# Patient Record
Sex: FEMALE | Race: AMERICAN INDIAN OR ALASKA NATIVE | Employment: OTHER | URBAN - METROPOLITAN AREA
[De-identification: names, ages, dates, MRNs, and addresses within clinical notes are randomized per-mention and may not be internally consistent; named-entity substitution may affect disease eponyms.]

---

## 2019-03-13 ENCOUNTER — OFFICE VISIT (OUTPATIENT)
Dept: INTERNAL MEDICINE CLINIC | Age: 59
End: 2019-03-13

## 2019-03-13 VITALS
WEIGHT: 201.1 LBS | OXYGEN SATURATION: 97 % | BODY MASS INDEX: 29.7 KG/M2 | HEART RATE: 90 BPM | DIASTOLIC BLOOD PRESSURE: 85 MMHG | TEMPERATURE: 98 F | SYSTOLIC BLOOD PRESSURE: 120 MMHG

## 2019-03-13 DIAGNOSIS — M54.12 CERVICAL RADICULOPATHY: ICD-10-CM

## 2019-03-13 DIAGNOSIS — K56.1 INTUSSUSCEPTION INTESTINE (HCC): ICD-10-CM

## 2019-03-13 DIAGNOSIS — Z12.39 BREAST CANCER SCREENING: ICD-10-CM

## 2019-03-13 DIAGNOSIS — M54.2 NECK PAIN: Primary | ICD-10-CM

## 2019-03-13 DIAGNOSIS — Z12.4 CERVICAL CANCER SCREENING: ICD-10-CM

## 2019-03-13 DIAGNOSIS — M54.50 ACUTE BILATERAL LOW BACK PAIN WITHOUT SCIATICA: ICD-10-CM

## 2019-03-13 RX ORDER — LOSARTAN POTASSIUM 25 MG/1
TABLET ORAL DAILY
COMMUNITY
End: 2019-03-27 | Stop reason: SDUPTHER

## 2019-03-13 RX ORDER — ALBUTEROL SULFATE 90 UG/1
1-2 AEROSOL, METERED RESPIRATORY (INHALATION) AS NEEDED
COMMUNITY
End: 2021-07-02

## 2019-03-13 RX ORDER — PREDNISONE 20 MG/1
20 TABLET ORAL 3 TIMES DAILY
Qty: 21 TAB | Refills: 0 | Status: SHIPPED | OUTPATIENT
Start: 2019-03-13 | End: 2019-03-20

## 2019-03-13 RX ORDER — GABAPENTIN 300 MG/1
300 CAPSULE ORAL 3 TIMES DAILY
COMMUNITY
End: 2019-04-24

## 2019-03-13 RX ORDER — MELOXICAM 15 MG/1
15 TABLET ORAL DAILY
Qty: 30 TAB | Refills: 3 | Status: SHIPPED | OUTPATIENT
Start: 2019-03-13 | End: 2019-07-23 | Stop reason: SDUPTHER

## 2019-03-13 RX ORDER — HYOSCYAMINE SULFATE 0.12 MG/1
0.12 TABLET SUBLINGUAL
Qty: 90 TAB | Refills: 0 | Status: SHIPPED | OUTPATIENT
Start: 2019-03-13 | End: 2019-04-24

## 2019-03-13 NOTE — PROGRESS NOTES
Chief Complaint   Patient presents with    Generalized Body Aches     she is a 66y.o. year old female who presents for evaluation of Body aches  Pain Assessment Encounter      Magdiel Dose  0/56/2790  Onset of Symptoms: neck pain, back pain   ________________________________________________________________________  Description: States that she has had lower back pain for many years. She is been through physical therapy 6 sessions with the [facility] back in November 2018. States that she is had an MRI done in 2004 which showed disc herniation and has had an epidural steroid injection of her lumbar spine. Regards to her neck she has had 5-day history of bilateral neck pain with radiation at times into her arms. States that she has not had any recent imaging but does have a chiropractor who she sees regularly. Frequency: more than 5 times a day  Pain Scale:(1-10): 7  Trauma Hx: fall, on on back,   Hx of similar symptoms: Yes  Radiation: YES, foot and leg  Duration:  continuous      Progression: has worsened  What makes it better?: heat and ice  What makes it worse?:exercise, sitting, strecthing and walking  Medications tried: acetaminophen, ibuprofen, aspirin    Reviewed and agree with Nurse Note and duplicated in this note. Reviewed PmHx, RxHx, FmHx, SocHx, AllgHx and updated and dated in the chart. No family history on file. No past medical history on file.    Social History     Socioeconomic History    Marital status: LEGALLY      Spouse name: Not on file    Number of children: Not on file    Years of education: Not on file    Highest education level: Not on file   Tobacco Use    Smoking status: Former Smoker   Substance and Sexual Activity    Alcohol use: Yes     Comment: occassionally    Drug use: No        Review of Systems - negative except as listed above      Objective:     Vitals:    03/13/19 1148   BP: 120/85   Pulse: 90   Temp: 98 °F (36.7 °C)   SpO2: 97%   Weight: 201 lb 1.6 oz (91.2 kg)       Physical Examination: General appearance - alert, well appearing, and in no distress  Back exam -positive straight-leg raise bilaterally, pain with palpation of bilateral low back, deep tendon reflexes intact  Neurological - alert, oriented, normal speech, no focal findings or movement disorder noted  Musculoskeletal -neck-negative Spurling's bilaterally with pain reproduced and to upper traps, pain with palpation of upper traps and spasms. Extremities - peripheral pulses normal, no pedal edema, no clubbing or cyanosis  Skin - normal coloration and turgor, no rashes, no suspicious skin lesions noted    Assessment/ Plan:   Diagnoses and all orders for this visit:    1. Neck pain  -     XR SPINE CERV 4 OR 5 V; Future  -     REFERRAL TO PHYSICAL THERAPY    2. Cervical radiculopathy  -     XR SPINE CERV 4 OR 5 V; Future  -     REFERRAL TO PHYSICAL THERAPY    3. Acute bilateral low back pain without sciatica  -     REFERRAL TO PHYSICAL THERAPY  -     MRI LUMB SPINE WO CONT; Future    4. Intussusception intestine (HCC)  -     hyoscyamine SL (LEVSIN/SL) 0.125 mg SL tablet; 1 Tab by SubLINGual route every four (4) hours as needed for Cramping.  -     REFERRAL TO GASTROENTEROLOGY    5. Cervical cancer screening  -     REFERRAL TO OBSTETRICS AND GYNECOLOGY    6. Breast cancer screening  -     [facility] MAMMO BI SCREENING INCL CAD; Future    Other orders  -     meloxicam (MOBIC) 15 mg tablet; Take 1 Tab by mouth daily. -     predniSONE (DELTASONE) 20 mg tablet; Take 20 mg by mouth three (3) times daily for 7 days.          Pathophysiology, recovery and rehabilitation process discussed and questions answered   Counseling for 30 Minutes of the total visit duration   Pictures and figures used as necessary   Provided reassurance   Monitor response to Physical Therapy   Recommend activity modification   Recommend  lower impact activities-walking, Eliptical, Nordic Track, cycling or swimming   Follow up in 4 week(s) 1) Remember to stay active and/or exercise regularly (I suggest 30-45 minutes daily)   2) For reliable dietary information, go to www. EATRIGHT.org. You may wish to consider seeing the nutritionist at [facility] [phone], also consider the [address] I have discussed the diagnosis with the patient and the intended plan as seen in the above orders. The patient has received an after-visit summary and questions were answered concerning future plans. Medication Side Effects and Warnings were discussed with patient,  Patient Labs were reviewed and or requested, and  Patient Past Records were reviewed and or requested  yes      Pt agrees to call or return to clinic and/or go to closest ER with any worsening of symptoms. This may include, but not limited to increased fever (>100.4) with NSAIDS or Tylenol, increased edema, confusion, rash, worsening of presenting symptoms.

## 2019-03-19 ENCOUNTER — TELEPHONE (OUTPATIENT)
Dept: INTERNAL MEDICINE CLINIC | Age: 59
End: 2019-03-19

## 2019-03-20 ENCOUNTER — OFFICE VISIT (OUTPATIENT)
Dept: INTERNAL MEDICINE CLINIC | Age: 59
End: 2019-03-20

## 2019-03-20 VITALS
SYSTOLIC BLOOD PRESSURE: 144 MMHG | RESPIRATION RATE: 16 BRPM | OXYGEN SATURATION: 96 % | BODY MASS INDEX: 30.36 KG/M2 | HEIGHT: 69 IN | WEIGHT: 205 LBS | HEART RATE: 79 BPM | DIASTOLIC BLOOD PRESSURE: 89 MMHG | TEMPERATURE: 97.7 F

## 2019-03-20 DIAGNOSIS — T78.40XA ALLERGIC REACTION, INITIAL ENCOUNTER: Primary | ICD-10-CM

## 2019-03-20 DIAGNOSIS — H53.8 BLURRY VISION, LEFT EYE: ICD-10-CM

## 2019-03-20 NOTE — PROGRESS NOTES
Chief Complaint Patient presents with  Allergic Reaction  
 
she is a 66y.o. year old female who presents for follow-up of possibly having an allergic reaction. Patient states she has a foggy memory, not feeling herself, and elevated bp after taking prednisone and meloxicam. Patient stopped taking meds yesterday. Reviewed and agree with Nurse Note and duplicated in this note. Reviewed PmHx, RxHx, FmHx, SocHx, AllgHx and updated and dated in the chart. History reviewed. No pertinent family history. History reviewed. No pertinent past medical history. Social History Socioeconomic History  Marital status: LEGALLY  Spouse name: Not on file  Number of children: Not on file  Years of education: Not on file  Highest education level: Not on file Tobacco Use  Smoking status: Former Smoker  Smokeless tobacco: Never Used Substance and Sexual Activity  Alcohol use: Yes Comment: occassionally  Drug use: No  
 Sexual activity: Yes Review of Systems - negative except as listed above Objective:  
 
Vitals:  
 03/20/19 1324 BP: 144/89 Pulse: 79 Resp: 16 Temp: 97.7 °F (36.5 °C) TempSrc: Oral  
SpO2: 96% Weight: 205 lb (93 kg) Height: 5' 9\" (1.753 m) Physical Examination: General appearance - alert, well appearing, and in no distress Eyes - pupils equal and reactive, extraocular eye movements intact Ears - bilateral TM's and external ear canals normal 
Nose - normal and patent, no erythema, discharge or polyps Mouth - mucous membranes moist, pharynx normal without lesions Neck - supple, no significant adenopathy Chest - clear to auscultation, no wheezes, rales or rhonchi, symmetric air entry Heart - normal rate, regular rhythm, normal S1, S2, no murmurs, rubs, clicks or gallops Abdomen - soft, nontender, nondistended, no masses or organomegaly Extremities - peripheral pulses normal, no pedal edema, no clubbing or cyanosis Skin - normal coloration and turgor, no rashes, no suspicious skin lesions noted Assessment/ Plan:  
Diagnoses and all orders for this visit: 1. Allergic reaction, initial encounter 2. Blurry vision, left eye 
-     REFERRAL TO OPHTHALMOLOGY Patient will continue to monitor symptoms over the next day and if any worsening symptoms we will proceed to ER. Patient will hold off on both meloxicam and prednisone. This likely reaction to the prednisone and she may restart meloxicam after she feels the symptoms have resolved. Follow-up Disposition: Not on File I have discussed the diagnosis with the patient and the intended plan as seen in the above orders. The patient has received an after-visit summary and questions were answered concerning future plans. Medication Side Effects and Warnings were discussed with patient, Patient Labs were reviewed and or requested, and 
Patient Past Records were reviewed and or requested  yes Pt agrees to call or return to clinic and/or go to closest ER with any worsening of symptoms. This may include, but not limited to increased fever (>100.4) with NSAIDS or Tylenol, increased edema, confusion, rash, worsening of presenting symptoms.

## 2019-03-25 ENCOUNTER — HOSPITAL ENCOUNTER (OUTPATIENT)
Dept: PHYSICAL THERAPY | Age: 59
Discharge: HOME | End: 2019-03-25
Payer: MEDICAID

## 2019-03-25 DIAGNOSIS — M54.50 ACUTE BILATERAL LOW BACK PAIN WITHOUT SCIATICA: ICD-10-CM

## 2019-03-25 DIAGNOSIS — M54.12 CERVICAL RADICULOPATHY: ICD-10-CM

## 2019-03-25 DIAGNOSIS — M54.2 NECK PAIN: ICD-10-CM

## 2019-03-25 PROCEDURE — 97110 THERAPEUTIC EXERCISES: CPT

## 2019-03-25 PROCEDURE — 97162 PT EVAL MOD COMPLEX 30 MIN: CPT

## 2019-03-26 ENCOUNTER — OFFICE VISIT (OUTPATIENT)
Dept: INTERNAL MEDICINE CLINIC | Age: 59
End: 2019-03-26

## 2019-03-26 VITALS
OXYGEN SATURATION: 97 % | WEIGHT: 202.8 LBS | HEART RATE: 103 BPM | SYSTOLIC BLOOD PRESSURE: 128 MMHG | BODY MASS INDEX: 30.04 KG/M2 | DIASTOLIC BLOOD PRESSURE: 85 MMHG | HEIGHT: 69 IN

## 2019-03-26 DIAGNOSIS — Z00.00 WELL WOMAN EXAM (NO GYNECOLOGICAL EXAM): Primary | ICD-10-CM

## 2019-03-26 NOTE — PROGRESS NOTES
Chief Complaint Patient presents with  Well Woman  
 
she is a 66y.o. year old female who presents for CPE Complete Physical Exam Questions: LMP -  1/2019 Last Pap (q 3 years, or q5 with HPV) - over 10 years ago Last Mammogram (50-74 biennially)- over 10 years ago Hx of abnl Pap - No 
 
1. Do you follow a low fat diet?  no 
2. Are you up to date on your Tdap (<10 years)? No 
3. Have you ever had a Pneumovax vaccine (>65)? Unknown [id number] Not applicable [id number] Not applicable 4. Have you had Zoster vaccine (>60)? Not applicable 5. Have you had the HPV - Gardasil (13- 26)? Not applicable 6. Do you follow an exercise program?  no 
7. Do you smoke?  no 
8. Do you consider yourself overweight?  yes 9. Is there a family history of CAD< age 48? Yes, sister heart valve defect. 10.  Is there a family history of Cancer?  yes 11. Do you know your Cancer risks? Yes 12. Have you had a colonoscopy? No, pt states it is scheduled for 4/25/2019. 13. Have you been tested for HIV or other STI's? No HIV testing today(18-66 y/o)? No 
14. Have had a bone density scan or DEXA done(>65)? Not applicable 15. Have you had an EKG performed in the last five years (>50)? No 
 
Other complaints: 
 
Reviewed and agree with Nurse Note and duplicated in this note. Reviewed PmHx, RxHx, FmHx, SocHx, AllgHx and updated and dated in the chart. No family history on file. No past medical history on file. Social History Socioeconomic History  Marital status: LEGALLY  Spouse name: Not on file  Number of children: Not on file  Years of education: Not on file  Highest education level: Not on file Tobacco Use  Smoking status: Former Smoker  Smokeless tobacco: Never Used Substance and Sexual Activity  Alcohol use: Yes Comment: occassionally  Drug use: No  
 Sexual activity: Yes Review of Systems - negative except as listed above Objective:  
 
Vitals: 03/26/19 1050 BP: 128/85 Pulse: (!) 103 SpO2: 97% Weight: 202 lb 12.8 oz (92 kg) Height: 5' 9\" (1.753 m) Physical Examination: General appearance - alert, well appearing, and in no distress Eyes - pupils equal and reactive, extraocular eye movements intact Ears - bilateral TM's and external ear canals normal 
Nose - normal and patent, no erythema, discharge or polyps Mouth - mucous membranes moist, pharynx normal without lesions Neck - supple, no significant adenopathy Chest - clear to auscultation, no wheezes, rales or rhonchi, symmetric air entry Heart - normal rate, regular rhythm, normal S1, S2, no murmurs, rubs, clicks or gallops Abdomen - soft, nontender, nondistended, no masses or organomegaly Musculoskeletal - no joint tenderness, deformity or swelling Extremities - peripheral pulses normal, no pedal edema, no clubbing or cyanosis Skin - normal coloration and turgor, no rashes, no suspicious skin lesions noted Assessment/ Plan:  
Diagnoses and all orders for this visit: 
 
1. Well woman exam (no gynecological exam) -     CBC W/O DIFF 
-     LIPID PANEL 
-     METABOLIC PANEL, COMPREHENSIVE Labs to be drawn: CBC, CMP, Lipid I have discussed the diagnosis with the patient and the intended plan as seen in the above orders. The patient has received an after-visit summary and questions were answered concerning future plans. Medication Side Effects and Warnings were discussed with patient, Patient Labs were reviewed and or requested, and 
Patient Past Records were reviewed and or requested  yes Pt agrees to call or return to clinic and/or go to closest ER with any worsening of symptoms. This may include, but not limited to increased fever (>100.4) with NSAIDS or Tylenol, increased edema, confusion, rash, worsening of presenting symptoms.

## 2019-03-26 NOTE — PROGRESS NOTES
[facility]  [id number] [address] [place], [address] [place] [address]  Phone: [phone]  Fax: [phone]    Plan of Care/Statement of Necessity for Physical Therapy Services  2-15    Patient name: Selena Barker  : 1960  Provider#: [id number]  Referral source: Geno Mcdonald MD      Medical/Treatment Diagnosis: Neck pain [M54.2]  Cervical radiculopathy [M54.12]  Acute bilateral low back pain without sciatica [M54.5]     Prior Hospitalization: see medical history     Comorbidities: Allergies, Anxiety or Panic Disorders, Arthritis, Asthma, Back pain, BMI>30, Gastrointestinal Disease, Headaches, High blood pressure, Prior surgery  Prior Level of Function: Patient completed 20 minutes of exercise at least 3 times a week. Medications: Verified on Patient Summary List    Start of Care: 3/25/19      Onset Date: Chronic       The Plan of Care and following information is based on the information from the initial evaluation. Assessment/ key information: Pt is a 66year old female who was referred to skilled PT for chronic back and neck pain with cervical radiculopathy. Based on examination, patient presents with an extension directional preference and various impairments associated with her chronic pain including decreased cervical/lumbar ROM, poor postural strength and awareness, decreased hip and core strength, impaired gait mechanics, and decreased functional activity tolerance. Patient will benefit from skilled PT services to modify and progress therapeutic interventions, address functional mobility deficits, address ROM deficits, address strength deficits, analyze and address soft tissue restrictions, analyze and cue movement patterns, analyze and modify body mechanics/ergonomics and assess and modify postural abnormalities to attain pt/PT goals.       Evaluation Complexity History MEDIUM  Complexity : 1-2 comorbidities / personal factors will impact the outcome/ POC ; Examination MEDIUM Complexity : 3 Standardized tests and measures addressing body structure, function, activity limitation and / or participation in recreation  ;Presentation MEDIUM Complexity : Evolving with changing characteristics  ; Clinical Decision Making MEDIUM Complexity : FOTO score of 26-74  Overall Complexity Rating: MEDIUM    Problem List: pain affecting function, decrease ROM, decrease strength, edema affecting function, impaired gait/ balance, decrease ADL/ functional abilitiies, decrease activity tolerance and decrease flexibility/ joint mobility   Treatment Plan may include any combination of the following: Therapeutic exercise, Therapeutic activities, Neuromuscular re-education, Physical agent/modality, Gait/balance training, Manual therapy, Patient education, Functional mobility training and Stair training  Patient / Family readiness to learn indicated by: asking questions, trying to perform skills and interest  Persons(s) to be included in education: patient (P)  Barriers to Learning/Limitations: None  Patient Goal (s): Relief  Patient Self Reported Health Status: fair  Rehabilitation Potential: fair    Short Term Goals: To be accomplished in 6 treatments:   1. Pt will be independent and compliant with HEP. 2. Pt will report improved sitting and standing postural awareness. 3. Pt will improve cervical rotation AROM from 40 to >/= 50 degrees bilaterally. Long Term Goals: To be accomplished in 12 treatments:   1. Pt will be independent and compliant with HEP. 2. Pt will improve FOTO score by the MCID demonstrating improved overall function with decreased pain or discomfort. 3. Pt will improve cervical rotation AROM to >/= 60 degrees bilaterally. 4. Pt will be able to stand >/= 2 hours without complaints of low back pain. 5. Pt will be able to tend to her garden without pain > 2/10. 6. Pt will be able to carry groceries without complaints of neck or back pain.   Frequency / Duration: Patient to be seen 2 times per week for 12 treatments. Patient/ Caregiver education and instruction: self care, activity modification and exercises    [x]  Plan of care has been reviewed with STACEY Romero 3/26/2019     ________________________________________________________________________    I certify that the above Therapy Services are being furnished while the patient is under my care. I agree with the treatment plan and certify that this therapy is necessary.     [de-identified] Signature:____________________  Date:____________Time: _________

## 2019-03-27 ENCOUNTER — OFFICE VISIT (OUTPATIENT)
Dept: OBGYN CLINIC | Age: 59
End: 2019-03-27

## 2019-03-27 VITALS
DIASTOLIC BLOOD PRESSURE: 90 MMHG | BODY MASS INDEX: 29.92 KG/M2 | WEIGHT: 202 LBS | HEIGHT: 69 IN | SYSTOLIC BLOOD PRESSURE: 160 MMHG

## 2019-03-27 DIAGNOSIS — Z01.419 ENCOUNTER FOR GYNECOLOGICAL EXAMINATION WITHOUT ABNORMAL FINDING: Primary | ICD-10-CM

## 2019-03-27 DIAGNOSIS — R73.9 ELEVATED BLOOD SUGAR: Primary | ICD-10-CM

## 2019-03-27 LAB
ALBUMIN SERPL-MCNC: 4.2 G/DL (ref 3.5–5.5)
ALBUMIN/GLOB SERPL: 1.2 {RATIO} (ref 1.2–2.2)
ALP SERPL-CCNC: 106 IU/L (ref 39–117)
ALT SERPL-CCNC: 24 IU/L (ref 0–32)
AST SERPL-CCNC: 15 IU/L (ref 0–40)
BILIRUB SERPL-MCNC: 0.6 MG/DL (ref 0–1.2)
BUN SERPL-MCNC: 10 MG/DL (ref 6–24)
BUN/CREAT SERPL: 14 (ref 9–23)
CALCIUM SERPL-MCNC: 8.9 MG/DL (ref 8.7–10.2)
CHLORIDE SERPL-SCNC: 103 MMOL/L (ref 96–106)
CHOLEST SERPL-MCNC: 221 MG/DL (ref 100–199)
CO2 SERPL-SCNC: 23 MMOL/L (ref 20–29)
CREAT SERPL-MCNC: 0.71 MG/DL (ref 0.57–1)
ERYTHROCYTE [DISTWIDTH] IN BLOOD BY AUTOMATED COUNT: 15 % (ref 12.3–15.4)
GLOBULIN SER CALC-MCNC: 3.4 G/DL (ref 1.5–4.5)
GLUCOSE SERPL-MCNC: 111 MG/DL (ref 65–99)
HCT VFR BLD AUTO: 35.1 % (ref 34–46.6)
HDLC SERPL-MCNC: 61 MG/DL
HGB BLD-MCNC: 11.6 G/DL (ref 11.1–15.9)
LDLC SERPL CALC-MCNC: 139 MG/DL (ref 0–99)
MCH RBC QN AUTO: 29.1 PG (ref 26.6–33)
MCHC RBC AUTO-ENTMCNC: 33 G/DL (ref 31.5–35.7)
MCV RBC AUTO: 88 FL (ref 79–97)
PLATELET # BLD AUTO: 330 X10E3/UL (ref 150–379)
POTASSIUM SERPL-SCNC: 4.3 MMOL/L (ref 3.5–5.2)
PROT SERPL-MCNC: 7.6 G/DL (ref 6–8.5)
RBC # BLD AUTO: 3.98 X10E6/UL (ref 3.77–5.28)
SODIUM SERPL-SCNC: 140 MMOL/L (ref 134–144)
TRIGL SERPL-MCNC: 107 MG/DL (ref 0–149)
VLDLC SERPL CALC-MCNC: 21 MG/DL (ref 5–40)
WBC # BLD AUTO: 8.5 X10E3/UL (ref 3.4–10.8)

## 2019-03-27 RX ORDER — LOSARTAN POTASSIUM 25 MG/1
25 TABLET ORAL DAILY
Qty: 30 TAB | Refills: 5 | Status: SHIPPED | OUTPATIENT
Start: 2019-03-27 | End: 2019-10-10 | Stop reason: SDUPTHER

## 2019-03-27 RX ORDER — FLUTICASONE PROPIONATE 50 MCG
2 SPRAY, SUSPENSION (ML) NASAL DAILY
COMMUNITY
End: 2019-04-24

## 2019-03-27 NOTE — PATIENT INSTRUCTIONS

## 2019-03-27 NOTE — PROGRESS NOTES
Your sugars are slightly elevated, please return to clinic at your convenience to take a look at your 3-month average sugar levels. This is called the hemoglobin A1c and is a lab draw Your \"Bad\" cholesterol (LDL and/or triglycerides) are elevated. Please eat a healthier diet as described below. In particular avoid fried, fatty and junk foods, while increasing fiber (fruits and vegetables). If you cannot increase fiber through diet, you can supplement with metamucil as directed on bottle daily. Also, make sure you are taking 1 to 2 grams of over the counter fish oil. Increase exercise to 5 times per week of cardio lasting at least 30 min's each (biking, walking, elliptical, swimming). Lets recheck the fasting (atleast eight hours) in 6 months. Mediterranean diet: Choose this heart-healthy diet option The Mediterranean diet is a heart-healthy eating plan combining elements of Mediterranean-style cooking. Here's how to adopt the Mediterranean diet. If you're looking for a heart-healthy eating plan, the Mediterranean diet might be right for you. The Mediterranean diet incorporates the basics of healthy eating  plus a splash of flavorful olive oil and perhaps a glass of red wine  among other components characterizing the traditional cooking style of countries bordering the [facility]. Most healthy diets include fruits, vegetables, fish and whole grains, and limit unhealthy fats. While these parts of a healthy diet remain tried-and-true, subtle variations or differences in proportions of certain foods may make a difference in your risk of heart disease. Benefits of the [address] [facility] Research has shown that the traditional Mediterranean diet reduces the risk of heart disease.  In fact, a recent analysis of more than 1.5 million healthy adults demonstrated that following a Mediterranean diet was associated with a reduced risk of overall and cardiovascular mortality, a reduced incidence of cancer and cancer mortality, and a reduced incidence of Parkinson's and Alzheimer's diseases. For this reason, most if not all major scientific organizations encourage healthy adults to adapt a style of eating like that of the [address] [facility] for prevention of major chronic diseases. Key components of the Mediterranean diet The Mediterranean diet emphasizes:  
Getting plenty of exercise Eating primarily plant-based foods, such as fruits and vegetables, whole grains, legumes and nuts Replacing butter with healthy fats such as olive oil and canola oil Using herbs and spices instead of salt to flavor foods Limiting red meat to no more than a few times a month Eating fish and poultry at least twice a week Drinking red wine in moderation (optional) The diet also recognizes the importance of enjoying meals with family and friends. Fruits, vegetables, nuts and grains The Mediterranean diet traditionally includes fruits, vegetables, pasta and rice. For example, residents of [facility] eat very little red meat and average nine servings a day of antioxidant-rich fruits and vegetables. The Mediterranean diet has been associated with a lower level of oxidized low-density lipoprotein (LDL) cholesterol  the \"bad\" cholesterol that's more likely to build up deposits in your arteries. Nuts are another part of a healthy Mediterranean diet. Nuts are high in fat (approximately 80 percent of their calories come from fat), but most of the fat is not saturated. Because nuts are high in calories, they should not be eaten in large amounts  generally no more than a handful a day. For the best nutrition, avoid candied or honey-roasted and heavily salted nuts. Grains in the [address] [place] [address] region are typically whole grain and usually contain very few unhealthy trans fats, and bread is an important part of the diet there.  However, throughout the [address] [place] [address] region, bread is eaten plain or dipped in olive oil  not eaten with butter or margarines, which contain saturated or trans fats.

## 2019-03-27 NOTE — PROGRESS NOTES
Annual exam    Gaetano Rodriguez is a 66 y.o. presenting for annual exam.   Her main concerns today include none    Ob/Gyn Hx:    No LMP recorded (lmp unknown). Menarche- age  Menses- regular monthly cycles? no, last 2 days, passage of clots? no, intermenstrual spotting? yes, Number of pads/tampons per day? 3  Contraception- none  STI- denies  SA- yes, female only    Health maintenance:  Pap- unknown  Mammo- scheduled in 2019  Colonoscopy- scheduled in 2019  Gardasil- none per patient    Past Medical History:   Diagnosis Date    Degenerative spinal arthritis     Hypertension     IBS (irritable bowel syndrome)        Past Surgical History:   Procedure Laterality Date    HX  SECTION      HX CHOLECYSTECTOMY         History reviewed. No pertinent family history.     Social History     Socioeconomic History    Marital status: LEGALLY      Spouse name: Not on file    Number of children: Not on file    Years of education: Not on file    Highest education level: Not on file   Occupational History    Not on file   Social Needs    Financial resource strain: Not on file    Food insecurity:     Worry: Not on file     Inability: Not on file    Transportation needs:     Medical: Not on file     Non-medical: Not on file   Tobacco Use    Smoking status: Former Smoker    Smokeless tobacco: Never Used   Substance and Sexual Activity    Alcohol use: Yes     Comment: occassionally    Drug use: No    Sexual activity: Yes     Partners: Female   Lifestyle    Physical activity:     Days per week: Not on file     Minutes per session: Not on file    Stress: Not on file   Relationships    Social connections:     Talks on phone: Not on file     Gets together: Not on file     Attends Orthodox service: Not on file     Active member of club or organization: Not on file     Attends meetings of clubs or organizations: Not on file     Relationship status: Not on file    Intimate partner violence:     Fear of current or ex partner: Not on file     Emotionally abused: Not on file     Physically abused: Not on file     Forced sexual activity: Not on file   Other Topics Concern    Not on file   Social History Narrative    Not on file       Current Outpatient Medications   Medication Sig Dispense Refill    fluticasone propionate (FLONASE ALLERGY RELIEF) 50 mcg/actuation nasal spray 2 Sprays by Both Nostrils route daily.  gabapentin (NEURONTIN) 300 mg capsule Take 300 mg by mouth three (3) times daily.  losartan (COZAAR) 25 mg tablet Take  by mouth daily.  albuterol (PROAIR HFA) 90 mcg/actuation inhaler Take  by inhalation.  hyoscyamine SL (LEVSIN/SL) 0.125 mg SL tablet 1 Tab by SubLINGual route every four (4) hours as needed for Cramping. 90 Tab 0    meloxicam (MOBIC) 15 mg tablet Take 1 Tab by mouth daily. 30 Tab 3    B INFANTIS/B ANI/B IGOR/B BIFID (PROBIOTIC DIGESTIVE CARE PO) Take  by mouth.  RANITIDINE HCL (ZANTAC PO) Take  by mouth.  INULIN (FIBER GUMMIES PO) Take  by mouth.  BUSPIRONE HCL (BUSPAR PO) Take  by mouth.          Allergies   Allergen Reactions    Egg Yolk Swelling    Lactaid [Lactase] Other (comments)     Bloating/gas    Prednisone Swelling       Review of Systems - History obtained from the patient  Constitutional: negative for weight loss, fever, night sweats  HEENT: negative for hearing loss, earache, congestion, snoring, sorethroat  CV: negative for chest pain, palpitations, edema  Resp: negative for cough, shortness of breath, wheezing  GI: negative for change in bowel habits, abdominal pain, black or bloody stools  : negative for frequency, dysuria, hematuria, vaginal discharge  MSK: negative for back pain, joint pain, muscle pain  Breast: negative for breast lumps, nipple discharge, galactorrhea  Skin :negative for itching, rash, hives  Neuro: negative for dizziness, headache, confusion, weakness  Psych: negative for anxiety, depression, change in mood  Heme/lymph: negative for bleeding, bruising, pallor    Physical Exam    Visit Vitals  /90 (BP 1 Location: Left arm, BP Patient Position: Sitting)   Ht 5' 9\" (1.753 m)   Wt 202 lb (91.6 kg)   LMP  (LMP Unknown)   BMI 29.83 kg/m²       Constitutional  · Appearance: well-nourished, well developed, alert, in no acute distress    HENT  · Head and Face: appears normal    Neck  · Inspection/Palpation: normal appearance, no masses or tenderness  · Lymph Nodes: no lymphadenopathy present  · Thyroid: gland size normal, nontender, no nodules or masses present on palpation    Chest  · Respiratory Effort: non-labored breathing  · Auscultation: CTAB, normal breath sounds    Cardiovascular  · Heart:  · Auscultation: regular rate and rhythm without murmur  · Extremities: no peripheral edema    Breasts  · Inspection of Breasts: breasts symmetrical, no skin changes, no discharge present, nipple appearance normal, no skin retraction present  · Palpation of Breasts and Axillae: no masses present on palpation, no breast tenderness  · Axillary Lymph Nodes: no lymphadenopathy present    Gastrointestinal  · Abdominal Examination: abdomen non-tender to palpation, normal bowel sounds, no masses present  · Liver and spleen: no hepatomegaly present, spleen not palpable  · Hernias: no hernias identified    Genitourinary  · External Genitalia: normal appearance for age, no discharge present, no tenderness present, no inflammatory lesions present, no masses present, no atrophy present  · Vagina: normal vaginal vault without central or paravaginal defects, no discharge present, no inflammatory lesions present, no masses present  · Bladder: non-tender to palpation  · Urethra: appears normal  · Cervix: normal   · Uterus: normal size, shape and consistency  · Adnexa: no adnexal tenderness present, no adnexal masses present-limited by habitus  · Perineum: perineum within normal limits, no evidence of trauma, no rashes or skin lesions present    Skin  · General Inspection: no rash, no lesions identified    Neurologic/Psychiatric  · Mental Status:  · Orientation: grossly oriented to person, place and time  · Mood and Affect: mood normal, affect appropriate      Assessment/Plan:  66 y.o. overall doing well.      Health Maintenance:  -counseled re: diet, exercise, healthy lifestyle  -pap/HPV  sent  -STI testing SENT      -refer for mammo and colonoscopy scheduled    RTC: 1 year for annual wellness assessment, or sooner prn for problems or concerns  -handouts and instructions provided    Deanne Castellon  3/27/2019  2:17 PM     Signed By: Governor MD Mariaa     March 27, 2019

## 2019-03-29 ENCOUNTER — DOCUMENTATION ONLY (OUTPATIENT)
Dept: INTERNAL MEDICINE CLINIC | Age: 59
End: 2019-03-29

## 2019-03-29 ENCOUNTER — HOSPITAL ENCOUNTER (OUTPATIENT)
Dept: MAMMOGRAPHY | Age: 59
Discharge: HOME | End: 2019-03-29
Attending: FAMILY MEDICINE
Payer: MEDICAID

## 2019-03-29 ENCOUNTER — APPOINTMENT (OUTPATIENT)
Dept: MRI IMAGING | Age: 59
End: 2019-03-29
Attending: FAMILY MEDICINE
Payer: MEDICAID

## 2019-03-29 DIAGNOSIS — Z12.39 BREAST CANCER SCREENING: ICD-10-CM

## 2019-03-29 PROCEDURE — 77067 SCR MAMMO BI INCL CAD: CPT

## 2019-04-01 LAB
C TRACH RRNA CVX QL NAA+PROBE: NEGATIVE
CYTOLOGIST CVX/VAG CYTO: NORMAL
CYTOLOGY CVX/VAG DOC THIN PREP: NORMAL
DX ICD CODE: NORMAL
HPV I/H RISK 4 DNA CVX QL PROBE+SIG AMP: NEGATIVE
Lab: NORMAL
Lab: NORMAL
N GONORRHOEA RRNA CVX QL NAA+PROBE: NEGATIVE
OTHER STN SPEC: NORMAL
PATH REPORT.FINAL DX SPEC: NORMAL
STAT OF ADQ CVX/VAG CYTO-IMP: NORMAL
T VAGINALIS RRNA SPEC QL NAA+PROBE: NEGATIVE

## 2019-04-03 ENCOUNTER — HOSPITAL ENCOUNTER (OUTPATIENT)
Dept: PHYSICAL THERAPY | Age: 59
Discharge: HOME | End: 2019-04-03
Payer: MEDICAID

## 2019-04-03 PROCEDURE — 97110 THERAPEUTIC EXERCISES: CPT

## 2019-04-03 NOTE — PROGRESS NOTES
PT DAILY TREATMENT NOTE 2-15    Patient Name: Dee Costa  [id number]  : 1960  [x]  Patient  Verified  Payor: [employer] / Plan: [facility]UN[facility] PLAN [place] CCCP / Product Type: Managed Care Medicaid /    In time:10:02 am  Out time:11:15 am  Total Treatment Time (min): 73 minutes  Visit #: 2    Treatment Area: Neck pain [M54.2]  Low back pain [M54.5]    SUBJECTIVE  Pain Level (0-10 scale): 3.5/10  Any medication changes, allergies to medications, adverse drug reactions, diagnosis change, or new procedure performed?: [x] No    [] Yes (see summary sheet for update)  Subjective functional status/changes:   [] No changes reported  Pt reports she is doing well today. She woke up with significant pain in R low back to anterior right hip on Friday which lasted all day; she thinks that it was from bending over while picking up a bag. Symptoms eased over the weekend. The neck has been great. OBJECTIVE    Modality rationale: decrease pain and increase tissue extensibility to improve the patients ability to perform ADLs with decreased pain or discomfort.    Min Type Additional Details       [] Estim: []Att   []Unatt    []TENS instruct                  []IFC  []Premod   []NMES                     []Other:  []w/US   []w/ice   []w/heat  Position:  Location:       []  Traction: [] Cervical       []Lumbar                       [] Prone          []Supine                       []Intermittent   []Continuous Lbs:  [] before manual  [] after manual  []w/heat    []  Ultrasound: []Continuous   [] Pulsed                       at: []1MHz   []3MHz Location:  W/cm2:    [] Paraffin         Location:   []w/heat   10 []  Ice     [x]  Heat  []  Ice massage Position: Supine  Location: Lumbar    []  Laser  []  Other: Position:  Location:      []  Vasopneumatic Device Pressure:       [] lo [] med [] hi   Temperature:      [x] Skin assessment post-treatment:  [x]intact []redness- no adverse reaction    []redness - adverse reaction:       63 min Therapeutic Exercise:  [] See flow sheet :   Rationale: increase ROM and increase strength to improve the patients ability to perform ADLs with decreased pain or discomfort. With   [] TE   [] TA   [] neuro   [] other: Patient Education: [x] Review HEP    [] Progressed/Changed HEP based on:   [] positioning   [] body mechanics   [] transfers   [] heat/ice application    [] other:      Other Objective/Functional Measures:     TTP: L>R PSIS; lumbar spinous processes     Pain Level (0-10 scale) post treatment: 1/10    ASSESSMENT/Changes in Function:   Pt was noting radiating symptoms into left foot beginning in the session. SRIKANTH increased pain in low back and did not improve symptoms in left foot, per Pt. However, she noted decreased symptoms in LE and back throughout session; continue to monitor effect of repeated extension going forward due to extension preference noted in evaluation. Pt noted significant challenge with L-sided clam shells, though was able to perform without pain. Continue progressing therex as tolerated. Patient will continue to benefit from skilled PT services to modify and progress therapeutic interventions, address functional mobility deficits, address ROM deficits, address strength deficits, analyze and address soft tissue restrictions, analyze and cue movement patterns, analyze and modify body mechanics/ergonomics and assess and modify postural abnormalities to attain remaining goals. [x]  See Plan of Care  []  See progress note/recertification  []  See Discharge Summary         Progress towards goals / Updated goals:   Short Term Goals: To be accomplished in 6 treatments:               1. Pt will be independent and compliant with HEP. 2. Pt will report improved sitting and standing postural awareness. 3. Pt will improve cervical rotation AROM from 40 to >/= 50 degrees bilaterally. Long Term Goals:  To be accomplished in 12 treatments:               1. Pt will be independent and compliant with HEP. 2. Pt will improve FOTO score by the MCID demonstrating improved overall function with decreased pain or discomfort. 3. Pt will improve cervical rotation AROM to >/= 60 degrees bilaterally. 4. Pt will be able to stand >/= 2 hours without complaints of low back pain. 5. Pt will be able to tend to her garden without pain > 2/10. 6. Pt will be able to carry groceries without complaints of neck or back pain.         PLAN  [x]  Upgrade activities as tolerated     [x]  Continue plan of care  []  Update interventions per flow sheet       []  Discharge due to:_  []  Other:_      Nikolai Encinas 4/3/2019

## 2019-04-08 ENCOUNTER — HOSPITAL ENCOUNTER (OUTPATIENT)
Dept: PHYSICAL THERAPY | Age: 59
Discharge: HOME | End: 2019-04-08
Payer: MEDICAID

## 2019-04-08 PROCEDURE — 97110 THERAPEUTIC EXERCISES: CPT

## 2019-04-08 NOTE — PROGRESS NOTES
PT DAILY TREATMENT NOTE 2-15    Patient Name: Maria G Auguste  IBWO:[id number]  : 1960  [x]  Patient  Verified  Payor: Cindi Carmona / Plan: [facility] COMMUNITY PLAN [facility] CCCP / Product Type: Managed Care Medicaid /    In time:10:02 am  Out time:10:58 am  Total Treatment Time (min): 56 minutes  Visit #: 3    Treatment Area: Neck pain [M54.2]  Low back pain [M54.5]     SUBJECTIVE  Pain Level (0-10 scale): 3.510  Any medication changes, allergies to medications, adverse drug reactions, diagnosis change, or new procedure performed?: [x] No    [] Yes (see summary sheet for update)  Subjective functional status/changes:   [] No changes reported  She reports she was on her feet for a prolonged time on Saturday and she noted stiffness in low back. She has been pretty diligent with her exercises and is more aware of her posture. OBJECTIVE    56 min Therapeutic Exercise:  [] See flow sheet :   Rationale: increase ROM and increase strength to improve the patients ability to perform ADLs with decreased pain or discomfort. With   [] TE   [] TA   [] neuro   [] other: Patient Education: [x] Review HEP    [] Progressed/Changed HEP based on:   [] positioning   [] body mechanics   [] transfers   [] heat/ice application    [] other:      Other Objective/Functional Measures:     TTP: L>R PSIS; lumbar spinous processes     Pain Level (0-10 scale) post treatment: 1/10    ASSESSMENT/Changes in Function:   Added split stance rows and shoulder extensions to promote both core stability as well as periscapular/postural strength cuing for decreased shoulder shrug and proper posture; Pt noted mild discomfort in right knee, but was able to perform without pain in low back or neck. Progressed pelvic tilts to include marches in order challenge core stabilization with functional movement; Pt able to perform properly with cuing.   Patient will continue to benefit from skilled PT services to modify and progress therapeutic interventions, address functional mobility deficits, address ROM deficits, address strength deficits, analyze and address soft tissue restrictions, analyze and cue movement patterns, analyze and modify body mechanics/ergonomics and assess and modify postural abnormalities to attain remaining goals. [x]  See Plan of Care  []  See progress note/recertification  []  See Discharge Summary         Progress towards goals / Updated goals:   Short Term Goals: To be accomplished in 6 treatments:               1. Pt will be independent and compliant with HEP. 2. Pt will report improved sitting and standing postural awareness. 3. Pt will improve cervical rotation AROM from 40 to >/= 50 degrees bilaterally. Long Term Goals: To be accomplished in 12 treatments:               1. Pt will be independent and compliant with HEP. 2. Pt will improve FOTO score by the MCID demonstrating improved overall function with decreased pain or discomfort. 3. Pt will improve cervical rotation AROM to >/= 60 degrees bilaterally. 4. Pt will be able to stand >/= 2 hours without complaints of low back pain. 5. Pt will be able to tend to her garden without pain > 2/10. 6. Pt will be able to carry groceries without complaints of neck or back pain.         PLAN  [x]  Upgrade activities as tolerated     [x]  Continue plan of care  []  Update interventions per flow sheet       []  Discharge due to:_  []  Other:_      Len Adkins 4/8/2019

## 2019-04-10 ENCOUNTER — HOSPITAL ENCOUNTER (OUTPATIENT)
Dept: PHYSICAL THERAPY | Age: 59
Discharge: HOME | End: 2019-04-10
Payer: MEDICAID

## 2019-04-10 PROCEDURE — 97110 THERAPEUTIC EXERCISES: CPT

## 2019-04-10 PROCEDURE — 97140 MANUAL THERAPY 1/> REGIONS: CPT

## 2019-04-10 NOTE — PROGRESS NOTES
PT DAILY TREATMENT NOTE 2-15    Patient Name: Nelson Burns  DNJE:[id number]  : 1960  [x]  Patient  Verified  Payor: [facility] MEDICAID / Plan: [facility] COMMUNITY PL[facility] [place] CCCP / Product Type: Managed Care Medicaid /    In time:10:04 am  Out time: 11:03 am  Total Treatment Time (min): 59 minutes  Visit #: 4    Treatment Area: Neck pain [M54.2]  Low back pain [M54.5]     SUBJECTIVE  Pain Level (0-10 scale): 4/10  Any medication changes, allergies to medications, adverse drug reactions, diagnosis change, or new procedure performed?: [x] No    [] Yes (see summary sheet for update)  Subjective functional status/changes:   [] No changes reported  Pt carried groceries yesterday with less discomfort and more stability. She has been doing lots of walking without discomfort. She is also noticing less fatigue with descending stairs. OBJECTIVE    49 min Therapeutic Exercise:  [] See flow sheet :   Rationale: increase ROM and increase strength to improve the patients ability to perform ADLs with decreased pain or discomfort. 10 min Manual Therapy: MFR and TPR of upper trap, levator scap, and cervical paraspinals bilaterally   Rationale: decrease pain, increase ROM, increase tissue extensibility and decrease trigger points to improve the patients ability to perform ADLs with decreased pain or discomfort. With   [x] TE   [] TA   [] neuro   [] other: Patient Education: [x] Review HEP    [] Progressed/Changed HEP based on:   [] positioning   [] body mechanics   [] transfers   [] heat/ice application    [] other:      Other Objective/Functional Measures:     TTP: L>R PSIS; lumbar spinous processes     Pain Level (0-10 scale) post treatment: 1/10    ASSESSMENT/Changes in Function:   Pt noted decreased tightness/soreness in neck bilaterally post-manual treatment. Pt noted dizziness and headache with introduction of upper trap stretches today; cued to limit motion to stret.   Added quadruped multifidus exercise today for increased lumbar stabilization; Pt demonstrated QL compensation and hip rotation which improved with cues to keep feet on table and just lift knee to isolate multifidus. Patient will continue to benefit from skilled PT services to modify and progress therapeutic interventions, address functional mobility deficits, address ROM deficits, address strength deficits, analyze and address soft tissue restrictions, analyze and cue movement patterns, analyze and modify body mechanics/ergonomics and assess and modify postural abnormalities to attain remaining goals. [x]  See Plan of Care  []  See progress note/recertification  []  See Discharge Summary         Progress towards goals / Updated goals:   Short Term Goals: To be accomplished in 6 treatments:               1. Pt will be independent and compliant with HEP. 2. Pt will report improved sitting and standing postural awareness. 3. Pt will improve cervical rotation AROM from 40 to >/= 50 degrees bilaterally. Long Term Goals: To be accomplished in 12 treatments:               1. Pt will be independent and compliant with HEP. 2. Pt will improve FOTO score by the MCID demonstrating improved overall function with decreased pain or discomfort. 3. Pt will improve cervical rotation AROM to >/= 60 degrees bilaterally. 4. Pt will be able to stand >/= 2 hours without complaints of low back pain. 5. Pt will be able to tend to her garden without pain > 2/10. 6. Pt will be able to carry groceries without complaints of neck or back pain.         PLAN  [x]  Upgrade activities as tolerated     [x]  Continue plan of care  []  Update interventions per flow sheet       []  Discharge due to:_  []  Other:_      Pam Thomas 4/10/2019

## 2019-04-15 ENCOUNTER — HOSPITAL ENCOUNTER (OUTPATIENT)
Dept: PHYSICAL THERAPY | Age: 59
End: 2019-04-15
Payer: MEDICAID

## 2019-04-24 RX ORDER — BACLOFEN 20 MG
500 TABLET ORAL DAILY
COMMUNITY
End: 2019-11-04

## 2019-04-24 RX ORDER — MAGNESIUM HYDROXIDE 400 MG/5ML
595 SUSPENSION, ORAL (FINAL DOSE FORM) ORAL DAILY
COMMUNITY
End: 2019-11-04

## 2019-04-24 RX ORDER — LORATADINE 10 MG/1
10 TABLET ORAL
COMMUNITY
End: 2020-11-10 | Stop reason: ALTCHOICE

## 2019-04-24 RX ORDER — BUSPIRONE HYDROCHLORIDE 5 MG/1
5 TABLET ORAL
COMMUNITY
End: 2019-07-24 | Stop reason: SDUPTHER

## 2019-04-24 RX ORDER — RANITIDINE 150 MG/1
150 TABLET, FILM COATED ORAL
COMMUNITY
End: 2020-11-10

## 2019-04-25 ENCOUNTER — HOSPITAL ENCOUNTER (OUTPATIENT)
Age: 59
Setting detail: OUTPATIENT SURGERY
Discharge: HOME | End: 2019-04-25
Attending: SPECIALIST | Admitting: SPECIALIST
Payer: MEDICAID

## 2019-04-25 ENCOUNTER — ANESTHESIA (OUTPATIENT)
Dept: ENDOSCOPY | Age: 59
End: 2019-04-25
Payer: MEDICAID

## 2019-04-25 ENCOUNTER — ANESTHESIA EVENT (OUTPATIENT)
Dept: ENDOSCOPY | Age: 59
End: 2019-04-25
Payer: MEDICAID

## 2019-04-25 VITALS
DIASTOLIC BLOOD PRESSURE: 86 MMHG | SYSTOLIC BLOOD PRESSURE: 142 MMHG | BODY MASS INDEX: 30.14 KG/M2 | HEART RATE: 84 BPM | OXYGEN SATURATION: 91 % | HEIGHT: 68 IN | TEMPERATURE: 97.8 F | WEIGHT: 198.9 LBS | RESPIRATION RATE: 33 BRPM

## 2019-04-25 LAB
H PYLORI FROM TISSUE: NEGATIVE
KIT LOT NO., HCLOLOT: NORMAL
NEGATIVE CONTROL: NEGATIVE
POSITIVE CONTROL: POSITIVE

## 2019-04-25 PROCEDURE — 74011250636 HC RX REV CODE- 250/636

## 2019-04-25 PROCEDURE — 88305 TISSUE EXAM BY PATHOLOGIST: CPT

## 2019-04-25 PROCEDURE — 77030027957 HC TBNG IRR ENDOGTR BUSS -B: Performed by: SPECIALIST

## 2019-04-25 PROCEDURE — 77030009426 HC FCPS BIOP ENDOSC BSC -B: Performed by: SPECIALIST

## 2019-04-25 PROCEDURE — 87077 CULTURE AEROBIC IDENTIFY: CPT | Performed by: SPECIALIST

## 2019-04-25 PROCEDURE — 76040000007: Performed by: SPECIALIST

## 2019-04-25 PROCEDURE — 88342 IMHCHEM/IMCYTCHM 1ST ANTB: CPT

## 2019-04-25 PROCEDURE — 76060000032 HC ANESTHESIA 0.5 TO 1 HR: Performed by: SPECIALIST

## 2019-04-25 RX ORDER — DEXTROMETHORPHAN/PSEUDOEPHED 2.5-7.5/.8
1.2 DROPS ORAL
Status: DISCONTINUED | OUTPATIENT
Start: 2019-04-25 | End: 2019-04-25 | Stop reason: HOSPADM

## 2019-04-25 RX ORDER — ATROPINE SULFATE 0.1 MG/ML
0.5 INJECTION INTRAVENOUS
Status: DISCONTINUED | OUTPATIENT
Start: 2019-04-25 | End: 2019-04-25 | Stop reason: HOSPADM

## 2019-04-25 RX ORDER — FENTANYL CITRATE 50 UG/ML
200 INJECTION, SOLUTION INTRAMUSCULAR; INTRAVENOUS
Status: DISCONTINUED | OUTPATIENT
Start: 2019-04-25 | End: 2019-04-25 | Stop reason: HOSPADM

## 2019-04-25 RX ORDER — SODIUM CHLORIDE 9 MG/ML
50 INJECTION, SOLUTION INTRAVENOUS CONTINUOUS
Status: DISCONTINUED | OUTPATIENT
Start: 2019-04-25 | End: 2019-04-25 | Stop reason: HOSPADM

## 2019-04-25 RX ORDER — LIDOCAINE HYDROCHLORIDE 20 MG/ML
INJECTION, SOLUTION EPIDURAL; INFILTRATION; INTRACAUDAL; PERINEURAL AS NEEDED
Status: DISCONTINUED | OUTPATIENT
Start: 2019-04-25 | End: 2019-04-25 | Stop reason: HOSPADM

## 2019-04-25 RX ORDER — NALOXONE HYDROCHLORIDE 0.4 MG/ML
0.4 INJECTION, SOLUTION INTRAMUSCULAR; INTRAVENOUS; SUBCUTANEOUS
Status: DISCONTINUED | OUTPATIENT
Start: 2019-04-25 | End: 2019-04-25 | Stop reason: HOSPADM

## 2019-04-25 RX ORDER — MIDAZOLAM HYDROCHLORIDE 1 MG/ML
.25-1 INJECTION, SOLUTION INTRAMUSCULAR; INTRAVENOUS
Status: DISCONTINUED | OUTPATIENT
Start: 2019-04-25 | End: 2019-04-25 | Stop reason: HOSPADM

## 2019-04-25 RX ORDER — PROPOFOL 10 MG/ML
INJECTION, EMULSION INTRAVENOUS AS NEEDED
Status: DISCONTINUED | OUTPATIENT
Start: 2019-04-25 | End: 2019-04-25 | Stop reason: HOSPADM

## 2019-04-25 RX ORDER — SODIUM CHLORIDE 0.9 % (FLUSH) 0.9 %
5-40 SYRINGE (ML) INJECTION AS NEEDED
Status: DISCONTINUED | OUTPATIENT
Start: 2019-04-25 | End: 2019-04-25 | Stop reason: HOSPADM

## 2019-04-25 RX ORDER — EPINEPHRINE 0.1 MG/ML
1 INJECTION INTRACARDIAC; INTRAVENOUS
Status: DISCONTINUED | OUTPATIENT
Start: 2019-04-25 | End: 2019-04-25 | Stop reason: HOSPADM

## 2019-04-25 RX ORDER — FLUMAZENIL 0.1 MG/ML
0.2 INJECTION INTRAVENOUS
Status: DISCONTINUED | OUTPATIENT
Start: 2019-04-25 | End: 2019-04-25 | Stop reason: HOSPADM

## 2019-04-25 RX ORDER — SODIUM CHLORIDE 0.9 % (FLUSH) 0.9 %
5-40 SYRINGE (ML) INJECTION EVERY 8 HOURS
Status: DISCONTINUED | OUTPATIENT
Start: 2019-04-25 | End: 2019-04-25 | Stop reason: HOSPADM

## 2019-04-25 RX ORDER — SODIUM CHLORIDE 9 MG/ML
INJECTION, SOLUTION INTRAVENOUS
Status: DISCONTINUED | OUTPATIENT
Start: 2019-04-25 | End: 2019-04-25 | Stop reason: HOSPADM

## 2019-04-25 RX ADMIN — PROPOFOL 40 MG: 10 INJECTION, EMULSION INTRAVENOUS at 16:30

## 2019-04-25 RX ADMIN — PROPOFOL 60 MG: 10 INJECTION, EMULSION INTRAVENOUS at 16:28

## 2019-04-25 RX ADMIN — SODIUM CHLORIDE: 9 INJECTION, SOLUTION INTRAVENOUS at 16:27

## 2019-04-25 RX ADMIN — PROPOFOL 100 MG: 10 INJECTION, EMULSION INTRAVENOUS at 16:27

## 2019-04-25 RX ADMIN — PROPOFOL 50 MG: 10 INJECTION, EMULSION INTRAVENOUS at 16:34

## 2019-04-25 RX ADMIN — PROPOFOL 50 MG: 10 INJECTION, EMULSION INTRAVENOUS at 16:47

## 2019-04-25 RX ADMIN — PROPOFOL 50 MG: 10 INJECTION, EMULSION INTRAVENOUS at 16:42

## 2019-04-25 RX ADMIN — LIDOCAINE HYDROCHLORIDE 40 MG: 20 INJECTION, SOLUTION EPIDURAL; INFILTRATION; INTRACAUDAL; PERINEURAL at 16:27

## 2019-04-25 RX ADMIN — PROPOFOL 50 MG: 10 INJECTION, EMULSION INTRAVENOUS at 16:39

## 2019-04-25 RX ADMIN — PROPOFOL 50 MG: 10 INJECTION, EMULSION INTRAVENOUS at 16:45

## 2019-04-25 RX ADMIN — PROPOFOL 50 MG: 10 INJECTION, EMULSION INTRAVENOUS at 16:37

## 2019-04-25 NOTE — ANESTHESIA PREPROCEDURE EVALUATION
Relevant Problems No relevant active problems Anesthetic History No history of anesthetic complications Review of Systems / Medical History Patient summary reviewed, nursing notes reviewed and pertinent labs reviewed Pulmonary Asthma Comments: Former Smoker Neuro/Psych Comments: Chronic pain Cardiovascular Hypertension Exercise tolerance: >4 METS 
  
GI/Hepatic/Renal 
  
GERD: well controlled Comments: IBS Endo/Other Arthritis Other Findings Physical Exam 
 
Airway Mallampati: II 
TM Distance: 4 - 6 cm Neck ROM: normal range of motion Mouth opening: Normal 
 
 Cardiovascular Regular rate and rhythm,  S1 and S2 normal,  no murmur, click, rub, or gallop Rhythm: regular Rate: normal 
 
 
 
 Dental 
 
Dentition: Upper partial plate Pulmonary Breath sounds clear to auscultation Abdominal 
GI exam deferred Other Findings Anesthetic Plan ASA: 2 Anesthesia type: MAC Induction: Intravenous Anesthetic plan and risks discussed with: Patient

## 2019-04-25 NOTE — H&P
Colonoscopy History and Physical      The patient was seen and examined. Date of last colonoscopy: 2008, Polyps  No      The airway was assessed and documented. The problem list, past medical history, and medications were reviewed. There is no problem list on file for this patient.     Social History     Socioeconomic History    Marital status: LEGALLY      Spouse name: Not on file    Number of children: Not on file    Years of education: Not on file    Highest education level: Not on file   Occupational History    Not on file   Social Needs    Financial resource strain: Not on file    Food insecurity:     Worry: Not on file     Inability: Not on file    Transportation needs:     Medical: Not on file     Non-medical: Not on file   Tobacco Use    Smoking status: Former Smoker    Smokeless tobacco: Never Used    Tobacco comment: quit 2012   Substance and Sexual Activity    Alcohol use: Yes     Comment: 3-4 times a yr    Drug use: No    Sexual activity: Yes     Partners: Female   Lifestyle    Physical activity:     Days per week: Not on file     Minutes per session: Not on file    Stress: Not on file   Relationships    Social connections:     Talks on phone: Not on file     Gets together: Not on file     Attends Jew service: Not on file     Active member of club or organization: Not on file     Attends meetings of clubs or organizations: Not on file     Relationship status: Not on file    Intimate partner violence:     Fear of current or ex partner: Not on file     Emotionally abused: Not on file     Physically abused: Not on file     Forced sexual activity: Not on file   Other Topics Concern    Not on file   Social History Narrative    Not on file     Past Medical History:   Diagnosis Date    Adverse effect of anesthesia     \"so sleepy and tired for the rest of the day\"    Asthma     Chronic pain     back - L5 is \"almost gone\"/left foot neuropathy -pinched nerve L4-5 - spine shifted/degenerative spinal disease/stiff and sore neck and shoulder - in PT for back and neck    Degenerative spinal arthritis     Hypertension     IBS (irritable bowel syndrome)     Ill-defined condition     pre-diabetes    Nausea & vomiting     ? d/t fentanyl         Prior to Admission Medications   Prescriptions Last Dose Informant Patient Reported? Taking? Bacillus coagulans (DIGESTIVE ADVANTAGE PO) 4/23/2019  Yes Yes   Sig: Take  by mouth. Takes one po twice daily. FIBER-CAPS, PSYLLIUM HUSK, PO 4/23/2019  Yes Yes   Sig: Take 2 Caps by mouth daily. MULTIVITAMIN PO 4/23/2019  Yes Yes   Sig: Take  by mouth. With iron and D3. Takes one po once daily. albuterol (PROAIR HFA) 90 mcg/actuation inhaler 4/18/2019 at Unknown time  Yes Yes   Sig: Take 1-2 Puffs by inhalation as needed. busPIRone (BUSPAR) 5 mg tablet Unknown at Unknown time  Yes No   Sig: Take 5 mg by mouth daily as needed. loratadine (CLARITIN) 10 mg tablet 4/23/2019  Yes Yes   Sig: Take 10 mg by mouth daily. losartan (COZAAR) 25 mg tablet 4/23/2019  No Yes   Sig: Take 1 Tab by mouth daily. magnesium oxide 500 mg tab 4/23/2019  Yes Yes   Sig: Take 500 mg by mouth daily. meloxicam (MOBIC) 15 mg tablet 4/23/2019 at Unknown time  No Yes   Sig: Take 1 Tab by mouth daily. omega-3/dha/epa/fish oil (OMEGA-3 FISH OIL PO) 4/23/2019  Yes Yes   Sig: Take 1 Cap by mouth daily. potassium gluconate 595 mg (99 mg) tablet 4/23/2019  Yes Yes   Sig: Take 595 mg by mouth daily. raNITIdine (ZANTAC) 150 mg tablet Unknown at Unknown time  Yes No   Sig: Take 150 mg by mouth daily as needed for Indigestion. turmeric (CURCUMIN) 4/23/2019  Yes Yes   Sig: Take  by mouth. [place] triple turmeric. 766 mg of turmeric/100 mg curcumin per 2 caps. Takes one po once daily. Facility-Administered Medications: None       The patient was seen and examined in the endoscopy suite. The airway was assessed and docuemented.  The problem list and medications were reviewed. Chief complaint, history of present illness, and review of systems and Past medical History are positive for: abdominal pain, constipation and colon cancer screening    The heart, lungs and mental status were satisfactory for the administration of sedation and for the procedure. I discussed with the patient the objectives, risks, consequences and alternatives to the procedure.      Plan: Endoscopic procedure with sedation     Tara Fletcher MD   4/25/2019  4:23 PM

## 2019-04-25 NOTE — ANESTHESIA POSTPROCEDURE EVALUATION
Procedure(s): 
COLONOSCOPY 
ESOPHAGOGASTRODUODENOSCOPY (EGD) ESOPHAGOGASTRODUODENAL (EGD) BIOPSY. MAC Anesthesia Post Evaluation Patient location during evaluation: PACU Patient participation: complete - patient participated Level of consciousness: awake and alert Pain management: adequate Airway patency: patent Anesthetic complications: no 
Cardiovascular status: acceptable Respiratory status: acceptable Hydration status: acceptable Comments: Seen and ready for discharge Post anesthesia nausea and vomiting:  none Vitals Value Taken Time /86 4/25/2019  5:07 PM  
Temp 36.6 °C (97.8 °F) 4/25/2019  5:01 PM  
Pulse 86 4/25/2019  5:13 PM  
Resp 0 4/25/2019  5:31 PM  
SpO2 0 % 4/25/2019  5:26 PM  
Vitals shown include unvalidated device data.

## 2019-04-25 NOTE — DISCHARGE INSTRUCTIONS
[facility]  [id number]  1960    COLON DISCHARGE INSTRUCTIONS  Discomfort:  Redness at IV site- apply warm compress to area; if redness or soreness persist- contact your physician  There may be a slight amount of blood passed from the rectum  Gaseous discomfort- walking, belching will help relieve any discomfort  You may not operate a vehicle for 12 hours  You may not engage in an occupation involving machinery or appliances for rest of today  You may not drink alcoholic beverages for at least 12 hours  Avoid making any critical decisions for at least 24 hour  DIET:   High fiber diet. - however -  remember your colon is empty and a heavy meal will produce gas. Avoid these foods:  vegetables, fried / greasy foods, carbonated drinks for today. MEDICATIONS:      Regarding Aspirin or Nonsteroidal medications, please see below. ACTIVITY:  You may resume your normal daily activities it is recommended that you spend the remainder of the day resting -  avoid any strenuous activity. CALL M.D. ANY SIGN OF:  Increasing pain, nausea, vomiting  Abdominal distension (swelling)  New increased bleeding (oral or rectal)  Fever (chills)  Pain in chest area  Bloody discharge from nose or mouth  Shortness of breath    You may not  take any Advil, Aspirin, Ibuprofen, Motrin, Aleve, or Goodys  ONLY  Tylenol as needed for pain. Post procedure diagnosis: 1. - Gastric Ulcer  2. - Diverticulosis      Follow-up Instructions:   Call Dr. Ashly Yi  Results of procedure / biopsy in 10 days. Take Omeprazole as prescribed.   Telephone #  [phone]      DISCHARGE SUMMARY from Nurse    The following personal items collected during your admission are returned to you:   Dental Appliance: Dental Appliances: Partials, Uppers, With patient  Vision: Visual Aid: Glasses, At bedside, With patient  Hearing Aid:    Jewelry:    Clothing:    Other Valuables:    Valuables sent to safe:

## 2019-04-25 NOTE — PROCEDURES
[address]  [address]                 NAME:  Virgilio Reed   :   1960   MRN:   [id number]     Date/Time:  2019 4:52 PM    Esophagogastroduodenoscopy (EGD) Procedure Note    :  David Ledesma MD    Referring Provider:  Jude Martinez MD    Anethesia/Sedation:  MAC anesthesia Propofol    Preoperative diagnosis: NOT GIVEN    Postoperative diagnosis: 1. - Gastric Ulcer  2. - Diverticulosis    Procedure Details     After infom consent was obtained for the procedure, with all risks and benefits of procedure explained the patient was taken to the endoscopy suite and placed in the left lateral decubitus position. Following sequential administration of sedation as per above, the [id number] gastroscope was inserted into the mouth and advanced under direct vision to second portion of the duodenum. A careful inspection was made as the gastroscope was withdrawn, including a retroflexed view of the proximal stomach; findings and interventions are described below. Findings:  Esophagus:normal  Stomach:4 mm superficial non bleeding ulcer seen in antrum surrounding by gastritis, LATRELL and biopsies done  Duodenum/jejunum:normal      Therapies:  none    Specimens: LATRELL, antral biopsy           EBL: None    Complications:   None; patient tolerated the procedure well. Impression:    See Postoperative diagnosis above    Recommendations:  -Acid suppression with a proton pump inhibitor. , -Await pathology. , -Await LATRELL test result and treat for Helicobacter pylori if positive. , -No NSAIDS    Discharge disposition:  Home in the company of  when able to ambulate    David Ledesma MD

## 2019-04-25 NOTE — PROCEDURES
[address]  [address]                 Colonoscopy Procedure Note    Indications:   See Preoperative Diagnosis above  Referring Physician: Elliot Cuevas MD  Anesthesia/Sedation: MAC anesthesia Propofol  Endoscopist:  Dr. Elis Mason  Assistant:  Endoscopy Technician-1: Hawk Chandra  Endoscopy RN-1: Kecia Schulz RN  Preoperative diagnosis: NOT GIVEN  Postoperative diagnosis: 1. - Gastric Ulcer  2. - Diverticulosis    Procedure in Detail:  Informed consent was obtained for the procedure, including sedation. Risks of perforation, hemorrhage, adverse drug reaction, and aspiration were discussed. The patient was placed in the left lateral decubitus position. Based on the pre-procedure assessment, including review of the patient's medical history, medications, allergies, and review of systems, she had been deemed to be an appropriate candidate for moderate sedation; she was therefore sedated with the medications listed above. The patient was monitored continuously with ECG tracing, pulse oximetry, blood pressure monitoring, and direct observations. A rectal examination was performed. The [id number] was inserted into the rectum and advanced under direct vision to the cecum, which was identified by the ileocecal valve and appendiceal orifice. The quality of the colonic preparation was good. A careful inspection was made as the colonoscope was withdrawn, including a retroflexed view of the rectum; findings and interventions are described below. Appropriate photodocumentation was obtained. Findings:  Rectum: normal  Sigmoid: moderate diverticulosis; Descending Colon: moderate diverticulosis;  Transverse Colon: mild diverticulosis; Ascending Colon: normal  Cecum: normal      Specimens:    none    EBL: None    Complications: None; patient tolerated the procedure well.     Recommendations:     - For colon cancer screening in this average-risk patient, colonoscopy may be repeated in 10 years. - High fiber diet.         Signed By: Campbell Abraham MD                        April 25, 2019

## 2019-04-25 NOTE — PROGRESS NOTES

## 2019-04-25 NOTE — PROGRESS NOTES
Bedside and Verbal shift change report given to Selena Hoskins (oncoming nurse) by Rosendo Ruvalcaba (offgoing nurse). Report included the following information SBAR, Kardex and MAR.

## 2019-04-29 ENCOUNTER — HOSPITAL ENCOUNTER (OUTPATIENT)
Dept: PHYSICAL THERAPY | Age: 59
Discharge: HOME | End: 2019-04-29
Payer: MEDICAID

## 2019-04-29 PROCEDURE — 97140 MANUAL THERAPY 1/> REGIONS: CPT

## 2019-04-29 PROCEDURE — 97110 THERAPEUTIC EXERCISES: CPT

## 2019-04-29 PROCEDURE — 97014 ELECTRIC STIMULATION THERAPY: CPT

## 2019-04-29 NOTE — PROGRESS NOTES
PT DAILY TREATMENT NOTE 2-15    Patient Name: Lynette Montes De Oca  SRIP:  : 1960  [x]  Patient  Verified  Payor: Siva Lazo / Plan: [facility] [employer]AN [employer] / Product Type: Managed Care Medicaid /    In time:10:03 am  Out time: 11:16 am  Total Treatment Time (min): 73 minutes  Visit #: 5    Treatment Area: Neck pain [M54.2]  Low back pain [M54.5]     SUBJECTIVE  Pain Level (0-10 scale): 9/10  Any medication changes, allergies to medications, adverse drug reactions, diagnosis change, or new procedure performed?: [x] No    [] Yes (see summary sheet for update)  Subjective functional status/changes:   [] No changes reported  Pt reports that she has had significant pain in past 2 days. She was doing great all last week, though pain just started randomly when she woke up yesterday. She notes that pain/swelling in central low back to R side. OBJECTIVE    Modality rationale: decrease inflammation, decrease pain and increase tissue extensibility to improve the patients ability to perform functional activities with decreased pain or discomfort.    Min Type Additional Details      15 [x] Estim: []Att   []Unatt    []TENS instruct                  [x]IFC  []Premod   []NMES                     []Other:  []w/US   []w/ice   [x]w/heat  Position:   Location:       []  Traction: [] Cervical       []Lumbar                       [] Prone          []Supine                       []Intermittent   []Continuous Lbs:  [] before manual  [] after manual  []w/heat    []  Ultrasound: []Continuous   [] Pulsed                       at: []1MHz   []3MHz Location:  W/cm2:    [] Paraffin         Location:   []w/heat    []  Ice     []  Heat  []  Ice massage Position:  Location:    []  Laser  []  Other: Position:  Location:      []  Vasopneumatic Device Pressure:       [] lo [] med [] hi   Temperature:      [x] Skin assessment post-treatment:  [x]intact []redness- no adverse reaction    []redness - adverse reaction: 48 min Therapeutic Exercise:  [] See flow sheet :   Rationale: increase ROM and increase strength to improve the patients ability to perform ADLs with decreased pain or discomfort. 10 min Manual Therapy: STM of R QL; contract-relax of R QL   Rationale: decrease pain, increase ROM, increase tissue extensibility and decrease trigger points to improve the patients ability to perform ADLs with decreased pain or discomfort. With   [x] TE   [] TA   [] neuro   [] other: Patient Education: [x] Review HEP    [] Progressed/Changed HEP based on:   [] positioning   [] body mechanics   [] transfers   [] heat/ice application    [] other:      Other Objective/Functional Measures:     TTP: L>R PSIS; lumbar spinous processes     Pain Level (0-10 scale) post treatment: 1/10    ASSESSMENT/Changes in Function:   Manual treatment helped ease discomfort in right side emphasizing decreased tightness of QL musculature. Juan Antonio repeated extension further improved symptoms, utilizing ambulation as an asterisk sign. Pt responded well to e-stim on R lumbar musculature with heat at end of session. Patient will continue to benefit from skilled PT services to modify and progress therapeutic interventions, address functional mobility deficits, address ROM deficits, address strength deficits, analyze and address soft tissue restrictions, analyze and cue movement patterns, analyze and modify body mechanics/ergonomics and assess and modify postural abnormalities to attain remaining goals. [x]  See Plan of Care  []  See progress note/recertification  []  See Discharge Summary         Progress towards goals / Updated goals:   Short Term Goals: To be accomplished in 6 treatments:               1. Pt will be independent and compliant with HEP. 2. Pt will report improved sitting and standing postural awareness. 3. Pt will improve cervical rotation AROM from 40 to >/= 50 degrees bilaterally.   Long Term Goals: To be accomplished in 12 treatments:               1. Pt will be independent and compliant with HEP. 2. Pt will improve FOTO score by the MCID demonstrating improved overall function with decreased pain or discomfort. 3. Pt will improve cervical rotation AROM to >/= 60 degrees bilaterally. 4. Pt will be able to stand >/= 2 hours without complaints of low back pain. 5. Pt will be able to tend to her garden without pain > 2/10. 6. Pt will be able to carry groceries without complaints of neck or back pain.         PLAN  [x]  Upgrade activities as tolerated     [x]  Continue plan of care  []  Update interventions per flow sheet       []  Discharge due to:_  []  Other:_      Tia Martínez 4/29/2019

## 2019-05-10 ENCOUNTER — HOSPITAL ENCOUNTER (OUTPATIENT)
Dept: PHYSICAL THERAPY | Age: 59
Discharge: HOME | End: 2019-05-10
Payer: MEDICAID

## 2019-05-10 PROCEDURE — 97110 THERAPEUTIC EXERCISES: CPT

## 2019-05-10 NOTE — PROGRESS NOTES
[facility]   [address], [address] [place] [address]  Phone: [phone] Fax: [phone]    Progress Note    Name: Last Amaya   : 1960   MD: Aram Briscoe MD       Treatment Diagnosis: Neck pain [M54.2]  Low back pain [M54.5]  Start of Care: 3/26/19    Visits from Start of Care: 6  Missed Visits: 2    Summary of Care: Ms. Michelle England has been seen for 6 skilled physical therapy visits secondary to chronic neck and low back pain. Pt has demonstrated some progress with her therapy program, noting she has increased her activity level significantly, walking 3-5 miles several days per week. She reports continued intermittent significant low back pain though improved neck pain and demonstrates significantly improved cervical and lumbar AROM. Pt would benefit from additional PT to further develop a comprehensive HEP to perform independently. Thank you for this referral!    Assessment / Recommendations: 4 additional sessions     Short Term Goals: To be accomplished in 6 treatments:               1. Pt will be independent and compliant with HEP.              2. Pt will report improved sitting and standing postural awareness. - MET               3. Pt will improve cervical rotation AROM from 40 to >/= 50 degrees bilaterally. - Progressing (MET L)  Long Term Goals: To be accomplished in 12 treatments:               1. Pt will be independent and compliant with HEP.              2. Pt will improve FOTO score by the MCID demonstrating improved overall function with decreased pain or discomfort. - Progressing               3. Pt will improve cervical rotation AROM to >/= 60 degrees bilaterally. - Progressing               4. Pt will be able to stand >/= 2 hours without complaints of low back pain. - Progressing (able to stand for 2 hours, though with some pain)               5.  Pt will be able to tend to her garden without pain > 2/10. - MET (returned this week for several hours)               6. Pt will be able to carry groceries without complaints of neck or back pain. - Progressing (some pain in low back, though much improved)         Margarete Duane 5/10/2019     ________________________________________________________________________  NOTE TO PHYSICIAN:  Please complete the following and fax to: [facility]: Fax: [phone] . Carolin Shane Retain this original for your records. If you are unable to process this request in 24 hours, please contact our office.        ____ I have read the above report and request that my patient continue therapy with the following changes/special instructions:  ____ I have read the above report and request that my patient be discharged from therapy    Physician's Signature:_________________ Date:___________Time:__________

## 2019-05-10 NOTE — PROGRESS NOTES
PT DAILY TREATMENT NOTE 2-15    Patient Name: Gabriele Esposito  SFLM:3/44/2014  : 1960  [x]  Patient  Verified  Payor: April Keegan / Plan: [facility] COMMUN[employer]AN [employer] / Product Type: Managed Care Medicaid /    In time:10:05 am  Out time: 11:10 am  Total Treatment Time (min): 55 minutes  Visit #: 6    Treatment Area: Neck pain [M54.2]  Low back pain [M54.5]     SUBJECTIVE  Pain Level (0-10 scale): 10  Any medication changes, allergies to medications, adverse drug reactions, diagnosis change, or new procedure performed?: [x] No    [] Yes (see summary sheet for update)  Subjective functional status/changes:   [] No changes reported  Pt reports she has walked 3-5 miles several days this week. She is still having continued pain in her right low back. She is also having continued stiffness/pain in neck L>R, though pain has improved. OBJECTIVE    55 min Therapeutic Exercise:  [] See flow sheet :   Rationale: increase ROM and increase strength to improve the patients ability to perform ADLs with decreased pain or discomfort. Other Objective/Functional Measures:   0-100: 50% improved - ability to move in all areas has improved, more control while doing activities, more aware of posture and mechanics; continued pain, numbness in feet (constant in L) with prolonged standing/bending; same severity p!  In low back; decreased p! in neck  FOTO Score: 52/100 (47 on eval)                                       Cervical AROM:                                                                       R                      L                        Flexion                                     35 pulling        35                                       Extension                                25                     40                         Side Bending                           15 pulling   20      15 pulling       15                Rotation                                   40      45              40      52                                                    Lumbar AROM:                                                                                               R                      L                        Flexion                                     50% p! Low back (had to sit down) - leaning back helps         100%                                     Extension                                50% eases p! And numbness                  75%   Side Bending                           75% central low back  p!   75% p!      50% increased numbness      75% p! Rotation                                   75% no change 75                      75% no change        50% restriction             Pain Level (0-10 scale) post treatment: 1/10    ASSESSMENT/Changes in Function:     []  See Plan of Care  [x]  See progress note/recertification  []  See Discharge Summary         Progress towards goals / Updated goals:   Short Term Goals: To be accomplished in 6 treatments:               1. Pt will be independent and compliant with HEP. 2. Pt will report improved sitting and standing postural awareness. - MET               3. Pt will improve cervical rotation AROM from 40 to >/= 50 degrees bilaterally. - Progressing (MET L)  Long Term Goals: To be accomplished in 12 treatments:               1. Pt will be independent and compliant with HEP. 2. Pt will improve FOTO score by the MCID demonstrating improved overall function with decreased pain or discomfort. - Progressing               3. Pt will improve cervical rotation AROM to >/= 60 degrees bilaterally. - Progressing               4. Pt will be able to stand >/= 2 hours without complaints of low back pain.  - Progressing (able to stand for 2 hours, though with some pain)               5. Pt will be able to tend to her garden without pain > 2/10. - MET (returned this week for several hours)               6. Pt will be able to carry groceries without complaints of neck or back pain.  - Progressing (some pain in low back, though much improved)      PLAN  [x]  Upgrade activities as tolerated     [x]  Continue plan of care  []  Update interventions per flow sheet       []  Discharge due to:_  []  Other:_      Russell Lawrence 5/10/2019

## 2019-05-15 ENCOUNTER — HOSPITAL ENCOUNTER (OUTPATIENT)
Dept: PHYSICAL THERAPY | Age: 59
End: 2019-05-15
Payer: MEDICAID

## 2019-06-04 NOTE — ANCILLARY DISCHARGE INSTRUCTIONS
[facility]   [address] [address], [address] [place] [address], [address] [place] [address]  Phone: [phone] Fax: [phone]      Discharge Summary 2-15      Patient name: Nelson Burns  : 1960  Provider#: [id number]  Referral source: Mynor Martel MD      Medical/Treatment Diagnosis: Neck pain [M54.2]  Low back pain [M54.5]     Prior Hospitalization: see medical history     Comorbidities: Allergies, Anxiety or Panic Disorders, Arthritis, Asthma, Back pain, BMI>30, Gastrointestinal Disease, Headaches, High blood pressure, Prior surgery  Prior Level of Function: Patient completed 20 minutes of exercise at least 3 times a week. Medications: Verified on Patient Summary List     Start of Care: 3/25/19                                                               Onset Date: Chronic        Visits from Start of Care: 6     Missed Visits: 3  Reporting Period : 3/25/19 to 5/10/19    Assessment/Summary of care: Ms. Louisa Fry was seen for a total of 6 skilled physical therapy visits secondary to chronic neck and low back pain. Pt did not show for her scheduled appointment after the most recent progress note and has stopped attending therapy. She is thus being discharged today, 19.       [address] be accomplished in 6 treatments:               1. Pt will be independent and compliant with HEP. - MET               2. Pt will report improved sitting and standing postural awareness. - MET               3. Pt will improve cervical rotation AROM from 40 to >/= 50 degrees bilaterally. - MET (Left)  Long Term Goals: To be accomplished in 12 treatments:               1.  Pt will be independent and compliant with HEP. - MET               2. Pt will improve FOTO score by the MCID from 47/100 to 54/100 demonstrating improved overall function with decreased pain or discomfort. - NOT MET (54/100)               3. Pt will improve cervical rotation AROM to >/= 60 degrees bilaterally. - NOT MET               4. Pt will be able to stand >/= 2 hours without complaints of low back pain. - NOT MET (able to stand for 2 hours, though with some pain)               5.  Pt will be able to tend to her garden without pain > 2/10. - MET                6. Pt will be able to carry groceries without complaints of neck or back pain. - NOT MET (some pain in low back, though much improved)    RECOMMENDATIONS:  [x]Discontinue therapy: []Patient has reached or is progressing toward set goals     [x]Patient is non-compliant or has abdicated     []Due to lack of appreciable progress towards set goals    Dannacharlee Goes 6/4/2019

## 2019-06-06 ENCOUNTER — OFFICE VISIT (OUTPATIENT)
Dept: INTERNAL MEDICINE CLINIC | Age: 59
End: 2019-06-06

## 2019-06-06 VITALS
HEIGHT: 68 IN | TEMPERATURE: 97.8 F | OXYGEN SATURATION: 96 % | BODY MASS INDEX: 30.68 KG/M2 | WEIGHT: 202.4 LBS | HEART RATE: 74 BPM | DIASTOLIC BLOOD PRESSURE: 79 MMHG | RESPIRATION RATE: 16 BRPM | SYSTOLIC BLOOD PRESSURE: 125 MMHG

## 2019-06-06 DIAGNOSIS — K21.00 GASTROESOPHAGEAL REFLUX DISEASE WITH ESOPHAGITIS: Primary | ICD-10-CM

## 2019-06-06 RX ORDER — HYOSCYAMINE SULFATE 0.12 MG/1
TABLET SUBLINGUAL
COMMUNITY
Start: 2019-03-13 | End: 2019-06-06 | Stop reason: SDUPTHER

## 2019-06-06 RX ORDER — DEXLANSOPRAZOLE 30 MG/1
30 CAPSULE, DELAYED RELEASE ORAL DAILY
Qty: 30 CAP | Refills: 1 | Status: SHIPPED | OUTPATIENT
Start: 2019-06-06 | End: 2019-11-04

## 2019-06-06 RX ORDER — HYOSCYAMINE SULFATE 0.12 MG/1
0.12 TABLET SUBLINGUAL
Qty: 30 TAB | Refills: 0 | Status: SHIPPED | OUTPATIENT
Start: 2019-06-06 | End: 2019-10-10 | Stop reason: SDUPTHER

## 2019-06-06 NOTE — PROGRESS NOTES
No chief complaint on file. she is a 64y.o. year old female who presents for follow-up of having an ulcer. Patient not doing well on omeprazole and would like a change in med and GI doctor. Patient states that she had endoscopy done and had 1 week of bloating and nausea. Denies any current pain or nausea vomiting or diarrhea. Reviewed and agree with Nurse Note and duplicated in this note  Reviewed PmHx, RxHx, FmHx, SocHx, AllgHx and updated and dated in the chart.     Family History   Problem Relation Age of Onset    Delayed Awakening Mother         with anesthesia    Arthritis Mother     Other Mother         IBS/degenerative spinal disease    COPD Father     Glaucoma Father     Heart Disease Sister         heart valve problem    Heart Disease Brother         heart valve problem    Glaucoma Brother     Other Other        Past Medical History:   Diagnosis Date    Adverse effect of anesthesia     \"so sleepy and tired for the rest of the day\"    Asthma     Chronic pain     back - L5 is \"almost gone\"/left foot neuropathy -pinched nerve L4-5 - spine shifted/degenerative spinal disease/stiff and sore neck and shoulder - in PT for back and neck    Degenerative spinal arthritis     Hypertension     IBS (irritable bowel syndrome)     Ill-defined condition     pre-diabetes    Nausea & vomiting     ? d/t fentanyl      Social History     Socioeconomic History    Marital status: LEGALLY      Spouse name: Not on file    Number of children: Not on file    Years of education: Not on file    Highest education level: Not on file   Tobacco Use    Smoking status: Former Smoker    Smokeless tobacco: Never Used    Tobacco comment: quit 2012   Substance and Sexual Activity    Alcohol use: Yes     Comment: 3-4 times a yr    Drug use: No    Sexual activity: Yes     Partners: Female        Review of Systems - negative except as listed above      Objective:     Vitals:    06/06/19 0914   BP: 125/79 Pulse: 74   Resp: 16   Temp: 97.8 °F (36.6 °C)   TempSrc: Oral   SpO2: 96%   Weight: 202 lb 6.4 oz (91.8 kg)   Height: 5' 8\" (1.727 m)       Physical Examination: General appearance - alert, well appearing, and in no distress  Eyes - pupils equal and reactive, extraocular eye movements intact  Ears - bilateral TM's and external ear canals normal  Nose - normal and patent, no erythema, discharge or polyps  Mouth - mucous membranes moist, pharynx normal without lesions  Neck - supple, no significant adenopathy  Chest - clear to auscultation, no wheezes, rales or rhonchi, symmetric air entry  Heart - normal rate, regular rhythm, normal S1, S2, no murmurs, rubs, clicks or gallops  Abdomen - soft, nontender, nondistended, no masses or organomegaly  Skin - normal coloration and turgor, no rashes, no suspicious skin lesions noted    Assessment/ Plan:   Diagnoses and all orders for this visit:    1. Gastroesophageal reflux disease with esophagitis  -     REFERRAL TO GASTROENTEROLOGY    Other orders  -     dexlansoprazole (DEXILANT) 30 mg capsule; Take 1 Cap by mouth daily. -     hyoscyamine SL (LEVSIN/SL) 0.125 mg SL tablet; Take 1 Tab by mouth every six (6) hours as needed for Cramping. I have discussed the diagnosis with the patient and the intended plan as seen in the above orders. The patient has received an after-visit summary and questions were answered concerning future plans. Medication Side Effects and Warnings were discussed with patient,  Patient Labs were reviewed and or requested, and  Patient Past Records were reviewed and or requested  yes       Pt agrees to call or return to clinic and/or go to closest ER with any worsening of symptoms. This may include, but not limited to increased fever (>100.4) with NSAIDS or Tylenol, increased edema, confusion, rash, worsening of presenting symptoms.

## 2019-07-02 DIAGNOSIS — M54.50 ACUTE BILATERAL LOW BACK PAIN WITHOUT SCIATICA: Primary | ICD-10-CM

## 2019-07-19 ENCOUNTER — HOSPITAL ENCOUNTER (OUTPATIENT)
Dept: MRI IMAGING | Age: 59
Discharge: HOME | End: 2019-07-19
Attending: FAMILY MEDICINE
Payer: MEDICAID

## 2019-07-19 DIAGNOSIS — M51.36 DDD (DEGENERATIVE DISC DISEASE), LUMBAR: Primary | ICD-10-CM

## 2019-07-19 DIAGNOSIS — M54.50 ACUTE BILATERAL LOW BACK PAIN WITHOUT SCIATICA: ICD-10-CM

## 2019-07-19 PROCEDURE — 72148 MRI LUMBAR SPINE W/O DYE: CPT

## 2019-07-19 NOTE — PROGRESS NOTES
Multilevel degenerative disease with disc bulge at L5. We will follow-up with orthospine.   Please call and inform patient

## 2019-07-24 RX ORDER — MELOXICAM 15 MG/1
15 TABLET ORAL DAILY
Qty: 30 TAB | Refills: 3 | Status: SHIPPED | OUTPATIENT
Start: 2019-07-24 | End: 2019-11-04

## 2019-07-24 RX ORDER — BUSPIRONE HYDROCHLORIDE 5 MG/1
5 TABLET ORAL
Qty: 30 TAB | Refills: 2 | Status: SHIPPED | OUTPATIENT
Start: 2019-07-24 | End: 2019-10-18 | Stop reason: SDUPTHER

## 2019-10-11 RX ORDER — LOSARTAN POTASSIUM 25 MG/1
25 TABLET ORAL DAILY
Qty: 30 TAB | Refills: 5 | Status: SHIPPED | OUTPATIENT
Start: 2019-10-11 | End: 2020-04-19 | Stop reason: SDUPTHER

## 2019-10-11 RX ORDER — HYOSCYAMINE SULFATE 0.12 MG/1
0.12 TABLET SUBLINGUAL
Qty: 30 TAB | Refills: 0 | Status: SHIPPED | OUTPATIENT
Start: 2019-10-11 | End: 2019-11-15

## 2019-10-14 ENCOUNTER — OFFICE VISIT (OUTPATIENT)
Dept: INTERNAL MEDICINE CLINIC | Age: 59
End: 2019-10-14

## 2019-10-14 VITALS
OXYGEN SATURATION: 97 % | SYSTOLIC BLOOD PRESSURE: 107 MMHG | WEIGHT: 208.1 LBS | DIASTOLIC BLOOD PRESSURE: 69 MMHG | RESPIRATION RATE: 16 BRPM | BODY MASS INDEX: 31.54 KG/M2 | HEIGHT: 68 IN | TEMPERATURE: 97.6 F | HEART RATE: 74 BPM

## 2019-10-14 DIAGNOSIS — R60.0 BILATERAL LEG EDEMA: ICD-10-CM

## 2019-10-14 DIAGNOSIS — Z01.811 PRE-OP CHEST EXAM: ICD-10-CM

## 2019-10-14 DIAGNOSIS — Z23 ENCOUNTER FOR IMMUNIZATION: ICD-10-CM

## 2019-10-14 DIAGNOSIS — M51.36 DDD (DEGENERATIVE DISC DISEASE), LUMBAR: Primary | ICD-10-CM

## 2019-10-14 DIAGNOSIS — R00.2 PALPITATIONS: ICD-10-CM

## 2019-10-14 NOTE — PROGRESS NOTES
Chief Complaint   Patient presents with    Pre-op Exam     she is a 64y.o. year old female who presents for evaluation of preop exam and complaints of sporadic chest palpitations and new onset bilateral leg edema. Patient states that she normally does not have lower foot swelling but notices that her feet have been more swollen bilaterally over the last couple months. Patient states that there is no new changes in her diet and is been salt restricted in general.  She has not had a history of swelling redness. Denies any shortness of breath or ROGERS. Preoperative Evaluation    Date of Exam: 01/83/0928    Carlyn Junior is a 64 y.o. female who presents for preoperative evaluation. Procedure/Surgery: spinal fusion  Date of Procedure/Surgery: 11/11/19  Surgeon: Dr. Hinojosa Neighbours: Kate Méndez  Primary Physician: Mitcheal Keep  Latex Allergy: no    Problem List:   There are no active problems to display for this patient. Medical History:     Past Medical History:   Diagnosis Date    Adverse effect of anesthesia     \"so sleepy and tired for the rest of the day\"    Asthma     Chronic pain     back - L5 is \"almost gone\"/left foot neuropathy -pinched nerve L4-5 - spine shifted/degenerative spinal disease/stiff and sore neck and shoulder - in PT for back and neck    Degenerative spinal arthritis     Hypertension     IBS (irritable bowel syndrome)     Ill-defined condition     pre-diabetes    Nausea & vomiting     ? d/t fentanyl     Allergies: Allergies   Allergen Reactions    Demerol [Meperidine] Nausea and Vomiting     Dizziness. Went to ER.  Egg Yolk Swelling    Fentanyl Nausea and Vomiting     Dizziness.  Lactaid [Lactase] Other (comments)     Bloating/gas    Lactose Other (comments)     Bloating/gas    Prednisone Swelling      Medications:     Current Outpatient Medications   Medication Sig    losartan (COZAAR) 25 mg tablet Take 1 Tab by mouth daily.     hyoscyamine SL (LEVSIN/SL) 0.125 mg SL tablet Take 1 Tab by mouth every six (6) hours as needed for Cramping.  meloxicam (MOBIC) 15 mg tablet Take 1 Tab by mouth daily.  busPIRone (BUSPAR) 5 mg tablet Take 1 Tab by mouth daily as needed (Anxiety).  magnesium oxide 500 mg tab Take 500 mg by mouth daily.  potassium gluconate 595 mg (99 mg) tablet Take 595 mg by mouth daily.  FIBER-CAPS, PSYLLIUM HUSK, PO Take 2 Caps by mouth daily.  turmeric (CURCUMIN) Take  by mouth. [facility] triple turmeric. 766 mg of turmeric/100 mg curcumin per 2 caps. Takes one po once daily.  omega-3/dha/epa/fish oil (OMEGA-3 FISH OIL PO) Take 1 Cap by mouth daily.  MULTIVITAMIN PO Take  by mouth. With iron and D3. Takes one po once daily.  loratadine (CLARITIN) 10 mg tablet Take 10 mg by mouth daily.  Bacillus coagulans (DIGESTIVE ADVANTAGE PO) Take  by mouth. Takes one po twice daily.  raNITIdine (ZANTAC) 150 mg tablet Take 150 mg by mouth daily as needed for Indigestion.  albuterol (PROAIR HFA) 90 mcg/actuation inhaler Take 1-2 Puffs by inhalation as needed.  dexlansoprazole (DEXILANT) 30 mg capsule Take 1 Cap by mouth daily. No current facility-administered medications for this visit.       Surgical History:     Past Surgical History:   Procedure Laterality Date    COLONOSCOPY N/A 4/25/2019    COLONOSCOPY performed by Ines Moreno MD at [facility] ENDOSCOPY    HX [place]    HX CHOLECYSTECTOMY       Social History:     Social History     Socioeconomic History    Marital status: LEGALLY      Spouse name: Not on file    Number of children: Not on file    Years of education: Not on file    Highest education level: Not on file   Tobacco Use    Smoking status: Former Smoker    Smokeless tobacco: Never Used    Tobacco comment: quit 2012   Substance and Sexual Activity    Alcohol use: Yes     Comment: 3-4 times a yr    Drug use: No    Sexual activity: Yes     Partners: Female       Recent use of: No recent use of aspirin (ASA), NSAIDS or steroids    Tetanus up to date: Td vaccination indicated and given today      Anesthesia Complications: None  History of abnormal bleeding : None  History of Blood Transfusions: no  Health Care Directive or Living Will: no      Past Surgical History:   Procedure Laterality Date    COLONOSCOPY N/A 4/25/2019    COLONOSCOPY performed by Jacques Joseph MD at [facility] ENDOSCOPY    HX [address] HX CHOLECYSTECTOMY         Family History   Problem Relation Age of Onset    Delayed Awakening Mother         with anesthesia    Arthritis Mother     Other Mother         IBS/degenerative spinal disease    COPD Father     Glaucoma Father     Heart Disease Sister         heart valve problem    Heart Disease Brother         heart valve problem    Glaucoma Brother     Other Other        Past Medical History:   Diagnosis Date    Adverse effect of anesthesia     \"so sleepy and tired for the rest of the day\"    Asthma     Chronic pain     back - L5 is \"almost gone\"/left foot neuropathy -pinched nerve L4-5 - spine shifted/degenerative spinal disease/stiff and sore neck and shoulder - in PT for back and neck    Degenerative spinal arthritis     Hypertension     IBS (irritable bowel syndrome)     Ill-defined condition     pre-diabetes    Nausea & vomiting     ? d/t fentanyl      Social History     Socioeconomic History    Marital status: LEGALLY      Spouse name: Not on file    Number of children: Not on file    Years of education: Not on file    Highest education level: Not on file   Tobacco Use    Smoking status: Former Smoker    Smokeless tobacco: Never Used    Tobacco comment: quit 2012   Substance and Sexual Activity    Alcohol use: Yes     Comment: 3-4 times a yr    Drug use: No    Sexual activity: Yes     Partners: Female      Reviewed and agree with Nurse Note and duplicated in this note.   Reviewed PmHx, RxHx, FmHx, SocHx, AllgHx and updated and dated in the chart. Review of Systems - negative except as listed above      Objective:     Vitals:    10/14/19 1019   BP: 107/69   Pulse: 74   Resp: 16   Temp: 97.6 °F (36.4 °C)   TempSrc: Oral   SpO2: 97%   Weight: 208 lb 1.6 oz (94.4 kg)   Height: 5' 8\" (1.727 m)       Physical Examination: General appearance - alert, well appearing, and in no distress  Eyes - pupils equal and reactive, extraocular eye movements intact  Ears - bilateral TM's and external ear canals normal  Nose - normal and patent, no erythema, discharge or polyps  Mouth - mucous membranes moist, pharynx normal without lesions  Neck - supple, no significant adenopathy  Chest - clear to auscultation, no wheezes, rales or rhonchi, symmetric air entry  Heart - normal rate, regular rhythm, normal S1, S2, no murmurs, rubs, clicks or gallops  Abdomen - soft, nontender, nondistended, no masses or organomegaly  Extremities - peripheral pulses normal, 1+ pedal edema nonpitting, no clubbing or cyanosis  Skin - normal coloration and turgor, no rashes, no suspicious skin lesions noted    Assessment/ Plan:   Diagnoses and all orders for this visit:    1. DDD (degenerative disc disease), lumbar    2. Encounter for immunization  -     INFLUENZA VIRUS VAC QUAD,SPLIT,PRESV FREE SYRINGE IM  -     IL IMMUNIZ ADMIN,1 SINGLE/COMB VAC/TOXOID    3. Pre-op chest exam    4. Palpitations  -     EKG, 12 LEAD, INITIAL; Future  -     ECHO ADULT COMPLETE; Future    5. Bilateral leg edema  -     EKG, 12 LEAD, INITIAL; Future  -     ECHO ADULT COMPLETE; Future    Patient has no symptoms of palpitations or chest pain today, if any symptoms develop she will proceed to the ER. Patient will return to clinic after her surgery she is still having palpitations we will consider referral to cardiology              I have discussed the diagnosis with the patient and the intended plan as seen in the above orders.   The patient has received an after-visit summary and questions were answered concerning future plans. Medication Side Effects and Warnings were discussed with patient,  Patient Labs were reviewed and or requested, and  Patient Past Records were reviewed and or requested  yes         Pt agrees to call or return to clinic and/or go to closest ER with any worsening of symptoms. This may include, but not limited to increased fever (>100.4) with NSAIDS or Tylenol, increased edema, confusion, rash, worsening of presenting symptoms.

## 2019-10-21 RX ORDER — BUSPIRONE HYDROCHLORIDE 5 MG/1
5 TABLET ORAL
Qty: 30 TAB | Refills: 2 | Status: SHIPPED | OUTPATIENT
Start: 2019-10-21 | End: 2020-01-06 | Stop reason: SDUPTHER

## 2019-10-23 ENCOUNTER — HOSPITAL ENCOUNTER (OUTPATIENT)
Dept: NON INVASIVE DIAGNOSTICS | Age: 59
Discharge: HOME | End: 2019-10-23
Attending: FAMILY MEDICINE
Payer: MEDICAID

## 2019-10-23 VITALS
BODY MASS INDEX: 31.54 KG/M2 | WEIGHT: 208.11 LBS | HEIGHT: 68 IN | SYSTOLIC BLOOD PRESSURE: 108 MMHG | DIASTOLIC BLOOD PRESSURE: 70 MMHG

## 2019-10-23 DIAGNOSIS — R00.2 PALPITATIONS: ICD-10-CM

## 2019-10-23 DIAGNOSIS — R60.0 BILATERAL LEG EDEMA: ICD-10-CM

## 2019-10-23 LAB
AV VELOCITY RATIO: 0.63
ECHO AO ROOT DIAM: 2.66 CM
ECHO AV AREA PEAK VELOCITY: 2.8 CM2
ECHO AV PEAK GRADIENT: 6.1 MMHG
ECHO AV PEAK VELOCITY: 123.21 CM/S
ECHO EST RA PRESSURE: 10 MMHG
ECHO LA AREA 4C: 9.9 CM2
ECHO LA VOL 4C: 22.02 ML (ref 22–52)
ECHO LA VOLUME INDEX A4C: 10.59 ML/M2 (ref 16–28)
ECHO LV E' LATERAL VELOCITY: 8.3 CM/S
ECHO LV E' SEPTAL VELOCITY: 8.19 CM/S
ECHO LV INTERNAL DIMENSION DIASTOLIC MMODE: 5.16 CM
ECHO LV INTERNAL DIMENSION SYSTOLIC MMODE: 3.23 CM
ECHO LV IVSD MMODE: 0.86 CM
ECHO LV IVSS MMODE: 1.35 CM
ECHO LV POSTERIOR WALL DIASTOLIC MMODE: 1.01 CM
ECHO LV POSTERIOR WALL SYSTOLIC MMODE: 1.59 CM
ECHO LVOT DIAM: 2.37 CM
ECHO LVOT PEAK GRADIENT: 2.4 MMHG
ECHO LVOT PEAK VELOCITY: 78.03 CM/S
ECHO MV A VELOCITY: 82.23 CM/S
ECHO MV E DECELERATION TIME (DT): 249.6 MS
ECHO MV E VELOCITY: 77.76 CM/S
ECHO MV E/A RATIO: 0.95
ECHO MV E/E' LATERAL: 9.37
ECHO MV E/E' RATIO (AVERAGED): 9.43
ECHO MV E/E' SEPTAL: 9.49
ECHO MV REGURGITANT PEAK GRADIENT: 70.4 MMHG
ECHO MV REGURGITANT PEAK VELOCITY: 419.5 CM/S
ECHO PULMONARY ARTERY SYSTOLIC PRESSURE (PASP): 28.1 MMHG
ECHO RIGHT VENTRICULAR SYSTOLIC PRESSURE (RVSP): 28.1 MMHG
ECHO RV INTERNAL DIMENSION: 2.82 CM
ECHO RVOT PEAK VELOCITY: 57.52 CM/S
ECHO TV REGURGITANT MAX VELOCITY: 212.77 CM/S
ECHO TV REGURGITANT PEAK GRADIENT: 18.1 MMHG
LVFS: 37.43 %
MV DEC SLOPE: 3.12

## 2019-10-23 PROCEDURE — 93306 TTE W/DOPPLER COMPLETE: CPT

## 2019-10-23 NOTE — PROGRESS NOTES
Essentially normal echo with mild Mitral valve thickening and regurgitation, no cause of leg swelling noted

## 2019-10-23 NOTE — PROGRESS NOTES
Patient called with questions about class, PAT, and surgery. Directed patient to call Dr. Trinidad Campos office for more information and provided her with the number to PAT.

## 2019-11-04 ENCOUNTER — HOSPITAL ENCOUNTER (OUTPATIENT)
Dept: PREADMISSION TESTING | Age: 59
Discharge: HOME | End: 2019-11-04
Payer: MEDICAID

## 2019-11-04 VITALS
HEART RATE: 86 BPM | BODY MASS INDEX: 31.08 KG/M2 | HEIGHT: 67 IN | SYSTOLIC BLOOD PRESSURE: 125 MMHG | DIASTOLIC BLOOD PRESSURE: 83 MMHG | WEIGHT: 198 LBS | TEMPERATURE: 98.5 F

## 2019-11-04 DIAGNOSIS — R73.09 ELEVATED HEMOGLOBIN A1C: ICD-10-CM

## 2019-11-04 LAB
ABO + RH BLD: NORMAL
ANION GAP SERPL CALC-SCNC: 2 MMOL/L (ref 5–15)
APPEARANCE UR: CLEAR
BACTERIA URNS QL MICRO: NEGATIVE /HPF
BILIRUB UR QL: NEGATIVE
BLOOD GROUP ANTIBODIES SERPL: NORMAL
BUN SERPL-MCNC: 10 MG/DL (ref 6–20)
BUN/CREAT SERPL: 15 (ref 12–20)
CALCIUM SERPL-MCNC: 8.9 MG/DL (ref 8.5–10.1)
CHLORIDE SERPL-SCNC: 105 MMOL/L (ref 97–108)
CO2 SERPL-SCNC: 31 MMOL/L (ref 21–32)
COLOR UR: NORMAL
CREAT SERPL-MCNC: 0.67 MG/DL (ref 0.55–1.02)
EPITH CASTS URNS QL MICRO: NORMAL /LPF
ERYTHROCYTE [DISTWIDTH] IN BLOOD BY AUTOMATED COUNT: 13.6 % (ref 11.5–14.5)
EST. AVERAGE GLUCOSE BLD GHB EST-MCNC: 128 MG/DL
GLUCOSE SERPL-MCNC: 83 MG/DL (ref 65–100)
GLUCOSE UR STRIP.AUTO-MCNC: NEGATIVE MG/DL
HBA1C MFR BLD: 6.1 % (ref 4.2–6.3)
HCG SERPL QL: NEGATIVE
HCT VFR BLD AUTO: 36.6 % (ref 35–47)
HGB BLD-MCNC: 11.6 G/DL (ref 11.5–16)
HGB UR QL STRIP: NEGATIVE
HYALINE CASTS URNS QL MICRO: NORMAL /LPF (ref 0–5)
INR PPP: 1 (ref 0.9–1.1)
KETONES UR QL STRIP.AUTO: NEGATIVE MG/DL
LEUKOCYTE ESTERASE UR QL STRIP.AUTO: NEGATIVE
MCH RBC QN AUTO: 29.7 PG (ref 26–34)
MCHC RBC AUTO-ENTMCNC: 31.7 G/DL (ref 30–36.5)
MCV RBC AUTO: 93.6 FL (ref 80–99)
NITRITE UR QL STRIP.AUTO: NEGATIVE
NRBC # BLD: 0 K/UL (ref 0–0.01)
NRBC BLD-RTO: 0 PER 100 WBC
PH UR STRIP: 6 [PH] (ref 5–8)
PLATELET # BLD AUTO: 305 K/UL (ref 150–400)
PMV BLD AUTO: 9.3 FL (ref 8.9–12.9)
POTASSIUM SERPL-SCNC: 4.1 MMOL/L (ref 3.5–5.1)
PROT UR STRIP-MCNC: NEGATIVE MG/DL
PROTHROMBIN TIME: 10.2 SEC (ref 9–11.1)
RBC # BLD AUTO: 3.91 M/UL (ref 3.8–5.2)
RBC #/AREA URNS HPF: NORMAL /HPF (ref 0–5)
SODIUM SERPL-SCNC: 138 MMOL/L (ref 136–145)
SP GR UR REFRACTOMETRY: 1.01 (ref 1–1.03)
SPECIMEN EXP DATE BLD: NORMAL
UA: UC IF INDICATED,UAUC: NORMAL
UROBILINOGEN UR QL STRIP.AUTO: 0.2 EU/DL (ref 0.2–1)
WBC # BLD AUTO: 8 K/UL (ref 3.6–11)
WBC URNS QL MICRO: NORMAL /HPF (ref 0–4)

## 2019-11-04 PROCEDURE — 86900 BLOOD TYPING SEROLOGIC ABO: CPT

## 2019-11-04 PROCEDURE — 36415 COLL VENOUS BLD VENIPUNCTURE: CPT

## 2019-11-04 PROCEDURE — 81001 URINALYSIS AUTO W/SCOPE: CPT

## 2019-11-04 PROCEDURE — 83036 HEMOGLOBIN GLYCOSYLATED A1C: CPT

## 2019-11-04 PROCEDURE — 85610 PROTHROMBIN TIME: CPT

## 2019-11-04 PROCEDURE — 80048 BASIC METABOLIC PNL TOTAL CA: CPT

## 2019-11-04 PROCEDURE — 85027 COMPLETE CBC AUTOMATED: CPT

## 2019-11-04 PROCEDURE — 84703 CHORIONIC GONADOTROPIN ASSAY: CPT

## 2019-11-05 PROBLEM — R73.09 ELEVATED HEMOGLOBIN A1C: Status: ACTIVE | Noted: 2019-11-05

## 2019-11-05 LAB
BACTERIA SPEC CULT: NORMAL
BACTERIA SPEC CULT: NORMAL
SERVICE CMNT-IMP: NORMAL

## 2019-11-06 NOTE — H&P
Keely Costa  Location: - [facility]  Patient #: [id number]  : 1960  Unknown / Language: [place] / Race: Black or   Female      History of Present Illness   The patient is a 64year old female who presents with chronic lumbar back pain. This condition occurred following a specific injury. The injury involved the low back. This occurred at work. The injury resulted from a fall (slipped down brick steps 1989). Symptoms include pain and decreased range of motion. Symptoms are located in the low back. The patient describes the pain as aching and throbbing. The symptoms occur constantly. The patient describes symptoms as moderate in severity (moderate to severe) and worsening. Symptoms are exacerbated by weight bearing, lifting and bending (exercise and twisting). Symptoms are relieved by rest and heat (and elevation). Current treatment includes nonsteroidal anti-inflammatory drugs. Recently the disease has been worsening. Past evaluation has included x-ray of the lumbar spine and MRI of the lumbar spine. Past treatment has included epidural injection and physical therapy. Allergies   Pollens   Seasonal  PriLOSEC *ULCER DRUGS/ANTISPASMODICS/ANTICHOLINERGICS*      Family History  Alcohol Abuse   Father. Anemia   Mother. Arthritis   Brother, Mother, Sister, Son. Hypercholesterolemia   Brother. Hypertension   Brother, Father. Migraine Headache   Daughter. Respiratory Condition   Father. Thyroid problems   Mother. Social History  Alcohol use   1 time, Drinks hard liquor, Drinks wine, Never drinks more than 5 drinks per occasion, per month. Caffeine use   3-4 drinks per day, Carbonated beverages, Coffee, Tea. Current work status   Part-time. Exercise   Occasionally, Swimming, walking. Illicit drug use   Current. Marital status   Partnered. Seat Belt Use   Always uses seat belts. Sun Exposure   Occasionally. Tobacco / smoke exposure   None.   Tobacco use   Former smoker, Smokes < .5 pack of cigarettes per day. Medication History   Mobic  (Oral) Specific strength unknown - Active. busPIRone HCl  (Oral) Specific strength unknown - Active. Bentyl  (Oral) Specific strength unknown - Active. Zantac  (Oral) Specific strength unknown - Active. Claritin  (Oral) Specific strength unknown - Active. Medications Reconciled     Past Surgical History    Delivery   1 time  Gallbladder Surgery   laparoscopic    Diagnostic Studies History  MRI, Spine   Date: 2019, Results: Review of the MRI demonstrates she has a grade 1 anterolisthesis at L4-5. She has moderate stenosis due to facet hypertrophy as well as the anterolisthesis. Also spondylosis at L5-S1 with some moderate lateral recess and foraminal stenosis. Lumbar Spine X-ray   Date: 2019, Results: Flexion-extension views of the lumbar spine demonstrate mild narrowing at L5-S1 to space but no difficulties with flexion-extension. At L4-5 she has a spondylolisthesis of approximately 6.5 mm. It reduces to approximately 5 mm with extension. Other Problems   Anemia    Anxiety Disorder    Arthritis    Asthma    Gastrointestinal Disease    High blood pressure    Migraine Headache    Ulcer disease      Review of Systems  General Present- Seasonal Allergies. Not Present- Appetite Loss, Chills, Fatigue, Fever, HIV Exposure, Night Sweats, Persistent Infections, Weight Gain and Weight Loss. Skin Not Present- Itching, Nail Changes, Poor Wound Healing, Rash, Skin Color Changes, Suspicious Lesions and Yellowish Skin Color. HEENT Not Present- Decreased Hearing, Earache, Hoarseness, Loose Teeth, Nose Bleed, Ringing in the Ears and Sore Throat. Respiratory Present- Wheezing. Not Present- Bloody sputum, Chronic Cough, Difficulty Breathing, Snoring and Wakes up from Sleep Wheezing or Short of Breath.   Cardiovascular Not Present- Bluish Discoloration Of Lips Or Nails, Chest Pain, Difficulty Breathing Lying Down, Difficulty Breathing On Exertion, Leg Cramps With Exertion, Palpitations and Swelling of Extremities. Gastrointestinal Present- Abdominal Pain and Diarrhea. Not Present- Black, Tarry Stool, Change in Bowel Habits, Cirrhosis, Constipation, Difficulty Swallowing, Nausea and Vomiting. Female Genitourinary Not Present- Blood in Urine, Frequency, Painful Urination, Pelvic Pain, Trouble Starting Urinary Stream and Urgency. Musculoskeletal Present- Back Pain, Joint Pain, Joint Stiffness and Joint Swelling. Neurological Present- Headaches, Numbness and Unsteadiness. Not Present- Fainting, Memory Loss, Seizures, Tingling, Tremor and Weakness. Psychiatric Present- Anxiety. Not Present- Bipolar and Depression. Endocrine Not Present- Cold Intolerance, Excessive Hunger, Excessive Thirst, Excessive Urination and Heat Intolerance. Hematology Not Present- Abnormal Bruising , Enlarged Lymph Nodes, Excessive bleeding and Skin Discoloration. Vitals  Weight: 204 lb   Height: 68.5 in   Body Surface Area: 2.07 m²   Body Mass Index: 30.57 kg/m²      Physical Exam  Neurologic  Sensory  Light Touch - Intact - Globally. Overall Assessment of Muscle Strength and Tone reveals  Lower Extremities - Right Iliopsoas - 5/5. Left Iliopsoas - 5/5. Right Tibialis Anterior - 5/5. Left Tibialis Anterior - 5/5. Right Gastroc-Soleus - 5/5. Left Gastroc-Soleus - 5/5. Right EHL - 3+/5. Left EHL - 4-/5. General Assessment of Reflexes  Right Ankle - Clonus is not present. Left Ankle - Clonus is not present. Reflexes (Dermatomes)  2/2 Normal - Left Achilles (L5-S2), Left Knee (L2-4), Right Achilles (L5-S2) and Right Knee (L2-4). Musculoskeletal  Global Assessment  Examination of related systems reveals - well-developed, well-nourished, in no acute distress, alert and oriented x 3. Gait and Station - normal gait and station and normal posture.  Right Lower Extremity - normal strength and tone, normal range of motion without pain and no instability, subluxation or laxity. Left Lower Extremity - normal strength and tone, normal range of motion without pain and no instability, subluxation or laxity. Spine/Ribs/Pelvis  Cervical Spine - Examination of the cervical spine reveals - no tenderness to palpation, no pain, no swelling, edema or erythema, normal cervical spine movements and normal sensation. Thoracic (Dorsal) Spine - Examination of the thoracic spine reveals - no tenderness over thoracic vertebrae, no pain, normal sensation and normal thoracic spine movements. Lumbosacral Spine - Examination of the lumbosacral spine reveals - no known fractures or deformities. Inspection and Palpation - Tenderness - moderate. Assessment of pain reveals the following findings - The pain is characterized as - moderate. Location - pain refers to lower back bilaterally. ROJM - Trunk Extension - 15 degrees. Lumbar Spine Flexion - . Lumbosacral Spine - Functional Testing - Babinski Test negative, Prone Knee Bending Test negative, Slump Test negative, Straight Leg Raising Test negative. Assessment & Plan   Spondylolisthesis (Acquired) (738.4  M43.10)  Impression: She has had a chronic history of back pain and bilateral leg pain left greater than right. She has lumbar stenosis at L4-5 and L5-S1. She has a grade 1 anterolisthesis at L4-5 which is unstable. She is a candidate for a lumbar laminectomy at L4-5 and L5-S1 with a midline fusion at L4-5 due to the instability. Risk and benefits were discussed with her. The risks and benefits were discussed at length with the patient and the patient has elected to proceed. Indications for surgery include failed conservative treatment. Alternative treatments, risks and the perioperative course were discussed with the patient. All questions were answered. The risks and benefits of the procedure were explained. Benefits include definitive diagnosis, relief of pain, elimination of deformity and improved function.  Risks of surgery including bleeding, infection, weakness, numbness, CSF leak, failure to improve symptoms, exacerbation of medical co-morbidities and even death were discussed with the patient. Current Plans  Pt Education - How to access health information online: discussed with patient and provided information. Pt Education - Educational materials were provided.: discussed with patient and provided information. X-RAY EXAM OF LUMBAR SPINE, 2 OR 3 VIEWS (83129) (Flexion and Extension views were taken today using Digital Radiography.)  Lumbar stenosis with neurogenic claudication (724.03  M48.062)  Treatment options were discussed with the patient in full.(V65.49)  Current Plans  Presurgical planning was preformed with the patient today  Surgery to be scheduled  Procedure: Lumbar laminectomy L4-S1 with L4-5 midline fusion    Date of Surgery Update:  Gumaro Ortega was seen and examined. History and physical has been reviewed. The patient has been examined.  There have been no significant clinical changes since the completion of the originally dated History and Physical.    Signed By: Courtney Biswas MD     November 11, 2019 1:43 PM             Signed by Courtney Biswas MD

## 2019-11-09 ENCOUNTER — ANESTHESIA EVENT (OUTPATIENT)
Dept: SURGERY | Age: 59
DRG: 304 | End: 2019-11-09
Payer: MEDICAID

## 2019-11-11 ENCOUNTER — APPOINTMENT (OUTPATIENT)
Dept: CT IMAGING | Age: 59
DRG: 304 | End: 2019-11-11
Attending: ORTHOPAEDIC SURGERY
Payer: MEDICAID

## 2019-11-11 ENCOUNTER — APPOINTMENT (OUTPATIENT)
Dept: GENERAL RADIOLOGY | Age: 59
DRG: 304 | End: 2019-11-11
Attending: ORTHOPAEDIC SURGERY
Payer: MEDICAID

## 2019-11-11 ENCOUNTER — ANESTHESIA (OUTPATIENT)
Dept: SURGERY | Age: 59
DRG: 304 | End: 2019-11-11
Payer: MEDICAID

## 2019-11-11 ENCOUNTER — HOSPITAL ENCOUNTER (INPATIENT)
Age: 59
LOS: 4 days | Discharge: HOME HEALTH | DRG: 304 | End: 2019-11-15
Attending: ORTHOPAEDIC SURGERY | Admitting: ORTHOPAEDIC SURGERY
Payer: MEDICAID

## 2019-11-11 DIAGNOSIS — M43.10 SPONDYLISTHESIS: ICD-10-CM

## 2019-11-11 DIAGNOSIS — M48.061 SPINAL STENOSIS OF LUMBAR REGION, UNSPECIFIED WHETHER NEUROGENIC CLAUDICATION PRESENT: ICD-10-CM

## 2019-11-11 DIAGNOSIS — M48.061 SPINAL STENOSIS OF LUMBAR REGION WITHOUT NEUROGENIC CLAUDICATION: Primary | ICD-10-CM

## 2019-11-11 DIAGNOSIS — K58.2 IRRITABLE BOWEL SYNDROME WITH BOTH CONSTIPATION AND DIARRHEA: ICD-10-CM

## 2019-11-11 DIAGNOSIS — M48.062 SPINAL STENOSIS, LUMBAR REGION, WITH NEUROGENIC CLAUDICATION: ICD-10-CM

## 2019-11-11 LAB
GLUCOSE BLD STRIP.AUTO-MCNC: 102 MG/DL (ref 65–100)
SERVICE CMNT-IMP: ABNORMAL

## 2019-11-11 PROCEDURE — 76010000172 HC OR TIME 2.5 TO 3 HR INTENSV-TIER 1: Performed by: ORTHOPAEDIC SURGERY

## 2019-11-11 PROCEDURE — 77030040943 HC SPCR SPN ARTIC-L MEDT -I1: Performed by: ORTHOPAEDIC SURGERY

## 2019-11-11 PROCEDURE — 74011250636 HC RX REV CODE- 250/636

## 2019-11-11 PROCEDURE — 74011250636 HC RX REV CODE- 250/636: Performed by: NURSE ANESTHETIST, CERTIFIED REGISTERED

## 2019-11-11 PROCEDURE — 77030038552 HC DRN WND MDII -A: Performed by: ORTHOPAEDIC SURGERY

## 2019-11-11 PROCEDURE — 77030038600 HC TU BPLR IRR DISP STRY -B: Performed by: ORTHOPAEDIC SURGERY

## 2019-11-11 PROCEDURE — 74011250636 HC RX REV CODE- 250/636: Performed by: PHYSICIAN ASSISTANT

## 2019-11-11 PROCEDURE — C1713 ANCHOR/SCREW BN/BN,TIS/BN: HCPCS | Performed by: ORTHOPAEDIC SURGERY

## 2019-11-11 PROCEDURE — 72131 CT LUMBAR SPINE W/O DYE: CPT

## 2019-11-11 PROCEDURE — 74011000250 HC RX REV CODE- 250: Performed by: ORTHOPAEDIC SURGERY

## 2019-11-11 PROCEDURE — 74011000250 HC RX REV CODE- 250: Performed by: PHYSICIAN ASSISTANT

## 2019-11-11 PROCEDURE — 77030004391 HC BUR FLUT MEDT -C: Performed by: ORTHOPAEDIC SURGERY

## 2019-11-11 PROCEDURE — 77030040361 HC SLV COMPR DVT MDII -B: Performed by: ORTHOPAEDIC SURGERY

## 2019-11-11 PROCEDURE — 0SG00AJ FUSION OF LUMBAR VERTEBRAL JOINT WITH INTERBODY FUSION DEVICE, POSTERIOR APPROACH, ANTERIOR COLUMN, OPEN APPROACH: ICD-10-PCS | Performed by: ORTHOPAEDIC SURGERY

## 2019-11-11 PROCEDURE — 4A10X4G MONITORING OF CENTRAL NERVOUS ELECTRICAL ACTIVITY, INTRAOPERATIVE, EXTERNAL APPROACH: ICD-10-PCS | Performed by: ORTHOPAEDIC SURGERY

## 2019-11-11 PROCEDURE — 77030008684 HC TU ET CUF COVD -B: Performed by: ANESTHESIOLOGY

## 2019-11-11 PROCEDURE — 77030014647 HC SEAL FBRN TISSL BAXT -D: Performed by: ORTHOPAEDIC SURGERY

## 2019-11-11 PROCEDURE — 77030013079 HC BLNKT BAIR HGGR 3M -A: Performed by: ANESTHESIOLOGY

## 2019-11-11 PROCEDURE — 0ST20ZZ RESECTION OF LUMBAR VERTEBRAL DISC, OPEN APPROACH: ICD-10-PCS | Performed by: ORTHOPAEDIC SURGERY

## 2019-11-11 PROCEDURE — 0SG0071 FUSION OF LUMBAR VERTEBRAL JOINT WITH AUTOLOGOUS TISSUE SUBSTITUTE, POSTERIOR APPROACH, POSTERIOR COLUMN, OPEN APPROACH: ICD-10-PCS | Performed by: ORTHOPAEDIC SURGERY

## 2019-11-11 PROCEDURE — 8E0WXBG COMPUTER ASSISTED PROCEDURE OF TRUNK REGION, WITH COMPUTERIZED TOMOGRAPHY: ICD-10-PCS | Performed by: ORTHOPAEDIC SURGERY

## 2019-11-11 PROCEDURE — 77030035338 HC BED MT SPN RENSNCE GDNC KT MAZR -G1: Performed by: ORTHOPAEDIC SURGERY

## 2019-11-11 PROCEDURE — 74011000250 HC RX REV CODE- 250: Performed by: NURSE ANESTHETIST, CERTIFIED REGISTERED

## 2019-11-11 PROCEDURE — 77030018836 HC SOL IRR NACL ICUM -A: Performed by: ORTHOPAEDIC SURGERY

## 2019-11-11 PROCEDURE — 77030026438 HC STYL ET INTUB CARD -A: Performed by: ANESTHESIOLOGY

## 2019-11-11 PROCEDURE — 01NR0ZZ RELEASE SACRAL NERVE, OPEN APPROACH: ICD-10-PCS | Performed by: ORTHOPAEDIC SURGERY

## 2019-11-11 PROCEDURE — 74011250636 HC RX REV CODE- 250/636: Performed by: ANESTHESIOLOGY

## 2019-11-11 PROCEDURE — 74011250637 HC RX REV CODE- 250/637: Performed by: NURSE ANESTHETIST, CERTIFIED REGISTERED

## 2019-11-11 PROCEDURE — 72100 X-RAY EXAM L-S SPINE 2/3 VWS: CPT

## 2019-11-11 PROCEDURE — 82962 GLUCOSE BLOOD TEST: CPT

## 2019-11-11 PROCEDURE — 76210000000 HC OR PH I REC 2 TO 2.5 HR: Performed by: ORTHOPAEDIC SURGERY

## 2019-11-11 PROCEDURE — 77030031139 HC SUT VCRL2 J&J -A: Performed by: ORTHOPAEDIC SURGERY

## 2019-11-11 PROCEDURE — 77030029099 HC BN WAX SSPC -A: Performed by: ORTHOPAEDIC SURGERY

## 2019-11-11 PROCEDURE — 77030040922 HC BLNKT HYPOTHRM STRY -A

## 2019-11-11 PROCEDURE — 77030019908 HC STETH ESOPH SIMS -A: Performed by: ANESTHESIOLOGY

## 2019-11-11 PROCEDURE — 65270000032 HC RM SEMIPRIVATE

## 2019-11-11 PROCEDURE — 74011250637 HC RX REV CODE- 250/637: Performed by: PHYSICIAN ASSISTANT

## 2019-11-11 PROCEDURE — 77030034094 HC GRFT BN SUB ELITE TRNTY MUSC -I1: Performed by: ORTHOPAEDIC SURGERY

## 2019-11-11 PROCEDURE — 77030005513 HC CATH URETH FOL11 MDII -B: Performed by: ORTHOPAEDIC SURGERY

## 2019-11-11 PROCEDURE — 74011250636 HC RX REV CODE- 250/636: Performed by: ORTHOPAEDIC SURGERY

## 2019-11-11 PROCEDURE — 01NB0ZZ RELEASE LUMBAR NERVE, OPEN APPROACH: ICD-10-PCS | Performed by: ORTHOPAEDIC SURGERY

## 2019-11-11 PROCEDURE — 76060000037 HC ANESTHESIA 3 TO 3.5 HR: Performed by: ORTHOPAEDIC SURGERY

## 2019-11-11 DEVICE — SPACER 56251110 12W 25MM X 11MM 10 DG TI
Type: IMPLANTABLE DEVICE | Site: BACK | Status: FUNCTIONAL
Brand: ARTIC-L™ 3D TI SPINAL SYSTEM WITH TIONIC™ TECHNOLOGY

## 2019-11-11 DEVICE — GRAFT BNE SUB M CANC FRZN MORSELIZED W/ VIABLE CELL TRINITY: Type: IMPLANTABLE DEVICE | Site: BACK | Status: FUNCTIONAL

## 2019-11-11 DEVICE — ROD 1553201040 5.5 TI CP4 NS CURV 40MM
Type: IMPLANTABLE DEVICE | Site: BACK | Status: FUNCTIONAL
Brand: CD HORIZON® SPINAL SYSTEM

## 2019-11-11 DEVICE — SET SCREW 5540030 5.5 TI NS BRK OFF
Type: IMPLANTABLE DEVICE | Site: BACK | Status: FUNCTIONAL
Brand: CD HORIZON® SPINAL SYSTEM

## 2019-11-11 DEVICE — SCREW 55840005540 5.5/6 MAS 5.5X40 CC
Type: IMPLANTABLE DEVICE | Site: BACK | Status: FUNCTIONAL
Brand: CD HORIZON® SPINAL SYSTEM

## 2019-11-11 DEVICE — GRAFT BNE SUB 7.5GM PTTY SYN SIGNAFUSE: Type: IMPLANTABLE DEVICE | Site: BACK | Status: FUNCTIONAL

## 2019-11-11 DEVICE — GRAFT BNE SUB L CANC FRZN MORSELIZED W/ VIABLE CELL TRINITY: Type: IMPLANTABLE DEVICE | Site: BACK | Status: FUNCTIONAL

## 2019-11-11 RX ORDER — CEFAZOLIN SODIUM/WATER 2 G/20 ML
2 SYRINGE (ML) INTRAVENOUS EVERY 8 HOURS
Status: COMPLETED | OUTPATIENT
Start: 2019-11-11 | End: 2019-11-12

## 2019-11-11 RX ORDER — SODIUM CHLORIDE 0.9 % (FLUSH) 0.9 %
5-40 SYRINGE (ML) INJECTION EVERY 8 HOURS
Status: DISCONTINUED | OUTPATIENT
Start: 2019-11-11 | End: 2019-11-15 | Stop reason: HOSPADM

## 2019-11-11 RX ORDER — ALBUTEROL SULFATE 0.83 MG/ML
2.5 SOLUTION RESPIRATORY (INHALATION)
Status: DISCONTINUED | OUTPATIENT
Start: 2019-11-11 | End: 2019-11-15 | Stop reason: HOSPADM

## 2019-11-11 RX ORDER — MORPHINE SULFATE 2 MG/ML
2 INJECTION, SOLUTION INTRAMUSCULAR; INTRAVENOUS
Status: ACTIVE | OUTPATIENT
Start: 2019-11-11 | End: 2019-11-12

## 2019-11-11 RX ORDER — ONDANSETRON 2 MG/ML
INJECTION INTRAMUSCULAR; INTRAVENOUS AS NEEDED
Status: DISCONTINUED | OUTPATIENT
Start: 2019-11-11 | End: 2019-11-11 | Stop reason: HOSPADM

## 2019-11-11 RX ORDER — CYCLOBENZAPRINE HCL 10 MG
10 TABLET ORAL
Status: DISCONTINUED | OUTPATIENT
Start: 2019-11-11 | End: 2019-11-15 | Stop reason: HOSPADM

## 2019-11-11 RX ORDER — VANCOMYCIN HYDROCHLORIDE 1 G/20ML
INJECTION, POWDER, LYOPHILIZED, FOR SOLUTION INTRAVENOUS AS NEEDED
Status: DISCONTINUED | OUTPATIENT
Start: 2019-11-11 | End: 2019-11-11 | Stop reason: HOSPADM

## 2019-11-11 RX ORDER — HYDROMORPHONE HYDROCHLORIDE 1 MG/ML
0.2 INJECTION, SOLUTION INTRAMUSCULAR; INTRAVENOUS; SUBCUTANEOUS
Status: DISCONTINUED | OUTPATIENT
Start: 2019-11-11 | End: 2019-11-11 | Stop reason: HOSPADM

## 2019-11-11 RX ORDER — ACETAMINOPHEN 10 MG/ML
INJECTION, SOLUTION INTRAVENOUS AS NEEDED
Status: DISCONTINUED | OUTPATIENT
Start: 2019-11-11 | End: 2019-11-11 | Stop reason: HOSPADM

## 2019-11-11 RX ORDER — LORATADINE 10 MG/1
10 TABLET ORAL
Status: DISCONTINUED | OUTPATIENT
Start: 2019-11-12 | End: 2019-11-15 | Stop reason: HOSPADM

## 2019-11-11 RX ORDER — MIDAZOLAM HYDROCHLORIDE 1 MG/ML
1 INJECTION, SOLUTION INTRAMUSCULAR; INTRAVENOUS
Status: DISCONTINUED | OUTPATIENT
Start: 2019-11-11 | End: 2019-11-11 | Stop reason: HOSPADM

## 2019-11-11 RX ORDER — SODIUM CHLORIDE, SODIUM LACTATE, POTASSIUM CHLORIDE, CALCIUM CHLORIDE 600; 310; 30; 20 MG/100ML; MG/100ML; MG/100ML; MG/100ML
INJECTION, SOLUTION INTRAVENOUS
Status: DISCONTINUED | OUTPATIENT
Start: 2019-11-11 | End: 2019-11-11 | Stop reason: HOSPADM

## 2019-11-11 RX ORDER — ESMOLOL HYDROCHLORIDE 10 MG/ML
INJECTION INTRAVENOUS AS NEEDED
Status: DISCONTINUED | OUTPATIENT
Start: 2019-11-11 | End: 2019-11-11 | Stop reason: HOSPADM

## 2019-11-11 RX ORDER — SODIUM CHLORIDE 0.9 % (FLUSH) 0.9 %
5-40 SYRINGE (ML) INJECTION AS NEEDED
Status: DISCONTINUED | OUTPATIENT
Start: 2019-11-11 | End: 2019-11-11 | Stop reason: HOSPADM

## 2019-11-11 RX ORDER — DIPHENHYDRAMINE HYDROCHLORIDE 50 MG/ML
12.5 INJECTION, SOLUTION INTRAMUSCULAR; INTRAVENOUS ONCE
Status: COMPLETED | OUTPATIENT
Start: 2019-11-11 | End: 2019-11-11

## 2019-11-11 RX ORDER — PROPOFOL 10 MG/ML
INJECTION, EMULSION INTRAVENOUS AS NEEDED
Status: DISCONTINUED | OUTPATIENT
Start: 2019-11-11 | End: 2019-11-11 | Stop reason: HOSPADM

## 2019-11-11 RX ORDER — SODIUM CHLORIDE 0.9 % (FLUSH) 0.9 %
5-40 SYRINGE (ML) INJECTION AS NEEDED
Status: DISCONTINUED | OUTPATIENT
Start: 2019-11-11 | End: 2019-11-15 | Stop reason: HOSPADM

## 2019-11-11 RX ORDER — MIDAZOLAM HYDROCHLORIDE 1 MG/ML
INJECTION, SOLUTION INTRAMUSCULAR; INTRAVENOUS
Status: COMPLETED
Start: 2019-11-11 | End: 2019-11-11

## 2019-11-11 RX ORDER — FAMOTIDINE 20 MG/1
20 TABLET, FILM COATED ORAL
Status: DISCONTINUED | OUTPATIENT
Start: 2019-11-12 | End: 2019-11-15 | Stop reason: HOSPADM

## 2019-11-11 RX ORDER — AMOXICILLIN 250 MG
1 CAPSULE ORAL 2 TIMES DAILY
Status: DISCONTINUED | OUTPATIENT
Start: 2019-11-11 | End: 2019-11-15 | Stop reason: HOSPADM

## 2019-11-11 RX ORDER — ACETAMINOPHEN 500 MG
1000 TABLET ORAL EVERY 6 HOURS
Status: DISCONTINUED | OUTPATIENT
Start: 2019-11-12 | End: 2019-11-15 | Stop reason: HOSPADM

## 2019-11-11 RX ORDER — SCOLOPAMINE TRANSDERMAL SYSTEM 1 MG/1
PATCH, EXTENDED RELEASE TRANSDERMAL AS NEEDED
Status: DISCONTINUED | OUTPATIENT
Start: 2019-11-11 | End: 2019-11-11 | Stop reason: HOSPADM

## 2019-11-11 RX ORDER — SODIUM CHLORIDE 0.9 % (FLUSH) 0.9 %
5-40 SYRINGE (ML) INJECTION EVERY 8 HOURS
Status: DISCONTINUED | OUTPATIENT
Start: 2019-11-11 | End: 2019-11-11 | Stop reason: HOSPADM

## 2019-11-11 RX ORDER — NALOXONE HYDROCHLORIDE 0.4 MG/ML
0.4 INJECTION, SOLUTION INTRAMUSCULAR; INTRAVENOUS; SUBCUTANEOUS AS NEEDED
Status: DISCONTINUED | OUTPATIENT
Start: 2019-11-11 | End: 2019-11-15 | Stop reason: HOSPADM

## 2019-11-11 RX ORDER — MIDAZOLAM HYDROCHLORIDE 1 MG/ML
1 INJECTION, SOLUTION INTRAMUSCULAR; INTRAVENOUS AS NEEDED
Status: DISCONTINUED | OUTPATIENT
Start: 2019-11-11 | End: 2019-11-11 | Stop reason: HOSPADM

## 2019-11-11 RX ORDER — DIPHENHYDRAMINE HYDROCHLORIDE 50 MG/ML
12.5 INJECTION, SOLUTION INTRAMUSCULAR; INTRAVENOUS
Status: ACTIVE | OUTPATIENT
Start: 2019-11-11 | End: 2019-11-12

## 2019-11-11 RX ORDER — KETAMINE HYDROCHLORIDE 10 MG/ML
INJECTION, SOLUTION INTRAMUSCULAR; INTRAVENOUS AS NEEDED
Status: DISCONTINUED | OUTPATIENT
Start: 2019-11-11 | End: 2019-11-11 | Stop reason: HOSPADM

## 2019-11-11 RX ORDER — HYDROMORPHONE HYDROCHLORIDE 2 MG/ML
INJECTION, SOLUTION INTRAMUSCULAR; INTRAVENOUS; SUBCUTANEOUS AS NEEDED
Status: DISCONTINUED | OUTPATIENT
Start: 2019-11-11 | End: 2019-11-11 | Stop reason: HOSPADM

## 2019-11-11 RX ORDER — ALBUTEROL SULFATE 90 UG/1
1-2 AEROSOL, METERED RESPIRATORY (INHALATION) AS NEEDED
Status: DISCONTINUED | OUTPATIENT
Start: 2019-11-11 | End: 2019-11-11 | Stop reason: CLARIF

## 2019-11-11 RX ORDER — MIDAZOLAM HYDROCHLORIDE 1 MG/ML
INJECTION, SOLUTION INTRAMUSCULAR; INTRAVENOUS AS NEEDED
Status: DISCONTINUED | OUTPATIENT
Start: 2019-11-11 | End: 2019-11-11 | Stop reason: HOSPADM

## 2019-11-11 RX ORDER — LIDOCAINE HYDROCHLORIDE 20 MG/ML
INJECTION, SOLUTION EPIDURAL; INFILTRATION; INTRACAUDAL; PERINEURAL AS NEEDED
Status: DISCONTINUED | OUTPATIENT
Start: 2019-11-11 | End: 2019-11-11 | Stop reason: HOSPADM

## 2019-11-11 RX ORDER — FENTANYL CITRATE 50 UG/ML
25 INJECTION, SOLUTION INTRAMUSCULAR; INTRAVENOUS
Status: DISCONTINUED | OUTPATIENT
Start: 2019-11-11 | End: 2019-11-11 | Stop reason: HOSPADM

## 2019-11-11 RX ORDER — CALCIUM POLYCARBOPHIL 625 MG
2 TABLET ORAL
Status: DISCONTINUED | OUTPATIENT
Start: 2019-11-12 | End: 2019-11-15 | Stop reason: HOSPADM

## 2019-11-11 RX ORDER — ONDANSETRON 2 MG/ML
4 INJECTION INTRAMUSCULAR; INTRAVENOUS
Status: ACTIVE | OUTPATIENT
Start: 2019-11-11 | End: 2019-11-12

## 2019-11-11 RX ORDER — POLYETHYLENE GLYCOL 3350 17 G/17G
17 POWDER, FOR SOLUTION ORAL DAILY
Status: DISCONTINUED | OUTPATIENT
Start: 2019-11-12 | End: 2019-11-15 | Stop reason: HOSPADM

## 2019-11-11 RX ORDER — SODIUM CHLORIDE, SODIUM LACTATE, POTASSIUM CHLORIDE, CALCIUM CHLORIDE 600; 310; 30; 20 MG/100ML; MG/100ML; MG/100ML; MG/100ML
50 INJECTION, SOLUTION INTRAVENOUS CONTINUOUS
Status: DISCONTINUED | OUTPATIENT
Start: 2019-11-11 | End: 2019-11-11 | Stop reason: HOSPADM

## 2019-11-11 RX ORDER — BUSPIRONE HYDROCHLORIDE 5 MG/1
5 TABLET ORAL
Status: DISCONTINUED | OUTPATIENT
Start: 2019-11-12 | End: 2019-11-15 | Stop reason: HOSPADM

## 2019-11-11 RX ORDER — LIDOCAINE HYDROCHLORIDE 10 MG/ML
0.1 INJECTION, SOLUTION EPIDURAL; INFILTRATION; INTRACAUDAL; PERINEURAL AS NEEDED
Status: DISCONTINUED | OUTPATIENT
Start: 2019-11-11 | End: 2019-11-11 | Stop reason: HOSPADM

## 2019-11-11 RX ORDER — CEFAZOLIN SODIUM/WATER 2 G/20 ML
2 SYRINGE (ML) INTRAVENOUS ONCE
Status: COMPLETED | OUTPATIENT
Start: 2019-11-11 | End: 2019-11-11

## 2019-11-11 RX ORDER — DEXAMETHASONE SODIUM PHOSPHATE 4 MG/ML
INJECTION, SOLUTION INTRA-ARTICULAR; INTRALESIONAL; INTRAMUSCULAR; INTRAVENOUS; SOFT TISSUE AS NEEDED
Status: DISCONTINUED | OUTPATIENT
Start: 2019-11-11 | End: 2019-11-11 | Stop reason: HOSPADM

## 2019-11-11 RX ORDER — SUCCINYLCHOLINE CHLORIDE 20 MG/ML
INJECTION INTRAMUSCULAR; INTRAVENOUS AS NEEDED
Status: DISCONTINUED | OUTPATIENT
Start: 2019-11-11 | End: 2019-11-11 | Stop reason: HOSPADM

## 2019-11-11 RX ORDER — LOSARTAN POTASSIUM 25 MG/1
25 TABLET ORAL
Status: DISCONTINUED | OUTPATIENT
Start: 2019-11-12 | End: 2019-11-15 | Stop reason: HOSPADM

## 2019-11-11 RX ORDER — HYOSCYAMINE SULFATE 0.12 MG/1
0.12 TABLET SUBLINGUAL
Status: DISCONTINUED | OUTPATIENT
Start: 2019-11-11 | End: 2019-11-14

## 2019-11-11 RX ORDER — KETOROLAC TROMETHAMINE 30 MG/ML
30 INJECTION, SOLUTION INTRAMUSCULAR; INTRAVENOUS EVERY 6 HOURS
Status: COMPLETED | OUTPATIENT
Start: 2019-11-11 | End: 2019-11-12

## 2019-11-11 RX ORDER — SODIUM CHLORIDE 9 MG/ML
125 INJECTION, SOLUTION INTRAVENOUS CONTINUOUS
Status: DISPENSED | OUTPATIENT
Start: 2019-11-11 | End: 2019-11-12

## 2019-11-11 RX ORDER — DEXMEDETOMIDINE HYDROCHLORIDE 100 UG/ML
INJECTION, SOLUTION INTRAVENOUS AS NEEDED
Status: DISCONTINUED | OUTPATIENT
Start: 2019-11-11 | End: 2019-11-11 | Stop reason: HOSPADM

## 2019-11-11 RX ORDER — ONDANSETRON 2 MG/ML
4 INJECTION INTRAMUSCULAR; INTRAVENOUS AS NEEDED
Status: DISCONTINUED | OUTPATIENT
Start: 2019-11-11 | End: 2019-11-11 | Stop reason: HOSPADM

## 2019-11-11 RX ORDER — MIDAZOLAM HYDROCHLORIDE 1 MG/ML
1 INJECTION, SOLUTION INTRAMUSCULAR; INTRAVENOUS
Status: DISCONTINUED | OUTPATIENT
Start: 2019-11-11 | End: 2019-11-14 | Stop reason: HOSPADM

## 2019-11-11 RX ORDER — PROPOFOL 10 MG/ML
INJECTION, EMULSION INTRAVENOUS
Status: DISCONTINUED | OUTPATIENT
Start: 2019-11-11 | End: 2019-11-11 | Stop reason: HOSPADM

## 2019-11-11 RX ORDER — MORPHINE SULFATE IN 0.9 % NACL 150MG/30ML
PATIENT CONTROLLED ANALGESIA SYRINGE INTRAVENOUS
Status: DISCONTINUED | OUTPATIENT
Start: 2019-11-11 | End: 2019-11-13

## 2019-11-11 RX ORDER — SODIUM CHLORIDE 9 MG/ML
INJECTION, SOLUTION INTRAVENOUS
Status: DISCONTINUED | OUTPATIENT
Start: 2019-11-11 | End: 2019-11-11 | Stop reason: HOSPADM

## 2019-11-11 RX ORDER — OXYCODONE HYDROCHLORIDE 5 MG/1
10 TABLET ORAL
Status: DISCONTINUED | OUTPATIENT
Start: 2019-11-11 | End: 2019-11-13

## 2019-11-11 RX ORDER — OXYCODONE HYDROCHLORIDE 5 MG/1
5 TABLET ORAL
Status: DISCONTINUED | OUTPATIENT
Start: 2019-11-11 | End: 2019-11-13

## 2019-11-11 RX ORDER — ROCURONIUM BROMIDE 10 MG/ML
INJECTION, SOLUTION INTRAVENOUS AS NEEDED
Status: DISCONTINUED | OUTPATIENT
Start: 2019-11-11 | End: 2019-11-11 | Stop reason: HOSPADM

## 2019-11-11 RX ORDER — THERA TABS 400 MCG
1 TAB ORAL DAILY
Status: DISCONTINUED | OUTPATIENT
Start: 2019-11-12 | End: 2019-11-15 | Stop reason: HOSPADM

## 2019-11-11 RX ORDER — FENTANYL CITRATE 50 UG/ML
50 INJECTION, SOLUTION INTRAMUSCULAR; INTRAVENOUS AS NEEDED
Status: DISCONTINUED | OUTPATIENT
Start: 2019-11-11 | End: 2019-11-11 | Stop reason: HOSPADM

## 2019-11-11 RX ORDER — FACIAL-BODY WIPES
10 EACH TOPICAL DAILY PRN
Status: DISCONTINUED | OUTPATIENT
Start: 2019-11-13 | End: 2019-11-15 | Stop reason: HOSPADM

## 2019-11-11 RX ADMIN — SODIUM CHLORIDE, POTASSIUM CHLORIDE, SODIUM LACTATE AND CALCIUM CHLORIDE: 600; 310; 30; 20 INJECTION, SOLUTION INTRAVENOUS at 15:10

## 2019-11-11 RX ADMIN — MIDAZOLAM 1 MG: 1 INJECTION INTRAMUSCULAR; INTRAVENOUS at 19:40

## 2019-11-11 RX ADMIN — SODIUM CHLORIDE: 900 INJECTION, SOLUTION INTRAVENOUS at 18:08

## 2019-11-11 RX ADMIN — ESMOLOL HYDROCHLORIDE 30 MG: 10 INJECTION, SOLUTION INTRAVENOUS at 17:48

## 2019-11-11 RX ADMIN — DEXMEDETOMIDINE HYDROCHLORIDE 4 MCG: 100 INJECTION, SOLUTION, CONCENTRATE INTRAVENOUS at 17:35

## 2019-11-11 RX ADMIN — KETAMINE HYDROCHLORIDE 10 MG: 10 INJECTION, SOLUTION INTRAMUSCULAR; INTRAVENOUS at 15:50

## 2019-11-11 RX ADMIN — SODIUM CHLORIDE 125 ML/HR: 900 INJECTION, SOLUTION INTRAVENOUS at 18:20

## 2019-11-11 RX ADMIN — Medication 10 ML: at 19:59

## 2019-11-11 RX ADMIN — Medication 10 ML: at 23:54

## 2019-11-11 RX ADMIN — HYDROMORPHONE HYDROCHLORIDE 0.2 MG: 1 INJECTION, SOLUTION INTRAMUSCULAR; INTRAVENOUS; SUBCUTANEOUS at 19:30

## 2019-11-11 RX ADMIN — CYCLOBENZAPRINE 10 MG: 10 TABLET, FILM COATED ORAL at 19:09

## 2019-11-11 RX ADMIN — SODIUM CHLORIDE, POTASSIUM CHLORIDE, SODIUM LACTATE AND CALCIUM CHLORIDE: 600; 310; 30; 20 INJECTION, SOLUTION INTRAVENOUS at 18:08

## 2019-11-11 RX ADMIN — KETAMINE HYDROCHLORIDE 10 MG: 10 INJECTION, SOLUTION INTRAMUSCULAR; INTRAVENOUS at 16:26

## 2019-11-11 RX ADMIN — HYDROMORPHONE HYDROCHLORIDE 0.2 MG: 1 INJECTION, SOLUTION INTRAMUSCULAR; INTRAVENOUS; SUBCUTANEOUS at 19:15

## 2019-11-11 RX ADMIN — DEXAMETHASONE SODIUM PHOSPHATE 8 MG: 4 INJECTION, SOLUTION INTRAMUSCULAR; INTRAVENOUS at 15:37

## 2019-11-11 RX ADMIN — ONDANSETRON 4 MG: 2 INJECTION INTRAMUSCULAR; INTRAVENOUS at 18:41

## 2019-11-11 RX ADMIN — SODIUM CHLORIDE, SODIUM LACTATE, POTASSIUM CHLORIDE, AND CALCIUM CHLORIDE 50 ML/HR: 600; 310; 30; 20 INJECTION, SOLUTION INTRAVENOUS at 13:00

## 2019-11-11 RX ADMIN — STANDARDIZED SENNA CONCENTRATE AND DOCUSATE SODIUM 1 TABLET: 8.6; 5 TABLET ORAL at 23:38

## 2019-11-11 RX ADMIN — ROCURONIUM BROMIDE 5 MG: 10 SOLUTION INTRAVENOUS at 15:17

## 2019-11-11 RX ADMIN — PROPOFOL 150 MCG/KG/MIN: 10 INJECTION, EMULSION INTRAVENOUS at 15:16

## 2019-11-11 RX ADMIN — KETAMINE HYDROCHLORIDE 20 MG: 10 INJECTION, SOLUTION INTRAMUSCULAR; INTRAVENOUS at 15:25

## 2019-11-11 RX ADMIN — HYDROMORPHONE HYDROCHLORIDE 0.2 MG: 1 INJECTION, SOLUTION INTRAMUSCULAR; INTRAVENOUS; SUBCUTANEOUS at 18:45

## 2019-11-11 RX ADMIN — DIPHENHYDRAMINE HYDROCHLORIDE 12.5 MG: 50 INJECTION, SOLUTION INTRAMUSCULAR; INTRAVENOUS at 19:41

## 2019-11-11 RX ADMIN — DEXMEDETOMIDINE HYDROCHLORIDE 8 MCG: 100 INJECTION, SOLUTION, CONCENTRATE INTRAVENOUS at 15:45

## 2019-11-11 RX ADMIN — ROCURONIUM BROMIDE 15 MG: 10 SOLUTION INTRAVENOUS at 15:26

## 2019-11-11 RX ADMIN — PROPOFOL 50 MG: 10 INJECTION, EMULSION INTRAVENOUS at 15:26

## 2019-11-11 RX ADMIN — Medication 2 G: at 23:40

## 2019-11-11 RX ADMIN — HYDROMORPHONE HYDROCHLORIDE 1 MG: 2 INJECTION, SOLUTION INTRAMUSCULAR; INTRAVENOUS; SUBCUTANEOUS at 15:21

## 2019-11-11 RX ADMIN — Medication 2 G: at 15:41

## 2019-11-11 RX ADMIN — MIDAZOLAM HYDROCHLORIDE 1 MG: 1 INJECTION, SOLUTION INTRAMUSCULAR; INTRAVENOUS at 19:40

## 2019-11-11 RX ADMIN — SODIUM CHLORIDE 125 ML/HR: 900 INJECTION, SOLUTION INTRAVENOUS at 23:58

## 2019-11-11 RX ADMIN — HYDROMORPHONE HYDROCHLORIDE 1 MG: 2 INJECTION, SOLUTION INTRAMUSCULAR; INTRAVENOUS; SUBCUTANEOUS at 15:48

## 2019-11-11 RX ADMIN — HYDROMORPHONE HYDROCHLORIDE 0.2 MG: 1 INJECTION, SOLUTION INTRAMUSCULAR; INTRAVENOUS; SUBCUTANEOUS at 18:30

## 2019-11-11 RX ADMIN — HYDROMORPHONE HYDROCHLORIDE 0.2 MG: 1 INJECTION, SOLUTION INTRAMUSCULAR; INTRAVENOUS; SUBCUTANEOUS at 19:00

## 2019-11-11 RX ADMIN — DEXMEDETOMIDINE HYDROCHLORIDE 4 MCG: 100 INJECTION, SOLUTION, CONCENTRATE INTRAVENOUS at 16:15

## 2019-11-11 RX ADMIN — DEXMEDETOMIDINE HYDROCHLORIDE 4 MCG: 100 INJECTION, SOLUTION, CONCENTRATE INTRAVENOUS at 15:50

## 2019-11-11 RX ADMIN — MIDAZOLAM 2 MG: 1 INJECTION INTRAMUSCULAR; INTRAVENOUS at 15:10

## 2019-11-11 RX ADMIN — KETOROLAC TROMETHAMINE 30 MG: 30 INJECTION, SOLUTION INTRAMUSCULAR at 23:50

## 2019-11-11 RX ADMIN — ONDANSETRON HYDROCHLORIDE 4 MG: 2 INJECTION, SOLUTION INTRAMUSCULAR; INTRAVENOUS at 17:45

## 2019-11-11 RX ADMIN — SUCCINYLCHOLINE CHLORIDE 140 MG: 20 INJECTION, SOLUTION INTRAMUSCULAR; INTRAVENOUS at 15:17

## 2019-11-11 RX ADMIN — DEXMEDETOMIDINE HYDROCHLORIDE 8 MCG: 100 INJECTION, SOLUTION, CONCENTRATE INTRAVENOUS at 15:25

## 2019-11-11 RX ADMIN — PROPOFOL 200 MG: 10 INJECTION, EMULSION INTRAVENOUS at 15:17

## 2019-11-11 RX ADMIN — Medication: at 18:35

## 2019-11-11 RX ADMIN — KETOROLAC TROMETHAMINE 30 MG: 30 INJECTION, SOLUTION INTRAMUSCULAR at 20:00

## 2019-11-11 RX ADMIN — LIDOCAINE HYDROCHLORIDE 100 MG: 20 INJECTION, SOLUTION EPIDURAL; INFILTRATION; INTRACAUDAL; PERINEURAL at 15:17

## 2019-11-11 RX ADMIN — ACETAMINOPHEN 1000 MG: 10 INJECTION, SOLUTION INTRAVENOUS at 17:50

## 2019-11-11 RX ADMIN — SCOPALAMINE 1 PATCH: 1 PATCH, EXTENDED RELEASE TRANSDERMAL at 15:09

## 2019-11-11 RX ADMIN — ACETAMINOPHEN 1000 MG: 500 TABLET ORAL at 23:38

## 2019-11-11 RX ADMIN — KETAMINE HYDROCHLORIDE 10 MG: 10 INJECTION, SOLUTION INTRAMUSCULAR; INTRAVENOUS at 17:17

## 2019-11-11 NOTE — ANESTHESIA POSTPROCEDURE EVALUATION
Post-Anesthesia Evaluation and Assessment    Patient: Jj De La O MRN: [id number]  SSN: [id number]    YOB: 1960  Age: 64 y.o. Sex: female      I have evaluated the patient and they are stable and ready for discharge from the PACU. Cardiovascular Function/Vital Signs  Visit Vitals  /61   Pulse (!) 115   Temp 37.5 °C (99.5 °F)   Resp 14   Ht 5' 7\" (1.702 m)   Wt 89.8 kg (198 lb)   SpO2 95%   BMI 31.01 kg/m²       Patient is status post General anesthesia for Procedure(s):  LUMBAR LAMINECTOMY L4-S1 WITH L4-5 MIDLINE FUSION - MAZOR. Nausea/Vomiting: None    Postoperative hydration reviewed and adequate. Pain:  Pain Scale 1: Numeric (0 - 10) (11/11/19 1825)  Pain Intensity 1: 6 (11/11/19 1825)   Managed    Neurological Status:   Neuro (WDL): Within Defined Limits (11/11/19 1257)   At baseline    Mental Status, Level of Consciousness: Alert and  oriented to person, place, and time    Pulmonary Status:   O2 Device: Nasal cannula (11/11/19 1817)   Adequate oxygenation and airway patent    Complications related to anesthesia: None    Post-anesthesia assessment completed.  No concerns    Signed By: Guzman Maxwell MD     November 11, 2019

## 2019-11-11 NOTE — BRIEF OP NOTE
BRIEF OPERATIVE NOTE    Date of Procedure: 11/11/2019   Preoperative Diagnosis: STENOSIS  SPONDYLOLISTHESIS  Postoperative Diagnosis: STENOSIS  SPONDYLOLISTHESIS    Procedure(s):  LUMBAR LAMINECTOMY L4-S1 WITH L4-5 MIDLINE FUSION - OLGA  Surgeon(s) and Role: Isiah Major MD - Primary         Surgical Assistant: Tara Mckeon PA-C    Surgical Staff:  Circ-1: Emiliana Dawson RN  Physician Assistant: PARESH Swanson  Scrub Tech-1: Venessa Cherry  Scrub RN-1: Maya Fox RN  Event Time In Time Out   Incision Start 1549    Incision Close 1755      Anesthesia: General   Estimated Blood Loss: 150cc  Specimens: * No specimens in log *   Findings: Lumbar stenosis, spondylolisthesis   Complications: None  Implants:   Implant Name Type Inv.  Item Serial No.  Lot No. LRB No. Used Action   [employer] --  - [id number]  [employer] --  [id number] MUSCULOSKELETAL TRANS N/A N/A 1 Implanted   [employer] [place] --  - [id number]  [employer] [place] --  [id number] MUSCULOSKELETAL TRANS N/A N/A 1 Implanted   SPACER   N/A [employer] [id number] N/A 1 Implanted   SIGNAFUSE   N/A [facility] [id number] N/A 1 Implanted

## 2019-11-11 NOTE — PERIOP NOTES
Called pt family to come and sit with pt while in holding; volunteer services called back and said pt family will be down in \"aboout 12 minutes\"    1:50 pt family at bedside

## 2019-11-11 NOTE — ANESTHESIA PREPROCEDURE EVALUATION
Anesthetic History     PONV          Review of Systems / Medical History  Patient summary reviewed, nursing notes reviewed and pertinent labs reviewed    Pulmonary            Asthma        Neuro/Psych   Within defined limits           Cardiovascular    Hypertension              Exercise tolerance: >4 METS     GI/Hepatic/Renal           PUD     Endo/Other    Diabetes: well controlled, type 2    Arthritis     Other Findings              Physical Exam    Airway  Mallampati: II  TM Distance: 4 - 6 cm  Neck ROM: normal range of motion   Mouth opening: Normal     Cardiovascular    Rhythm: regular  Rate: normal         Dental    Dentition: Upper partial plate     Pulmonary  Breath sounds clear to auscultation               Abdominal  Abdominal exam normal       Other Findings            Anesthetic Plan    ASA: 2  Anesthesia type: general          Induction: Intravenous  Anesthetic plan and risks discussed with: Patient

## 2019-11-12 LAB
ANION GAP SERPL CALC-SCNC: 7 MMOL/L (ref 5–15)
BUN SERPL-MCNC: 8 MG/DL (ref 6–20)
BUN/CREAT SERPL: 9 (ref 12–20)
CALCIUM SERPL-MCNC: 8.4 MG/DL (ref 8.5–10.1)
CHLORIDE SERPL-SCNC: 106 MMOL/L (ref 97–108)
CO2 SERPL-SCNC: 25 MMOL/L (ref 21–32)
CREAT SERPL-MCNC: 0.87 MG/DL (ref 0.55–1.02)
GLUCOSE SERPL-MCNC: 168 MG/DL (ref 65–100)
HGB BLD-MCNC: 9.7 G/DL (ref 11.5–16)
POTASSIUM SERPL-SCNC: 4.2 MMOL/L (ref 3.5–5.1)
SODIUM SERPL-SCNC: 138 MMOL/L (ref 136–145)

## 2019-11-12 PROCEDURE — 97535 SELF CARE MNGMENT TRAINING: CPT

## 2019-11-12 PROCEDURE — 97161 PT EVAL LOW COMPLEX 20 MIN: CPT

## 2019-11-12 PROCEDURE — 36415 COLL VENOUS BLD VENIPUNCTURE: CPT

## 2019-11-12 PROCEDURE — 65270000032 HC RM SEMIPRIVATE

## 2019-11-12 PROCEDURE — 74011250637 HC RX REV CODE- 250/637: Performed by: PHYSICIAN ASSISTANT

## 2019-11-12 PROCEDURE — 97165 OT EVAL LOW COMPLEX 30 MIN: CPT

## 2019-11-12 PROCEDURE — 97116 GAIT TRAINING THERAPY: CPT

## 2019-11-12 PROCEDURE — 74011250637 HC RX REV CODE- 250/637: Performed by: ORTHOPAEDIC SURGERY

## 2019-11-12 PROCEDURE — 80048 BASIC METABOLIC PNL TOTAL CA: CPT

## 2019-11-12 PROCEDURE — 85018 HEMOGLOBIN: CPT

## 2019-11-12 PROCEDURE — 74011250636 HC RX REV CODE- 250/636: Performed by: PHYSICIAN ASSISTANT

## 2019-11-12 RX ORDER — DIPHENHYDRAMINE HCL 25 MG
25 CAPSULE ORAL
Status: DISCONTINUED | OUTPATIENT
Start: 2019-11-12 | End: 2019-11-15 | Stop reason: HOSPADM

## 2019-11-12 RX ADMIN — OXYCODONE HYDROCHLORIDE 10 MG: 5 TABLET ORAL at 17:55

## 2019-11-12 RX ADMIN — DIPHENHYDRAMINE HYDROCHLORIDE 25 MG: 25 CAPSULE ORAL at 20:55

## 2019-11-12 RX ADMIN — OXYCODONE HYDROCHLORIDE 10 MG: 5 TABLET ORAL at 21:14

## 2019-11-12 RX ADMIN — Medication 10 ML: at 21:14

## 2019-11-12 RX ADMIN — KETOROLAC TROMETHAMINE 30 MG: 30 INJECTION, SOLUTION INTRAMUSCULAR at 05:32

## 2019-11-12 RX ADMIN — CALCIUM POLYCARBOPHIL 1250 MG: 625 TABLET, FILM COATED ORAL at 09:13

## 2019-11-12 RX ADMIN — THERA TABS 1 TABLET: TAB at 09:14

## 2019-11-12 RX ADMIN — STANDARDIZED SENNA CONCENTRATE AND DOCUSATE SODIUM 1 TABLET: 8.6; 5 TABLET ORAL at 17:50

## 2019-11-12 RX ADMIN — OXYCODONE HYDROCHLORIDE 10 MG: 5 TABLET ORAL at 09:13

## 2019-11-12 RX ADMIN — OXYCODONE HYDROCHLORIDE 5 MG: 5 TABLET ORAL at 14:43

## 2019-11-12 RX ADMIN — Medication 10 ML: at 05:41

## 2019-11-12 RX ADMIN — STANDARDIZED SENNA CONCENTRATE AND DOCUSATE SODIUM 1 TABLET: 8.6; 5 TABLET ORAL at 09:13

## 2019-11-12 RX ADMIN — ACETAMINOPHEN 1000 MG: 500 TABLET ORAL at 12:43

## 2019-11-12 RX ADMIN — ACETAMINOPHEN 1000 MG: 500 TABLET ORAL at 17:50

## 2019-11-12 RX ADMIN — Medication 1 CAPSULE: at 09:13

## 2019-11-12 RX ADMIN — OXYCODONE HYDROCHLORIDE 5 MG: 5 TABLET ORAL at 12:43

## 2019-11-12 RX ADMIN — ACETAMINOPHEN 1000 MG: 500 TABLET ORAL at 06:00

## 2019-11-12 RX ADMIN — Medication 2 G: at 05:42

## 2019-11-12 RX ADMIN — FAMOTIDINE 20 MG: 20 TABLET ORAL at 06:17

## 2019-11-12 RX ADMIN — Medication 10 ML: at 14:44

## 2019-11-12 RX ADMIN — BUSPIRONE HYDROCHLORIDE 5 MG: 5 TABLET ORAL at 09:14

## 2019-11-12 RX ADMIN — SODIUM CHLORIDE 125 ML/HR: 900 INJECTION, SOLUTION INTRAVENOUS at 09:21

## 2019-11-12 RX ADMIN — KETOROLAC TROMETHAMINE 30 MG: 30 INJECTION, SOLUTION INTRAMUSCULAR at 12:43

## 2019-11-12 RX ADMIN — LORATADINE 10 MG: 10 TABLET ORAL at 09:14

## 2019-11-12 RX ADMIN — POLYETHYLENE GLYCOL 3350 17 G: 17 POWDER, FOR SOLUTION ORAL at 09:14

## 2019-11-12 NOTE — PROGRESS NOTES
Problem: Mobility Impaired (Adult and Pediatric)  Goal: *Acute Goals and Plan of Care (Insert Text)  Description  FUNCTIONAL STATUS PRIOR TO ADMISSION: Patient was independent and active without use of DME.    HOME SUPPORT PRIOR TO ADMISSION: The patient lived with family but did not require assist.    Physical Therapy Goals  Initiated 11/12/2019    1. Patient will move from supine to sit and sit to supine , scoot up and down and roll side to side in bed with modified independence within 4 days. 2. Patient will perform sit to stand with modified independence within 4 days. 3. Patient will ambulate with modified independence for 300 feet with the least restrictive device within 4 days. 4. Patient will ascend/descend 4 stairs with 1 handrail(s) with supervision/set-up within 4 days. 5. Patient will verbalize and demonstrate understanding of spinal precautions (No bending, lifting greater than 5 lbs, or twisting; log-roll technique; frequent repositioning as instructed) within 4 days. 11/12/2019 1508 by Emeli Montes De Oca, PT  Outcome: Progressing Towards Goal  11/12/2019 1138 by Emeli Montes De Oca, PT  Outcome: Progressing Towards Goal    PHYSICAL THERAPY TREATMENT  Patient: Keely Costa (9 y.o. female)  Date: 11/12/2019  Diagnosis: Spinal stenosis, lumbar [M48.061] <principal problem not specified>  Procedure(s) (LRB):  LUMBAR LAMINECTOMY L4-S1 WITH L4-5 MIDLINE FUSION - MAZOR (N/A) 1 Day Post-Op  Precautions:   No bending, no lifting greater than 5 lbs, no twisting, log-roll technique, repositioning every 20-30 min except when sleeping, brace when OOB (if ordered)  Chart, physical therapy assessment, plan of care and goals were reviewed. ASSESSMENT  Patient continues with skilled PT services and is progressing towards goals. Pt up with OT upon arrival, OT ended session and pt participated in walking in hallway.  Pt noted to have significant bolus of fluid in wrist, found RN in hallway and fluids stopped. Pt participates in 300ft walking with RW and improved stability with Supervision. Discussed progress and mobility with pt and family. Pt is not cleared for DC home, expect pt will clear tomorrow AM.     Current Level of Function Impacting Discharge (mobility/balance): supervision         PLAN :  Patient continues to benefit from skilled intervention to address the above impairments. Continue treatment per established plan of care. to address goals. Recommendation for discharge: (in order for the patient to meet his/her long term goals)  Physical therapy at least 2 days/week in the home     This discharge recommendation:  Has been made in collaboration with the attending provider and/or case management    IF patient discharges home will need the following DME: patient owns DME required for discharge       SUBJECTIVE:   Patient stated I feel better a bit.     OBJECTIVE DATA SUMMARY:   Critical Behavior:  Neurologic State: Alert  Orientation Level: Oriented X4  Cognition: Appropriate decision making, Follows commands       Spinal diagnosis intervention:  The patient stated 3/3 back precautions when prompted. Reviewed all 3 back precautions, log roll technique, and sitting for 30 minutes at a time. Functional Mobility Training:    Bed Mobility:  Log Rolling: Minimum assistance  Supine to Sit: Moderate assistance     Scooting: Supervision        Transfers:  Sit to Stand: Contact guard assistance  Stand to Sit: Contact guard assistance                             Balance:  Sitting: Intact; Without support  Standing: Impaired; Without support  Standing - Static: Constant support;Good  Standing - Dynamic : Fair;Constant support  Ambulation/Gait Training:  Distance (ft): 300 Feet (ft)  Assistive Device: Brace/Splint; Walker, rolling  Ambulation - Level of Assistance: Supervision     Gait Description (WDL): Exceptions to WDL  Gait Abnormalities: Trunk sway increased        Base of Support: Widened Speed/Gogo: Fluctuations  Step Length: Right shortened;Left shortened      Stairs:  Number of Stairs Trained: 4  Stairs - Level of Assistance: Minimum assistance;Contact guard assistance   Rail Use: Right      Pain Ratin/10    Activity Tolerance:   Good  Please refer to the flowsheet for vital signs taken during this treatment.     After treatment patient left in no apparent distress:   Sitting in chair, Call bell within reach and Caregiver / family present    COMMUNICATION/COLLABORATION:   The patients plan of care was discussed with: Registered Nurse and     Stacy Guerin, PT   Time Calculation: 19 mins

## 2019-11-12 NOTE — PROGRESS NOTES
Bedside and Verbal shift change report given to Patricia Sorto (oncoming nurse) by Joe Cobos RN (offgoing nurse). Report included the following information SBAR, Kardex, OR Summary, Intake/Output and MAR.

## 2019-11-12 NOTE — PROGRESS NOTES
Problem: Self Care Deficits Care Plan (Adult)  Goal: *Acute Goals and Plan of Care (Insert Text)  Description  FUNCTIONAL STATUS PRIOR TO ADMISSION: Patient required minimal to moderate assistance for basic and instrumental ADLs. Difficulty with LB self care and bathing. Used cane for mobility    HOME SUPPORT: The patient lived with daughter, partner, friend, friend's brother. Occupational Therapy Goals  Initiated 11/12/2019    1. Patient will perform lower body dressing with supervision/set-up assist using AE PRN within 7 days. 2.  Patient will perform toileting with supervision/set-up using most appropriate DME within 7 days. 3.  Patient will tub transfer at supervision/set-up within 7 days. 4.  Patient will don/doff back brace at modified [place] within 7 days. 5.  Patient will verbalize/demonstrate 3/3 back precautions during ADL tasks without cues within 7 days. Outcome: Progressing Towards Goal     OCCUPATIONAL THERAPY EVALUATION  Patient: Kelley Noriega (63 y.o. female)  Date: 11/12/2019  Primary Diagnosis: Spinal stenosis, lumbar [M48.061]  Procedure(s) (LRB):  LUMBAR LAMINECTOMY L4-S1 WITH L4-5 MIDLINE FUSION - MAZOR (N/A) 1 Day Post-Op   Precautions: spinal precautions, fall       ASSESSMENT  Based on the objective data described below, the patient presents with spinal precautions, decreased functional mobility, decreased activity tolerance, impaired standing balance, and ability to perform ADLs 2/2 lumbar laminectomy with midline fusion. Pt participated well in therapy with overall min to CGA for ADL mobility and min A for toileting. Believe she will continue to benefit from OT during acute stay and may benefit from [facility] services upon d/c home to ensure safety with ADLs/iADLs in the home, decreasing risk for injury. Will continue to follow during inpatient stay.       Current Level of Function Impacting Discharge (ADLs/self-care): max A for LB self-care without Ae, min A for toileting, min A bed mobility    Functional Outcome Measure: The patient scored 60/100 on the Barthel Index outcome measure. Other factors to consider for discharge: has family available to assist at home     Patient will benefit from skilled therapy intervention to address the above noted impairments. PLAN :  Recommendations and Planned Interventions: self care training, functional mobility training, therapeutic exercise, balance training, therapeutic activities, patient education, home safety training and family training/education    Frequency/Duration: Patient will be followed by occupational therapy 5 times a week to address goals. Recommendation for discharge: (in order for the patient to meet his/her long term goals)  Occupational therapy at least 2 days/week in the home     This discharge recommendation:  Has not yet been discussed the attending provider and/or case management    IF patient discharges home will need the following DME: transfer bench, sock aide (declined to purchase, educated on how to make one from plastic bottle if needed) and patient owns other DME required for discharge (reacher)       SUBJECTIVE:   Patient stated I feel better standing up.     OBJECTIVE DATA SUMMARY:   HISTORY:   Past Medical History:   Diagnosis Date    Adverse effect of anesthesia     \"so sleepy and tired for the rest of the day\"    Anxiety     Asthma     Chronic pain     back - L5 is \"almost gone\"/left foot neuropathy -pinched nerve L4-5 - spine shifted/degenerative spinal disease/stiff and sore neck and shoulder - in PT for back and neck    Degenerative spinal arthritis     Diabetes ([address]     PRE DIABETIC    Hypertension     IBS (irritable bowel syndrome)     Ill-defined condition     pre-diabetes    Nausea & vomiting     ? d/t fentanyl    PUD (peptic ulcer disease)     Spondylolisthesis      Past Surgical History:   Procedure Laterality Date    COLONOSCOPY N/A 4/25/2019    COLONOSCOPY performed by Veda Rabago MD at [facility] ENDOSCOPY    [address]    HX CHOLECYSTECTOMY  2006    HX DILATION AND CURETTAGE  1984    HX GI  04/2019    COLONOSCOPY    HX GI      ENDOSCOPY       Expanded or extensive additional review of patient history:     Home Situation  Home Environment: Private residence  # Steps to Enter: 2  Rails to Enter: Yes  Hand Rails : Bilateral(can'thold atsametime)  One/Two Story Residence: One story  Living Alone: No  Support Systems: Spouse/Significant Other/Partner, Friends \ neighbors, Child(bob)  Patient Expects to be Discharged to[de-identified] Private residence  Current DME Used/Available at Home: Shower chair(bidet, lift chair)  Tub or Shower Type: Tub/Shower combination    Hand dominance: Right    EXAMINATION OF PERFORMANCE DEFICITS:  Cognitive/Behavioral Status:  Neurologic State: Alert  Orientation Level: Oriented X4                Skin: wnl      Edema: none observed    Hearing: Auditory  Auditory Impairment: None    Vision/Perceptual:                                Corrective Lenses: Glasses    Range of Motion:    AROM: Generally decreased, functional  PROM: Generally decreased, functional                      Strength:    Strength: Generally decreased, functional                Coordination:  Coordination: Within functional limits  Fine Motor Skills-Upper: Left Intact; Right Intact    Gross Motor Skills-Upper: Left Intact; Right Intact    Tone & Sensation:    Tone: Normal  Sensation: Intact                      Balance:  Sitting: Intact; Without support  Standing: Intact; Without support  Standing - Static: Good  Standing - Dynamic : Fair;Constant support    Functional Mobility and Transfers for ADLs:  Bed Mobility:  Rolling: Minimum assistance  Supine to Sit: Minimum assistance  Scooting: Supervision    Transfers:  Sit to Stand: Contact guard assistance  Stand to Sit: Minimum assistance;Contact guard assistance  Bathroom Mobility: Contact guard assistance  Toilet Transfer : Contact guard assistance    ADL Assessment:  Feeding: Setup    Oral Facial Hygiene/Grooming: Contact guard assistance(standing at sink)    Bathing: Moderate assistance    Upper Body Dressing: Minimum assistance    Lower Body Dressing: Maximum assistance(without AE)    Toileting: Minimum assistance                ADL Intervention and task modifications:  Patient instructed and demonstrated 2/3 back precautions with min cues to recall 3rd (lifting)       Grooming  Washing Hands: Stand-by assistance                   Lower Body Dressing Assistance  Protective Undergarmet: Minimum assistance    Toileting  Toileting Assistance: Minimum assistance  Bladder Hygiene: Stand-by assistance  Bowel Hygiene: Stand-by assistance  Clothing Management: Minimum assistance         Patient instructed and indicated understanding the benefits of maintaining activity tolerance, functional mobility, and independence with self care tasks during acute stay  to ensure safe return home and to baseline. Encouraged patient to increase frequency and duration OOB, not sitting longer than 30 mins without marching/walking with staff, be out of bed for all meals, perform daily ADLs (as approved by RN/MD regarding bathing etc), and performing functional mobility to/from bathroom. Patient instruction and indicated understanding on body mechanics, ergonomics and gravitational force on the spine during different body positions to plan activities in prep for return home to complete basic ADLs, instrumental ADLs and back to work safely. Dressing brace: Patient instructed and demonstrated to don/doff velcro on brace using dominant side, keeping non-dominant side intact. Patient instructed and demonstrated in meantime of being able to stand with back against wall to don/doff brace, to don/doff seated using lap and bed/chair surface to support brace while manipulating.   Dressing lower body: Patient instructed to don brace first and on the benefits to remain seated to don all clothing to increase independence with precautions and pain management. Patient instructed and demonstrated tailor sitting for lower body dressing with AE PRN - reported he daughter will assist her as needed. Toileting: Patient instructed on the benefits of using flushable wet wipes and toilet tongs if decreased reach or pain for jim care. Also, the benefits of a reacher to aid in clothing management. Patient instruction and indicated understanding to not strain i.e. holding breath to bear down during a bowel movement, lifting/activity, and sexual activity. Home safety: Patient instructed and indicated understanding on home modifications and safety [raise height of ADL objects (i.e. clothing, sink items, fridge items, items to mouth when grooming), appropriate height of chair surfaces, recliner safety, change of floor surfaces, clear pathways] to increase independence and fall prevention. Standing: Patient instructed and indicated understanding to walk up to sink/counter top/surfaces, step into walker, square off while using objects, slide objects along surfaces, to increase adherence to back precautions and fall prevention. Patient instructed to increase amount of time standing in order to increase independence and tolerance with ADLs. Tub transfer: Patient instructed and indicated understanding regarding when it is safe to begin transfer into tub (complete stairs with PT, advance exercises with PT high enough to clear tub height, and while clothes donned practice with another person present). Functional Measure:  Barthel Index:    Bathin  Bladder: 0  Bowels: 10  Groomin  Dressin  Feeding: 10  Mobility: 10  Stairs: 5  Toilet Use: 5  Transfer (Bed to Chair and Back): 10  Total: 60/100        The Barthel ADL Index: Guidelines  1. The index should be used as a record of what a patient does, not as a record of what a patient could do.   2. The main aim is to establish degree of independence from any help, physical or verbal, however minor and for whatever reason. 3. The need for supervision renders the patient not independent. 4. A patient's performance should be established using the best available evidence. Asking the patient, friends/relatives and nurses are the usual sources, but direct observation and common sense are also important. However direct testing is not needed. 5. Usually the patient's performance over the preceding 24-48 hours is important, but occasionally longer periods will be relevant. 6. Middle categories imply that the patient supplies over 50 per cent of the effort. 7. Use of aids to be independent is allowed. Alek Hernandez., Barthel, D.W. ([id number]). Functional evaluation: the Barthel Index. [facility] (14)2. Michelle Sierra zabrina BRI Courtney, Corwin Mendiola., [place]., [place], [address] (1999). Measuring the change indisability after inpatient rehabilitation; comparison of the responsiveness of the Barthel Index and Functional [place] Measure. Journal of Neurology, Neurosurgery, and Psychiatry, 66(4), 589-403. Saskia Magdaleno, N.J.A, DELMI Sheridan, & Jose Guadalupe Denis, M.A. (2004.) Assessment of post-stroke quality of life in cost-effectiveness studies: The usefulness of the Barthel Index and the EuroQoL-5D.  Quality of Life Research, 15, 214-21         Occupational Therapy Evaluation Charge Determination   History Examination Decision-Making   LOW Complexity : Brief history review  MEDIUM Complexity : 3-5 performance deficits relating to physical, cognitive , or psychosocial skils that result in activity limitations and / or participation restrictions LOW Complexity : No comorbidities that affect functional and no verbal or physical assistance needed to complete eval tasks       Based on the above components, the patient evaluation is determined to be of the following complexity level: LOW   Pain Rating:  Not rated during eval    Activity Tolerance: Good  Please refer to the flowsheet for vital signs taken during this treatment. After treatment patient left in no apparent distress: With physical therapist for treatment session     COMMUNICATION/EDUCATION:   The patients plan of care was discussed with: Physical Therapist and Registered Nurse. Home safety education was provided and the patient/caregiver indicated understanding. and Patient/family have participated as able in goal setting and plan of care. This patients plan of care is appropriate for delegation to [facility].     Thank you for this referral.  Ernestine Quinones OT  Time Calculation: 36 mins

## 2019-11-12 NOTE — PERIOP NOTES
TRANSFER - OUT REPORT:    Verbal report given to Len Jacobs RN(name) on Julita Quispe  being transferred to (unit) for routine post - op       Report consisted of patients Situation, Background, Assessment and   Recommendations(SBAR). Time Pre op antibiotic given:1541  Anesthesia Stop time: 1819  Casarez Present on Transfer to floor:yes  Order for Casarez on Chart:yes  Discharge Prescriptions with Chart:n/a    Information from the following report(s) SBAR, Kardex, OR Summary, Procedure Summary, Intake/Output, MAR, Recent Results and Cardiac Rhythm ST was reviewed with the receiving nurse. Opportunity for questions and clarification was provided. Is the patient on 02? YES       L/Min 2    Is the patient on a monitor? NO    Is the nurse transporting with the patient? NO    Surgical Waiting Area notified of patient's transfer from PACU? YES      The following personal items collected during your admission accompanied patient upon transfer:   Dental Appliance: Dental Appliances: Partials, Uppers(PARTIAL UPPER TO PACU)  Vision: Visual Aid: Glasses  Hearing Aid:    Jewelry: Jewelry: None  Clothing: Clothing: Other (comment)(bag of clothes and partials returned to pt in PACU)  Other Valuables:  Other Valuables: Eyeglasses(pt states girlfriend has eyeglasses)  Valuables sent to safe:

## 2019-11-12 NOTE — PROGRESS NOTES
Problem: Mobility Impaired (Adult and Pediatric)  Goal: *Acute Goals and Plan of Care (Insert Text)  Description  FUNCTIONAL STATUS PRIOR TO ADMISSION: Patient was independent and active without use of DME.    HOME SUPPORT PRIOR TO ADMISSION: The patient lived with family but did not require assist.    Physical Therapy Goals  Initiated 11/12/2019    1. Patient will move from supine to sit and sit to supine , scoot up and down and roll side to side in bed with modified independence within 4 days. 2. Patient will perform sit to stand with modified independence within 4 days. 3. Patient will ambulate with modified independence for 300 feet with the least restrictive device within 4 days. 4. Patient will ascend/descend 4 stairs with 1 handrail(s) with supervision/set-up within 4 days. 5. Patient will verbalize and demonstrate understanding of spinal precautions (No bending, lifting greater than 5 lbs, or twisting; log-roll technique; frequent repositioning as instructed) within 4 days. Outcome: Progressing Towards Goal    PHYSICAL THERAPY EVALUATION  Patient: Gee Alamo (89 y.o. female)  Date: 11/12/2019  Primary Diagnosis: Spinal stenosis, lumbar [M48.061]  Procedure(s) (LRB):  LUMBAR LAMINECTOMY L4-S1 WITH L4-5 MIDLINE FUSION - MAZOR (N/A) 1 Day Post-Op   Precautions: No bending, no lifting greater than 5 lbs, no twisting, log-roll technique, repositioning every 20-30 min except when sleeping, brace when OOB           ASSESSMENT  Based on the objective data described below, the patient presents with impaired trunk ROM, spinal precautions, mild LE pain, balance, and weakness and gait dysfunction/intolerance causing limited independence with mobility s/p recent L4-S1 Lami with L4-5 fusion POD #1. Pt received in bed, log roll with supervision, no assist to don brace. Pt states that pain is controlled and that she is eager to resume previous activities.  Pt ambulates 300ft with varying duarte that is improved with distraction, WFL step length and clearance. Pt tolerates well with minimal deficits and mild increased attention to task. Pt highly concerned regarding stability on steps, is not confident but demos overall well. Reviewed back precautions, sitting limit of 30-45 minutes, positioning in chair, and progress. Pt is not cleared for discharge from Physical Therapy standpoint at this time, likely will clear this afternoon, recommend HHPT. Will benefit from therapy in acute setting, anticipate will improve tolerance quickly with improved pain control. Current Level of Function Impacting Discharge (mobility/balance): min a OOB    Functional Outcome Measure: The patient scored Total: 60/100 on the Barthel Index which is indicative of moderate impaired ability to care for basic self needs/dependency on others. Other factors to consider for discharge: large extended family will assist     Patient will benefit from skilled therapy intervention to address the above noted impairments. PLAN :  Recommendations and Planned Interventions: bed mobility training, transfer training, gait training, therapeutic exercises, neuromuscular re-education, patient and family training/education and therapeutic activities      Frequency/Duration: Patient will be followed by physical therapy:  twice daily to address goals. Recommendation for discharge: (in order for the patient to meet his/her long term goals)  Physical therapy at least 2 days/week in the home     This discharge recommendation:  Has been made in collaboration with the attending provider and/or case management    IF patient discharges home will need the following DME: patient owns DME required for discharge         SUBJECTIVE:   Patient stated im ready to get back to walking and moving around!  Im just still wobbly    OBJECTIVE DATA SUMMARY:   HISTORY:    Past Medical History:   Diagnosis Date    Adverse effect of anesthesia     \"so sleepy and tired for the rest of the day\"    Anxiety     Asthma     Chronic pain     back - L5 is \"almost gone\"/left foot neuropathy -pinched nerve L4-5 - spine shifted/degenerative spinal disease/stiff and sore neck and shoulder - in PT for back and neck    Degenerative spinal arthritis     Diabetes ([facility] [address]     PRE DIABETIC    Hypertension     IBS (irritable bowel syndrome)     Ill-defined condition     pre-diabetes    Nausea & vomiting     ? d/t fentanyl    PUD (peptic ulcer disease)     Spondylolisthesis      Past Surgical History:   Procedure Laterality Date    COLONOSCOPY N/A 4/25/2019    COLONOSCOPY performed by Capri Vo MD at [facility] ENDOSCOPY    [address]    HX CHOLECYSTECTOMY  2006    HX DILATION AND CURETTAGE  1984    HX GI  04/2019    COLONOSCOPY    HX GI      ENDOSCOPY       Personal factors and/or comorbidities impacting plan of care:     Home Situation  Home Environment: (P) Private residence  # Steps to Enter: (P) 2  Rails to Enter: (P) Yes  Hand Rails : (P) Bilateral(Can      only      reach one          at a    time.)  One/Two Story Residence: (P) One story  Living Alone: (P) No  Support Systems: (P) Child(bob), Spouse/Significant Other/Partner, Family member(s)  Patient Expects to be Discharged to[de-identified] (P) Private residence  Current DME Used/Available at Home: (P) Cane, quad, Brace/Splint, Lift chair, Walker, rolling  Tub or Shower Type: (P) Tub/Shower combination(Also shower  chair)    EXAMINATION/PRESENTATION/DECISION MAKING:   Critical Behavior:  Neurologic State: Alert, Appropriate for age  Orientation Level: Oriented X4  Cognition: Appropriate decision making, Follows commands     Hearing:   Auditory  Auditory Impairment: None  Skin:  intact x incision  Edema: none  Range Of Motion:  AROM: Within functional limits           PROM: Within functional limits           Strength:    Strength: Generally decreased, functional                    Tone & Sensation:   Tone: Normal              Sensation: Intact               Coordination:  Coordination: Within functional limits  Vision:      Functional Mobility:  Bed Mobility:  Rolling: Supervision  Supine to Sit: Supervision     Scooting: Supervision  Transfers:  Sit to Stand: Minimum assistance;Contact guard assistance  Stand to Sit: Minimum assistance;Contact guard assistance                       Balance:   Sitting: Intact; Without support  Standing: Impaired; With support  Standing - Static: Fair;Good;Constant support  Standing - Dynamic : Fair;Constant support  Ambulation/Gait Training:  Distance (ft): 300 Feet (ft)  Assistive Device: Brace/Splint; Walker, rolling  Ambulation - Level of Assistance: Contact guard assistance;Minimal assistance     Gait Description (WDL): Exceptions to WDL  Gait Abnormalities: Trunk sway increased(normal heel toe, toe clearance)        Base of Support: Widened     Speed/Gogo: Fluctuations(variable depending on attention to task)  Step Length: Left shortened;Right shortened      Stairs:  Number of Stairs Trained: 4  Stairs - Level of Assistance: Minimum assistance;Contact guard assistance   Rail Use: Right , Pt not confident in ability on steps    Functional Measure:  Barthel Index:    Bathin  Bladder: 0  Bowels: 10  Groomin  Dressin  Feeding: 10  Mobility: 10  Stairs: 5  Toilet Use: 5  Transfer (Bed to Chair and Back): 10  Total: 60/100       The Barthel ADL Index: Guidelines  1. The index should be used as a record of what a patient does, not as a record of what a patient could do. 2. The main aim is to establish degree of independence from any help, physical or verbal, however minor and for whatever reason. 3. The need for supervision renders the patient not independent. 4. A patient's performance should be established using the best available evidence. Asking the patient, friends/relatives and nurses are the usual sources, but direct observation and common sense are also important.  However direct testing is not needed. 5. Usually the patient's performance over the preceding 24-48 hours is important, but occasionally longer periods will be relevant. 6. Middle categories imply that the patient supplies over 50 per cent of the effort. 7. Use of aids to be independent is allowed. Anahy Goodson., Barthel, D.W. (1823). Functional evaluation: the Barthel Index. [facility] (14)2. BRI Silva, Efrain Antonio., [facility], [address] [place] [address] (1999). Measuring the change indisability after inpatient rehabilitation; comparison of the responsiveness of the Barthel Index and Functional [place] Measure. Journal of Neurology, Neurosurgery, and Psychiatry, 66(4), 968-885. Jevon Washington, N.J.A, DELMI Sheridan, & Aren Mendez M.A. (2004.) Assessment of post-stroke quality of life in cost-effectiveness studies: The usefulness of the Barthel Index and the EuroQoL-5D. Quality of Life Research, 15, 117-41        Physical Therapy Evaluation Charge Determination   History Examination Presentation Decision-Making   HIGH Complexity :3+ comorbidities / personal factors will impact the outcome/ POC  HIGH Complexity : 4+ Standardized tests and measures addressing body structure, function, activity limitation and / or participation in recreation  LOW Complexity : Stable, uncomplicated  Other outcome measures barthel  MEDIUM      Based on the above components, the patient evaluation is determined to be of the following complexity level: LOW     Pain Rating:  Controlled 4/10     Activity Tolerance:   Good and Fair  Please refer to the flowsheet for vital signs taken during this treatment. After treatment patient left in no apparent distress:   Sitting in chair and Call bell within reach    COMMUNICATION/EDUCATION:   The patients plan of care was discussed with: Registered Nurse and . Fall prevention education was provided and the patient/caregiver indicated understanding.  and Patient/family have participated as able in goal setting and plan of care.     Thank you for this referral.  Nela Guerin, PT   Time Calculation: 38 mins

## 2019-11-12 NOTE — PROGRESS NOTES
Orthopedic Spine Progress Note  Post Op day: 1 Day Post-Op    2019 7:54 AM   Admit Date: 2019  Procedure: Procedure(s):  LUMBAR LAMINECTOMY L4-S1 WITH L4-5 MIDLINE FUSION - OLGA    Subjective:     Laurita Bailey appears well. Pain seems to be managed. She has been ambulating with assistance. No N/V  Drain in place  Casarez in place    Pain Control:   Pain Assessment  Pain Scale 1: Numeric (0 - 10)  Pain Intensity 1: 0  Pain Onset 1: S/P postop surgical pain  Pain Location 1: Back, Incisional, Spine, lumbar, Spine, sacral  Pain Orientation 1: Lower, Posterior  Pain Description 1: Dull, Aching  Pain Intervention(s) 1: Distraction, Emotional support, Encouraged PCA, Family Support, Rest    Objective:          Physical Exam:  General:  Alert and oriented. No acute distress. Heart:  Respirations unlabored. Abdomen:   Extremities: Soft, non-tender. No evidence of cyanosis. Pulses palpable in both upper and lower extremities. Neurologic:  Musculoskeletal:  No new motor deficits. Neurovascular exam within normal limits. Sensation stable. Motor: unchanged C5-T1 and L2-S1. Cecil's sign negative in bilateral lower extremities. Calves soft, nontender upon palpation and with passive twitch. Moves both upper and lower extremities. Incision: clean, dry, and intact. No significant erythema or swelling. No active drainage noted. Vital Signs:    Blood pressure 110/68, pulse 95, temperature 98.7 °F (37.1 °C), resp. rate 16, height 5' 7\" (1.702 m), weight 89.8 kg (198 lb), SpO2 96 %.   Temp (24hrs), Av.5 °F (36.9 °C), Min:98 °F (36.7 °C), Max:99.5 °F (37.5 °C)      LAB:    Recent Labs     19  0526   HGB 9.7*     Lab Results   Component Value Date/Time    Sodium 138 2019 05:26 AM    Potassium 4.2 2019 05:26 AM    Chloride 106 2019 05:26 AM    CO2 25 2019 05:26 AM    Glucose 168 (H) 2019 05:26 AM    BUN 8 2019 05:26 AM    Creatinine 0.87 11/12/2019 05:26 AM    Calcium 8.4 (L) 11/12/2019 05:26 AM       Intake/Output:No intake/output data recorded. 11/10 1901 - 11/12 0700  In: 1989 [P.O.:240; I.V.:1749]  Out: 4836 [Urine:2225; Drains:210]      Assessment:   Patient is 1 Day Post-Op s/p Procedure(s):  LUMBAR LAMINECTOMY L4-S1 WITH L4-5 MIDLINE FUSION - OLGA    Plan:     1. PT/OT - Quick Draw at the bedside  2. D/C PCA and begin established methods of pain control  3. VTE Prophylaxes - TEDS &/or SCDs   4. Encouraged use of ICS  5. Remove drain once output is <80  6. Remove Casarez once mobilizing with PT  7.   Discharge pending    Signed By: Jesus Butcher PA-C

## 2019-11-12 NOTE — PROGRESS NOTES
TRANSFER - IN REPORT:    Verbal report received from [facility] AT JOANN RN(name) on Sushil Ar  being received from PACU(unit) for routine post - op      Report consisted of patients Situation, Background, Assessment and   Recommendations(SBAR). Information from the following report(s) SBAR, OR Summary, Intake/Output and MAR was reviewed with the receiving nurse Josue Sanchez RN. Opportunity for questions and clarification was provided. Assessment completed upon patients arrival to unit and care assumed.

## 2019-11-12 NOTE — PROGRESS NOTES
A Spiritual Care Partner Volunteer visited patient in Rm 540 on 11/12/2019.   Documented by:  Chaplain Hodgson MDiv, MS, [place] [id number] PRAY ([id number])

## 2019-11-12 NOTE — OP NOTES
[address]   OPERATIVE REPORT    Name:  Dong Soto  MR#:  [id number]  :  1960  ACCOUNT #:  [de-identified]  DATE OF SERVICE:  2019    PREOPERATIVE DIAGNOSES:  Lumbar stenosis, L4-5, L5-S1; lumbar spondylolisthesis, L4-5. POSTOPERATIVE DIAGNOSES:  Lumbar stenosis, L4-5, L5-S1; lumbar spondylolisthesis, L4-5. PROCEDURES PERFORMED:  Lumbar laminectomy, L4-5, L5-S1; posterior midline fusion L4-5 with midline cortical screws; post transforaminal interbody fusion L4-5. SURGEON:  Ralph Melchor MD    ASSISTANT:  Carlo Leonardo PA-C    ANESTHESIA:  General.    COMPLICATIONS:  None. SPECIMENS REMOVED:  None. IMPLANTS:  Medtronic Solera and Artic L Tlif. ESTIMATED BLOOD LOSS:  Minimal.    INDICATIONS:  This is a pleasant 70-year-old female with chronic history of back pain, bilateral leg pain, lumbar spondylolisthesis L4-5; lumbar spondylosis L5-S1, lumbar stenosis L4-5 and L5-S1. She had failed conservative treatment, understood the risks and benefits, and elected to proceed. PROCEDURE:  The patient was identified, brought to the operating suite, underwent general anesthesia without difficulty. Placed in the prone position on the [place] frame. Casarez catheter was placed as well as preoperative neuromonitoring. Baselines obtained. After prepping and draping, time-out was performed. After time-out was performed, a midline incision was made. We exposed from the L4 spinous process and lamina out to the pars region at the L4-5 disk space. Identified the hypertrophic facet of L4-5. The L5 lamina superior portion was taken. We confirmed this on fluoroscopy. We then attached the PSIS pin to the right PSIS and then attached the Mazor X Stealth system that had been attached previously to the patient's bed frame on the right hand side. We obtained secure fixation through this PSIS pin to the patient.   3-D topographical scan was then performed using the optical system of the Interviewstreetor X system, followed by placement of the AP and oblique markers for the AP and lateral fluoro images. We used these 2 images to merge with a CT scan that had been done preoperatively. After successful merge, the software was then used to plan cortical screw trajectories at L4 and L5. We drilled and tapped these and placed 5.5 x 40 mm Medtronic Solera screws at each level. These all tested out greater than 20 milliamps. We then started a wide laminectomy, removing all of the L5 spinous process and lamina and inferior portion of L4. Central laminectomy was performed with undercutting the L4 lamina. This allowed access to the lateral recess, which had severe stenosis bilaterally especially at the L4-5 facet due to facet hypertrophy and ligamentum hypertrophy. We did a [id number] decompression down to the sacrum. After that, we then did an annulotomy on the right hand side at the L4-5 disk space, did a complete diskectomy and did release the disk space. We did a complete diskectomy and did trial cydney to approximately 11 mm. After evacuation of disk, we rasped the endplates to lightly bleeding bone. We packed Rosita allograft in the anterior one-third column, followed by placement of a Medtronic ARTiC-L TLIF obliquely across the disk space. Neuromonitoring was stable. Wound was irrigated with pulse lavage, bacitracin 3000 mL. We decorticated the remainder of L4-5 facet. We attached the cortical screws with 40 mm longitudinal rods, attached with the setscrews. Final tightening performed. The patient had final x-rays demonstrating good fixation. The patient had medium Hemovac placed. Interrupted 0 Vicryl in the fascial layer, 2-0 Vicryl in the subcutaneous layer, 3-0 Vicryl in the skin. The physician assistant was present during the entire operative procedure and assisted in all critical elements of the surgery. No surgical assist or resident was available.      Melva Moncada Ward Mejia MD      [id number]  D:  11/12/2019 12:39  T:  11/12/2019 16:00  JOB #:  [id number]

## 2019-11-12 NOTE — PERIOP NOTES
Called by PACU RN for tachycardia    Patient asleep without complaints of pain, normotensive, , afebrile, smooth and unlabored breaths. No acute intervention required.

## 2019-11-12 NOTE — PROGRESS NOTES
The following herbal, alternative, and/or nutritional/dietary supplement product(s) has been discontinued  per [employer] approved policy:    [facility] triple turmeric. 766 mg of turmeric/100 mg curcumin per 2 caps. Takes one po once daily  Please reorder upon discharge if appropriate.

## 2019-11-12 NOTE — PROGRESS NOTES
Primary Nurse Orlando Bragg, PEYTON and Evangelista Ni , RN performed a dual skin assessment on this patient No impairment noted  Peterson score is 20

## 2019-11-13 LAB — HGB BLD-MCNC: 9.1 G/DL (ref 11.5–16)

## 2019-11-13 PROCEDURE — 85018 HEMOGLOBIN: CPT

## 2019-11-13 PROCEDURE — 74011000250 HC RX REV CODE- 250: Performed by: NURSE PRACTITIONER

## 2019-11-13 PROCEDURE — 65270000032 HC RM SEMIPRIVATE

## 2019-11-13 PROCEDURE — 74011250637 HC RX REV CODE- 250/637: Performed by: PHYSICIAN ASSISTANT

## 2019-11-13 PROCEDURE — 74011250637 HC RX REV CODE- 250/637: Performed by: ORTHOPAEDIC SURGERY

## 2019-11-13 PROCEDURE — 74011250637 HC RX REV CODE- 250/637: Performed by: NURSE PRACTITIONER

## 2019-11-13 PROCEDURE — 36415 COLL VENOUS BLD VENIPUNCTURE: CPT

## 2019-11-13 PROCEDURE — 97116 GAIT TRAINING THERAPY: CPT

## 2019-11-13 RX ORDER — CHLORZOXAZONE 500 MG/1
500 TABLET ORAL
Status: DISCONTINUED | OUTPATIENT
Start: 2019-11-13 | End: 2019-11-13

## 2019-11-13 RX ORDER — LIDOCAINE 50 MG/G
2 PATCH TOPICAL EVERY 24 HOURS
Status: DISCONTINUED | OUTPATIENT
Start: 2019-11-13 | End: 2019-11-15 | Stop reason: HOSPADM

## 2019-11-13 RX ORDER — HYDROMORPHONE HYDROCHLORIDE 4 MG/1
4 TABLET ORAL
Status: DISCONTINUED | OUTPATIENT
Start: 2019-11-13 | End: 2019-11-14

## 2019-11-13 RX ORDER — CHLORZOXAZONE 500 MG/1
750 TABLET ORAL
Status: DISCONTINUED | OUTPATIENT
Start: 2019-11-13 | End: 2019-11-15 | Stop reason: HOSPADM

## 2019-11-13 RX ADMIN — POLYETHYLENE GLYCOL 3350 17 G: 17 POWDER, FOR SOLUTION ORAL at 09:12

## 2019-11-13 RX ADMIN — Medication 10 ML: at 06:45

## 2019-11-13 RX ADMIN — ACETAMINOPHEN 1000 MG: 500 TABLET ORAL at 06:46

## 2019-11-13 RX ADMIN — Medication 10 ML: at 13:16

## 2019-11-13 RX ADMIN — ACETAMINOPHEN 1000 MG: 500 TABLET ORAL at 18:10

## 2019-11-13 RX ADMIN — STANDARDIZED SENNA CONCENTRATE AND DOCUSATE SODIUM 1 TABLET: 8.6; 5 TABLET ORAL at 18:10

## 2019-11-13 RX ADMIN — LORATADINE 10 MG: 10 TABLET ORAL at 09:13

## 2019-11-13 RX ADMIN — Medication 1 CAPSULE: at 09:12

## 2019-11-13 RX ADMIN — THERA TABS 1 TABLET: TAB at 09:13

## 2019-11-13 RX ADMIN — STANDARDIZED SENNA CONCENTRATE AND DOCUSATE SODIUM 1 TABLET: 8.6; 5 TABLET ORAL at 09:13

## 2019-11-13 RX ADMIN — BUSPIRONE HYDROCHLORIDE 5 MG: 5 TABLET ORAL at 09:13

## 2019-11-13 RX ADMIN — CALCIUM POLYCARBOPHIL 1250 MG: 625 TABLET, FILM COATED ORAL at 09:12

## 2019-11-13 RX ADMIN — CHLORZOXAZONE 500 MG: 500 TABLET ORAL at 14:05

## 2019-11-13 RX ADMIN — HYDROMORPHONE HYDROCHLORIDE 4 MG: 4 TABLET ORAL at 09:13

## 2019-11-13 RX ADMIN — Medication 10 ML: at 21:39

## 2019-11-13 RX ADMIN — ACETAMINOPHEN 1000 MG: 500 TABLET ORAL at 13:16

## 2019-11-13 RX ADMIN — HYDROMORPHONE HYDROCHLORIDE 4 MG: 4 TABLET ORAL at 13:16

## 2019-11-13 RX ADMIN — HYDROMORPHONE HYDROCHLORIDE 4 MG: 4 TABLET ORAL at 22:17

## 2019-11-13 RX ADMIN — SODIUM PHOSPHATE, DIBASIC AND SODIUM PHOSPHATE, MONOBASIC 1 ENEMA: 7; 19 ENEMA RECTAL at 21:09

## 2019-11-13 RX ADMIN — OXYCODONE HYDROCHLORIDE 10 MG: 5 TABLET ORAL at 03:25

## 2019-11-13 RX ADMIN — FAMOTIDINE 20 MG: 20 TABLET ORAL at 06:45

## 2019-11-13 RX ADMIN — OXYCODONE HYDROCHLORIDE 10 MG: 5 TABLET ORAL at 00:17

## 2019-11-13 RX ADMIN — DIPHENHYDRAMINE HYDROCHLORIDE 25 MG: 25 CAPSULE ORAL at 06:45

## 2019-11-13 RX ADMIN — OXYCODONE HYDROCHLORIDE 10 MG: 5 TABLET ORAL at 06:46

## 2019-11-13 RX ADMIN — HYDROMORPHONE HYDROCHLORIDE 4 MG: 4 TABLET ORAL at 18:10

## 2019-11-13 RX ADMIN — ACETAMINOPHEN 1000 MG: 500 TABLET ORAL at 00:18

## 2019-11-13 RX ADMIN — BISACODYL 10 MG: 10 SUPPOSITORY RECTAL at 19:29

## 2019-11-13 NOTE — PROGRESS NOTES
Care Management Interventions  PCP Verified by CM: Yes(Dr Mercy Aguayo )  Last Visit to PCP: 10/02/19  Palliative Care Criteria Met (RRAT>21 & CHF Dx)?: No  Mode of Transport at Discharge: (car dgt ALEXIA  )  Transition of Care Consult (CM Consult): (PATIENT HAS [facility]OSEN [facility])  Discharge Durable Medical Equipment: No  Physical Therapy Consult: Yes  Occupational Therapy Consult: Yes  Current Support Network: Relative's Home(HAS AN EXENDED FAMILY LIVING WITH  HER ALONG WITH DGT )  Confirm Follow Up Transport: (DGT)  Plan discussed with Pt/Family/Caregiver: Yes(PT AND DGT )   Resource Information Provided?: No  Discharge Location  Discharge Placement: Home    introduced self to patient and dgt. Patient has all her dme at her home. She was given choices for home health and would like to use [facility]. I sent referral via cc link. I have made my partner today Nehal Gutierrez aware of this and she will follow up in the am re availability with [facility]. Patient does have an AMD and she uses the [facility] [address] for her medications. Care management will follow for transitions of care needs.

## 2019-11-13 NOTE — PROGRESS NOTES
Pt c/o pain 10/10. Stating that Dilaudid and oxy not working so far. And refusing flexeril. Pderito STARK.

## 2019-11-13 NOTE — PROGRESS NOTES
Transitons of CARE:   participated in [facility] rounds this am.  I also went to meet with patient, however, she asks that I return as she has been medicated for pain and is resting. I will return later to check with her about her preferences for home health post discharge.

## 2019-11-13 NOTE — PROGRESS NOTES
Occupational Therapy    Chart reviewed. Pt cleared for therapy by nursing. Upon entering room, pt in bed resting with lights off and complaining of significant pain. Reported she had just finished working with PT. Requested to defer therapy at this time. Nursing in pt room at time of OT leaving. Will continue to follow and work with pt as indicated.    Thank you    Governor Kanner, OTR/JUD

## 2019-11-13 NOTE — PROGRESS NOTES
Problem: Mobility Impaired (Adult and Pediatric)  Goal: *Acute Goals and Plan of Care (Insert Text)  Description  FUNCTIONAL STATUS PRIOR TO ADMISSION: Patient was independent and active without use of DME.    HOME SUPPORT PRIOR TO ADMISSION: The patient lived with family but did not require assist.    Physical Therapy Goals  Initiated 11/12/2019    1. Patient will move from supine to sit and sit to supine , scoot up and down and roll side to side in bed with modified independence within 4 days. 2. Patient will perform sit to stand with modified independence within 4 days. 3. Patient will ambulate with modified independence for 300 feet with the least restrictive device within 4 days. 4. Patient will ascend/descend 4 stairs with 1 handrail(s) with supervision/set-up within 4 days. 5. Patient will verbalize and demonstrate understanding of spinal precautions (No bending, lifting greater than 5 lbs, or twisting; log-roll technique; frequent repositioning as instructed) within 4 days. 11/13/2019 1346 by Colletta Medico, PT  Outcome: Progressing Towards Goal  11/13/2019 1222 by Colletta Medico, PT  Outcome: Progressing Towards Goal    PHYSICAL THERAPY TREATMENT  Patient: Rolf Powell (63 y.o. female)  Date: 11/13/2019  Diagnosis: Spinal stenosis, lumbar [M48.061] <principal problem not specified>  Procedure(s) (LRB):  LUMBAR LAMINECTOMY L4-S1 WITH L4-5 MIDLINE FUSION - MAZOR (N/A) 2 Days Post-Op  Precautions:    Chart, physical therapy assessment, plan of care and goals were reviewed. ASSESSMENT  Patient continues with skilled PT services and is progressing towards goals. Pt found in chair more visibly upset and in pain, states that dilaudid is not helping pain at this point. Pt demos continued level of assistance at this time for 300ft with supervision and minimally slower pacing with x2 rest breaks 10-15 seconds.  Pt noted to have large diffuse ms spasms in erector spinae at scapula, overall tenseness of ms this afternoon. Pt returned to supine, became emotional and overwhelmed, improves with encouragement. Pt refusing ice as she is too sensitive to cold, given hot pack at appropriate temperature to ms medial to scapula/away from surgical site. Will continue to follow. Current Level of Function Impacting Discharge (mobility/balance): supervision         PLAN :  Patient continues to benefit from skilled intervention to address the above impairments. Continue treatment per established plan of care. to address goals. Recommendation for discharge: (in order for the patient to meet his/her long term goals)  Physical therapy at least 2 days/week in the home     This discharge recommendation:  Has been made in collaboration with the attending provider and/or case management    IF patient discharges home will need the following DME: patient owns DME required for discharge       SUBJECTIVE:   Patient stated I just want to not hurt.     OBJECTIVE DATA SUMMARY:   Critical Behavior:  Neurologic State: Alert  Orientation Level: Oriented X4  Cognition: Appropriate decision making, Appropriate for age attention/concentration, Appropriate safety awareness, Follows commands     Functional Mobility Training:  Bed Mobility:                    Transfers:  Sit to Stand: Contact guard assistance;Supervision  Stand to Sit: Contact guard assistance;Supervision                             Balance:  Sitting: Intact  Standing: Intact; With support  Ambulation/Gait Training:  Distance (ft): 300 Feet (ft)  Assistive Device: Brace/Splint; Walker, rolling  Ambulation - Level of Assistance: Supervision        Gait Abnormalities: Trunk sway increased        Base of Support: Widened     Speed/Gogo: Fluctuations; Slow  Step Length: Left shortened;Right shortened            Pain Ratin/10, Pt pain appears unstable with use of FLACC objective measurement pain scale:   Face 1, Legs 1, Activity 1, Cry 2, Consolability 2 for a score of 7/10 increased from this AM    Face  0 No particular expression or smile 1 Occasional grimace or frown, withdrawn, uninterested 2 Frequent to constant quivering chin, clenched jaw  Legs  0 Normal position or relaxed  1 Uneasy, restless, tense    2 Kicking, or legs drawn up  Activity 0 Lying quietly/normally, moves easily 1 Squirming, shifting, back and forth, tense  2 Arched, rigid or jerking  Cry  0 No cry (awake or asleep)  1 Moans or whimpers; occasional complaint   2 Crying steadily, screams or sobs, frequent complaints  Consolability  0 Content, relaxed   1 Reassured by touching, being talked to, distractible 2 Difficult to console or comfort      Activity Tolerance:   Good, Fair and requires rest breaks  Please refer to the flowsheet for vital signs taken during this treatment.     After treatment patient left in no apparent distress:   Supine in bed, Call bell within reach and Caregiver / family present    COMMUNICATION/COLLABORATION:   The patients plan of care was discussed with: Registered Nurse and     Elaine Guerin, PT   Time Calculation: 30 mins

## 2019-11-13 NOTE — PROGRESS NOTES
Orthopedic Spine Progress Note  Post Op day: 2 Days Post-Op    2019 7:47 AM   Admit Date: 2019  Procedure: Procedure(s):  LUMBAR LAMINECTOMY L4-S1 WITH L4-5 MIDLINE FUSION - OLGA    Subjective:     Arpan Viveros appears well. Pain not well managed. She ambulated a good distance with physical therapy yesterday. Tolerating diet  No N/V  Voiding  Drain in place    Pain Control:   Pain Assessment  Pain Scale 1: Numeric (0 - 10)  Pain Intensity 1: 9  Pain Onset 1: S/P postop surgical pain  Pain Location 1: Back  Pain Orientation 1: Posterior  Pain Description 1: Aching  Pain Intervention(s) 1: Medication (see MAR)    Objective:          Physical Exam:  General:  Alert and oriented. No acute distress. Heart:  Respirations unlabored. Abdomen:   Extremities: Soft, non-tender. No evidence of cyanosis. Pulses palpable in both upper and lower extremities. Neurologic:  Musculoskeletal:  No new motor deficits. Neurovascular exam within normal limits. Sensation stable. Motor: unchanged C5-T1 and L2-S1. Cecil's sign negative in bilateral lower extremities. Calves soft, nontender upon palpation and with passive twitch. Moves both upper and lower extremities. Incision: clean, dry, and intact. No significant erythema or swelling. No active drainage noted. Vital Signs:    Blood pressure 106/71, pulse 82, temperature 98.2 °F (36.8 °C), resp. rate 16, height 5' 7\" (1.702 m), weight 89.8 kg (198 lb), SpO2 95 %.   Temp (24hrs), Av.6 °F (37 °C), Min:98.2 °F (36.8 °C), Max:98.9 °F (37.2 °C)      LAB:    Recent Labs     19  0331   HGB 9.1*     Lab Results   Component Value Date/Time    Sodium 138 2019 05:26 AM    Potassium 4.2 2019 05:26 AM    Chloride 106 2019 05:26 AM    CO2 25 2019 05:26 AM    Glucose 168 (H) 2019 05:26 AM    BUN 8 2019 05:26 AM    Creatinine 0.87 2019 05:26 AM    Calcium 8.4 (L) 2019 05:26 AM Intake/Output:No intake/output data recorded. 11/11 1901 - 11/13 0700  In: 4623 [P.O.:480; I.V.:749]  Out: 3200 [Urine:3525; Drains:400]    PT/OT:   Gait:  Gait  Base of Support: Widened  Speed/Gogo: Fluctuations  Step Length: Right shortened, Left shortened  Gait Abnormalities: Trunk sway increased  Ambulation - Level of Assistance: Supervision  Distance (ft): 300 Feet (ft)  Assistive Device: Brace/Splint, Walker, rolling  Rail Use: Right   Stairs - Level of Assistance: Minimum assistance, Contact guard assistance  Number of Stairs Trained: 4                 Assessment:   Patient is 2 Days Post-Op s/p Procedure(s):  LUMBAR LAMINECTOMY L4-S1 WITH L4-5 MIDLINE FUSION Dinorah ESPINOZA    Plan:     1. Continue PT/OT  2. Continue established methods of pain control - change to dilaudid for better pain control  3. VTE Prophylaxes - TEDS & SCDs   4. Encouraged use of ICS  5. Remove drain once output is <80  6.   Discharge pending      Signed By: Joi Weeks PA-C

## 2019-11-14 ENCOUNTER — APPOINTMENT (OUTPATIENT)
Dept: GENERAL RADIOLOGY | Age: 59
DRG: 304 | End: 2019-11-14
Attending: ORTHOPAEDIC SURGERY
Payer: MEDICAID

## 2019-11-14 ENCOUNTER — HOME HEALTH ADMISSION (OUTPATIENT)
Dept: HOME HEALTH SERVICES | Facility: HOME HEALTH | Age: 59
End: 2019-11-14
Payer: MEDICAID

## 2019-11-14 LAB
ALBUMIN SERPL-MCNC: 3.3 G/DL (ref 3.5–5)
ALBUMIN/GLOB SERPL: 0.8 {RATIO} (ref 1.1–2.2)
ALP SERPL-CCNC: 100 U/L (ref 45–117)
ALT SERPL-CCNC: 111 U/L (ref 12–78)
ANION GAP SERPL CALC-SCNC: 6 MMOL/L (ref 5–15)
AST SERPL-CCNC: 56 U/L (ref 15–37)
BASOPHILS # BLD: 0.1 K/UL (ref 0–0.1)
BASOPHILS NFR BLD: 0 % (ref 0–1)
BILIRUB SERPL-MCNC: 0.9 MG/DL (ref 0.2–1)
BUN SERPL-MCNC: 8 MG/DL (ref 6–20)
BUN/CREAT SERPL: 9 (ref 12–20)
CALCIUM SERPL-MCNC: 9 MG/DL (ref 8.5–10.1)
CHLORIDE SERPL-SCNC: 102 MMOL/L (ref 97–108)
CO2 SERPL-SCNC: 28 MMOL/L (ref 21–32)
CREAT SERPL-MCNC: 0.88 MG/DL (ref 0.55–1.02)
DIFFERENTIAL METHOD BLD: ABNORMAL
EOSINOPHIL # BLD: 0 K/UL (ref 0–0.4)
EOSINOPHIL NFR BLD: 0 % (ref 0–7)
ERYTHROCYTE [DISTWIDTH] IN BLOOD BY AUTOMATED COUNT: 13.5 % (ref 11.5–14.5)
GLOBULIN SER CALC-MCNC: 4.4 G/DL (ref 2–4)
GLUCOSE BLD STRIP.AUTO-MCNC: 149 MG/DL (ref 65–100)
GLUCOSE SERPL-MCNC: 158 MG/DL (ref 65–100)
HCT VFR BLD AUTO: 29.6 % (ref 35–47)
HGB BLD-MCNC: 9.5 G/DL (ref 11.5–16)
IMM GRANULOCYTES # BLD AUTO: 0.1 K/UL (ref 0–0.04)
IMM GRANULOCYTES NFR BLD AUTO: 1 % (ref 0–0.5)
LYMPHOCYTES # BLD: 3.5 K/UL (ref 0.8–3.5)
LYMPHOCYTES NFR BLD: 20 % (ref 12–49)
MCH RBC QN AUTO: 29.4 PG (ref 26–34)
MCHC RBC AUTO-ENTMCNC: 32.1 G/DL (ref 30–36.5)
MCV RBC AUTO: 91.6 FL (ref 80–99)
MONOCYTES # BLD: 1.4 K/UL (ref 0–1)
MONOCYTES NFR BLD: 8 % (ref 5–13)
NEUTS SEG # BLD: 12 K/UL (ref 1.8–8)
NEUTS SEG NFR BLD: 71 % (ref 32–75)
NRBC # BLD: 0 K/UL (ref 0–0.01)
NRBC BLD-RTO: 0 PER 100 WBC
PLATELET # BLD AUTO: 256 K/UL (ref 150–400)
PMV BLD AUTO: 8.8 FL (ref 8.9–12.9)
POTASSIUM SERPL-SCNC: 3.6 MMOL/L (ref 3.5–5.1)
PROT SERPL-MCNC: 7.7 G/DL (ref 6.4–8.2)
RBC # BLD AUTO: 3.23 M/UL (ref 3.8–5.2)
SERVICE CMNT-IMP: ABNORMAL
SODIUM SERPL-SCNC: 136 MMOL/L (ref 136–145)
WBC # BLD AUTO: 17 K/UL (ref 3.6–11)

## 2019-11-14 PROCEDURE — 74011000250 HC RX REV CODE- 250: Performed by: NURSE PRACTITIONER

## 2019-11-14 PROCEDURE — 94760 N-INVAS EAR/PLS OXIMETRY 1: CPT

## 2019-11-14 PROCEDURE — 74011250637 HC RX REV CODE- 250/637: Performed by: NURSE PRACTITIONER

## 2019-11-14 PROCEDURE — 74011250637 HC RX REV CODE- 250/637: Performed by: PHYSICIAN ASSISTANT

## 2019-11-14 PROCEDURE — 97116 GAIT TRAINING THERAPY: CPT

## 2019-11-14 PROCEDURE — 82962 GLUCOSE BLOOD TEST: CPT

## 2019-11-14 PROCEDURE — 85025 COMPLETE CBC W/AUTO DIFF WBC: CPT

## 2019-11-14 PROCEDURE — 36415 COLL VENOUS BLD VENIPUNCTURE: CPT

## 2019-11-14 PROCEDURE — 65270000032 HC RM SEMIPRIVATE

## 2019-11-14 PROCEDURE — 74018 RADEX ABDOMEN 1 VIEW: CPT

## 2019-11-14 PROCEDURE — 80053 COMPREHEN METABOLIC PANEL: CPT

## 2019-11-14 PROCEDURE — 97535 SELF CARE MNGMENT TRAINING: CPT

## 2019-11-14 RX ORDER — HYDROMORPHONE HYDROCHLORIDE 2 MG/1
2 TABLET ORAL
Status: DISCONTINUED | OUTPATIENT
Start: 2019-11-14 | End: 2019-11-15 | Stop reason: HOSPADM

## 2019-11-14 RX ORDER — CHLORZOXAZONE 500 MG/1
500 TABLET ORAL
Qty: 15 TAB | Refills: 0 | Status: SHIPPED | OUTPATIENT
Start: 2019-11-14 | End: 2021-10-13

## 2019-11-14 RX ORDER — HYOSCYAMINE SULFATE 0.12 MG/1
0.25 TABLET SUBLINGUAL
Status: DISCONTINUED | OUTPATIENT
Start: 2019-11-14 | End: 2019-11-15 | Stop reason: HOSPADM

## 2019-11-14 RX ORDER — SORBITOL SOLUTION 70 %
30 SOLUTION, ORAL MISCELLANEOUS ONCE
Status: DISCONTINUED | OUTPATIENT
Start: 2019-11-14 | End: 2019-11-14

## 2019-11-14 RX ORDER — CELECOXIB 200 MG/1
200 CAPSULE ORAL 2 TIMES DAILY
Status: DISCONTINUED | OUTPATIENT
Start: 2019-11-14 | End: 2019-11-15 | Stop reason: HOSPADM

## 2019-11-14 RX ORDER — ONDANSETRON 4 MG/1
4 TABLET, ORALLY DISINTEGRATING ORAL
Qty: 10 TAB | Refills: 0 | Status: SHIPPED | OUTPATIENT
Start: 2019-11-14 | End: 2020-11-10

## 2019-11-14 RX ORDER — NALOXONE HYDROCHLORIDE 0.4 MG/ML
INJECTION, SOLUTION INTRAMUSCULAR; INTRAVENOUS; SUBCUTANEOUS
Status: DISCONTINUED
Start: 2019-11-14 | End: 2019-11-14 | Stop reason: WASHOUT

## 2019-11-14 RX ORDER — FACIAL-BODY WIPES
10 EACH TOPICAL
Qty: 4 SUPPOSITORY | Refills: 0 | Status: SHIPPED | OUTPATIENT
Start: 2019-11-14 | End: 2020-11-10

## 2019-11-14 RX ORDER — CHLORZOXAZONE 500 MG/1
750 TABLET ORAL
Qty: 15 TAB | Refills: 0 | Status: SHIPPED | OUTPATIENT
Start: 2019-11-14 | End: 2019-11-14

## 2019-11-14 RX ORDER — HYDROMORPHONE HYDROCHLORIDE 2 MG/1
2 TABLET ORAL
Qty: 40 TAB | Refills: 0 | Status: SHIPPED | OUTPATIENT
Start: 2019-11-14 | End: 2019-11-21 | Stop reason: SDUPTHER

## 2019-11-14 RX ORDER — ONDANSETRON 2 MG/ML
4 INJECTION INTRAMUSCULAR; INTRAVENOUS
Status: DISCONTINUED | OUTPATIENT
Start: 2019-11-14 | End: 2019-11-15 | Stop reason: HOSPADM

## 2019-11-14 RX ORDER — CHLORDIAZEPOXIDE HYDROCHLORIDE AND CLIDINIUM BROMIDE 5; 2.5 MG/1; MG/1
1 CAPSULE ORAL
Status: DISCONTINUED | OUTPATIENT
Start: 2019-11-14 | End: 2019-11-15 | Stop reason: HOSPADM

## 2019-11-14 RX ADMIN — HYOSCYAMINE SULFATE 0.25 MG: 0.12 TABLET ORAL; SUBLINGUAL at 13:11

## 2019-11-14 RX ADMIN — ACETAMINOPHEN 1000 MG: 500 TABLET ORAL at 18:35

## 2019-11-14 RX ADMIN — CHLORDIAZEPOXIDE HYDROCHLORIDE AND CLIDINIUM BROMIDE 1 CAPSULE: 5; 2.5 CAPSULE ORAL at 21:15

## 2019-11-14 RX ADMIN — CHLORZOXAZONE 750 MG: 500 TABLET ORAL at 00:41

## 2019-11-14 RX ADMIN — Medication 1 CAPSULE: at 09:23

## 2019-11-14 RX ADMIN — STANDARDIZED SENNA CONCENTRATE AND DOCUSATE SODIUM 1 TABLET: 8.6; 5 TABLET ORAL at 09:23

## 2019-11-14 RX ADMIN — CELECOXIB 200 MG: 200 CAPSULE ORAL at 09:24

## 2019-11-14 RX ADMIN — BUSPIRONE HYDROCHLORIDE 5 MG: 5 TABLET ORAL at 09:23

## 2019-11-14 RX ADMIN — Medication 10 ML: at 13:14

## 2019-11-14 RX ADMIN — Medication 10 ML: at 23:30

## 2019-11-14 RX ADMIN — HYDROMORPHONE HYDROCHLORIDE 2 MG: 2 TABLET ORAL at 18:35

## 2019-11-14 RX ADMIN — HYDROMORPHONE HYDROCHLORIDE 4 MG: 4 TABLET ORAL at 06:19

## 2019-11-14 RX ADMIN — CELECOXIB 200 MG: 200 CAPSULE ORAL at 18:35

## 2019-11-14 RX ADMIN — CALCIUM POLYCARBOPHIL 1250 MG: 625 TABLET, FILM COATED ORAL at 09:23

## 2019-11-14 RX ADMIN — ACETAMINOPHEN 1000 MG: 500 TABLET ORAL at 00:32

## 2019-11-14 RX ADMIN — STANDARDIZED SENNA CONCENTRATE AND DOCUSATE SODIUM 1 TABLET: 8.6; 5 TABLET ORAL at 18:35

## 2019-11-14 RX ADMIN — LORATADINE 10 MG: 10 TABLET ORAL at 09:25

## 2019-11-14 RX ADMIN — CHLORZOXAZONE 750 MG: 500 TABLET ORAL at 09:24

## 2019-11-14 RX ADMIN — ACETAMINOPHEN 1000 MG: 500 TABLET ORAL at 07:27

## 2019-11-14 RX ADMIN — HYDROMORPHONE HYDROCHLORIDE 2 MG: 2 TABLET ORAL at 23:27

## 2019-11-14 RX ADMIN — Medication 10 ML: at 06:16

## 2019-11-14 RX ADMIN — ACETAMINOPHEN 1000 MG: 500 TABLET ORAL at 23:27

## 2019-11-14 RX ADMIN — POLYETHYLENE GLYCOL 3350 17 G: 17 POWDER, FOR SOLUTION ORAL at 09:25

## 2019-11-14 RX ADMIN — FAMOTIDINE 20 MG: 20 TABLET ORAL at 06:18

## 2019-11-14 RX ADMIN — THERA TABS 1 TABLET: TAB at 09:26

## 2019-11-14 RX ADMIN — HYDROMORPHONE HYDROCHLORIDE 4 MG: 4 TABLET ORAL at 12:20

## 2019-11-14 RX ADMIN — Medication 1 CAPSULE: at 09:24

## 2019-11-14 NOTE — PROGRESS NOTES
Called Cayetano Barba for MD on call. Dr. Tammy Dobson called. Informed Dr. Tammy Dobson of pt's history of IBS, pt c/o tenderness in her upper abdomen and wanting to have a BM this evening. Informed MD that pt is requesting a Fleets enema if the dulcolax given does not work. Order obtained for Fleets enema.

## 2019-11-14 NOTE — PROGRESS NOTES
Problem: Mobility Impaired (Adult and Pediatric)  Goal: *Acute Goals and Plan of Care (Insert Text)  Description  FUNCTIONAL STATUS PRIOR TO ADMISSION: Patient was independent and active without use of DME.    HOME SUPPORT PRIOR TO ADMISSION: The patient lived with family but did not require assist.    Physical Therapy Goals  Initiated 11/12/2019    1. Patient will move from supine to sit and sit to supine , scoot up and down and roll side to side in bed with modified independence within 4 days. 2. Patient will perform sit to stand with modified independence within 4 days. 3. Patient will ambulate with modified independence for 300 feet with the least restrictive device within 4 days. 4. Patient will ascend/descend 4 stairs with 1 handrail(s) with supervision/set-up within 4 days. 5. Patient will verbalize and demonstrate understanding of spinal precautions (No bending, lifting greater than 5 lbs, or twisting; log-roll technique; frequent repositioning as instructed) within 4 days. Outcome: Progressing Towards Goal     PHYSICAL THERAPY TREATMENT  Patient: Cheryln Oppenheim (85 y.o. female)  Date: 11/14/2019  Diagnosis: Spinal stenosis, lumbar [M48.061] <principal problem not specified>  Procedure(s) (LRB):  LUMBAR LAMINECTOMY L4-S1 WITH L4-5 MIDLINE FUSION - MAZOR (N/A) 3 Days Post-Op  Precautions:    Chart, physical therapy assessment, plan of care and goals were reviewed. ASSESSMENT  Patient continues with skilled PT services and is progressing towards goals. Pt demos good mobility despite high pain, increased fear and requiring heavy motivation. Pt tolerates 300ft with RW SBA-mod I with encouragement and reassurance, discussed progression and home DC and patient slows gait down. Pt highly fearful of going home at this time. Mobility is stable. Pt returned to bed per her wishes. Pt has since apparently vasovagaled and RRT called however no lasting deficits noted and patient spontaneously recovered. Pt refused PM session. Current Level of Function Impacting Discharge (mobility/balance): supervision      PLAN :  Patient continues to benefit from skilled intervention to address the above impairments. Continue treatment per established plan of care. to address goals. Recommendation for discharge: (in order for the patient to meet his/her long term goals)  Physical therapy at least 2 days/week in the home     This discharge recommendation:  Has been made in collaboration with the attending provider and/or case management    IF patient discharges home will need the following DME: brace/splint and rolling walker       SUBJECTIVE:   Patient stated I hurt so bad.     OBJECTIVE DATA SUMMARY:   Critical Behavior:  Neurologic State: Alert  Orientation Level: Oriented X4  Cognition: Appropriate decision making, Follows commands     Functional Mobility Training:  Bed Mobility:  Rolling: Modified independent  Supine to Sit: Modified independent              Transfers:  Sit to Stand: Modified independent  Stand to Sit: Modified independent        Bed to Chair: Modified independent                    Balance:  Sitting: Intact  Standing: Intact; With support  Ambulation/Gait Training:  Distance (ft): 300 Feet (ft)  Assistive Device: Brace/Splint; Walker, rolling  Ambulation - Level of Assistance: Modified independent;Supervision        Gait Abnormalities: Trunk sway increased        Base of Support: Widened     Speed/Duarte: Shuffled  Step Length: Right shortened;Left shortened   Pt ambulates with nearly normal duarte and fluctuating speeds, discussed progress and patient demos slower speeds. Pain Rating:  C/o severe pain    Activity Tolerance:   Good, Fair and requires rest breaks  Please refer to the flowsheet for vital signs taken during this treatment.     After treatment patient left in no apparent distress:   Supine in bed, Call bell within reach and Caregiver / family present    COMMUNICATION/COLLABORATION: The patients plan of care was discussed with: Registered Nurse and     Gela Guerin, PT   Time Calculation: 30 mins

## 2019-11-14 NOTE — PROGRESS NOTES
Pt passes out after c/o abdominal cramping. Vitalsigns checked and RRT Called. Pt regained consciousness spontaneously. Labs ordered. Given ordered antispasmodic med per NP.

## 2019-11-14 NOTE — PROGRESS NOTES
Orthopedic Spine Progress Note  Post Op day: 3 Days Post-Op    2019 7:47 AM   Admit Date: 2019  Procedure: Procedure(s):  LUMBAR LAMINECTOMY L4-S1 WITH L4-5 MIDLINE FUSION - MAZOR  With local autograft and allograft. Subjective:     Karen Bansal appears well. States pain is better managed after starting the muscle relaxer yesterday. Having cramping lower abd pain. +bm overnight but states they were small and feels like she needs to have another one. Tolerating diet  No N/V  Voiding  Drain removed POD2    Pain Control:   Pain Assessment  Pain Scale 1: Numeric (0 - 10)  Pain Intensity 1: 8  Pain Onset 1: postop  Pain Location 1: Back  Pain Orientation 1: Posterior  Pain Description 1: Aching  Pain Intervention(s) 1: Medication (see MAR)    Objective:          Physical Exam:  General:  Alert and oriented. No acute distress. Heart:  Respirations unlabored. Abdomen:   Extremities: Soft, non-tender. No evidence of cyanosis. Pulses palpable in both upper and lower extremities. Neurologic:    Musculoskeletal:  No new motor deficits. Neurovascular exam within normal limits. Sensation stable. Motor: unchanged C5-T1 and L2-S1. Calves soft, nontender upon palpationMoves both upper and lower extremities. Incision: clean, dry, and intact. No significant erythema or swelling. No active drainage noted. Vital Signs:    Blood pressure 102/62, pulse 100, temperature 99.6 °F (37.6 °C), resp. rate 16, height 5' 7\" (1.702 m), weight 89.8 kg (198 lb), SpO2 94 %.   Temp (24hrs), Av.8 °F (37.1 °C), Min:97.6 °F (36.4 °C), Max:100.1 °F (37.8 °C)      LAB:    Recent Labs     19  0331   HGB 9.1*     Lab Results   Component Value Date/Time    Sodium 138 2019 05:26 AM    Potassium 4.2 2019 05:26 AM    Chloride 106 2019 05:26 AM    CO2 25 2019 05:26 AM    Glucose 168 (H) 2019 05:26 AM    BUN 8 2019 05:26 AM    Creatinine 0.87 2019 05:26 AM Calcium 8.4 (L) 11/12/2019 05:26 AM       Intake/Output:No intake/output data recorded. 11/12 1901 - 11/14 0700  In: -   Out: 940 [Urine:800; Drains:140]    PT/OT:   Gait:  Gait  Base of Support: Widened  Speed/Gogo: Fluctuations, Slow  Step Length: Left shortened, Right shortened  Gait Abnormalities: Trunk sway increased  Ambulation - Level of Assistance: Supervision  Distance (ft): 300 Feet (ft)  Assistive Device: Brace/Splint, Walker, rolling  Rail Use: Right   Stairs - Level of Assistance: Minimum assistance, Contact guard assistance  Number of Stairs Trained: 4                 Assessment/Plan     1. Chronic back and leg pain in setting of lumbar spondylolisthesis   -Patient is 3 Days Post-Op s/p Procedure(s):  LUMBAR LAMINECTOMY L4-S1 WITH L4-5 MIDLINE FUSION - MAZOR  -continue chlorzoxazone, prn dilaudid, lidocaine patches.  -encourage mobility  -incentive spirometer  -drain removed pod#2  - VTE Prophylaxes - TEDS & SCDs     2. Chronic IBS- mixed type, now with constipation in setting of opioids  -s/p fleets enema overnight  -continue pericolace and miralax  -one dose sorbitol ordered  this am  -hyoscamine for cramping    3.  Hypertension, chronic   -BP soft- hold home cozaar    Disposition: home likely later today pending pain control and PT clearance    Signed By: Iqra Morris NP

## 2019-11-14 NOTE — PROGRESS NOTES
Problem: Self Care Deficits Care Plan (Adult)  Goal: *Acute Goals and Plan of Care (Insert Text)  Description  FUNCTIONAL STATUS PRIOR TO ADMISSION: Patient required minimal to moderate assistance for basic and instrumental ADLs. Difficulty with LB self care and bathing. Used cane for mobility    HOME SUPPORT: The patient lived with daughter, partner, friend, friend's brother. Occupational Therapy Goals  Initiated 11/12/2019    1. Patient will perform lower body dressing with supervision/set-up assist using AE PRN within 7 days. 2.  Patient will perform toileting with supervision/set-up using most appropriate DME within 7 days. 3.  Patient will tub transfer at supervision/set-up within 7 days. 4.  Patient will don/doff back brace at modified [place] within 7 days. 5.  Patient will verbalize/demonstrate 3/3 back precautions during ADL tasks without cues within 7 days. Outcome: Progressing Towards Goal     OCCUPATIONAL THERAPY TREATMENT/DISCHARGE  Patient: Cheryln Oppenheim (50 y.o. female)  Date: 11/14/2019  Diagnosis: Spinal stenosis, lumbar [M48.061] <principal problem not specified>  Procedure(s) (LRB):  LUMBAR LAMINECTOMY L4-S1 WITH L4-5 MIDLINE FUSION - MAZOR (N/A) 3 Days Post-Op  Precautions:    Chart, occupational therapy assessment, plan of care, and goals were reviewed. ASSESSMENT  Patient continues with skilled OT services and is progressing towards goals. Pt with improved participation and performance of ADLs this date. Demonstrated ability to perform LB self-care (bathing), toileting, and grooming at MOD I to stand by assist level with good adherence to spinal precautions. Additionally, pt trained/education on LB dressing equipment ([place] aide/reacher) and demonstrated ability to don socks after training with stand by assist and cueing for strategies to perform. Verbalized good understanding.  Pt is very happy her pain is better managed and she is better able to engage in ADLs/iADls and related mobility. Anticipate she will continue to make good progress at home. Current Level of Function Impacting Discharge (ADLs): anticipate MIN A for bathing in shower, supervision/MOD I for other ADLs    Other factors to consider for discharge: has family/friends available for support as needed         PLAN :  Patient continues to benefit from skilled intervention to address the above impairments. Continue treatment per established plan of care. to address goals. Recommendation for discharge: (in order for the patient to meet his/her long term goals)  Occupational therapy at least 2 days/week in the home     This discharge recommendation:  Has not yet been discussed the attending provider and/or case management    IF patient discharges home will need the following DME: long handled DME - sock aide, shoe horn , has other recommended DME       SUBJECTIVE:   Patient stated \"That medicine really made a difference. I can get up without pain!     OBJECTIVE DATA SUMMARY:   Cognitive/Behavioral Status:  Neurologic State: Alert  Orientation Level: Oriented X4                Functional Mobility and Transfers for ADLs:  Bed Mobility:  Rolling: Modified independent  Supine to Sit: Modified independent    Transfers:     Functional Transfers  Bathroom Mobility: Stand-by assistance  Toilet Transfer : Stand-by assistance       Balance:  Sitting: Intact; Without support    ADL Intervention:       Grooming  Grooming Assistance: Modified independent  Position Performed: Standing(at sink)  Washing Hands: Modified independent  Brushing Teeth: Stand-by assistance(cues to adhere to back precautions)         Lower Body Bathing  Bathing Assistance: Set-up; Stand-by assistance  Perineal  : Set-up; Stand-by assistance  Position Performed: Standing;Seated on toilet         Lower Body Dressing Assistance  Dressing Assistance: Minimum assistance  Socks: Set-up; Stand-by assistance(after training wtart long handled DME)  Slip on Shoes with Back: Minimum assistance  Position Performed: Seated in chair    Toileting  Toileting Assistance: Modified independent  Bladder Hygiene: Modified independent  Clothing Management: Modified independent         The patient recalled and demonstrated 3/3 back precautions. Reviewed all 3 with patient. Bathing: Patient instructed and indicated understanding when bathing to not submerge wound in water, stand to shower or sponge bathe, cover wound with plastic and tape to ensure no water reaches bandage/wound without cues. Dressing brace: Patient instructed and demonstrated to don/doff velcro on brace using dominant side, keeping non-dominant side intact. Patient instructed and demonstrated in meantime of being able to stand with back against wall to don/doff brace, to don/doff seated using lap and bed/chair surface to support brace while manipulating. Dressing lower body: Patient instructed to don brace first and on the benefits to remain seated to don all clothing to increase independence with precautions and pain management. Toileting: Patient instructed on the benefits of using flushable wet wipes and toilet tongs if decreased reach or pain for jim care. Also, the benefits of a reacher to aid in clothing management. Home safety: Patient instructed and indicated understanding on home modifications and safety (raise height of ADL objects, appropriate height of chair surfaces, recliner safety, change of floor surfaces, clear pathways) to increase independence and fall prevention  Standing: Patient instructed and indicated understanding to walk up to sink/counter top/surfaces, step into walker, square off while using objects, slide objects along surfaces, to increase adherence to back precautions and fall prevention. Patient instructed to increase amount of time standing in order to increase independence and tolerance with ADLs.  During prolonged standing, can open cabinet door or place foot on stool to decrease spinal pressure/increase pain. Patient instructed and indicated understanding the benefits of maintaining activity tolerance, functional mobility, and independence with self care tasks during acute stay  to ensure safe return home and to baseline. Encouraged patient to increase frequency and duration OOB, not sitting longer than 30 mins without marching/walking with staff, be out of bed for all meals, perform daily ADLs (as approved by RN/MD regarding bathing etc), and performing functional mobility to/from bathroom. Patient instruction and indicated understanding on body mechanics, ergonomics and gravitational force on the spine during different body positions to plan activities in prep for return home to complete instrumental ADLs and back to work safely. Patient instructed and demonstrated while supine hip ER stretch and hold 10 seconds to increase ROM in prep for lower body ADLs daily with Minimum assistance. Pain:  No pain reported    Activity Tolerance:   Good  Please refer to the flowsheet for vital signs taken during this treatment.     After treatment patient left in no apparent distress:   Sitting in chair and Call bell within reach    COMMUNICATION/COLLABORATION:   The patients plan of care was discussed with: Physical Therapist and Registered Nurse    Gavin Dial OT  Time Calculation: 26 mins

## 2019-11-14 NOTE — PROGRESS NOTES
PRASHANTH:  1. [address] accepted for services. 2. Family will transport at discharge.     Janina Nolascods [facility]

## 2019-11-14 NOTE — PROGRESS NOTES
Orthopedic Spine Progress Note  Post Op day: 3 Days Post-Op     November 14, 2019 7:47 AM   Admit Date: 11/11/2019  Procedure: Procedure(s):  LUMBAR LAMINECTOMY L4-S1 WITH L4-5 MIDLINE FUSION - [facility]    Patient evaluated during rapid response. She is AOX3 and reports having intense lower abdominal cramping and then lost consciousness. She did not fall. Nursing states patient had generalized shaking during event which spontaneously resolved. Regained consciousness quickly. She had increased work of breathing and was anxious and placed on a non-rebreather. Saturations were normal. Sinus rhythm noted on monitor. She denies any chest pain. She is grabbing her lower abdomen and in obvious pain. States she has history of passing out when her abdominal cramping becomes this intense. Likely vasovagal response. Her vitals are stable and her blood sugar is 149. Will obtain abdominal KUB and give patient hyoscyamine for the acute abdominal cramping. Minimize narcotics. Consider anxiolytic for likely anxiety exacerbating her symptoms. Will keep patient on continuous pulse ox, f/u on abdominal imaging, and monitor response to antispasmodic.      Rashida Schultz, [facility]-BC  Orthopedic and Neurosurgery Spine Nurse Practitioner

## 2019-11-15 VITALS
WEIGHT: 198 LBS | SYSTOLIC BLOOD PRESSURE: 144 MMHG | HEIGHT: 67 IN | TEMPERATURE: 99.6 F | DIASTOLIC BLOOD PRESSURE: 86 MMHG | BODY MASS INDEX: 31.08 KG/M2 | HEART RATE: 93 BPM | RESPIRATION RATE: 18 BRPM | OXYGEN SATURATION: 92 %

## 2019-11-15 PROCEDURE — 97116 GAIT TRAINING THERAPY: CPT

## 2019-11-15 PROCEDURE — 74011250637 HC RX REV CODE- 250/637: Performed by: NURSE PRACTITIONER

## 2019-11-15 PROCEDURE — 94760 N-INVAS EAR/PLS OXIMETRY 1: CPT

## 2019-11-15 PROCEDURE — 74011250637 HC RX REV CODE- 250/637: Performed by: PHYSICIAN ASSISTANT

## 2019-11-15 RX ORDER — CHLORDIAZEPOXIDE HYDROCHLORIDE AND CLIDINIUM BROMIDE 5; 2.5 MG/1; MG/1
1 CAPSULE ORAL
Qty: 30 CAP | Refills: 0 | Status: SHIPPED | OUTPATIENT
Start: 2019-11-15 | End: 2019-12-15

## 2019-11-15 RX ORDER — NALOXONE HYDROCHLORIDE 4 MG/.1ML
SPRAY NASAL
Qty: 1 EACH | Refills: 0 | Status: SHIPPED | OUTPATIENT
Start: 2019-11-15 | End: 2020-11-10 | Stop reason: CLARIF

## 2019-11-15 RX ADMIN — POLYETHYLENE GLYCOL 3350 17 G: 17 POWDER, FOR SOLUTION ORAL at 09:45

## 2019-11-15 RX ADMIN — THERA TABS 1 TABLET: TAB at 09:45

## 2019-11-15 RX ADMIN — Medication 10 ML: at 08:12

## 2019-11-15 RX ADMIN — CELECOXIB 200 MG: 200 CAPSULE ORAL at 09:45

## 2019-11-15 RX ADMIN — CALCIUM POLYCARBOPHIL 1250 MG: 625 TABLET, FILM COATED ORAL at 09:45

## 2019-11-15 RX ADMIN — LORATADINE 10 MG: 10 TABLET ORAL at 09:45

## 2019-11-15 RX ADMIN — HYDROMORPHONE HYDROCHLORIDE 2 MG: 2 TABLET ORAL at 08:10

## 2019-11-15 RX ADMIN — HYDROMORPHONE HYDROCHLORIDE 2 MG: 2 TABLET ORAL at 04:10

## 2019-11-15 RX ADMIN — Medication 1 CAPSULE: at 09:45

## 2019-11-15 RX ADMIN — STANDARDIZED SENNA CONCENTRATE AND DOCUSATE SODIUM 1 TABLET: 8.6; 5 TABLET ORAL at 09:45

## 2019-11-15 RX ADMIN — FAMOTIDINE 20 MG: 20 TABLET ORAL at 08:09

## 2019-11-15 RX ADMIN — LOSARTAN POTASSIUM 25 MG: 25 TABLET ORAL at 09:46

## 2019-11-15 RX ADMIN — ACETAMINOPHEN 1000 MG: 500 TABLET ORAL at 08:09

## 2019-11-15 RX ADMIN — CHLORDIAZEPOXIDE HYDROCHLORIDE AND CLIDINIUM BROMIDE 1 CAPSULE: 5; 2.5 CAPSULE ORAL at 10:36

## 2019-11-15 RX ADMIN — BUSPIRONE HYDROCHLORIDE 5 MG: 5 TABLET ORAL at 09:45

## 2019-11-15 RX ADMIN — HYDROMORPHONE HYDROCHLORIDE 2 MG: 2 TABLET ORAL at 12:47

## 2019-11-15 NOTE — PROGRESS NOTES
Bedside and Verbal shift change report given to Patricia Sorto (oncoming nurse) by Keturah Whitfield RN (offgoing nurse). Report included the following information SBAR, Kardex, OR Summary, Intake/Output, MAR and Recent Results.

## 2019-11-15 NOTE — DISCHARGE INSTRUCTIONS
After Hospital Care Plan:  Discharge Instructions Lumbar Fusion Surgery   Dr. Aldo Kinsey   Patient Name: Esau Lira    Date of procedure: 11/11/2019  Date of discharge:     Procedure: Procedure(s):  LUMBAR LAMINECTOMY L4-S1 WITH L4-5 [place] 7  PCP: Jeanette Franklin MD     **please follow-up with Dr. Renetta Mckoy with GI for your IBS and abdominal cramping. Librax has been prescribed as needed to help control these symptoms. This medication can make you sleepy so avoid taking other sedating meds at the same time. *Chlorzoxazone is a medication for muscle spasms to take as needed. You should wean off of your dilaudid slowly over the next several days. Follow up appointments  -follow up with Dr. Dr. Aldo Kinsey in 2 weeks. Call [phone] to make an appointment as soon as you get home from the hospital.    [address] [place], [place] [address]y: ____________________   phone: _______________________  The agency will contact you to arrange dates/times for visits.   Please call them if you do not hear from them within 24 hours after you are discharged  Physical therapy 3 times a week for 3 weeks  Nursing-initial assessment and as needed    When to call your Orthopaedic Surgeon:  -Signs of infection-if your incision is red; continues to have drainage; drainage has a foul odor or if you have a persistent fever over 101 degrees for 24 hours  -Nausea or vomiting, severe headache  -Loss of bowel or bladder function, inability to urinate  -Changes in sensation in your arms or legs (numbness, tingling, loss of color)  -Increased weakness-greater than before your surgery  -Severe pain or pain not relieved by medications  -Signs of a blood clot in your leg-calf pain, tenderness, redness, swelling of lower leg    When to call your Primary Care Physician:  -Concerns about medical conditions such as diabetes, high blood pressure, asthma, congestive heart failure  -Call if blood sugars are elevated, persistent headache or dizziness, coughing or congestion, constipation or diarrhea, burning with urination, abnormal heart rate    When to call 911 and go to the nearest emergency room:  -Acute onset of chest pain, shortness of breath, difficulty breathing    Activity  -You are going home a well person, be as active as possible. Your only exercise should be walking. Start with short frequent walks and increase your walking distance each day.  -Limit the amount of time you sit to 20-30 minute intervals. Sitting for prolonged periods of time will be uncomfortable for you following surgery.  -Do NOT lift anything over 5 pounds  -Do NOT do any straining, twisting or bending  -When you are in bed, you may lay on your back or on either side. Do NOT lie on your stomach    Brace  -If you have a back brace, you should wear your brace at all times when you are out of bed. Do not wear the brace while in bed or showering.  -Remember to always wear a cotton t-shirt underneath your brace.  -Do not bend or twist when your brace is off    Diet  -Resume usual diet; drink plenty of fluids; eat foods high in fiber  -It is important to have regular bowel movements. Pain medications may cause constipation. You may want to take a stool softener (such as Senokot-S or Colace) to prevent constipation.   -If constipation occurs, take a laxative (such as Dulcolax tablets, Milk of Magnesia, or a suppository). Laxatives should only be used if the above preventable measures have failed and you still have not had a bowel movement after three days    Driving  -You may not drive or return to work until instructed by your physician. However, you may ride in the car for short periods of time. Incision Care  Your incision has been closed with absorbable sutures and the Zipline skin closure system. This will assist with healing. The Rudolfo Dart is to remain on your incision for 2 weeks.  A dry dressing (ABD and tape) will be placed over it and should be changed daily, for at least the first several days after your surgery. If you have no incisional drainage, you may leave the incision open to air if you wish, still leaving the Zipline in place. Please make sure to wash your hands prior to touching your dressing. You may take brief showers but do not run the water directly onto the wound. After your shower, blot your incision dry with a soft towel and replace the dry dressing. Do not allow the tape to come in contact with the Zipline. Do not rub or apply any lotions or ointments to your incision site. Do not soak or scrub your wound. The Zipline dressing will be removed during your two week follow-up appointment. If you experience drainage leaking from underneath the Zipline or if it peels off before 2 weeks, please contact your orthopedic surgeons office. Showering  -You may shower in approximately 4 days after your surgery.    -Leave the dressing on during your shower. Do NOT allow the water to run directly onto your dressing. Once you get out of the shower, gently pat the dressing dry. -Reminder- your brace can be removed while showering. Remember to not bend or twist while your brace is off.    -Do not take a tub bath. Preventing blood clots  -You have been given T.E.D. stockings to wear. Continue to wear these for 7 days after your discharge. Put them on in the morning and take them off at night.    -They are used to increase your circulation and prevent blood clots from forming in your legs  -T. E.D. stockings can be machine washed, temperature not to exceed 160° F (71°C) and machine dried for 15 to 20 minutes, temperature not to exceed 250° F (121°C).     Pain management  -Take pain medication as prescribed; decrease the amount you use as your pain lessens  -DO not wait until you are in extreme pain to take your medication.  -Avoid alcoholic beverages while taking pain medication    Pain Medication Safety  DO:  -Read the Medication Guide   -Take your medicine exactly as prescribed   -Store your medicine away from children and in a safe place   -Flush unused medicine down the toilet   -Call your healthcare provider for medical advice about side effects. You may report side effects to FDA at 6-827-FDA-3560.   -Please be aware that many medications contain Tylenol. We do not want you to over medicate so please read the information below as a guide. Do not take more than 4 Grams of Tylenol in a 24 hour period. (There are 1000 milligrams in one Gram)                                                                                                                                                                                                                                                Percocet contains 325 mg of Tylenol per tablet (do not take more than 12 tablets in 24 hours)  Lortab contains 500 mg of Tylenol per tablet (do not take more than 8 tablets in 24 hours)  Norco contains 325 mg of Tylenol per tablet (do not take more than 12 tablets in 24 hours). DO NOT:  -Do not give your medicine to others   -Do not take medicine unless it was prescribed for you   -Do not stop taking your medicine without talking to your healthcare provider   -Do not break, chew, crush, dissolve, or inject your medicine. If you cannot swallow your medicine whole, talk to your healthcare provider.  -Do not drink alcohol while taking this medicine  -Do not take anti-inflammatory medications or aspirin unless instructed by your     physician.

## 2019-11-15 NOTE — PROGRESS NOTES
Problem: Mobility Impaired (Adult and Pediatric)  Goal: *Acute Goals and Plan of Care (Insert Text)  Description  FUNCTIONAL STATUS PRIOR TO ADMISSION: Patient was independent and active without use of DME.    HOME SUPPORT PRIOR TO ADMISSION: The patient lived with family but did not require assist.    Physical Therapy Goals  Initiated 11/12/2019    1. Patient will move from supine to sit and sit to supine , scoot up and down and roll side to side in bed with modified independence within 4 days. 2. Patient will perform sit to stand with modified independence within 4 days. 3. Patient will ambulate with modified independence for 300 feet with the least restrictive device within 4 days. 4. Patient will ascend/descend 4 stairs with 1 handrail(s) with supervision/set-up within 4 days. 5. Patient will verbalize and demonstrate understanding of spinal precautions (No bending, lifting greater than 5 lbs, or twisting; log-roll technique; frequent repositioning as instructed) within 4 days. Outcome: Progressing Towards Goal    PHYSICAL THERAPY TREATMENT  Patient: Isela Arriaga (89 y.o. female)  Date: 11/15/2019  Diagnosis: Spinal stenosis, lumbar [M48.061] <principal problem not specified>  Procedure(s) (LRB):  LUMBAR LAMINECTOMY L4-S1 WITH L4-5 MIDLINE FUSION - [facility]ZOR (N/A) 4 Days Post-Op  Precautions:   No bending, no lifting greater than 5 lbs, no twisting, log-roll technique, repositioning every 20-30 min except when sleeping, brace when OOB (if ordered)  Chart, physical therapy assessment, plan of care and goals were reviewed. ASSESSMENT  Patient continues with skilled PT services and is progressing towards goals. Pt received in chair, participates in gait with increased duarte, decreased difficulty. Pt ambulates repeating to herself \"dont think about it\" with increased step length throughout. Pt continues to be highly anxious regarding DC home, but is safe to DC at this time from a PT standpoint. Current Level of Function Impacting Discharge (mobility/balance): supervision to mod I            PLAN :  Patient continues to benefit from skilled intervention to address the above impairments. Continue treatment per established plan of care. to address goals. Recommendation for discharge: (in order for the patient to meet his/her long term goals)  Physical therapy at least 2 days/week in the home     This discharge recommendation:  Has been made in collaboration with the attending provider and/or case management    IF patient discharges home will need the following DME: patient owns DME required for discharge       SUBJECTIVE:   Patient stated I feel much better today, but im still hurting.     OBJECTIVE DATA SUMMARY:   Critical Behavior:  Neurologic State: Alert  Orientation Level: Oriented X4  Cognition: Appropriate decision making       Spinal diagnosis intervention:  The patient stated 3/3 back precautions when prompted. Reviewed all 3 back precautions, log roll technique, and sitting for 30 minutes at a time. The patient required no cues to maintain back precautions during functional activity. Functional Mobility Training:    Bed Mobility:  Log                   Transfers:  Sit to Stand: Modified independent  Stand to Sit: Modified independent        Bed to Chair: Modified independent                    Balance:  Sitting: Intact  Standing: Intact; With support  Ambulation/Gait Training:  Distance (ft): 300 Feet (ft)  Assistive Device: Brace/Splint; Walker, rolling  Ambulation - Level of Assistance: Modified independent        Gait Abnormalities: Trunk sway increased        Base of Support: Widened     Speed/Gogo: Fluctuations  Step Length: Left shortened; Other (comment)     Pain Ratin/10 pain per pt at rest, Pt pain appears stable with use of FLACC objective measurement pain scale:   Face 1, Legs 0, Activity 0, Cry 1, Consolability 1 for a score of 3/10    Face  0 No particular expression or smile 1 Occasional grimace or frown, withdrawn, uninterested 2 Frequent to constant quivering chin, clenched jaw  Legs  0 Normal position or relaxed  1 Uneasy, restless, tense    2 Kicking, or legs drawn up  Activity  0 Lying quietly/normally, moves easily 1 Squirming, shifting, back and forth, tense  2 Arched, rigid or jerking  Cry  0 No cry (awake or asleep)  1 Moans or whimpers; occasional complaint   2 Crying steadily, screams or sobs, frequent complaints  Consolability  0 Content, relaxed   1 Reassured by touching, being talked to, distractible 2 Difficult to console or comfort      Activity Tolerance:   Good  Please refer to the flowsheet for vital signs taken during this treatment.     After treatment patient left in no apparent distress:   Sitting in chair, Call bell within reach and Caregiver / family present    COMMUNICATION/COLLABORATION:   The patients plan of care was discussed with: Registered Nurse and     Evens Guerin, PT   Time Calculation: 15 mins

## 2019-11-15 NOTE — PROGRESS NOTES
Bedside and Verbal shift change report given to Bashir Anthony RN (oncoming nurse) by James Tsai (offgoing nurse). Report included the following information SBAR, Kardex, OR Summary, Intake/Output, MAR and Recent Results.

## 2019-11-15 NOTE — PROGRESS NOTES
Orthopedic Spine Progress Note  Post Op day: 4 Days Post-Op    November 15, 2019 7:48 AM   Admit Date: 2019  Procedure: Procedure(s):  LUMBAR LAMINECTOMY L4-S1 WITH L4-5 MIDLINE FUSION - [facility]ZOR    Subjective:     Victoriano Armijo appears well. Pain seems to be managed. She was ready for discharge and had sudden onset abdominal spasms. She stated she occasionally experiences this which will result in nausea, vomiting and loss of consciousness. A rapid response was called. A KUB was obtained. Tolerating diet  No N/V  Voiding    Pain Control:   Pain Assessment  Pain Scale 1: Numeric (0 - 10)  Pain Intensity 1: 7  Pain Onset 1: S/P post op surgical pain. Pain Location 1: Back, Incisional, Spine, lumbar, Spine, sacral  Pain Orientation 1: Lower, Posterior  Pain Description 1: Aching, Other (comment)(Spasms)  Pain Intervention(s) 1: Medication (see MAR), Distraction, Emotional support, Rest    Objective:          Physical Exam:  General:  Alert and oriented. No acute distress. Heart:  Respirations unlabored. Abdomen:   Extremities: Soft, non-tender. No evidence of cyanosis. Pulses palpable in both upper and lower extremities. Neurologic:  Musculoskeletal:  No new motor deficits. Neurovascular exam within normal limits. Sensation stable. Motor: unchanged C5-T1 and L2-S1. Cecil's sign negative in bilateral lower extremities. Calves soft, nontender upon palpation and with passive twitch. Moves both upper and lower extremities. Incision: clean, dry, and intact. No significant erythema or swelling. No active drainage noted. Vital Signs:    Blood pressure 122/76, pulse 79, temperature 98.1 °F (36.7 °C), resp. rate 18, height 5' 7\" (1.702 m), weight 89.8 kg (198 lb), SpO2 100 %.   Temp (24hrs), Av.2 °F (36.8 °C), Min:97.7 °F (36.5 °C), Max:98.6 °F (37 °C)      LAB:    Recent Labs     19  1304   HCT 29.6*   HGB 9.5*        Lab Results   Component Value Date/Time    Sodium 136 11/14/2019 01:04 PM    Potassium 3.6 11/14/2019 01:04 PM    Chloride 102 11/14/2019 01:04 PM    CO2 28 11/14/2019 01:04 PM    Glucose 158 (H) 11/14/2019 01:04 PM    BUN 8 11/14/2019 01:04 PM    Creatinine 0.88 11/14/2019 01:04 PM    Calcium 9.0 11/14/2019 01:04 PM       Intake/Output:No intake/output data recorded. 11/13 1901 - 11/15 0700  In: -   Out: 800 [Urine:800]    PT/OT:   Gait:  Gait  Base of Support: Widened  Speed/Gogo: Shuffled  Step Length: Right shortened, Left shortened  Gait Abnormalities: Trunk sway increased  Ambulation - Level of Assistance: Modified independent, Supervision  Distance (ft): 300 Feet (ft)  Assistive Device: Brace/Splint, Walker, rolling  Rail Use: Right   Stairs - Level of Assistance: Minimum assistance, Contact guard assistance  Number of Stairs Trained: 4     RESULTS:         IMPRESSION KUB: No acute abdominal abnormality.            Assessment:   Patient is 4 Days Post-Op s/p Procedure(s):  LUMBAR LAMINECTOMY L4-S1 WITH L4-5 MIDLINE FUSION - [facility]    Plan:     1. Continue PT/OT  2. Continue established methods of pain control  3. VTE Prophylaxes - TEDS & SCDs   4. Encouraged use of ICS  5.  GI consulted - thank you for your assistance  6.   D/C home with [facility] pending GI clearance      Signed By: Norm Jose PA-C

## 2019-11-15 NOTE — CONSULTS
[facility]  217 [facility] [address], [address]  [phone]                     GI CONSULTATION NOTE  [facility]  Work Cell: [phone]      NAME:  Ana Rosa Pavon   :   1960   MRN:   [id number]       Referring Provider: Juan Miguel Lyon NP    Consult Date: 11/15/2019     Chief Complaint: Back surgery    History of Present Illness:  Patient is a 64 y.o. who is seen in consultation at the request of Juan Miguel Lyon NP for abdominal pain. Ms. Benjie Myers has a PMH as detailed below. She is s/p lumbar laminectomy L4-S1 w/ L4-5 midline fusion whom was doing well post-operatively, however yesterday had an episode of severe abdominal pain w/ diaphoresis and syncopal episode. KUB (-). She reports a long-standing hx of these episodes (have occurred since she was young). Reports having 3-4 of these episodes a year. No specific triggers. Usually associated with need to defecate. Stools are usually loose, non-bloody. After episode, she feels very wiped out for 1-2 days then resumes her normal life. Says she has had \"IBS\" all her life. Has seen multiple gastroenterologists for her symptoms. Has taken Bentyl and Levsin in the past w/o benefit. Has anxiety due to symptoms. EGD 2019 - Gastric ulcer. Bx unremarkable. LATRELL (-). Colonoscopy 2019 - Moderate diverticulosis, otherwise unremarkable. CT abd/pelvis 2016 - Focal small bowel intussusception. Pt feeling much better this AM. Reports minimal lower abdominal discomfort. Tolerating diet and had a good solid stool this AM. Received Librax and feels this is helpful.         PMH:  Past Medical History:   Diagnosis Date    Adverse effect of anesthesia     \"so sleepy and tired for the rest of the day\"    Anxiety     Asthma     Chronic pain     back - L5 is \"almost gone\"/left foot neuropathy -pinched nerve L4-5 - spine shifted/degenerative spinal disease/stiff and sore neck and shoulder - in PT for back and neck  Degenerative spinal arthritis     Diabetes ([facility] [address]     PRE DIABETIC    Hypertension     IBS (irritable bowel syndrome)     Ill-defined condition     pre-diabetes    Nausea & vomiting     ? d/t fentanyl    PUD (peptic ulcer disease)     Spondylolisthesis        PSH:  Past Surgical History:   Procedure Laterality Date    COLONOSCOPY N/A 4/25/2019    COLONOSCOPY performed by Alejandra Ramon MD at [address] [facility] HX CHOLECYSTECTOMY  2006    HX DILATION AND CURETTAGE  1984    HX GI  04/2019    COLONOSCOPY    HX GI      ENDOSCOPY       Allergies: Allergies   Allergen Reactions    Demerol [Meperidine] Nausea and Vomiting     Dizziness. Went to ER.  Egg Yolk Swelling    Fentanyl Nausea and Vomiting     Dizziness.  Lactaid [Lactase] Other (comments)     Bloating/gas    Lactose Other (comments)     Bloating/gas    Prednisone Swelling    Soy Nausea and Vomiting       Home Medications:  Prior to Admission Medications   Prescriptions Last Dose Informant Patient Reported? Taking? Bacillus coagulans (DIGESTIVE ADVANTAGE PO)   Yes No   Sig: Take  by mouth. Takes one po twice daily. FIBER-CAPS, PSYLLIUM HUSK, PO 11/4/2019 at Unknown time  Yes Yes   Sig: Take 2 Caps by mouth daily (after breakfast). MULTIVITAMIN PO 11/4/2019 at Unknown time  Yes Yes   Sig: Take  by mouth. With iron and D3. Takes one po once daily. albuterol (PROAIR HFA) 90 mcg/actuation inhaler 10/28/2019  Yes No   Sig: Take 1-2 Puffs by inhalation as needed. busPIRone (BUSPAR) 5 mg tablet 11/10/2019 at AM  No Yes   Sig: Take 1 Tab by mouth daily as needed (Anxiety). Patient taking differently: Take 5 mg by mouth daily (after breakfast).    hyoscyamine SL (LEVSIN/SL) 0.125 mg SL tablet 11/4/2019 at Unknown time  No Yes   Sig: Take 1 Tab by mouth every six (6) hours as needed for Cramping.   loratadine (CLARITIN) 10 mg tablet 11/4/2019 at Unknown time  Yes Yes   Sig: Take 10 mg by mouth daily (after breakfast). losartan (COZAAR) 25 mg tablet 11/10/2019 at AM  No Yes   Sig: Take 1 Tab by mouth daily. Patient taking differently: Take 25 mg by mouth daily (after breakfast). omega-3/dha/epa/fish oil (OMEGA-3 FISH OIL PO) 11/4/2019 at Unknown time  Yes Yes   Sig: Take 1 Cap by mouth daily. raNITIdine (ZANTAC) 150 mg tablet 11/10/2019 at AM  Yes Yes   Sig: Take 150 mg by mouth Daily (before breakfast). turmeric (CURCUMIN) 11/4/2019 at Unknown time  Yes Yes   Sig: Take  by mouth. [facility] triple turmeric. 766 mg of turmeric/100 mg curcumin per 2 caps. Takes one po once daily.       Facility-Administered Medications: None       Hospital Medications:  Current Facility-Administered Medications   Medication Dose Route Frequency    celecoxib (CELEBREX) capsule 200 mg  200 mg Oral BID    ondansetron (ZOFRAN) injection 4 mg  4 mg IntraVENous Q4H PRN    hyoscyamine SL (LEVSIN/SL) tablet 0.25 mg  0.25 mg Oral Q6H PRN    HYDROmorphone (DILAUDID) tablet 2 mg  2 mg Oral Q4H PRN    clindinium-chlordiazePOXIDE (LIBRAX) capsule 1 Cap  1 Cap Oral BID PRN    lidocaine (LIDODERM) 5 % patch 2 Patch  2 Patch TransDERmal Q24H    chlorzoxazone (PARAFON FORTE) tablet 750 mg  750 mg Oral Q6H PRN    sodium phosphate (FLEET'S) enema 1 Enema  1 Enema Rectal PRN    diphenhydrAMINE (BENADRYL) capsule 25 mg  25 mg Oral Q6H PRN    busPIRone (BUSPAR) tablet 5 mg  5 mg Oral DAILY AFTER BREAKFAST    loratadine (CLARITIN) tablet 10 mg  10 mg Oral DAILY AFTER BREAKFAST    losartan (COZAAR) tablet 25 mg  25 mg Oral DAILY AFTER BREAKFAST    famotidine (PEPCID) tablet 20 mg  20 mg Oral ACB    fish oil-omega-3 fatty acids 340-1,000 mg capsule 1 Cap  1 Cap Oral DAILY    therapeutic multivitamin (THERAGRAN) tablet 1 Tab  1 Tab Oral DAILY    calcium polycarbophil (FIBERCON) tablet 1,250 mg  2 Tab Oral DAILY AFTER BREAKFAST    lactobac ac& pc-s.therm-b.anim (BASILIO Q/RISAQUAD)  1 Cap Oral DAILY    sodium chloride (NS) flush 5-40 mL 5-40 mL IntraVENous Q8H    sodium chloride (NS) flush 5-40 mL  5-40 mL IntraVENous PRN    naloxone (NARCAN) injection 0.4 mg  0.4 mg IntraVENous PRN    senna-docusate (PERICOLACE) 8.6-50 mg per tablet 1 Tab  1 Tab Oral BID    polyethylene glycol (MIRALAX) packet 17 g  17 g Oral DAILY    bisacodyl (DULCOLAX) suppository 10 mg  10 mg Rectal DAILY PRN    phenol throat spray (CHLORASEPTIC) 1 Spray  1 Spray Oral PRN    benzocaine-menthol (CEPACOL) lozenge 1 Lozenge  1 Lozenge Oral PRN    acetaminophen (TYLENOL) tablet 1,000 mg  1,000 mg Oral Q6H    cyclobenzaprine (FLEXERIL) tablet 10 mg  10 mg Oral BID PRN    albuterol (PROVENTIL VENTOLIN) nebulizer solution 2.5 mg  2.5 mg Nebulization Q4H PRN       Social History:  Social History     Tobacco Use    Smoking status: Former Smoker     Packs/day: 0.50     Years: 12.00     Pack years: 6.00     Last attempt to quit: 2012     Years since quittin.9    Smokeless tobacco: Never Used    Tobacco comment: quit    Substance Use Topics    Alcohol use: Yes     Comment: 3-4 times a yr       Family History:  Family History   Problem Relation Age of Onset    Delayed Awakening Mother         with anesthesia    Arthritis Mother     Other Mother         IBS/degenerative spinal disease    Anesth Problems Mother         N/V AND SLEEPY ALL DAY    COPD Father     Glaucoma Father     HIV/AIDS Sister     Heart Disease Brother         heart valve problem    Glaucoma Brother     Other Other     Heart Disease Sister         HEART VALVE DISESE        Review of Systems:    Constitutional: negative fever, negative chills, negative weight loss  Eyes:   negative visual changes  ENT:   negative sore throat, tongue or lip swelling  Respiratory:  negative cough, negative dyspnea  Cards:  negative for chest pain, palpitations, lower extremity edema  GI:   See HPI  :  negative for frequency, dysuria  Integument:  negative for rash and pruritus  Heme:  negative for easy bruising and gum/nose bleeding  Musculoskel: negative for myalgias, back pain and muscle weakness  Neuro: negative for headaches, dizziness, vertigo  Psych:  negative for feelings of anxiety, depression      Objective:     Patient Vitals for the past 8 hrs:   BP Temp Pulse Resp SpO2   11/15/19 0849 144/86 99.6 °F (37.6 °C) 93 18 92 %   11/15/19 0417 122/76 98.1 °F (36.7 °C) 79 18 100 %     No intake/output data recorded. 11/13 1901 - 11/15 0700  In: -   Out: 800 [Urine:800]      PHYSICAL EXAM:  General: WD, WN. Alert, cooperative, no acute distress.    HEENT: NC, Atraumatic. PERRLA, EOMI. Anicteric sclerae. Lungs:  CTA Bilaterally. No Wheezing/Rhonchi/Rales. Heart:  Regular rate and rhythm, No murmur, No Rubs, No Gallops  Abdomen: Soft, non-distended, non-tender.  +Bowel sounds, no HSM  Extremities: No c/c/e  Neurologic:  Alert and oriented X 3. No acute neurological distress. Psych:   Good insight. Not anxious nor agitated. Data Review     Recent Labs     11/14/19  1304 11/13/19  0331   WBC 17.0*  --    HGB 9.5* 9.1*   HCT 29.6*  --      --      Recent Labs     11/14/19  1304      K 3.6      CO2 28   BUN 8   CREA 0.88   *   CA 9.0     Recent Labs     11/14/19  1304   SGOT 56*      TP 7.7   ALB 3.3*   GLOB 4.4*     No results for input(s): INR, PTP, APTT, INREXT in the last 72 hours. Imaging studies reviewed      Assessment:   1. Episodic abdominal pain w/ diaphoresis and syncope - has long-standing hx of these symptoms, suspect IBS; however previous imaging in 2013 with focal small bowel intussusception. Recent colonoscopy unremarkable.    2. S/p lumbar laminectomy L4-S1 w/ L4-5 midline fusion     Patient Active Problem List   Diagnosis Code    Elevated hemoglobin A1c R73.09    Spinal stenosis, lumbar M48.061              Plan:   -Diet as tolerated  -Supportive management per primary team  -Continue Librax  -Recommend outpatient follow-up in 4 weeks - consider CT abd/pelvis w/ contrast vs upper GI series given history of intussusception   -Discussed with Dr. Izaiah Atkins  -Thank you kindly for allowing us to participate in the care of this patient

## 2019-11-15 NOTE — PROGRESS NOTES
Problem: Patient Education: Go to Patient Education Activity  Goal: Patient/Family Education  Outcome: Progressing Towards Goal     Problem: Patient Education: Go to Patient Education Activity  Goal: Patient/Family Education  Description  Please refer to case management note   Outcome: Progressing Towards Goal     Problem: Discharge Planning  Goal: *Discharge to safe environment  Description  See CM note.     Luis [facility], [facility]     Outcome: Progressing Towards Goal  Goal: *Knowledge of medication management  Outcome: Progressing Towards Goal  Goal: *Knowledge of discharge instructions  Outcome: Progressing Towards Goal     Problem: Patient Education: Go to Patient Education Activity  Goal: Patient/Family Education  Outcome: Progressing Towards Goal     Problem: Discharge Planning  Goal: *Discharge to safe environment  Outcome: Progressing Towards Goal

## 2019-11-16 ENCOUNTER — HOME CARE VISIT (OUTPATIENT)
Dept: SCHEDULING | Facility: HOME HEALTH | Age: 59
End: 2019-11-16
Payer: MEDICAID

## 2019-11-16 VITALS
RESPIRATION RATE: 16 BRPM | HEART RATE: 90 BPM | OXYGEN SATURATION: 95 % | TEMPERATURE: 97.8 F | DIASTOLIC BLOOD PRESSURE: 70 MMHG | SYSTOLIC BLOOD PRESSURE: 122 MMHG

## 2019-11-16 PROCEDURE — G0151 HHCP-SERV OF PT,EA 15 MIN: HCPCS

## 2019-11-16 PROCEDURE — 400013 HH SOC

## 2019-11-18 ENCOUNTER — HOME CARE VISIT (OUTPATIENT)
Dept: SCHEDULING | Facility: HOME HEALTH | Age: 59
End: 2019-11-18
Payer: MEDICAID

## 2019-11-18 VITALS
RESPIRATION RATE: 16 BRPM | HEART RATE: 97 BPM | DIASTOLIC BLOOD PRESSURE: 80 MMHG | OXYGEN SATURATION: 96 % | SYSTOLIC BLOOD PRESSURE: 125 MMHG | TEMPERATURE: 97.6 F

## 2019-11-18 PROCEDURE — G0152 HHCP-SERV OF OT,EA 15 MIN: HCPCS

## 2019-11-18 NOTE — CDMP QUERY
RETRO QUERY Pt admitted with spinal stenosis/Noted documentation in OP report of posterior midline fusion without type of graft material used. If possible, please document in the progress notes and d/c summary if you are evaluating and / or treating any of the following: 
 
? ___ Allograft ? ___Autograft or local bone ? ____No graft material used 
? Other, please specify ? Clinically unable to determine The medical record reflects the following: 
  Risk Factors: posterior midline fusion Clinical Indicators:  
OP report PROCEDURES PERFORMED:  Lumbar laminectomy, L4-5, L5-S1; posterior midline fusion L4-5 with midline cortical screws; post transforaminal interbody fusion L4-5 Treatment: fusion Thank you, 
       Tru Hyman [facility], [address] [facility]

## 2019-11-19 ENCOUNTER — HOME CARE VISIT (OUTPATIENT)
Dept: SCHEDULING | Facility: HOME HEALTH | Age: 59
End: 2019-11-19
Payer: MEDICAID

## 2019-11-19 VITALS
DIASTOLIC BLOOD PRESSURE: 90 MMHG | TEMPERATURE: 98.2 F | HEART RATE: 98 BPM | OXYGEN SATURATION: 97 % | RESPIRATION RATE: 16 BRPM | SYSTOLIC BLOOD PRESSURE: 138 MMHG

## 2019-11-19 PROCEDURE — G0151 HHCP-SERV OF PT,EA 15 MIN: HCPCS

## 2019-11-20 ENCOUNTER — HOME CARE VISIT (OUTPATIENT)
Dept: SCHEDULING | Facility: HOME HEALTH | Age: 59
End: 2019-11-20
Payer: MEDICAID

## 2019-11-20 PROCEDURE — G0152 HHCP-SERV OF OT,EA 15 MIN: HCPCS

## 2019-11-21 ENCOUNTER — HOME CARE VISIT (OUTPATIENT)
Dept: SCHEDULING | Facility: HOME HEALTH | Age: 59
End: 2019-11-21
Payer: MEDICAID

## 2019-11-21 VITALS
HEART RATE: 100 BPM | DIASTOLIC BLOOD PRESSURE: 80 MMHG | RESPIRATION RATE: 16 BRPM | OXYGEN SATURATION: 96 % | SYSTOLIC BLOOD PRESSURE: 120 MMHG | TEMPERATURE: 97.5 F

## 2019-11-21 DIAGNOSIS — M48.062 SPINAL STENOSIS OF LUMBAR REGION WITH NEUROGENIC CLAUDICATION: Primary | ICD-10-CM

## 2019-11-21 PROCEDURE — G0151 HHCP-SERV OF PT,EA 15 MIN: HCPCS

## 2019-11-21 RX ORDER — HYDROMORPHONE HYDROCHLORIDE 2 MG/1
2-4 TABLET ORAL
Qty: 40 TAB | Refills: 0 | Status: SHIPPED | OUTPATIENT
Start: 2019-11-21 | End: 2019-11-28

## 2019-11-24 ENCOUNTER — HOME CARE VISIT (OUTPATIENT)
Dept: SCHEDULING | Facility: HOME HEALTH | Age: 59
End: 2019-11-24
Payer: MEDICAID

## 2019-11-24 VITALS
SYSTOLIC BLOOD PRESSURE: 120 MMHG | TEMPERATURE: 98.3 F | OXYGEN SATURATION: 95 % | RESPIRATION RATE: 18 BRPM | DIASTOLIC BLOOD PRESSURE: 80 MMHG | HEART RATE: 68 BPM

## 2019-11-24 PROCEDURE — G0152 HHCP-SERV OF OT,EA 15 MIN: HCPCS

## 2019-11-25 ENCOUNTER — HOME CARE VISIT (OUTPATIENT)
Dept: HOME HEALTH SERVICES | Facility: HOME HEALTH | Age: 59
End: 2019-11-25
Payer: MEDICAID

## 2019-11-26 ENCOUNTER — HOME CARE VISIT (OUTPATIENT)
Dept: SCHEDULING | Facility: HOME HEALTH | Age: 59
End: 2019-11-26
Payer: MEDICAID

## 2019-11-26 VITALS
TEMPERATURE: 98.1 F | OXYGEN SATURATION: 96 % | HEART RATE: 95 BPM | SYSTOLIC BLOOD PRESSURE: 115 MMHG | DIASTOLIC BLOOD PRESSURE: 80 MMHG | RESPIRATION RATE: 16 BRPM

## 2019-11-26 VITALS
HEART RATE: 95 BPM | SYSTOLIC BLOOD PRESSURE: 115 MMHG | TEMPERATURE: 98.1 F | RESPIRATION RATE: 16 BRPM | OXYGEN SATURATION: 96 % | DIASTOLIC BLOOD PRESSURE: 80 MMHG

## 2019-11-26 PROCEDURE — G0151 HHCP-SERV OF PT,EA 15 MIN: HCPCS

## 2019-11-26 PROCEDURE — G0152 HHCP-SERV OF OT,EA 15 MIN: HCPCS

## 2019-11-29 ENCOUNTER — HOME CARE VISIT (OUTPATIENT)
Dept: SCHEDULING | Facility: HOME HEALTH | Age: 59
End: 2019-11-29
Payer: MEDICAID

## 2019-11-29 VITALS
RESPIRATION RATE: 16 BRPM | HEART RATE: 70 BPM | DIASTOLIC BLOOD PRESSURE: 60 MMHG | SYSTOLIC BLOOD PRESSURE: 122 MMHG | TEMPERATURE: 98 F | OXYGEN SATURATION: 96 %

## 2019-11-29 PROCEDURE — G0151 HHCP-SERV OF PT,EA 15 MIN: HCPCS

## 2020-01-06 RX ORDER — BUSPIRONE HYDROCHLORIDE 5 MG/1
5 TABLET ORAL
Qty: 30 TAB | Refills: 2 | Status: SHIPPED | OUTPATIENT
Start: 2020-01-06 | End: 2020-10-19 | Stop reason: SDUPTHER

## 2020-02-18 RX ORDER — DICYCLOMINE HYDROCHLORIDE 10 MG/1
10 CAPSULE ORAL 3 TIMES DAILY
Qty: 90 CAP | Refills: 0 | Status: SHIPPED | OUTPATIENT
Start: 2020-02-18 | End: 2020-03-21 | Stop reason: SDUPTHER

## 2020-03-09 DIAGNOSIS — K21.00 GASTROESOPHAGEAL REFLUX DISEASE WITH ESOPHAGITIS: Primary | ICD-10-CM

## 2020-03-19 ENCOUNTER — HOSPITAL ENCOUNTER (OUTPATIENT)
Dept: PHYSICAL THERAPY | Age: 60
Discharge: HOME | End: 2020-03-19
Payer: MEDICAID

## 2020-03-19 PROCEDURE — 97162 PT EVAL MOD COMPLEX 30 MIN: CPT | Performed by: PHYSICAL THERAPIST

## 2020-03-19 NOTE — PROGRESS NOTES
PT INITIAL EVALUATION NOTE 2-15    Patient Name: Minnie Hoskins  NPLJ:[id number]  : 1960  [x]  Patient  Verified  Payor: Olga Madison / Plan: [facility] [employer]EL[employer] / Product Type: Managed Care Medicaid /    In time: 4278X  Out time: 1210p  Total Treatment Time (min): 55  Visit #: 1     Treatment Area: Neck pain [M54.2]  Low back pain [M54.5]    SUBJECTIVE  Pain Level (0-10 scale): 8  Any medication changes, allergies to medications, adverse drug reactions, diagnosis change, or new procedure performed?: [] No    [x] Yes (see summary sheet for update)  Subjective:     Patient reports s/p L4-S1 lumbar laminectomy and L4-5 midline fusion performed on 19. She reports 30 year history low back pain, with recent deterioration in function over the last 4 years. Patient reports she is worse since surgery in regards to pain. She also reports with increased neuropathy in bilateral feet, R>L sides since surgery. Neuropathy seems greatest over the sole of the foot and up the lateral lower leg. She reports her biggest issue right now is her back pain, which then radiates around her hips and down her legs. She states she is unable to get up and move around, and going to the grocery will knock her out for 3 days. Says that she feels a little off balance and tends to drift to her L side. She is currently unemployed, at least partially due to ongoing back pain and reduced activity tolerance. Also reporting long history of neck pain limiting function as well. Description of symptoms: back pain, R>L sides, R>L LE pain  Aggravating Factors: activity  Alleviating Factors: heating pad and meds    Radiation: neuropathy, generally over L5 dermatome at greatest, R>L sides.  Back pain radiates down into hip, over hip flexors and into posterior thigh    Patient reports functional limitations with: all activity, standing, sitting, bed mobility    OBJECTIVE/EXAMINATION  Posture:  Guarded posture in sitting, supporting herself with hands into table  Other Observations:  Wearing back brace. Incision site located lower lumbar spine, L5 area, healed well  Gait and Functional Mobility:  Slow, antalgic gait with frequent hesitation and stopping on both R and L feet. \"Shaking out\" her legs with ankle pumping and knee bends intermittently throughout ambulation  Palpation: TTP generally around lumbar spine and over R>L lumbar parspinals, particularly in L3-S1 region        Lumbar AROM:          R  L    Flexion    WFLs, requires significant knee flexion    Extension   75% loss      Side Bending   50+% loss, R>L sides          LOWER QUARTER   MUSCLE STRENGTH  KEY       R  L  0 - No Contraction  L1, L2 Psoas  3-  4  1 - Trace   L3 Quads  4  4  2 - Poor   L4 Tib Ant  4  4  3 - Fair    L5 EHL  --  --  4 - Good   S1 Peroneals  4  4  5 - Normal   S2 Hams  4  4    Mobility Assessment: lumbar P-A mobility: unable to assess     Lumbar rotational mobility: unable to assess    MMT: see above    Neurological: Reflexes / Sensations: reports hypersensitivity R LE v. L LE with light touch. Particularly over L4-5 dermatomes    Special Tests: Forward bend: (+) for pain, aberrant movement on return   Slump: (+), R>L sides       5 min Therapeutic Exercise:  [x] See flow sheet :   Rationale: increase ROM and increase strength to improve the patients ability to stand with less pain          With   [] TE   [] TA   [] neuro   [] other: Patient Education: [x] Review HEP    [] Progressed/Changed HEP based on:   [] positioning   [] body mechanics   [] transfers   [] heat/ice application    [] other:        Other Objective/Functional Measures:  FOTO: deferred    Pain Level (0-10 scale) post treatment: 8      ASSESSMENT:      [x]  See Plan of Graciela, PT 3/19/2020

## 2020-03-19 NOTE — PROGRESS NOTES
[facility]  [address] [address]  Phone: [phone]  Fax: [phone]    Plan of Care/Statement of Necessity for Physical Therapy Services  2-15    Patient name: Ludin Arboleda  : 1960  Provider#: [id number]  Referral source: Seble Ortiz MD      Medical/Treatment Diagnosis: Neck pain [M54.2]  Low back pain [M54.5]     Prior Hospitalization: see medical history     Comorbidities: HTN, asthma, arthritis, scoliosis  Prior Level of Function: limited function over the last 4 years, previously was able to work with tolerable pain  Medications: Verified on Patient Summary List    Start of Care: 3/18/2020      Onset Date: 19 (surgery)       The Plan of Care and following information is based on the information from the initial evaluation. Assessment/ key information: Patient reports with ongoing back and bilateral LE pain s/p L4-5 lumbar fusion performed on 19. Notable increase in neural tension noted today, along with associated LE weakness, neuropathy, and associated gait deficits. She demonstrates poor lumbo-pelvic stability, and gentle core stabilization and strengthening exercises per tolerance.      Evaluation Complexity History HIGH Complexity :3+ comorbidities / personal factors will impact the outcome/ POC ; Examination HIGH Complexity : 4+ Standardized tests and measures addressing body structure, function, activity limitation and / or participation in recreation  ;Presentation MEDIUM Complexity : Evolving with changing characteristics    Overall Complexity Rating: MEDIUM    Problem List: pain affecting function, decrease ROM, decrease strength, impaired gait/ balance, decrease ADL/ functional abilitiies, decrease activity tolerance, decrease flexibility/ joint mobility and decrease transfer abilities   Treatment Plan may include any combination of the following: Therapeutic exercise, Therapeutic activities, Neuromuscular re-education, Physical agent/modality, Gait/balance training, Manual therapy, Patient education and Self Care training  Patient / Family readiness to learn indicated by: asking questions  Persons(s) to be included in education: patient (P)  Barriers to Learning/Limitations: none  Patient Goal (s): to get better  Patient Self Reported Health Status: good  Rehabilitation Potential: fair    Short Term Goals: To be accomplished in 4-6 treatments:   1. Pt will be able to perform 10 bridge exercises without pain or difficulty   2. Pt will be compliant with initial HEP use  Long Term Goals: To be accomplished in 16-20 treatments:   1. Pt will be able to perform sit->stand 10 times without use of UE assistance   2. Pt will be able to ambulate in the community without use of SPC at least 75% of the time   3. Pt will be able to perform all bed mobility without increase in pain   4. Pt will be able to self-manage care using updated HEP for improved independence   5. Pt will be be able to participate in light housework with tolerable discomfort    Frequency / Duration: Patient to be seen 2 times per week for 6-8 weeks. Patient/ Caregiver education and instruction: self care and exercises    [x]  Plan of care has been reviewed with STACEY Powell, PT 3/19/2020     ________________________________________________________________________    I certify that the above Therapy Services are being furnished while the patient is under my care. I agree with the treatment plan and certify that this therapy is necessary.     [de-identified] Signature:____________________  Date:____________Time: _________

## 2020-03-23 ENCOUNTER — APPOINTMENT (OUTPATIENT)
Dept: PHYSICAL THERAPY | Age: 60
End: 2020-03-23
Payer: MEDICAID

## 2020-03-23 RX ORDER — DICYCLOMINE HYDROCHLORIDE 10 MG/1
10 CAPSULE ORAL 3 TIMES DAILY
Qty: 90 CAP | Refills: 0 | Status: SHIPPED | OUTPATIENT
Start: 2020-03-23 | End: 2020-04-28 | Stop reason: SDUPTHER

## 2020-03-24 ENCOUNTER — TELEPHONE (OUTPATIENT)
Dept: PHYSICAL THERAPY | Age: 60
End: 2020-03-24

## 2020-03-25 ENCOUNTER — APPOINTMENT (OUTPATIENT)
Dept: PHYSICAL THERAPY | Age: 60
End: 2020-03-25
Payer: MEDICAID

## 2020-03-30 ENCOUNTER — APPOINTMENT (OUTPATIENT)
Dept: PHYSICAL THERAPY | Age: 60
End: 2020-03-30
Payer: MEDICAID

## 2020-03-31 ENCOUNTER — TELEPHONE (OUTPATIENT)
Dept: PHYSICAL THERAPY | Age: 60
End: 2020-03-31

## 2020-03-31 NOTE — TELEPHONE ENCOUNTER
In-person therapy visits for this out-patient have been placed on temporary hold until on or after 5/26/20 in compliance with organizational directives and CDC recommendations related to the current [address] pandemic. Contact with this patient by the treating therapist may be made via telephonic e-visits and/or telehealth visits during this time, if applicable. Left voicemail for patient regarding her status. Requested she call clinic back when she can, will plan to follow up again in the next week if we do not hear from her.

## 2020-04-01 ENCOUNTER — APPOINTMENT (OUTPATIENT)
Dept: PHYSICAL THERAPY | Age: 60
End: 2020-04-01

## 2020-04-06 ENCOUNTER — APPOINTMENT (OUTPATIENT)
Dept: PHYSICAL THERAPY | Age: 60
End: 2020-04-06

## 2020-04-08 ENCOUNTER — TELEPHONE (OUTPATIENT)
Dept: PHYSICAL THERAPY | Age: 60
End: 2020-04-08

## 2020-04-08 ENCOUNTER — APPOINTMENT (OUTPATIENT)
Dept: PHYSICAL THERAPY | Age: 60
End: 2020-04-08

## 2020-04-08 NOTE — TELEPHONE ENCOUNTER
Patient called back to clinic. States she has been working on her exercises and has been trying to do more walking. She is currently doing exercise every other day, and resting/using heating pad for soreness. Says she is still doing better with leg lifts and nerve glide exercises, but still having trouble picking up objects from the ground. Discussed working on sit->stand exercise per tolerance to work on LE strength. Plan to f/u next week.

## 2020-04-08 NOTE — TELEPHONE ENCOUNTER
Left another voicemail for patient.  Requested she call back clinic with questions/concerns regarding her care and home exercise program.

## 2020-04-13 ENCOUNTER — APPOINTMENT (OUTPATIENT)
Dept: PHYSICAL THERAPY | Age: 60
End: 2020-04-13

## 2020-04-15 ENCOUNTER — APPOINTMENT (OUTPATIENT)
Dept: PHYSICAL THERAPY | Age: 60
End: 2020-04-15

## 2020-04-20 RX ORDER — LOSARTAN POTASSIUM 25 MG/1
25 TABLET ORAL DAILY
Qty: 30 TAB | Refills: 5 | Status: SHIPPED | OUTPATIENT
Start: 2020-04-20 | End: 2020-10-19 | Stop reason: SDUPTHER

## 2020-04-28 RX ORDER — DICYCLOMINE HYDROCHLORIDE 10 MG/1
10 CAPSULE ORAL 3 TIMES DAILY
Qty: 90 CAP | Refills: 0 | Status: SHIPPED | OUTPATIENT
Start: 2020-04-28 | End: 2020-11-10 | Stop reason: ALTCHOICE

## 2020-05-14 ENCOUNTER — HOSPITAL ENCOUNTER (OUTPATIENT)
Age: 60
Setting detail: OUTPATIENT SURGERY
Discharge: HOME | End: 2020-05-14
Attending: INTERNAL MEDICINE | Admitting: INTERNAL MEDICINE
Payer: MEDICAID

## 2020-05-14 ENCOUNTER — ANESTHESIA (OUTPATIENT)
Dept: ENDOSCOPY | Age: 60
End: 2020-05-14
Payer: MEDICAID

## 2020-05-14 ENCOUNTER — ANESTHESIA EVENT (OUTPATIENT)
Dept: ENDOSCOPY | Age: 60
End: 2020-05-14
Payer: MEDICAID

## 2020-05-14 VITALS
OXYGEN SATURATION: 99 % | TEMPERATURE: 97.7 F | BODY MASS INDEX: 31.55 KG/M2 | HEART RATE: 78 BPM | RESPIRATION RATE: 16 BRPM | DIASTOLIC BLOOD PRESSURE: 82 MMHG | SYSTOLIC BLOOD PRESSURE: 121 MMHG | WEIGHT: 201 LBS | HEIGHT: 67 IN

## 2020-05-14 PROCEDURE — 76040000019: Performed by: INTERNAL MEDICINE

## 2020-05-14 PROCEDURE — 77030020268 HC MISC GENERAL SUPPLY: Performed by: INTERNAL MEDICINE

## 2020-05-14 PROCEDURE — 74011250636 HC RX REV CODE- 250/636: Performed by: NURSE ANESTHETIST, CERTIFIED REGISTERED

## 2020-05-14 PROCEDURE — 74011000250 HC RX REV CODE- 250: Performed by: NURSE ANESTHETIST, CERTIFIED REGISTERED

## 2020-05-14 PROCEDURE — 76060000031 HC ANESTHESIA FIRST 0.5 HR: Performed by: INTERNAL MEDICINE

## 2020-05-14 PROCEDURE — 74011250636 HC RX REV CODE- 250/636: Performed by: INTERNAL MEDICINE

## 2020-05-14 PROCEDURE — 88305 TISSUE EXAM BY PATHOLOGIST: CPT

## 2020-05-14 PROCEDURE — 77030021593 HC FCPS BIOP ENDOSC BSC -A: Performed by: INTERNAL MEDICINE

## 2020-05-14 RX ORDER — SODIUM CHLORIDE 9 MG/ML
INJECTION, SOLUTION INTRAVENOUS
Status: DISCONTINUED | OUTPATIENT
Start: 2020-05-14 | End: 2020-05-14 | Stop reason: HOSPADM

## 2020-05-14 RX ORDER — ATROPINE SULFATE 0.1 MG/ML
0.5 INJECTION INTRAVENOUS
Status: DISCONTINUED | OUTPATIENT
Start: 2020-05-14 | End: 2020-05-14 | Stop reason: HOSPADM

## 2020-05-14 RX ORDER — LIDOCAINE HYDROCHLORIDE 20 MG/ML
INJECTION, SOLUTION EPIDURAL; INFILTRATION; INTRACAUDAL; PERINEURAL AS NEEDED
Status: DISCONTINUED | OUTPATIENT
Start: 2020-05-14 | End: 2020-05-14 | Stop reason: HOSPADM

## 2020-05-14 RX ORDER — SODIUM CHLORIDE 9 MG/ML
50 INJECTION, SOLUTION INTRAVENOUS CONTINUOUS
Status: DISCONTINUED | OUTPATIENT
Start: 2020-05-14 | End: 2020-05-14 | Stop reason: HOSPADM

## 2020-05-14 RX ORDER — EPINEPHRINE 0.1 MG/ML
1 INJECTION INTRACARDIAC; INTRAVENOUS
Status: DISCONTINUED | OUTPATIENT
Start: 2020-05-14 | End: 2020-05-14 | Stop reason: HOSPADM

## 2020-05-14 RX ORDER — NALOXONE HYDROCHLORIDE 0.4 MG/ML
0.4 INJECTION, SOLUTION INTRAMUSCULAR; INTRAVENOUS; SUBCUTANEOUS
Status: DISCONTINUED | OUTPATIENT
Start: 2020-05-14 | End: 2020-05-14 | Stop reason: HOSPADM

## 2020-05-14 RX ORDER — MIDAZOLAM HYDROCHLORIDE 1 MG/ML
.25-1 INJECTION, SOLUTION INTRAMUSCULAR; INTRAVENOUS
Status: DISCONTINUED | OUTPATIENT
Start: 2020-05-14 | End: 2020-05-14 | Stop reason: HOSPADM

## 2020-05-14 RX ORDER — FLUMAZENIL 0.1 MG/ML
0.2 INJECTION INTRAVENOUS
Status: DISCONTINUED | OUTPATIENT
Start: 2020-05-14 | End: 2020-05-14 | Stop reason: HOSPADM

## 2020-05-14 RX ORDER — SODIUM CHLORIDE 0.9 % (FLUSH) 0.9 %
5-40 SYRINGE (ML) INJECTION AS NEEDED
Status: DISCONTINUED | OUTPATIENT
Start: 2020-05-14 | End: 2020-05-14 | Stop reason: HOSPADM

## 2020-05-14 RX ORDER — FENTANYL CITRATE 50 UG/ML
100 INJECTION, SOLUTION INTRAMUSCULAR; INTRAVENOUS
Status: DISCONTINUED | OUTPATIENT
Start: 2020-05-14 | End: 2020-05-14 | Stop reason: HOSPADM

## 2020-05-14 RX ORDER — PROPOFOL 10 MG/ML
INJECTION, EMULSION INTRAVENOUS AS NEEDED
Status: DISCONTINUED | OUTPATIENT
Start: 2020-05-14 | End: 2020-05-14 | Stop reason: HOSPADM

## 2020-05-14 RX ORDER — DEXTROMETHORPHAN/PSEUDOEPHED 2.5-7.5/.8
1.2 DROPS ORAL
Status: DISCONTINUED | OUTPATIENT
Start: 2020-05-14 | End: 2020-05-14 | Stop reason: HOSPADM

## 2020-05-14 RX ORDER — SODIUM CHLORIDE 0.9 % (FLUSH) 0.9 %
5-40 SYRINGE (ML) INJECTION EVERY 8 HOURS
Status: DISCONTINUED | OUTPATIENT
Start: 2020-05-14 | End: 2020-05-14 | Stop reason: HOSPADM

## 2020-05-14 RX ADMIN — PROPOFOL 50 MG: 10 INJECTION, EMULSION INTRAVENOUS at 09:04

## 2020-05-14 RX ADMIN — PROPOFOL 20 MG: 10 INJECTION, EMULSION INTRAVENOUS at 09:03

## 2020-05-14 RX ADMIN — PROPOFOL 20 MG: 10 INJECTION, EMULSION INTRAVENOUS at 08:56

## 2020-05-14 RX ADMIN — PROPOFOL 30 MG: 10 INJECTION, EMULSION INTRAVENOUS at 08:58

## 2020-05-14 RX ADMIN — PROPOFOL 50 MG: 10 INJECTION, EMULSION INTRAVENOUS at 09:02

## 2020-05-14 RX ADMIN — LIDOCAINE HYDROCHLORIDE 40 MG: 20 INJECTION, SOLUTION EPIDURAL; INFILTRATION; INTRACAUDAL; PERINEURAL at 08:54

## 2020-05-14 RX ADMIN — PROPOFOL 150 MG: 10 INJECTION, EMULSION INTRAVENOUS at 08:54

## 2020-05-14 RX ADMIN — PROPOFOL 50 MG: 10 INJECTION, EMULSION INTRAVENOUS at 09:05

## 2020-05-14 RX ADMIN — SODIUM CHLORIDE: 900 INJECTION, SOLUTION INTRAVENOUS at 08:42

## 2020-05-14 NOTE — PROCEDURES
118 [facility].  217 [facility] [address], [address]   [phone]                            NAME:  Gumaro Ortega   :   1960   MRN:   [id number]     Date/Time:  2020 9:10 AM    Esophagogastroduodenoscopy (EGD) Procedure Note    :  Perri Goel MD    Referring Provider:  Elaine Copeland MD    Anethesia/Sedation:  MAC anesthesia    Procedure Details   After infomed consent was obtained for the procedure, with all risks and benefits of procedure explained the patient was taken to the endoscopy suite and placed in the left lateral decubitus position. Following sequential administration of sedation as per above, the gastroscope was inserted into the mouth and advanced under direct vision to second portion of the duodenum. A careful inspection was made as the gastroscope was withdrawn, including a retroflexed view of the proximal stomach; findings and interventions are described below. Findings:  Esophagus:normal esophageal mucosa without stenosis, GE junction 38 cm from the incisors. Empirically dilated with 17 mm (51 Fr) Savary  Stomach:antral predominant gastritis, biopsied. Prior gastric ulcer has healed  Duodenum/jejunum:normal, biopsied    Interventions:  Biopsies and dilation           Specimens Removed:    ID Type Source Tests Collected by Time Destination   1 : gastric bx Preservative Gastric  Yessenia Gordon MD 2020 4549 Pathology   2 : duodenal bx Preservative Duodenum  Yessenia Gordon MD 2020 0810 Pathology       Complications: None. EBL:  minimal    Impression:    See Postoperative diagnosis above    Recommendations:   - Await pathology. You should receive a letter within 2 weeks. - Resume normal medications.  - Start pantoprazole 20 mg once daily.      Discharge disposition:  Home in the company of  when able to ambulate    Perri Goel MD

## 2020-05-14 NOTE — ANESTHESIA POSTPROCEDURE EVALUATION
Post-Anesthesia Evaluation and Assessment    Patient: Eric Palomino MRN: [id number]  SSN: [id number]    YOB: 1960  Age: 64 y.o. Sex: female      I have evaluated the patient and they are stable and ready for discharge from the PACU. Cardiovascular Function/Vital Signs  Visit Vitals  /82   Pulse 78   Temp 36.5 °C (97.7 °F)   Resp 16   Ht 5' 7\" (1.702 m)   Wt 91.2 kg (201 lb)   SpO2 99%   Breastfeeding No   BMI 31.48 kg/m²       Patient is status post MAC anesthesia for Procedure(s):  ESOPHAGOGASTRODUODENOSCOPY (EGD)  ESOPHAGOGASTRODUODENAL (EGD) BIOPSY  ESOPHAGEAL DILATION. Nausea/Vomiting: None    Postoperative hydration reviewed and adequate. Pain:  Pain Scale 1: Numeric (0 - 10) (05/14/20 0943)  Pain Intensity 1: 3 (05/14/20 0943)   Managed    Neurological Status: At baseline    Mental Status, Level of Consciousness: Alert and  oriented to person, place, and time    Pulmonary Status:   O2 Device: Room air (05/14/20 0943)   Adequate oxygenation and airway patent    Complications related to anesthesia: None    Post-anesthesia assessment completed. No concerns    Signed By: Violet Payne MD     May 14, 2020              Post-Anesthesia Evaluation and Assessment    Patient: Eric Palomino MRN: [id number]  SSN: [id number]    YOB: 1960  Age: 64 y.o. Sex: female      I have evaluated the patient and they are stable and ready for discharge from the PACU. Cardiovascular Function/Vital Signs  Visit Vitals  /82   Pulse 78   Temp 36.5 °C (97.7 °F)   Resp 16   Ht 5' 7\" (1.702 m)   Wt 91.2 kg (201 lb)   SpO2 99%   Breastfeeding No   BMI 31.48 kg/m²       Patient is status post MAC anesthesia for Procedure(s):  ESOPHAGOGASTRODUODENOSCOPY (EGD)  ESOPHAGOGASTRODUODENAL (EGD) BIOPSY  ESOPHAGEAL DILATION. Nausea/Vomiting: None    Postoperative hydration reviewed and adequate.     Pain:  Pain Scale 1: Numeric (0 - 10) (05/14/20 0943)  Pain Intensity 1: 3 (05/14/20 3201)   Managed    Neurological Status: At baseline    Mental Status, Level of Consciousness: Alert and  oriented to person, place, and time    Pulmonary Status:   O2 Device: Room air (05/14/20 0943)   Adequate oxygenation and airway patent    Complications related to anesthesia: None    Post-anesthesia assessment completed. No concerns    Signed By: Jesús Mattson MD     May 14, 2020              Procedure(s):  ESOPHAGOGASTRODUODENOSCOPY (EGD)  ESOPHAGOGASTRODUODENAL (EGD) BIOPSY  ESOPHAGEAL DILATION. MAC    <BSHSIANPOST>    Vitals Value Taken Time   /82 5/14/2020  9:43 AM   Temp 36.5 °C (97.7 °F) 5/14/2020  9:16 AM   Pulse 70 5/14/2020  9:44 AM   Resp 0 5/14/2020  9:44 AM   SpO2 0 % 5/14/2020  9:44 AM   Vitals shown include unvalidated device data.

## 2020-05-14 NOTE — ANESTHESIA PREPROCEDURE EVALUATION
Relevant Problems   No relevant active problems       Anesthetic History     PONV          Review of Systems / Medical History  Patient summary reviewed, nursing notes reviewed and pertinent labs reviewed    Pulmonary            Asthma        Neuro/Psych              Cardiovascular    Hypertension              Exercise tolerance: >4 METS     GI/Hepatic/Renal           PUD     Endo/Other    Diabetes    Arthritis     Other Findings              Physical Exam    Airway  Mallampati: II  TM Distance: > 6 cm  Neck ROM: normal range of motion   Mouth opening: Normal     Cardiovascular    Rhythm: regular  Rate: normal         Dental  No notable dental hx       Pulmonary  Breath sounds clear to auscultation               Abdominal         Other Findings            Anesthetic Plan    ASA: 3  Anesthesia type: MAC          Induction: Intravenous  Anesthetic plan and risks discussed with: Patient

## 2020-05-14 NOTE — PERIOP NOTES

## 2020-05-14 NOTE — DISCHARGE INSTRUCTIONS
[facility]  [address] [facility] 7[address] [facility]                                  Elis Seen  [id number]  1960    It was my pleasure seeing you for your procedure. You will also receive a summary report with the findings from this procedure and any further recommendations. If you had polyps removed or biopsies taken during your procedure, you will receive a separate letter from me within the next 2 weeks. If you don't receive this letter or if you have any questions, please call my office [phone]. Please take note of the post procedure instructions listed below. Charles Jernigan,    Dr. Mary Caruso    These instructions give you information on caring for yourself after your procedure. Call your doctor if you have any problems or questions after your procedure. HOME CARE  · Walk if you have belly cramping or gas. Walking will help get rid of the air and reduce the bloated feeling in your belly (abdomen). · Your IV site (where you received drugs) may be tender to touch. Place warm towels on the site; keep your arm up on two pillows if you have any swelling or soreness in the area. · You may shower. ACTIVITY:  · Take frequent rest periods and move at a slower pace for the next 24 hours. .  · You may resume your regular activity tomorrow if you are feeling back to normal.  · Do not drive or ride a bicycle for at least 24 hours (because of the medicine (anesthesia) used during the test). · Do not sign any important legal documents or use or operate any machinery for 24 hours  · Do not take sleeping medicines/nerve drugs for 24 hours unless the doctor tells you. · You can return to work/school tomorrow unless otherwise instructed. NUTRITION:  · Drink plenty of fluids to keep your pee (urine) clear or pale yellow  · Begin with a light meal and progress to your normal diet.  Heavy or fried foods are harder to digest and may make you feel sick to your stomach (nauseated). · Once you are feeling back to normal, you may resume your normal diet as instructed by your doctor. · Avoid alcoholic beverages for 24 hours or as instructed. IF YOU HAD BIOPSIES TAKEN OR POLYPS REMOVED DURING THE PROCEDURE:  · For the next 7 days, avoid all non-steroidal antiinflammatory medications such as Ibuprofen, Motrin, Advil, Alleve, Madina-seltzer, Goody's powder, BC powder. · If you do not have an heart condition that requires you to take a daily aspirin, you should avoid taking aspirin for 7 days. · Eat a soft diet for 24 hours. · Monitor your stools for any blood or dark black, tar-like, stools as this may be a sign of bleeding and if you see any blood, notify your doctor immediately. GET HELP RIGHT AWAY AND SEEK IMMEDIATE MEDICAL CARE IF:  · You have more than a spotting of blood in your stool. · You pass clumps of tissue (blood clots) or fill the toilet with blood. · Your belly is painfully swollen or puffy (abdominal distention). · You throw up (vomit). · You have a fever. · You have redness, pain or swelling at the IV site that last greater than two days. · You have abdominal pain or discomfort that is severe or gets worse throughout the day. Post-procedure diagnosis:  1)gastritis    Post-procedure recommendations:  Findings:  Esophagus:normal esophageal mucosa without stenosis, GE junction 38 cm from the incisors. Empirically dilated with 17 mm (51 Fr) Savary  Stomach:antral predominant gastritis, biopsied. Prior gastric ulcer has healed  Duodenum/jejunum:normal, biopsied    Recommendations:   - Await pathology. You should receive a letter within 2 weeks. - Resume normal medications.  - Start pantoprazole 20 mg once daily. Learning About Coronavirus [phone])  Coronavirus [phone]): Overview  What is coronavirus (COVID-19)? The coronavirus disease (COVID-19) is caused by a virus.  It is an illness that was first found in [facility], in December 2019. It has since spread worldwide. The virus can cause fever, cough, and trouble breathing. In severe cases, it can cause pneumonia and make it hard to breathe without help. It can cause death. Coronaviruses are a large group of viruses. They cause the common cold. They also cause more serious illnesses like Middle East respiratory syndrome (MERS) and severe acute respiratory syndrome (SARS). COVID-19 is caused by a novel coronavirus. That means it's a new type that has not been seen in people before. This virus spreads person-to-person through droplets from coughing and sneezing. It can also spread when you are close to someone who is infected. And it can spread when you touch something that has the virus on it, such as a doorknob or a tabletop. What can you do to protect yourself from coronavirus (COVID-19)? The best way to protect yourself from getting sick is to:  · Avoid areas where there is an outbreak. · Avoid contact with people who may be infected. · Wash your hands often with soap or alcohol-based hand sanitizers. · Avoid crowds and try to stay at least 6 feet away from other people. · Wash your hands often, especially after you cough or sneeze. Use soap and water, and scrub for at least 20 seconds. If soap and water aren't available, use an alcohol-based hand . · Avoid touching your mouth, nose, and eyes. What can you do to avoid spreading the virus to others? To help avoid spreading the virus to others:  · Cover your mouth with a tissue when you cough or sneeze. Then throw the tissue in the trash. · Use a disinfectant to clean things that you touch often. · Stay home if you are sick or have been exposed to the virus. Don't go to school, work, or public areas. And don't use public transportation. · If you are sick:  ? Leave your home only if you need to get medical care. But call the doctor's office first so they know you're coming.  And wear a face mask, if you have one.  ? If you have a face mask, wear it whenever you're around other people. It can help stop the spread of the virus when you cough or sneeze. ? Clean and disinfect your home every day. Use household  and disinfectant wipes or sprays. Take special care to clean things that you grab with your hands. These include doorknobs, remote controls, phones, and handles on your refrigerator and microwave. And don't forget countertops, tabletops, bathrooms, and computer keyboards. When to call for help  Call 911 anytime you think you may need emergency care. For example, call if:  · You have severe trouble breathing. (You can't talk at all.)  · You have constant chest pain or pressure. · You are severely dizzy or lightheaded. · You are confused or can't think clearly. · Your face and lips have a blue color. · You pass out (lose consciousness) or are very hard to wake up. Call your doctor now if you develop symptoms such as:  · Shortness of breath. · Fever. · Cough. If you need to get care, call ahead to the doctor's office for instructions before you go. Make sure you wear a face mask, if you have one, to prevent exposing other people to the virus. Where can you get the latest information? The following health organizations are tracking and studying this virus. Their websites contain the most up-to-date information. [place] also learn what to do if you think you may have been exposed to the virus. · U.S. Centers for Disease Control and Prevention (CDC): The CDC provides updated news about the disease and travel advice. The website also tells you how to prevent the spread of infection. www.cdc.gov  · World Health Organization [facility]): WHO offers information about the virus outbreaks. WHO also has travel advice. www.who.int  Current as of: April 1, 2020               Content Version: 12.4  © 1646-6212 [employer], [employer]orporated.    Care instructions adapted under license by your healthcare professional. If you have questions about a medical condition or this instruction, always ask your healthcare professional. [place] [address] any warranty or liability for your use of this information.

## 2020-05-14 NOTE — H&P
118 [facility].  217 [facility] 140 Mirandaabhi Knowles, [address]  [phone]                                History and Physical     NAME: Yessi Buchanan   :  1960   MRN:  [id number]     HPI:  The patient was seen and examined. Past Surgical History:   Procedure Laterality Date    COLONOSCOPY N/A 2019    COLONOSCOPY performed by Loyda Hutchison MD at [address] [facility] HX CHOLECYSTECTOMY  2006    HX DILATION AND CURETTAGE  1984    HX GI  2019    COLONOSCOPY    HX GI      ENDOSCOPY     Past Medical History:   Diagnosis Date    Adverse effect of anesthesia     \"so sleepy and tired for the rest of the day\"    Anxiety     Asthma     Chronic pain     back - L5 is \"almost gone\"/left foot neuropathy -pinched nerve L4-5 - spine shifted/degenerative spinal disease/stiff and sore neck and shoulder - in PT for back and neck    Degenerative spinal arthritis     Diabetes ([facility] [address]     PRE DIABETIC    Hypertension     IBS (irritable bowel syndrome)     Ill-defined condition     pre-diabetes    Nausea & vomiting     ? d/t fentanyl    PUD (peptic ulcer disease)     Spondylolisthesis      Social History     Tobacco Use    Smoking status: Former Smoker     Packs/day: 0.50     Years: 12.00     Pack years: 6.00     Last attempt to quit: 2012     Years since quittin.4    Smokeless tobacco: Never Used    Tobacco comment: quit    Substance Use Topics    Alcohol use: Yes     Comment: 3-4 times a yr    Drug use: Yes     Types: Marijuana     Comment:  LAST USED IN 2019     Allergies   Allergen Reactions    Demerol [Meperidine] Nausea and Vomiting     Dizziness. Went to ER.  Egg Yolk Swelling    Fentanyl Nausea and Vomiting     Dizziness.     Lactaid [Lactase] Other (comments)     Bloating/gas    Lactose Other (comments)     Bloating/gas    Prednisone Swelling    Soy Nausea and Vomiting     Family History   Problem Relation Age of Onset    Delayed Awakening Mother         with anesthesia    Arthritis Mother     Other Mother         IBS/degenerative spinal disease    Anesth Problems Mother         N/V AND SLEEPY ALL DAY    COPD Father     Glaucoma Father     HIV/AIDS Sister     Heart Disease Brother         heart valve problem    Glaucoma Brother     Other Other     Heart Disease Sister         HEART VALVE DISESE      Current Facility-Administered Medications   Medication Dose Route Frequency    0.9% sodium chloride infusion  50 mL/hr IntraVENous CONTINUOUS    sodium chloride (NS) flush 5-40 mL  5-40 mL IntraVENous Q8H    sodium chloride (NS) flush 5-40 mL  5-40 mL IntraVENous PRN    midazolam (VERSED) injection 0.25-10 mg  0.25-10 mg IntraVENous Multiple    fentaNYL citrate (PF) injection 100 mcg  100 mcg IntraVENous MULTIPLE DOSE GIVEN    naloxone (NARCAN) injection 0.4 mg  0.4 mg IntraVENous Multiple    flumazeniL (ROMAZICON) 0.1 mg/mL injection 0.2 mg  0.2 mg IntraVENous Multiple    simethicone (MYLICON) 88US/4.9KZ oral drops 80 mg  1.2 mL Oral Multiple    atropine injection 0.5 mg  0.5 mg IntraVENous ONCE PRN    EPINEPHrine (ADRENALIN) 0.1 mg/mL syringe 1 mg  1 mg Endoscopically ONCE PRN         PHYSICAL EXAM:  General: WD, WN. Alert, cooperative, no acute distress    HEENT: NC, Atraumatic. PERRLA, EOMI. Anicteric sclerae. Lungs:  CTA Bilaterally. No Wheezing/Rhonchi/Rales. Heart:  Regular  rhythm,  No murmur, No Rubs, No Gallops  Abdomen: Soft, Non distended, Non tender. +Bowel sounds, no HSM  Extremities: No c/c/e  Neurologic:  CN 2-12 gi, Alert and oriented X 3. No acute neurological distress   Psych:   Good insight. Not anxious nor agitated. The heart, lungs and mental status were satisfactory for the administration of MAC sedation and for the procedure.       Mallampati score: 2     The patient was counseled at length about the risks of lisa Covid-19 in the jim-operative and post-operative states including the recovery window of their procedure. The patient was made aware that lisa Covid-19 after a surgical procedure may worsen their prognosis for recovering from the virus and lend to a higher morbidity and or mortality risk. The patient was given the options of postponing their procedure. All of the risks, benefits, and alternatives were discussed. The patient does wish to proceed with the procedure.       Assessment:   · Epigastric pain, prior peptic ulcer disease    Plan:   · Endoscopic procedure :Egd  · MAC sedation

## 2020-05-14 NOTE — ROUTINE PROCESS
Amberannika Nakia  [birth year]  [id number]    Situation:  Verbal report received from: Reid  Procedure: Procedure(s):  ESOPHAGOGASTRODUODENOSCOPY (EGD)  ESOPHAGOGASTRODUODENAL (EGD) BIOPSY  ESOPHAGEAL DILATION    Background:    Preoperative diagnosis: CONSTIPATION, GASTRIC ULCER, BLOATING, IRRITABLE BOWEL SYNDROME  Postoperative diagnosis: 1)gastritis    :  Dr. Marek Camargo  Assistant(s): Endoscopy Technician-1: Marilynn Galvan  Endoscopy RN-1: Gavino Doty RN    Specimens:   ID Type Source Tests Collected by Time Destination   1 : gastric bx Preservative Gastric  Bk Schneider MD 5/14/2020 5434 Pathology   2 : duodenal bx Preservative Duodenum  Bk Schneider MD 5/14/2020 2943 Pathology     H. Pylori  yes    Assessment:  Intra-procedure medications   Anesthesia gave intra-procedure sedation and medications, see anesthesia flow sheet    Intravenous fluids: NS@ KVO     Vital signs stable     Abdominal assessment: round and soft     Recommendation:  Discharge patient per MD order    Friend  Permission to share finding with family or friend

## 2020-05-15 ENCOUNTER — HOSPITAL ENCOUNTER (OUTPATIENT)
Dept: CT IMAGING | Age: 60
Discharge: HOME | End: 2020-05-15
Attending: INTERNAL MEDICINE
Payer: MEDICAID

## 2020-05-15 DIAGNOSIS — R74.8 ELEVATED LIVER ENZYMES: ICD-10-CM

## 2020-05-15 DIAGNOSIS — K25.9 GASTRIC ULCER: ICD-10-CM

## 2020-05-15 DIAGNOSIS — K58.9 IBS (IRRITABLE BOWEL SYNDROME): ICD-10-CM

## 2020-05-15 DIAGNOSIS — D64.9 ANEMIA, UNSPECIFIED: ICD-10-CM

## 2020-05-15 DIAGNOSIS — R10.32 LLQ PAIN: ICD-10-CM

## 2020-05-15 DIAGNOSIS — K56.1 INTUSSUSCEPTION OF SMALL INTESTINE (HCC): ICD-10-CM

## 2020-05-15 DIAGNOSIS — R14.0 FUNCTIONAL BLOATING: ICD-10-CM

## 2020-05-15 PROCEDURE — 74177 CT ABD & PELVIS W/CONTRAST: CPT

## 2020-05-15 PROCEDURE — 74011636320 HC RX REV CODE- 636/320: Performed by: INTERNAL MEDICINE

## 2020-05-15 RX ORDER — SODIUM CHLORIDE 0.9 % (FLUSH) 0.9 %
10 SYRINGE (ML) INJECTION
Status: COMPLETED | OUTPATIENT
Start: 2020-05-15 | End: 2020-05-15

## 2020-05-15 RX ADMIN — Medication 10 ML: at 07:55

## 2020-05-15 RX ADMIN — IOHEXOL 20 ML: 240 INJECTION, SOLUTION INTRATHECAL; INTRAVASCULAR; INTRAVENOUS; ORAL at 07:55

## 2020-05-15 RX ADMIN — IOPAMIDOL 100 ML: 755 INJECTION, SOLUTION INTRAVENOUS at 07:55

## 2020-05-22 RX ORDER — BUSPIRONE HYDROCHLORIDE 5 MG/1
5 TABLET ORAL
Qty: 30 TAB | Refills: 2 | OUTPATIENT
Start: 2020-05-22

## 2020-07-21 NOTE — ANCILLARY DISCHARGE INSTRUCTIONS
[facility] Physical Therapy  [address] [address], [address] [place] [address]  Phone: [phone]  Fax: [phone]    Discharge Summary  2-15    Patient name: Otto Vance  : 1960  Provider#: [id number]  Referral source: Katy Broussard MD      Medical/Treatment Diagnosis: Neck pain [M54.2]  Low back pain [M54.5]     Prior Hospitalization: see medical history     Comorbidities: HTN, asthma, arthritis, scoliosis  Prior Level of Function: limited function over the last 4 years, previously was able to work with tolerable pain  Medications: Verified on Patient Summary List     Start of Care: 3/18/2020                                                                              Onset Date: 19 (surgery)             Visits from Start of Care: 1     Missed Visits: 0  Reporting Period : 3/19/2020 to 3/19/2020      ASSESSMENT/SUMMARY OF CARE: Ms. Ladd Boeck was seen for initial evaluation on 3/19/2020. Subsequent follow ups were disrupted secondary to COVID-19 outbreak. We continued to follow up via phone to assist in better self-management of care. As patient has not returned to clinic, will proceed with d/c at this time.       RECOMMENDATIONS:  [x]Discontinue therapy: []Patient has reached or is progressing toward set goals      [x]COVID-19      []Due to lack of appreciable progress towards set [address] Kimberley Hastings, PT, DPT 2020

## 2020-09-30 ENCOUNTER — HOSPITAL ENCOUNTER (OUTPATIENT)
Dept: PHYSICAL THERAPY | Age: 60
Discharge: HOME | End: 2020-09-30
Payer: MEDICAID

## 2020-09-30 PROCEDURE — 97162 PT EVAL MOD COMPLEX 30 MIN: CPT | Performed by: PHYSICAL THERAPIST

## 2020-09-30 NOTE — PROGRESS NOTES
[address] [place] [address] Physical Therapy  [address] [address], [address]   Phone: [phone]  Fax: [phone]    Plan of Care/Statement of Necessity for Physical Therapy Services  2-15    Patient name: Adele Herrera  : 1960  Provider#: [id number]  Referral source: [facility], Not On File, MD      Medical/Treatment Diagnosis: Low back pain [M54.5]     Prior Hospitalization: see medical history     Comorbidities: HTN, asthma, arthritis, scoliosis  Prior Level of Function: limited function over the last 4 years, previously was able to work with tolerable pain  Medications: Verified on Patient Summary List     Start of Care: 2020                                                                              Onset Date: 19 (surgery)                                         The Plan of Care and following information is based on the information from the initial evaluation. Assessment/ key information: Patient returns to PT today to continue with treatment s/p L4-5 lumbar fusion performed on 2019. She is much improved from her previous evaluation in March. There is no significant neural tension noted today.  Notable tightness in low back musculature and lumbo-pelvic stability weakness.      Evaluation Complexity History HIGH Complexity :3+ comorbidities / personal factors will impact the outcome/ POC ; Examination HIGH Complexity : 4+ Standardized tests and measures addressing body structure, function, activity limitation and / or participation in recreation  ;Presentation MEDIUM Complexity : Evolving with changing characteristics    Overall Complexity Rating: MEDIUM     Problem List: pain affecting function, decrease ROM, decrease strength, impaired gait/ balance, decrease ADL/ functional abilitiies, decrease activity tolerance, decrease flexibility/ joint mobility and decrease transfer abilities               Treatment Plan may include any combination of the following: Therapeutic exercise, Therapeutic activities, Neuromuscular re-education, Physical agent/modality, Gait/balance training, Manual therapy, Patient education and Self Care training  Patient / Family readiness to learn indicated by: asking questions  Persons(s) to be included in education: patient (P)  Barriers to Learning/Limitations: none  Patient Goal (s): \"better mobility\"  Patient Self Reported Health Status: good  Rehabilitation Potential: fair     Short Term Goals: To be accomplished in 2-4 treatments:               1. Pt will be able to perform 10 bridge exercises without pain or difficulty               2. Pt will be compliant with initial HEP use  Long Term Goals: To be accomplished in 10-12 treatments:               1. Pt will be able to perform light squat exercises without increase in back pain               2. Pt will be able to ambulate in the community without significant increase in back pain               3. Pt will be able to perform light yardwork without increase in pain               4. Pt will be able to self-manage care using updated HEP for improved independence    Frequency / Duration: Patient to be seen 2 times per week for 4-6 weeks. Patient/ Caregiver education and instruction: self care and exercises    [x]  Plan of care has been reviewed with STACEY Tate, PT, DPT 9/30/2020     ________________________________________________________________________    I certify that the above Therapy Services are being furnished while the patient is under my care. I agree with the treatment plan and certify that this therapy is necessary.     [de-identified] Signature:____________________  Date:____________Time: _________

## 2020-09-30 NOTE — PROGRESS NOTES
PT INITIAL EVALUATION NOTE 2-15    Patient Name: Gerry Hopkins  AYIN:  : 1960  [x]  Patient  Verified  Payor: Higinio Palomo / Plan: [facility] COMMUNITY PLAN [facility] / Product Type: Managed Care Medicaid /    In time: 140p  Out time:230p  Total Treatment Time (min): 50  Visit #: 1     Treatment Area: Low back pain [M54.5]    SUBJECTIVE  Pain Level (0-10 scale): 5  Any medication changes, allergies to medications, adverse drug reactions, diagnosis change, or new procedure performed?: [] No    [x] Yes (see summary sheet for update)  Subjective:     Patient reports s/p L4-S1 lumbar laminectomy and L4-5 midline fusion performed on 19. She reports 30 year history low back pain, with recent deterioration in function over the last 4 years. Neuropathy seems greatest over the sole of the foot and up the lateral lower leg. Neuropathy has improved since March, but is still present, L>R sides.      She reports her biggest issue right now is her back pain, which then radiates around her hips and down her legs. Back pain averages about a 3/10. Does avoid some of her yard work due to concerns about her leg pain. She noticed around August she started to get a little bit better and her activity tolerance has improved significantly.      She is currently unemployed, at least partially due to ongoing back pain and reduced activity tolerance. Also reporting long history of neck pain limiting function as well.      Description of symptoms: back pain, centrally  Aggravating Factors: cleaning, squatting/bending to ground  Alleviating Factors: heating pad and meds     Radiation: neuropathy, generally over L5-S1 dermatome at greatest, R>L sides. Back pain radiates down into hip, over hip flexors and into posterior thigh     Patient reports functional limitations with: prolonged standing activities, yardwork, bending, twisting activities.      OBJECTIVE/EXAMINATION  Posture:  Much improved sitting posture since March evaluation  Other Observations:  Incision site located lower lumbar spine, L5-S1 area, healed well  Gait and Functional Mobility:  walking relatively well with much improved duarte since March, non-antalgic  Palpation: TTP near incision site around L5-S1, noted increased in muscle tension L>R paraspinals                                                     Lumbar AROM:                                                                                               R                      L                        Flexion                                     WFLs, some back pain on return                Extension                                75% loss                                               Side Bending                           25+% loss, R>L sides                                                                  LOWER QUARTER                            MUSCLE STRENGTH  KEY                                                                             R                      L  0 - No Contraction                   L1, L2 Psoas               4                     4+  1 - Trace                                  L3 Quads                    5                      5  2 - Poor                                   L4 Tib Ant                    4+                      4+  3 - Fair                                     L5 EHL                        --                      --  4 - Good                                  S1 Peroneals              4+                      4  5 - Normal                               S2 Hams                     4+                     4+     Mobility Assessment: lumbar P-A mobility: unable to assess                                      Lumbar rotational mobility: unable to assess               MMT: R hip: 4+/5 ER, 4/5 abd   L hip: 4/5 ER, 4-/5 abd     Neurological: Reflexes / Sensations: reports reduced sensation L LE with light touch. Particularly over L5-S1 dermatomes     Special Tests:                Forward bend: WFLs, with mild pain on return, no aberrant movement              Slump: (-)   SLR: (-)    5 min Therapeutic Exercise:  [x] See flow sheet :   Rationale: increase ROM and increase strength to improve the patients ability to do yardwork without pain          With   [] TE   [] TA   [] neuro   [] other: Patient Education: [x] Review HEP    [] Progressed/Changed HEP based on:   [] positioning   [] body mechanics   [] transfers   [] heat/ice application    [] other:        Other Objective/Functional Measures:  FOTO: did not complete    Pain Level (0-10 scale) post treatment: 5      ASSESSMENT:      [x]  See Plan of OLIVIA Owens DPT 9/30/2020

## 2020-10-05 ENCOUNTER — APPOINTMENT (OUTPATIENT)
Dept: PHYSICAL THERAPY | Age: 60
End: 2020-10-05
Payer: COMMERCIAL

## 2020-10-07 ENCOUNTER — HOSPITAL ENCOUNTER (OUTPATIENT)
Dept: PHYSICAL THERAPY | Age: 60
Discharge: HOME | End: 2020-10-07
Payer: COMMERCIAL

## 2020-10-07 PROCEDURE — 97112 NEUROMUSCULAR REEDUCATION: CPT | Performed by: PHYSICAL THERAPIST

## 2020-10-07 PROCEDURE — 97110 THERAPEUTIC EXERCISES: CPT | Performed by: PHYSICAL THERAPIST

## 2020-10-07 NOTE — PROGRESS NOTES
PT DAILY TREATMENT NOTE 2-15    Patient Name: Trena Mac  RHBS:  : 1960  [x]  Patient  Verified  Payor: Deepti Edouard / Plan: [facility] COMMUNITY PLAN [facility] / Product Type: Managed Care Medicaid /    In time: 410p  Out time: 515p  Total Treatment Time (min): 65  Visit #: 2    Treatment Area: Low back pain [M54.5]    SUBJECTIVE  Pain Level (0-10 scale): 3-4  Any medication changes, allergies to medications, adverse drug reactions, diagnosis change, or new procedure performed?: [x] No    [] Yes (see summary sheet for update)  Subjective functional status/changes:   [] No changes reported  Doing okay today. Was exhausted the day after a day long shopping trip this past weekend.      OBJECTIVE    Modality rationale: decrease pain and increase tissue extensibility to improve the patients ability to walk with reduced pain   Min Type Additional Details       [] Estim: []Att   []Unatt    []TENS instruct                  []IFC  []Premod   []NMES                     []Other:  []w/US   []w/ice   []w/heat  Position:  Location:       []  Traction: [] Cervical       []Lumbar                       [] Prone          []Supine                       []Intermittent   []Continuous Lbs:  [] before manual  [] after manual  []w/heat    []  Ultrasound: []Continuous   [] Pulsed                       at: []1MHz   []3MHz Location:  W/cm2:    [] Paraffin         Location:   []w/heat   10 []  Ice     [x]  Heat  []  Ice massage Position:  Location: back    []  Laser  []  Other: Position:  Location:      []  Vasopneumatic Device Pressure:       [] lo [] med [] hi   Temperature:      [x] Skin assessment post-treatment:  [x]intact []redness- no adverse reaction    []redness  adverse reaction:     45 min Therapeutic Exercise:  [x] See flow sheet :   Rationale: increase ROM, increase strength and improve coordination to improve the patients ability to walk with reduced pain     10 min Neuromuscular Re-education:  []  See flow sheet : biofeedback cuff re-training   Rationale: increase strength and improve coordination  to improve the patients ability to stand with reduced pain          With   [x] TE   [] TA   [] neuro   [] other: Patient Education: [x] Review HEP    [] Progressed/Changed HEP based on:   [] positioning   [] body mechanics   [] transfers   [] heat/ice application    [] other:      Other Objective/Functional Measures: -     Pain Level (0-10 scale) post treatment: 6    ASSESSMENT/Changes in Function:   Quickly fatigued with exercises today, mainly clamshells and sidelying abduction. Reporting more stiffness at end of session. Cautioned against over-stretching and pushing too hard into her stretches. Patient will continue to benefit from skilled PT services to modify and progress therapeutic interventions, address functional mobility deficits, address ROM deficits, address strength deficits, analyze and address soft tissue restrictions and analyze and cue movement patterns to attain remaining goals. []  See Plan of Care  []  See progress note/recertification  []  See Discharge Summary         Progress towards goals / Updated goals:  Short Term Goals: To be accomplished in 2-4 treatments:               1. Pt will be able to perform 10 bridge exercises without pain or difficulty               2. Pt will be compliant with initial HEP use  Long Term Goals: To be accomplished in 10-12 treatments:               1.  Pt will be able to perform light squat exercises without increase in back pain               2. Pt will be able to ambulate in the community without significant increase in back pain               3. Pt will be able to perform light yardwork without increase in pain               4. Pt will be able to self-manage care using updated HEP for improved independence    PLAN  [x]  Upgrade activities as tolerated     [x]  Continue plan of care  []  Update interventions per flow sheet       []  Discharge due to:_  [] Other:_      Jw Acevedo, PT, DPT 10/7/2020

## 2020-10-12 ENCOUNTER — HOSPITAL ENCOUNTER (OUTPATIENT)
Dept: PHYSICAL THERAPY | Age: 60
Discharge: HOME | End: 2020-10-12
Payer: COMMERCIAL

## 2020-10-12 PROCEDURE — 97112 NEUROMUSCULAR REEDUCATION: CPT | Performed by: PHYSICAL THERAPIST

## 2020-10-12 PROCEDURE — 97110 THERAPEUTIC EXERCISES: CPT | Performed by: PHYSICAL THERAPIST

## 2020-10-12 NOTE — PROGRESS NOTES
PT DAILY TREATMENT NOTE 2-15    Patient Name: Jil Green  WTQD:  : 1960  [x]  Patient  Verified  Payor: Arleth Hill / Plan: [facility] COMMUNITY PLAN [facility] / Product Type: Managed Care Medicaid /    In time: 400p  Out time:450p  Total Treatment Time (min): 50  Visit #: 3    Treatment Area: Low back pain [M54.5]    SUBJECTIVE  Pain Level (0-10 scale): 6  Any medication changes, allergies to medications, adverse drug reactions, diagnosis change, or new procedure performed?: [x] No    [] Yes (see summary sheet for update)  Subjective functional status/changes:   [] No changes reported  Back is more stiff today. Says it is due to the weather. OBJECTIVE    Modality rationale: decrease pain and increase tissue extensibility to improve the patients ability to walk with reduced pain    Min Type Additional Details        []? Estim: []? Att   []? Unatt    []? TENS instruct                  []?IFC  []? Premod   []? NMES                     []?Other:  []?w/US   []?w/ice   []?w/heat  Position:  Location:        []? Traction: []? Cervical       []? Lumbar                       []? Prone          []? Supine                       []?Intermittent   []? Continuous Lbs:  []? before manual  []? after manual  []?w/heat     []? Ultrasound: []? Continuous   []? Pulsed                       at: []?1MHz   []? 3MHz Location:  W/cm2:     []? Paraffin         Location:   []?w/heat   -- []? Ice     [x]? Heat  []? Ice massage Position:  Location: back     []? Laser  []? Other: Position:  Location:        []? Vasopneumatic Device Pressure:       []? lo []? med []? hi   Temperature:       [x]? Skin assessment post-treatment:  [x]? intact []? redness- no adverse reaction    []? redness  adverse reaction:      40 min Therapeutic Exercise:  [x]?  See flow sheet :   Rationale: increase ROM, increase strength and improve coordination to improve the patients ability to walk with reduced pain     10 min Neuromuscular Re-education:  []? See flow sheet : biofeedback cuff re-training   Rationale: increase strength and improve coordination  to improve the patients ability to stand with reduced pain                                                                 With   [x]? TE   []? TA   []? neuro   []? other: Patient Education: [x]? Review HEP    []? Progressed/Changed HEP based on:   []? positioning   []? body mechanics   []? transfers   []? heat/ice application    []? other:       Other Objective/Functional Measures: -         Pain Level (0-10 scale) post treatment: 3     ASSESSMENT/Changes in Function:   Improving activity tolerance. Able to progress lumbar strengthening/stabilization exercises without pain today. Patient will continue to benefit from skilled PT services to modify and progress therapeutic interventions, address functional mobility deficits, address ROM deficits, address strength deficits, analyze and address soft tissue restrictions and analyze and cue movement patterns to attain remaining goals. []? See Plan of Care  []? See progress note/recertification  []? See Discharge Summary         Progress towards goals / Updated goals:  Short Term Goals: To be accomplished in 2-4 treatments:               1. Pt will be able to perform 10 bridge exercises without pain or difficulty               2. Pt will be compliant with initial HEP use  Long Term Goals: To be accomplished in 10-12 treatments:               1. Pt will be able to perform light squat exercises without increase in back pain               2. Pt will be able to ambulate in the community without significant increase in back pain               3. Pt will be able to perform light yardwork without increase in pain               4. Pt will be able to self-manage care using updated HEP for improved independence     PLAN  [x]? Upgrade activities as tolerated     [x]? Continue plan of care  []? Update interventions per flow sheet       []?   Discharge due to:_  []?   Other:_           Johanna Atkins, PT, DPT 10/12/2020

## 2020-10-14 ENCOUNTER — HOSPITAL ENCOUNTER (OUTPATIENT)
Dept: PHYSICAL THERAPY | Age: 60
Discharge: HOME | End: 2020-10-14
Payer: COMMERCIAL

## 2020-10-14 PROCEDURE — 97110 THERAPEUTIC EXERCISES: CPT | Performed by: PHYSICAL THERAPIST

## 2020-10-14 PROCEDURE — 97112 NEUROMUSCULAR REEDUCATION: CPT | Performed by: PHYSICAL THERAPIST

## 2020-10-14 NOTE — PROGRESS NOTES
PT DAILY TREATMENT NOTE 2-15    Patient Name: Isabell Vargas  NDOP:  : 1960  [x]  Patient  Verified  Payor: Janelle Ash / Plan: [facility] COMMUNITY PLAN [employer] / Product Type: Managed Care Medicaid /    In time: 400p  Out time: 452p  Total Treatment Time (min): 52  Visit #:  4    Treatment Area: Low back pain [M54.5]    SUBJECTIVE  Pain Level (0-10 scale): 4  Any medication changes, allergies to medications, adverse drug reactions, diagnosis change, or new procedure performed?: [x] No    [] Yes (see summary sheet for update)  Subjective functional status/changes:   [] No changes reported  Doing pretty well today. Hips are sore, in a good way, since last visit. OBJECTIVE    Modality rationale: decrease pain and increase tissue extensibility to improve the patients ability to walk with reduced pain     Min Type Additional Details        []? ? Estim: []??Att   []? ? Unatt    []? ?TENS instruct                  []? ?IFC  []? ?Premod   []? ?NMES                     []? ? Other:  []??w/US   []? ?w/ice   []? ?w/heat  Position:  Location:        []? ?  Traction: []?? Cervical       []? ?Lumbar                       []? ? Prone          []? ?Supine                       []? ?Intermittent   []? ? Continuous Lbs:  []?? before manual  []? ? after manual  []? ?w/heat     []? ?  Ultrasound: []??Continuous   []? ? Pulsed                       at: []??1MHz   []? ? 3MHz Location:  W/cm2:     []? ? Paraffin         Location:   []??w/heat   -- []??  Ice     [x]? ?  Heat  []? ?  Ice massage Position:  Location: back     []? ?  Laser  []? ?  Other: Position:  Location:        []? ?  Vasopneumatic Device Pressure:       []? ? lo []? ? med []?? hi   Temperature:       [x]? ? Skin assessment post-treatment:  [x]? ? intact []? ?redness- no adverse reaction    []? ?redness  adverse reaction:      42 min Therapeutic Exercise:  [x]? ? See flow sheet :   Rationale: increase ROM, increase strength and improve coordination to improve the patients ability to walk with reduced pain     10 min Neuromuscular Re-education:  []??  See flow sheet : biofeedback cuff re-training   Rationale: increase strength and improve coordination  to improve the patients ability to stand with reduced pain                                                                 With   [x]? ? TE   []?? TA   []?? neuro   []?? other: Patient Education: [x]? ? Review HEP    []? ? Progressed/Changed HEP based on:   []?? positioning   []? ? body mechanics   []? ? transfers   []? ? heat/ice application    []? ? other:       Other Objective/Functional Measures: -         Pain Level (0-10 scale) post treatment: 3     ASSESSMENT/Changes in Function:   Progressed lumbar strengthening today without difficulty. Great progress overall at this time. Patient will continue to benefit from skilled PT services to modify and progress therapeutic interventions, address functional mobility deficits, address ROM deficits, address strength deficits, analyze and address soft tissue restrictions and analyze and cue movement patterns to attain remaining goals.     []? ?  See Plan of Care  []? ?  See progress note/recertification  []? ?  See Discharge Summary         Progress towards goals / Updated goals:  Short Term Goals: To be accomplished in 2-4 treatments:               1. Pt will be able to perform 10 bridge exercises without pain or difficulty               2. Pt will be compliant with initial HEP use  Long Term Goals: To be accomplished in 10-12 treatments:               1. Pt will be able to perform light squat exercises without increase in back pain               2. Pt will be able to ambulate in the community without significant increase in back pain               3. Pt will be able to perform light yardwork without increase in pain               4. Pt will be able to self-manage care using updated HEP for improved independence     PLAN  [x]? ?  Upgrade activities as tolerated     [x]? ?  Continue plan of care  []? ?  Update interventions per flow sheet       []? ?  Discharge due to:_  []??  Other:_        Lupillo Lewis, PT, DPT 10/14/2020

## 2020-10-19 ENCOUNTER — HOSPITAL ENCOUNTER (OUTPATIENT)
Dept: PHYSICAL THERAPY | Age: 60
Discharge: HOME | End: 2020-10-19
Payer: COMMERCIAL

## 2020-10-19 ENCOUNTER — OFFICE VISIT (OUTPATIENT)
Dept: INTERNAL MEDICINE CLINIC | Age: 60
End: 2020-10-19
Payer: COMMERCIAL

## 2020-10-19 VITALS
BODY MASS INDEX: 32.8 KG/M2 | SYSTOLIC BLOOD PRESSURE: 135 MMHG | HEIGHT: 67 IN | OXYGEN SATURATION: 98 % | WEIGHT: 209 LBS | HEART RATE: 85 BPM | DIASTOLIC BLOOD PRESSURE: 87 MMHG | RESPIRATION RATE: 14 BRPM | TEMPERATURE: 98.5 F

## 2020-10-19 DIAGNOSIS — Z71.3 DIETARY COUNSELING: ICD-10-CM

## 2020-10-19 DIAGNOSIS — I10 BENIGN ESSENTIAL HTN: ICD-10-CM

## 2020-10-19 DIAGNOSIS — Z01.419 WELL WOMAN EXAM: Primary | ICD-10-CM

## 2020-10-19 DIAGNOSIS — M25.521 RIGHT ELBOW PAIN: ICD-10-CM

## 2020-10-19 DIAGNOSIS — K21.00 GASTROESOPHAGEAL REFLUX DISEASE WITH ESOPHAGITIS WITHOUT HEMORRHAGE: ICD-10-CM

## 2020-10-19 DIAGNOSIS — Z23 NEEDS FLU SHOT: ICD-10-CM

## 2020-10-19 DIAGNOSIS — J45.20 MILD INTERMITTENT ASTHMA, UNSPECIFIED WHETHER COMPLICATED: ICD-10-CM

## 2020-10-19 DIAGNOSIS — M75.82 ROTATOR CUFF TENDONITIS, LEFT: ICD-10-CM

## 2020-10-19 DIAGNOSIS — F32.A DEPRESSION, UNSPECIFIED DEPRESSION TYPE: ICD-10-CM

## 2020-10-19 PROCEDURE — 90686 IIV4 VACC NO PRSV 0.5 ML IM: CPT | Performed by: FAMILY MEDICINE

## 2020-10-19 PROCEDURE — 99396 PREV VISIT EST AGE 40-64: CPT | Performed by: FAMILY MEDICINE

## 2020-10-19 PROCEDURE — 97112 NEUROMUSCULAR REEDUCATION: CPT | Performed by: PHYSICAL THERAPIST

## 2020-10-19 PROCEDURE — 97110 THERAPEUTIC EXERCISES: CPT | Performed by: PHYSICAL THERAPIST

## 2020-10-19 PROCEDURE — 99214 OFFICE O/P EST MOD 30 MIN: CPT | Performed by: FAMILY MEDICINE

## 2020-10-19 PROCEDURE — 90471 IMMUNIZATION ADMIN: CPT | Performed by: FAMILY MEDICINE

## 2020-10-19 RX ORDER — RIFAXIMIN 550 MG/1
550 TABLET ORAL 2 TIMES DAILY
COMMUNITY
End: 2021-07-02

## 2020-10-19 RX ORDER — PANTOPRAZOLE SODIUM 20 MG/1
20 TABLET, DELAYED RELEASE ORAL DAILY
COMMUNITY
End: 2021-07-02

## 2020-10-19 RX ORDER — MONTELUKAST SODIUM 4 MG/1
1 TABLET, CHEWABLE ORAL 2 TIMES DAILY
COMMUNITY
End: 2021-07-02

## 2020-10-19 RX ORDER — LOSARTAN POTASSIUM 25 MG/1
25 TABLET ORAL DAILY
Qty: 30 TAB | Refills: 5 | Status: SHIPPED | OUTPATIENT
Start: 2020-10-19 | End: 2020-12-01 | Stop reason: DRUGHIGH

## 2020-10-19 RX ORDER — BUSPIRONE HYDROCHLORIDE 5 MG/1
5 TABLET ORAL
Qty: 30 TAB | Refills: 2 | Status: SHIPPED | OUTPATIENT
Start: 2020-10-19 | End: 2021-06-15 | Stop reason: ALTCHOICE

## 2020-10-19 NOTE — PROGRESS NOTES
No chief complaint on file. she is a 64y.o. year old female who presents for CPE  Complete Physical Exam Questions:    LMP -  none  Last Pap (q 3 years, or q5 with HPV) - 2019  Last Mammogram (50-74 biennially)- 2019  Hx of abnl Pap - No    1. Do you follow a low fat diet?  no  2. Are you up to date on your Tdap (<10 years)? Yes  3. Have you ever had a Pneumovax vaccine (>65)? No   PCV13 No   PPSV23 No  4. Have you had Zoster vaccine (>60)? No  5. Have you had the HPV - Gardasil (13- 26)? No  6. Do you follow an exercise program?  no  7. Do you smoke?  no  8. Do you consider yourself overweight?  yes  9. Is there a family history of CAD< age 48? No  10. Is there a family history of Cancer?  yes  11. Do you know your Cancer risks? No  12. Have you had a colonoscopy? yes  13. Have you been tested for HIV or other STI's? No HIV testing today(18-66 y/o)? No  14. Have had a bone density scan or DEXA done(>65)? No  15. Have you had an EKG performed in the last five years (>50)? No    Other complaints:    Reviewed and agree with Nurse Note and duplicated in this note. Reviewed PmHx, RxHx, FmHx, SocHx, AllgHx and updated and dated in the chart.     Family History   Problem Relation Age of Onset    Delayed Awakening Mother         with anesthesia    Arthritis Mother     Other Mother         IBS/degenerative spinal disease    Anesth Problems Mother         N/V AND SLEEPY ALL DAY    COPD Father     Glaucoma Father     HIV/AIDS Sister     Heart Disease Brother         heart valve problem    Glaucoma Brother     Other Other     Heart Disease Sister         HEART VALVE DISESE        Past Medical History:   Diagnosis Date    Adverse effect of anesthesia     \"so sleepy and tired for the rest of the day\"    Anxiety     Asthma     Chronic pain     back - L5 is \"almost gone\"/left foot neuropathy -pinched nerve L4-5 - spine shifted/degenerative spinal disease/stiff and sore neck and shoulder - in PT for back and neck    Degenerative spinal arthritis     Diabetes ([facility] [address]     PRE DIABETIC    Hypertension     IBS (irritable bowel syndrome)     Ill-defined condition     pre-diabetes    Nausea & vomiting     ? d/t fentanyl    PUD (peptic ulcer disease)     Spondylolisthesis       Social History     Socioeconomic History    Marital status: LEGALLY      Spouse name: Not on file    Number of children: Not on file    Years of education: Not on file    Highest education level: Not on file   Tobacco Use    Smoking status: Former Smoker     Packs/day: 0.50     Years: 12.00     Pack years: 6.00     Last attempt to quit: 2012     Years since quittin.8    Smokeless tobacco: Never Used    Tobacco comment: quit    Substance and Sexual Activity    Alcohol use: Yes     Comment: 3-4 times a yr    Drug use: Yes     Types: Marijuana     Comment:  LAST USED IN 2019    Sexual activity: Yes     Partners: Female        Review of Systems - negative except as listed above      Objective:     Vitals:    10/19/20 1517   BP: 135/87   Pulse: 85   Resp: 14   Temp: 98.5 °F (36.9 °C)   TempSrc: Oral   SpO2: 98%   Weight: 209 lb (94.8 kg)   Height: 5' 7\" (1.702 m)       Physical Examination: General appearance - alert, well appearing, and in no distress  Eyes - pupils equal and reactive, extraocular eye movements intact  Ears - bilateral TM's and external ear canals normal  Nose - normal and patent, no erythema, discharge or polyps  Mouth - mucous membranes moist, pharynx normal without lesions  Neck - supple, no significant adenopathy  Chest - clear to auscultation, no wheezes, rales or rhonchi, symmetric air entry  Heart - normal rate, regular rhythm, normal S1, S2, no murmurs, rubs, clicks or gallops  Abdomen - soft, nontender, nondistended, no masses or organomegaly  Neurological - alert, oriented, normal speech, no focal findings or movement disorder noted  Musculoskeletal - no joint tenderness, deformity or swelling  Extremities - peripheral pulses normal, no pedal edema, no clubbing or cyanosis  Skin - normal coloration and turgor, no rashes, no suspicious skin lesions noted      Assessment/ Plan:   Diagnoses and all orders for this visit:    1. Well woman exam  -     CBC W/O DIFF  -     LIPID PANEL  -     METABOLIC PANEL, COMPREHENSIVE    2. BMI 32.0-32.9,adult  -     REFERRAL TO NUTRITION    3. Dietary counseling  -     REFERRAL TO NUTRITION    4. Gastroesophageal reflux disease with esophagitis without hemorrhage    5. Benign essential HTN    6. Depression, unspecified depression type    Other orders  -     losartan (COZAAR) 25 mg tablet; Take 1 Tab by mouth daily. -     busPIRone (BUSPAR) 5 mg tablet; Take 1 Tab by mouth daily as needed (Anxiety). Labs to be drawn: CBC, CMP, Lipid       I have reviewed/discussed the above normal BMI with the patient. I have recommended the following interventions: dietary management education, guidance, and counseling . I have discussed the diagnosis with the patient and the intended plan as seen in the above orders. The patient has received an after-visit summary and questions were answered concerning future plans. Medication Side Effects and Warnings were discussed with patient,  Patient Labs were reviewed and or requested, and  Patient Past Records were reviewed and or requested  yes         Pt agrees to call or return to clinic and/or go to closest ER with any worsening of symptoms. This may include, but not limited to increased fever (>100.4) with NSAIDS or Tylenol, increased edema, confusion, rash, worsening of presenting symptoms. Please note that this dictation was completed with [employer], the computer voice recognition software. Quite often unanticipated grammatical, syntax, homophones, and other interpretive errors are inadvertently transcribed by the computer software. Please disregard these errors.   Please excuse any errors that have escaped final proofreading. Thank you. Chief Complaint   Patient presents with    Physical     she is a 64y.o. year old female who presents for follow-up of   Anxiety and/or Depression  Well controlled on buspar and none. Ongoing symptoms include: none poor concentration  Patient denies: suicidal ideation, homocidal ideation. Reported side effects from the treatment: none. She is also taking her blood pressure medications losartan-as directed with no complaints or side effects. Patient denies any chest pain shortness of breath currently. Reviewed and agree with Nurse Note and duplicated in this note. Reviewed PmHx, RxHx, FmHx, SocHx, AllgHx and updated and dated in the chart.     Family History   Problem Relation Age of Onset    Delayed Awakening Mother         with anesthesia    Arthritis Mother     Other Mother         IBS/degenerative spinal disease    Anesth Problems Mother         N/V AND SLEEPY ALL DAY    COPD Father     Glaucoma Father     HIV/AIDS Sister     Heart Disease Brother         heart valve problem    Glaucoma Brother     Other Other     Heart Disease Sister         HEART VALVE DISESE        Past Medical History:   Diagnosis Date    Adverse effect of anesthesia     \"so sleepy and tired for the rest of the day\"    Anxiety     Asthma     Chronic pain     back - L5 is \"almost gone\"/left foot neuropathy -pinched nerve L4-5 - spine shifted/degenerative spinal disease/stiff and sore neck and shoulder - in PT for back and neck    Degenerative spinal arthritis     Diabetes ([address]     PRE DIABETIC    Hypertension     IBS (irritable bowel syndrome)     Ill-defined condition     pre-diabetes    Nausea & vomiting     ? d/t fentanyl    PUD (peptic ulcer disease)     Spondylolisthesis       Social History     Socioeconomic History    Marital status: LEGALLY      Spouse name: Not on file    Number of children: Not on file    Years of education: Not on file    Highest education level: Not on file   Tobacco Use    Smoking status: Former Smoker     Packs/day: 0.50     Years: 12.00     Pack years: 6.00     Last attempt to quit: 2012     Years since quittin.8    Smokeless tobacco: Never Used    Tobacco comment: quit    Substance and Sexual Activity    Alcohol use: Yes     Comment: 3-4 times a yr    Drug use: Yes     Types: Marijuana     Comment:  LAST USED IN 2019    Sexual activity: Yes     Partners: Female        Review of Systems - negative except as listed above      Objective:     Vitals:    10/19/20 1517   BP: 135/87   Pulse: 85   Resp: 14   Temp: 98.5 °F (36.9 °C)   TempSrc: Oral   SpO2: 98%   Weight: 209 lb (94.8 kg)   Height: 5' 7\" (1.702 m)       Physical Examination: General appearance - alert, well appearing, and in no distress  Eyes - pupils equal and reactive, extraocular eye movements intact  Ears - bilateral TM's and external ear canals normal  Nose - normal and patent, no erythema, discharge or polyps  Mouth - mucous membranes moist, pharynx normal without lesions  Neck - supple, no significant adenopathy  Chest - clear to auscultation, no wheezes, rales or rhonchi, symmetric air entry  Heart - normal rate, regular rhythm, normal S1, S2, no murmurs, rubs, clicks or gallops  Abdomen - soft, nontender, nondistended, no masses or organomegaly  Neurological - alert, oriented, normal speech, no focal findings or movement disorder noted  Extremities - peripheral pulses normal, no pedal edema, no clubbing or cyanosis  Skin - normal coloration and turgor, no rashes, no suspicious skin lesions noted    Assessment/ Plan:   Diagnoses and all orders for this visit:    1. Well woman exam  -     CBC W/O DIFF  -     LIPID PANEL  -     METABOLIC PANEL, COMPREHENSIVE    2. BMI 32.0-32.9,adult  -     REFERRAL TO NUTRITION    3. Dietary counseling  -     REFERRAL TO NUTRITION    4. Gastroesophageal reflux disease with esophagitis without hemorrhage    5. Benign essential HTN    6. Depression, unspecified depression type    Other orders  -     losartan (COZAAR) 25 mg tablet; Take 1 Tab by mouth daily. -     busPIRone (BUSPAR) 5 mg tablet; Take 1 Tab by mouth daily as needed (Anxiety). Follow-up and Dispositions    · Return in about 6 months (around 4/19/2021) for HTN, Anxiety/Depression. I have reviewed/discussed the above normal BMI with the patient. I have recommended the following interventions: dietary management education, guidance, and counseling . I have discussed the diagnosis with the patient and the intended plan as seen in the above orders. The patient has received an after-visit summary and questions were answered concerning future plans. Medication Side Effects and Warnings were discussed with patient,  Patient Labs were reviewed and or requested, and  Patient Past Records were reviewed and or requested  yes         Pt agrees to call or return to clinic and/or go to closest ER with any worsening of symptoms. This may include, but not limited to increased fever (>100.4) with NSAIDS or Tylenol, increased edema, confusion, rash, worsening of presenting symptoms. Please note that this dictation was completed with [employer], the [email or web address] voice recognition software. Quite often unanticipated grammatical, syntax, homophones, and other interpretive errors are inadvertently transcribed by the computer software. Please disregard these errors. Please excuse any errors that have escaped final proofreading. Thank you.

## 2020-10-19 NOTE — PROGRESS NOTES
PT DAILY TREATMENT NOTE 2-15    Patient Name: Xin Alarcon  MVFU:  : 1960  [x]  Patient  Verified  Payor: Kyle Julian / Plan: [facility] COMMUNITY PLAN [facility] / Product Type: Managed Care Medicaid /    In time: 400p  Out time: 510p  Total Treatment Time (min): 70  Visit #: 5    Treatment Area: Low back pain [M54.5]    SUBJECTIVE  Pain Level (0-10 scale): 4  Any medication changes, allergies to medications, adverse drug reactions, diagnosis change, or new procedure performed?: [x] No    [] Yes (see summary sheet for update)  Subjective functional status/changes:   [] No changes reported  Back is about the same. Says that her L shoulder and R elbow are bothering her a lot and Dr. Fredy Bashir is going to write her an Rx to work on those as well. OBJECTIVE    Modality rationale: decrease pain and increase tissue extensibility to improve the patients ability to walk with reduced pain     Min Type Additional Details        []??? Estim: []? ? ? Att   []? ??Unatt    []? ??TENS instruct                  []? ??IFC  []? ? ?Premod   []? ??NMES                     []? ??Other:  []???w/US   []? ??w/ice   []? ??w/heat  Position:  Location:        []? ??  Traction: []??? Cervical       []? ? ?Lumbar                       []? ?? Prone          []? ? ?Supine                       []? ? ? Intermittent   []? ?? Continuous Lbs:  []??? before manual  []? ?? after manual  []? ??w/heat     []? ??  Ultrasound: []? ? ? Continuous   []? ?? Pulsed                       at: []???1MHz   []? ??3MHz Location:  W/cm2:     []??? Paraffin         Location:   []???w/heat   10 []???  Ice     [x]? ??  Heat  []? ??  Ice massage Position:  Location: back     []? ??  Laser  []? ??  Other: Position:  Location:        []? ??  Vasopneumatic Device Pressure:       []? ?? lo []? ?? med []? ?? hi   Temperature:       [x]? ?? Skin assessment post-treatment:  [x]? ??intact []? ??redness- no adverse reaction    []? ??redness - adverse reaction:      50 min Therapeutic Exercise:  [x]??? See flow sheet :   Rationale: increase ROM, increase strength and improve coordination to improve the patients ability to walk with reduced pain     10 min Neuromuscular Re-education:  []???  See flow sheet : biofeedback cuff re-training   Rationale: increase strength and improve coordination  to improve the patients ability to stand with reduced pain                                                                 With   [x]? ?? TE   []??? TA   []??? neuro   []? ?? other: Patient Education: [x]??? Review HEP    []? ?? Progressed/Changed HEP based on:   []??? positioning   []? ?? body mechanics   []? ?? transfers   []? ?? heat/ice application    []? ?? other:       Other Objective/Functional Measures: -         Pain Level (0-10 scale) post treatment: 3     ASSESSMENT/Changes in Function:   Didn't progress exercises today due to c/o pain, though seems to be mostly shoulder and elbow v. Back pain. Continues to need glut strengthening as she remains weak. Patient will continue to benefit from skilled PT services to modify and progress therapeutic interventions, address functional mobility deficits, address ROM deficits, address strength deficits, analyze and address soft tissue restrictions and analyze and cue movement patterns to attain remaining goals.     []? ??  See Plan of Care  []? ??  See progress note/recertification  []? ??  See Discharge Summary         Progress towards goals / Updated goals:  Short Term Goals: To be accomplished in 2-4 treatments:               1.  Pt will be able to perform 10 bridge exercises without pain or difficulty NOT MET               2. Pt will be compliant with initial HEP use MET  Long Term Goals: To be accomplished in 10-12 treatments:               1. Pt will be able to perform light squat exercises without increase in back pain               2. Pt will be able to ambulate in the community without significant increase in back pain               3. Pt will be able to perform light yardwork without increase in pain               4. Pt will be able to self-manage care using updated HEP for improved independence     PLAN  [x]???  Upgrade activities as tolerated     [x]? ??  Continue plan of care  []? ??  Update interventions per flow sheet       []???  Discharge due to:_  []???  Other:_       Autumn Bamberger, PT, DPT 10/19/2020

## 2020-10-19 NOTE — PATIENT INSTRUCTIONS
Vaccine Information Statement    Influenza (Flu) Vaccine (Inactivated or Recombinant): What You Need to Know    Many Vaccine Information Statements are available in Polish and other languages. See www.immunize.org/vis  Hojas de información sobre vacunas están disponibles en español y en muchos otros idiomas. Visite www.immunize.org/vis    1. Why get vaccinated? Influenza vaccine can prevent influenza (flu). Flu is a contagious disease that spreads around the [facility] every year, usually between October and May. Anyone can get the flu, but it is more dangerous for some people. Infants and young children, people 72years of age and older, pregnant women, and people with certain health conditions or a weakened immune system are at greatest risk of flu complications. Pneumonia, bronchitis, sinus infections and ear infections are examples of flu-related complications. If you have a medical condition, such as heart disease, cancer or diabetes, flu can make it worse. Flu can cause fever and chills, sore throat, muscle aches, fatigue, cough, headache, and runny or stuffy nose. Some people may have vomiting and diarrhea, though this is more common in children than adults. Each year thousands of people in the [facility] die from flu, and many more are hospitalized. Flu vaccine prevents millions of illnesses and flu-related visits to the doctor each year. 2. Influenza vaccines     CDC recommends everyone 10months of age and older get vaccinated every flu season. Children 6 months through 6years of age may need 2 doses during a single flu season. Everyone else needs only 1 dose each flu season. It takes about 2 weeks for protection to develop after vaccination. There are many flu viruses, and they are always changing. Each year a new flu vaccine is made to protect against three or four viruses that are likely to cause disease in the upcoming flu season.  Even when the vaccine doesnt exactly match these viruses, it may still provide some protection. Influenza vaccine does not cause flu. Influenza vaccine may be given at the same time as other vaccines. 3. Talk with your health care provider    Tell your vaccine provider if the person getting the vaccine:   Has had an allergic reaction after a previous dose of influenza vaccine, or has any severe, life-threatening allergies.  Has ever had Guillain-Barré Syndrome (also called GBS). In some cases, your health care provider may decide to postpone influenza vaccination to a future visit. People with minor illnesses, such as a cold, may be vaccinated. People who are moderately or severely ill should usually wait until they recover before getting influenza vaccine. Your health care provider can give you more information. 4. Risks of a reaction     Soreness, redness, and swelling where shot is given, fever, muscle aches, and headache can happen after influenza vaccine.  There may be a very small increased risk of Guillain-Barré Syndrome (GBS) after inactivated influenza vaccine (the flu shot). Rosendo Acevedo children who get the flu shot along with pneumococcal vaccine (PCV13), and/or DTaP vaccine at the same time might be slightly more likely to have a seizure caused by fever. Tell your health care provider if a child who is getting flu vaccine has ever had a seizure. People sometimes faint after medical procedures, including vaccination. Tell your provider if you feel dizzy or have vision changes or ringing in the ears. As with any medicine, there is a very remote chance of a vaccine causing a severe allergic reaction, other serious injury, or death. 5. What if there is a serious problem? An allergic reaction could occur after the vaccinated person leaves the clinic.  If you see signs of a severe allergic reaction (hives, swelling of the face and throat, difficulty breathing, a fast heartbeat, dizziness, or weakness), call 9-1-1 and get the person to the nearest hospital.    For other signs that concern you, call your health care provider. Adverse reactions should be reported to the Vaccine Adverse Event Reporting System (VAERS). Your health care provider will usually file this report, or you can do it yourself. Visit the VAERS website at www.vaers. [facility].gov or call 2-807.929.8768. VAERS is only for reporting reactions, and VAERS staff do not give medical advice. 6. The National Vaccine Injury Compensation Program    The [facility] Vaccine Injury Compensation Program (VICP) is a federal program that was created to compensate people who may have been injured by certain vaccines. Visit the VICP website at www.hrsa.gov/vaccinecompensation or call 0-254.186.6013 to learn about the program and about filing a claim. There is a time limit to file a claim for compensation. 7. How can I learn more?  Ask your health care provider.  Call your local or state health department.  Contact the Centers for Disease Control and Prevention (CDC):  - Call 0-319.225.3058 (1-800-CDC-INFO) or  - Visit CDCs influenza website at www.cdc.gov/flu    Vaccine Information Statement (Interim)  Inactivated Influenza Vaccine   8/15/2019  42 SEBASTIAN Corbett [id number]   Department of Health and Human Services  Centers for Disease Control and Prevention    Office Use Only

## 2020-10-20 RX ORDER — ZOSTER VACCINE RECOMBINANT, ADJUVANTED 50 MCG/0.5
0.5 KIT INTRAMUSCULAR ONCE
Qty: 0.5 ML | Refills: 0 | Status: SHIPPED | OUTPATIENT
Start: 2020-10-20 | End: 2020-10-20

## 2020-10-21 ENCOUNTER — APPOINTMENT (OUTPATIENT)
Dept: PHYSICAL THERAPY | Age: 60
End: 2020-10-21
Payer: COMMERCIAL

## 2020-10-26 ENCOUNTER — HOSPITAL ENCOUNTER (OUTPATIENT)
Dept: PHYSICAL THERAPY | Age: 60
Discharge: HOME | End: 2020-10-26
Payer: COMMERCIAL

## 2020-10-28 ENCOUNTER — HOSPITAL ENCOUNTER (OUTPATIENT)
Dept: PHYSICAL THERAPY | Age: 60
Discharge: HOME | End: 2020-10-28
Payer: COMMERCIAL

## 2020-10-28 PROCEDURE — 97110 THERAPEUTIC EXERCISES: CPT

## 2020-10-28 PROCEDURE — 97112 NEUROMUSCULAR REEDUCATION: CPT

## 2020-10-28 NOTE — PROGRESS NOTES
PT DAILY TREATMENT NOTE 2-15    Patient Name: Daniela Don  SPCW:  : 1960  [x]  Patient  Verified  Payor: Phyllis Chavarria / Plan: [facility] COMMUNITY PLAN [facility] / Product Type: Managed Care Medicaid /    In time: 2:47P  Out time: 3:35P  Total Treatment Time (min): 48  Visit #: 6    Treatment Area: Low back pain [M54.5]    SUBJECTIVE  Pain Level (0-10 scale): 7/10  Any medication changes, allergies to medications, adverse drug reactions, diagnosis change, or new procedure performed?: [x] No    [] Yes (see summary sheet for update)  Subjective functional status/changes:   [] No changes reported  Pt reports feeling a little better. Her IBS was bothering her a lot earlier in the week. Back and hips are hurting more today. She thinks its weather related. OBJECTIVE           Modality rationale: decrease pain and increase tissue extensibility to improve the patients ability to walk with reduced pain     Min Type Additional Details        []??? Estim: []? ? ? Att   []? ??Unatt    []? ??TENS instruct                  []? ??IFC  []? ? ?Premod   []? ??NMES                     []? ??Other:  []???w/US   []? ??w/ice   []? ??w/heat  Position:  Location:        []? ??  Traction: []??? Cervical       []? ? ?Lumbar                       []? ?? Prone          []? ? ?Supine                       []? ? ? Intermittent   []? ?? Continuous Lbs:  []??? before manual  []? ?? after manual  []? ??w/heat     []? ??  Ultrasound: []? ? ? Continuous   []? ?? Pulsed                       at: []???1MHz   []? ??3MHz Location:  W/cm2:     []??? Paraffin         Location:   []???w/heat   10 []???  Ice     [x]? ??  Heat  []? ??  Ice massage Position:  Location: back     []? ??  Laser  []? ??  Other: Position:  Location:        []? ??  Vasopneumatic Device Pressure:       []? ?? lo []? ?? med []? ?? hi   Temperature:       [x]? ?? Skin assessment post-treatment:  [x]? ??intact []? ??redness- no adverse reaction    []? ??redness  adverse reaction:      28 min Therapeutic Exercise:  [x]? ?? See flow sheet :   Rationale: increase ROM, increase strength and improve coordination to improve the patients ability to walk with reduced pain     10 min Neuromuscular Re-education:  []???  See flow sheet : biofeedback cuff re-training   Rationale: increase strength and improve coordination  to improve the patients ability to stand with reduced pain                                                                 With   [x]? ?? TE   []??? TA   []??? neuro   []? ?? other: Patient Education: [x]??? Review HEP    []? ?? Progressed/Changed HEP based on:   []??? positioning   []? ?? body mechanics   []? ?? transfers   []? ?? heat/ice application    []? ?? other:       Other Objective/Functional Measures: --         Pain Level (0-10 scale) post treatment: 6/10     ASSESSMENT/Changes in Function:   Pt's program limited secondary to high pain levels today. Pt still needs to get order from MD for treatment of her shoulder and elbow. Patient will continue to benefit from skilled PT services to modify and progress therapeutic interventions, address functional mobility deficits, address ROM deficits, address strength deficits, analyze and address soft tissue restrictions and analyze and cue movement patterns to attain remaining goals.     []? ??  See Plan of Care  []? ??  See progress note/recertification  []? ??  See Discharge Summary         Progress towards goals / Updated goals:  Short Term Goals: To be accomplished in 2-4 treatments:               1.  Pt will be able to perform 10 bridge exercises without pain or difficulty NOT MET               2. Pt will be compliant with initial HEP use MET  Long Term Goals: To be accomplished in 10-12 treatments:               1. Pt will be able to perform light squat exercises without increase in back pain               2. Pt will be able to ambulate in the community without significant increase in back pain               3. Pt will be able to perform light yardwork without increase in pain               4. Pt will be able to self-manage care using updated HEP for improved independence     PLAN  [x]???  Upgrade activities as tolerated     [x]? ??  Continue plan of care  []? ??  Update interventions per flow sheet       []???  Discharge due to:_  []???  Other:_       José Miguel Letters, PTA, OPTA 10/28/2020

## 2020-11-02 ENCOUNTER — APPOINTMENT (OUTPATIENT)
Dept: PHYSICAL THERAPY | Age: 60
End: 2020-11-02
Payer: COMMERCIAL

## 2020-11-03 ENCOUNTER — HOSPITAL ENCOUNTER (OUTPATIENT)
Dept: NUTRITION | Age: 60
Discharge: HOME | End: 2020-11-03
Payer: MEDICAID

## 2020-11-03 DIAGNOSIS — K58.0 IRRITABLE BOWEL SYNDROME WITH DIARRHEA: ICD-10-CM

## 2020-11-03 PROCEDURE — 97802 MEDICAL NUTRITION INDIV IN: CPT | Performed by: DIETITIAN, REGISTERED

## 2020-11-03 NOTE — PROGRESS NOTES
Shahrzad Kinney was informed of the inherent limitations of a virtual visit,  and has consented to a virtual therapy visit on 11/3/2020. Information regarding emergency contact information for this patient during this visit is to contact:  Katey Cuellar at [phone]  in addition to calling 911. The patient was informed that at any time during the virtual visit, they can decide to stop the virtual visit. The patient verified that they are physically located in the [facility] for this virtual visit. [id number] [facility]     Nutrition Assessment  Medical Nutrition Therapy   Outpatient Initial Evaluation         Patient Name: Shharzad Kinney : [birth year]   Treatment Diagnosis: IBS, Obesity,    [facility]): 11/3/2020     In time:  3:10pm          Out time:   4:15pm   Total Treatment Time (min):   72     Gender: female Age: 64 y.o. Ht: 67 in Wt: 209 (PCP)  lb  kg   BMI: 32.7 AF 1.2 BMR Female 1561     Past Medical History:  Patient Active Problem List   Diagnosis Code    Elevated hemoglobin A1c R73.09    Spinal stenosis, lumbar M48.061     Anemia  Arthritis  Asthma  Hemorrhoids  High blood pressure  IBS-D  Cholecystectomy   Shrimp allergy IgE (Class 1)  Moderate hepatic steatosis  Sigmoid diverticulosis  Mild chronic gastritis  Chronic duodenitis     Pertinent Medications:     Current Outpatient Medications:     pantoprazole (PROTONIX) 20 mg tablet, Take 20 mg by mouth daily. , Disp: , Rfl:     colestipoL (COLESTID) 1 gram tablet, Take 1 g by mouth two (2) times a day., Disp: , Rfl:     rifAXIMin (Xifaxan) 550 mg tablet, Take 550 mg by mouth two (2) times a day., Disp: , Rfl:     losartan (COZAAR) 25 mg tablet, Take 1 Tab by mouth daily. , Disp: 30 Tab, Rfl: 5    busPIRone (BUSPAR) 5 mg tablet, Take 1 Tab by mouth daily as needed (Anxiety). , Disp: 30 Tab, Rfl: 2    dicyclomine (BENTYL) 10 mg capsule, Take 1 Cap by mouth three (3) times daily. , Disp: 90 Cap, Rfl: 0    gabapentin (NEURONTIN) 300 mg capsule, Take 300 mg by mouth three (3) times daily. , Disp: , Rfl:     acetaminophen (TYLENOL) 500 mg tablet, Take 1,000 mg by mouth every six (6) hours as needed for Pain., Disp: , Rfl:     naloxone (NARCAN) 4 mg/actuation nasal spray, Use 1 spray intranasally, then discard. Repeat with new spray every 2 min as needed for opioid overdose symptoms, alternating nostrils. , Disp: 1 Each, Rfl: 0    bisacodyl (DULCOLAX) 10 mg suppository, Insert 10 mg into rectum daily as needed (constipation). , Disp: 4 Suppository, Rfl: 0    ondansetron (ZOFRAN ODT) 4 mg disintegrating tablet, Take 1 Tab by mouth every eight (8) hours as needed for Nausea., Disp: 10 Tab, Rfl: 0    chlorzoxazone (PARAFON FORTE) 500 mg tablet, Take 1 Tab by mouth three (3) times daily as needed (muscle spasms). Indications: muscle spasm, Disp: 15 Tab, Rfl: 0    FIBER-CAPS, PSYLLIUM HUSK, PO, Take 2 Caps by mouth daily (after breakfast). , Disp: , Rfl:     turmeric (CURCUMIN), Take  by mouth. [facility] triple turmeric. 766 mg of turmeric/100 mg curcumin per 2 caps. Takes one po once daily. , Disp: , Rfl:     omega-3/dha/epa/fish oil (OMEGA-3 FISH OIL PO), Take 1 Cap by mouth daily. , Disp: , Rfl:     MULTIVITAMIN PO, Take 1 Tab by mouth. With iron and D3. Takes one tablet by mouth daily, Disp: , Rfl:     loratadine (CLARITIN) 10 mg tablet, Take 10 mg by mouth daily (after breakfast). , Disp: , Rfl:     Bacillus coagulans (DIGESTIVE ADVANTAGE PO), Take  by mouth. Takes one po twice daily. , Disp: , Rfl:     raNITIdine (ZANTAC) 150 mg tablet, Take 150 mg by mouth Daily (before breakfast). , Disp: , Rfl:     albuterol (PROAIR HFA) 90 mcg/actuation inhaler, Take 1-2 Puffs by inhalation as needed for Wheezing., Disp: , Rfl:      Notes on Medications:  Pt taking xifaxan 550mg 3 times per day as prescribed by GI  Stopped dicyclomine  Not taking gabapentin  Taking tynenol 1000mg every 4-5 hours  No taking IBGuard  Not using dulcolax  Not using zofran  Capsul of Metamucil 5 per day (1 serving. 10g fiber)  Tumeric (Nature's Bounty 450mg - 95% curcuminoids)  Not taking fish oil  Multivitamin (equate 1 a day women's with iron)   Not taking Claritin  Probiotic (Citlali Ash currently. Digestive Advantage when available at stores)   Not taking Zantac  Aloe Very Gel ([employer] 25mg)  Potassium Gluconate (595mg [employer] brand) - taking because her multivitamin does not contain any  Calcium 600mg (calcium carbonate - [address]) - sometimes forgets  Magnesium 400mg (Nature Made) - sometimes forgets  Vitamin C 1000mg (Angelique-C)   Gas-x     Biochemical Data:   Lab Results   Component Value Date/Time    Hemoglobin A1c 6.1 11/04/2019 03:41 PM     Lab Results   Component Value Date/Time    Sodium 136 11/14/2019 01:04 PM    Potassium 3.6 11/14/2019 01:04 PM    Chloride 102 11/14/2019 01:04 PM    CO2 28 11/14/2019 01:04 PM    Anion gap 6 11/14/2019 01:04 PM    Glucose 158 (H) 11/14/2019 01:04 PM    BUN 8 11/14/2019 01:04 PM    Creatinine 0.88 11/14/2019 01:04 PM    BUN/Creatinine ratio 9 (L) 11/14/2019 01:04 PM    GFR est AA >60 11/14/2019 01:04 PM    GFR est non-AA >60 11/14/2019 01:04 PM    Calcium 9.0 11/14/2019 01:04 PM    Bilirubin, total 0.9 11/14/2019 01:04 PM    Alk.  phosphatase 100 11/14/2019 01:04 PM    Protein, total 7.7 11/14/2019 01:04 PM    Albumin 3.3 (L) 11/14/2019 01:04 PM    Globulin 4.4 (H) 11/14/2019 01:04 PM    A-G Ratio 0.8 (L) 11/14/2019 01:04 PM    ALT (SGPT) 111 (H) 11/14/2019 01:04 PM    AST (SGOT) 56 (H) 11/14/2019 01:04 PM     Lab Results   Component Value Date/Time    Cholesterol, total 221 (H) 03/26/2019 11:18 AM    HDL Cholesterol 61 03/26/2019 11:18 AM    LDL, calculated 139 (H) 03/26/2019 11:18 AM    VLDL, calculated 21 03/26/2019 11:18 AM    Triglyceride 107 03/26/2019 11:18 AM     BP Readings from Last 3 Encounters:   10/19/20 135/87   05/14/20 121/82   11/29/19 122/60 Assessment:    pt is a 65yo female here today for help with IBS-D. Her biggest concerns are her IBS and stomach contractions. She describes the pain as worse than labor. she has passed out at times. Seeing GI. Pt suspects some food triggers but feels it can be random what foods cause issues. She tried the low FODMAP diet per suggestion of her GI but noted she had issues with some of the foods on the low-FODMAP list and was going to eat some of the high FODMAP foods anyway based on preference. Tried IBGuard supplement at recommendation of her GI, but notes unable to tolerate as it increased her reflux type symptoms. Pt had spinal fusion which limits her movement and activity. Notes her health has deteriorated over past 4 years. Just started physical therapy for her back. Notes still in pain . Allergy testing planned for in 1-2 months. She suspects food allergies or some issues related to hormones/pesticides from foods in her youth. Native [place] heritage. Prior to 45s she had issues with IBS-C, but has now been having issues with diarrhea type. GI symptoms may increase as the day goes on. Pain right above and below her belly button. Soma pain in stomach as well. 5 out of 7 days having diarrhea. Suspected Food triggers:   High fat foods - cramping  High sugar foods - cramping (or combination with high fat foods)  Large food volumes  Difficulty swallowing things of smoothie texture. Still had bloating issues. Lactose intolerant  Onions - gas bloating immediately until diarrhea  Some greens - broccoli, peas, lima beans, swiss chard, spinach - diarrhea   No issues with cooked fruit. Does not eating much. Does not eat raw vegetables except salad  Soy - feeling sick after    Egg yolks - notes it tastes like metal. Scrambled no issue. Some bloating after.     Banana - stomach pain  Pork and beef - 6-8oz regurgitation, feeling essay longer  Potatoes - stomach bloat  Dried fruit - on a day having issues of diarrhea has further issues. She suspects issues with grape skins. Some fears around food textures as possible triggers. Inconsistent. Safe foods: tomatoes, parmesan/ dry cheese, peaches, plums, rice long grain/brown, bread, nuts and seeds, peanut butter, garlic, meat generally         Food & Nutrition:   Constant feeling of pain and bloating. Minimal symptoms yesterday. B- ramen noodle soup + sesame seeds and oil added  OR pb sandwich  S- potato strings (1/2 cup fried) or saltine crackers OR  cheezits (13) OR pretzles (1-2 hard), popcorn (made on stove). L- skipped  Tyipcal: sardines/tuna oR mc D hamburger OR leftover pasta OR turkey hotdog OR soup  S- snacking over the day  D- baked chicken in gravy (2-3 oz) + lima beans (didn't finish) + egg noodles (2 cups - didn't finish) + spinach ( cooked with butter, salt, pepper, 1 cup) + 1/2 cup stuffing + cranberry sauce  S- snacking over the day  Drinks: water, [place] tea or english breakfast daily. Pt estimates typically eating 6-8oz of protein. Estimate Needs = Equation( [x] MSJ ; []  HBE; [] Tavera; [] other)  * Activity Factor (1.2) - 250   Calories: 1622  Protein: 81 Carbs: 204 Fat: 54   Kcal/day  g/day  g/day  g/day       0.8g/kg protein percent: 20  50  30               Nutrition Diagnosis Altered GI Function R/T diagnosis of IBS-D AEB pt report of constant pain and bloating with severe spasms, and diarrhea type bowel movements multiple times per day. Reported issues with high fat foods, certain vegetables especially raw, and large portions. Nutrition Intervention &  Education: Reviewed symptoms and suspected food triggers. Educated on goals of eating every 3-4 hours with mixed small meals using her safe foods list. Will adjust based on results of allergy testing when conducted.     Handouts Provided: []  Carbohydrates  []  Protein  []  Non-starchy Vegatbles  []  Food Label  []  Meal and Snack Ideas  []  Food Journals []  Diabetes  []  Cholesterol  []  Sodium  []  Gen Nutr Guidelines  []  SBGM Guidelines  []  Others:   Information Reviewed with: Pt    Readiness to Change Stage: []  Pre-contemplative    []  Contemplative  [x]  Preparation               []  Action                  []  Maintenance   Potential Barriers to Learning: []  Decline in memory    []  Language barrier   []  Other:  [x]  Emotional (in constant pain and discomfort)                 []  Limited mobility  []  Lack of motivation     [] Vision, hearing or cognitive impairment   Expected Compliance: Good      Nutritional Goal - To promote lifestyle changes to result in:    [x]  Weight loss  []  Improved diabetic control  []  Decreased cholesterol levels  []  Decreased blood pressure  []  Weight maintenance [x]  Preventing any interactions associated with IBS symptoms  []  Adequate weight gain toward goal weight  []  Other:        Patient Goals:   -eat every 3-4 hours  -choose small mixed meals using safe foods list  -keep a food and symptoms log to help identify possible trigger foods. Dietitian Signature: Maximilian Gonzalez MS, RD, CSSD Date: 11/3/2020   Follow-up: 2-4 weeks Time: 4:92 PM   Trena Mac is a 64 y.o. female being evaluated by a Virtual Visit (video visit) encounter to address concerns as mentioned above. A caregiver was present when appropriate. Due to this being a TeleHealth encounter (During [facility] public health emergency), evaluation of the following areas was limited: Nutrition Focused Physical Exam. Pursuant to the emergency declaration under the [address] [place] [address] and the [employer] General Act, this Virtual Visit was conducted with patient's (and/or legal guardian's) consent, to reduce the risk of exposure to COVID-19 and provide necessary medical care.       Services were provided through a video synchronous discussion virtually to substitute for in-person encounter. --Kya Stiles on 11/3/2020 at 3:10 PM    An electronic signature was used to authenticate this note.

## 2020-11-04 ENCOUNTER — APPOINTMENT (OUTPATIENT)
Dept: PHYSICAL THERAPY | Age: 60
End: 2020-11-04
Payer: COMMERCIAL

## 2020-11-09 ENCOUNTER — APPOINTMENT (OUTPATIENT)
Dept: PHYSICAL THERAPY | Age: 60
End: 2020-11-09
Payer: COMMERCIAL

## 2020-11-10 ENCOUNTER — OFFICE VISIT (OUTPATIENT)
Dept: INTERNAL MEDICINE CLINIC | Age: 60
End: 2020-11-10
Payer: COMMERCIAL

## 2020-11-10 VITALS
SYSTOLIC BLOOD PRESSURE: 117 MMHG | BODY MASS INDEX: 32.85 KG/M2 | HEART RATE: 82 BPM | RESPIRATION RATE: 14 BRPM | TEMPERATURE: 98.8 F | DIASTOLIC BLOOD PRESSURE: 78 MMHG | HEIGHT: 67 IN | WEIGHT: 209.3 LBS | OXYGEN SATURATION: 99 %

## 2020-11-10 DIAGNOSIS — R73.09 ELEVATED HEMOGLOBIN A1C: Primary | ICD-10-CM

## 2020-11-10 DIAGNOSIS — J32.0 MAXILLARY SINUSITIS, UNSPECIFIED CHRONICITY: ICD-10-CM

## 2020-11-10 PROCEDURE — 99214 OFFICE O/P EST MOD 30 MIN: CPT | Performed by: FAMILY MEDICINE

## 2020-11-10 RX ORDER — AZITHROMYCIN 250 MG/1
TABLET, FILM COATED ORAL
Qty: 6 TAB | Refills: 0 | Status: SHIPPED | OUTPATIENT
Start: 2020-11-10 | End: 2020-11-15

## 2020-11-10 NOTE — PROGRESS NOTES
Allergy symptoms for the past 9 days. Symptoms include: sinus pressure. There is no fever or rash. Seasonal or environmental triggers: YES. Severity: moderate. Modifiers: has not tried OTCs for relief of pain. Trend of symptoms: has worsened  Reviewed and agree with Nurse Note and duplicated in this note. Reviewed PmHx, RxHx, FmHx, SocHx, AllgHx and updated and dated in the chart.     Family History   Problem Relation Age of Onset    Delayed Awakening Mother         with anesthesia    Arthritis Mother     Other Mother         IBS/degenerative spinal disease    Anesth Problems Mother         N/V AND SLEEPY ALL DAY    COPD Father     Glaucoma Father     HIV/AIDS Sister     Heart Disease Brother         heart valve problem    Glaucoma Brother     Other Other     Heart Disease Sister         HEART VALVE DISESE        Past Medical History:   Diagnosis Date    Adverse effect of anesthesia     \"so sleepy and tired for the rest of the day\"    Anxiety     Asthma     Chronic pain     back - L5 is \"almost gone\"/left foot neuropathy -pinched nerve L4-5 - spine shifted/degenerative spinal disease/stiff and sore neck and shoulder - in PT for back and neck    Degenerative spinal arthritis     Diabetes ([facility]     PRE DIABETIC    Hypertension     IBS (irritable bowel syndrome)     Ill-defined condition     pre-diabetes    Nausea & vomiting     ? d/t fentanyl    PUD (peptic ulcer disease)     Spondylolisthesis       Social History     Socioeconomic History    Marital status: LEGALLY      Spouse name: Not on file    Number of children: Not on file    Years of education: Not on file    Highest education level: Not on file   Tobacco Use    Smoking status: Former Smoker     Packs/day: 0.50     Years: 12.00     Pack years: 6.00     Last attempt to quit: 2012     Years since quittin.9    Smokeless tobacco: Never Used    Tobacco comment: quit 2012   Substance and Sexual Activity    Alcohol use: Yes     Comment: 3-4 times a yr    Drug use: Yes     Types: Marijuana     Comment:  LAST USED IN 9/2019    Sexual activity: Yes     Partners: Female        Review of Systems - negative except as listed above      Objective:     Vitals:    11/10/20 1609   BP: 117/78   Pulse: 82   Resp: 14   Temp: 98.8 °F (37.1 °C)   TempSrc: Oral   SpO2: 99%   Weight: 209 lb 4.8 oz (94.9 kg)   Height: 5' 7\" (1.702 m)       Physical Examination: General appearance - alert, well appearing, and in no distress  Eyes - pupils equal and reactive, extraocular eye movements intact  Ears - bilateral TM's and external ear canals normal  Nose - normal and patent, no erythema, discharge or polyps  Mouth - mucous membranes moist, pharynx normal without lesions  Neck - supple, no significant adenopathy  Chest - clear to auscultation, no wheezes, rales or rhonchi, symmetric air entry  Heart - normal rate, regular rhythm, normal S1, S2, no murmurs, rubs, clicks or gallops  Abdomen - soft, nontender, nondistended, no masses or organomegaly  Musculoskeletal - no joint tenderness, deformity or swelling  Extremities - peripheral pulses normal, no pedal edema, no clubbing or cyanosis  Skin - normal coloration and turgor, no rashes, no suspicious skin lesions noted    Assessment/ Plan:   Diagnoses and all orders for this visit:    1. Elevated hemoglobin A1c  -     HEMOGLOBIN A1C WITH EAG    2. Maxillary sinusitis, unspecified chronicity    Other orders  -     azithromycin (ZITHROMAX) 250 mg tablet; Take 2 tablets today, then take 1 tablet daily                 I have discussed the diagnosis with the patient and the intended plan as seen in the above orders. The patient has received an after-visit summary and questions were answered concerning future plans.      Medication Side Effects and Warnings were discussed with patient,  Patient Labs were reviewed and or requested, and  Patient Past Records were reviewed and or requested  yes         Pt agrees to call or return to clinic and/or go to closest ER with any worsening of symptoms. This may include, but not limited to increased fever (>100.4) with NSAIDS or Tylenol, increased edema, confusion, rash, worsening of presenting symptoms. Please note that this dictation was completed with [employer], the computer voice recognition software. Quite often unanticipated grammatical, syntax, homophones, and other interpretive errors are inadvertently transcribed by the computer software. Please disregard these errors. Please excuse any errors that have escaped final proofreading. Thank you.

## 2020-11-11 LAB
EST. AVERAGE GLUCOSE BLD GHB EST-MCNC: 126 MG/DL
HBA1C MFR BLD: 6 % (ref 4.8–5.6)

## 2020-11-11 NOTE — PROGRESS NOTES
Your sugars are in the prediabetes range but are holding steady, continue the great work on diet and exercise

## 2020-11-12 ENCOUNTER — APPOINTMENT (OUTPATIENT)
Dept: PHYSICAL THERAPY | Age: 60
End: 2020-11-12
Payer: COMMERCIAL

## 2020-11-16 ENCOUNTER — HOSPITAL ENCOUNTER (OUTPATIENT)
Dept: PHYSICAL THERAPY | Age: 60
Discharge: HOME | End: 2020-11-16
Payer: COMMERCIAL

## 2020-11-16 PROCEDURE — 97110 THERAPEUTIC EXERCISES: CPT

## 2020-11-16 NOTE — PROGRESS NOTES
PT DAILY TREATMENT NOTE 2-15    Patient Name: Corets Melton  YJSK:  : 1960  [x]  Patient  Verified  Payor: /  Paulino Rizzo / Plan: [facility] [employer]AN [employer] / Product Type: Managed Care Medicaid /   In time: 1:16P  Out time: 2:20P  Total Treatment Time (min): 64  Total Timed Codes (min): 54  1:1 Treatment Time ( only): --   Visit #:  7  Treatment Area: Low back pain [M54.5]    SUBJECTIVE  Pain Level (0-10 scale): 5-6/10  Any medication changes, allergies to medications, adverse drug reactions, diagnosis change, or new procedure performed?: [x] No    [] Yes (see summary sheet for update)  Subjective functional status/changes:   [] No changes reported  Pt reports feeling much better this week. Back still hurts her a lot with a lot of swelling. Has not kept up with her HEP oneyda to being sick. Still having trouble with bending, reaching and squatting. Still has pain in her hips and numbness in her feet. Pt states that she does feel about 75% better. OBJECTIVE           Modality rationale: decrease pain and increase tissue extensibility to improve the patients ability to walk with reduced pain     Min Type Additional Details        []??? Estim: []? ? ? Att   []? ??Unatt    []? ??TENS instruct                  []? ??IFC  []? ? ?Premod   []? ??NMES                     []? ??Other:  []???w/US   []? ??w/ice   []? ??w/heat  Position:  Location:        []? ??  Traction: []??? Cervical       []? ? ?Lumbar                       []? ?? Prone          []? ? ?Supine                       []? ? ? Intermittent   []? ?? Continuous Lbs:  []??? before manual  []? ?? after manual  []? ??w/heat     []? ??  Ultrasound: []? ? ? Continuous   []? ?? Pulsed                       at: []???1MHz   []? ??3MHz Location:  W/cm2:     []??? Paraffin         Location:   []???w/heat   10 []???  Ice     [x]? ??  Heat  []? ??  Ice massage Position:  Location: back     []? ??  Laser  []? ??  Other: Position:  Location:        []? ??  Vasopneumatic Device Pressure:       []? ?? lo []? ?? med []? ?? hi   Temperature:       [x]? ?? Skin assessment post-treatment:  [x]? ??intact []? ??redness- no adverse reaction    []? ??redness  adverse reaction:      54 min Therapeutic Exercise:  [x]? ?? See flow sheet :   Rationale: increase ROM, increase strength and improve coordination to improve the patients ability to walk with reduced pain                                                                    With   [x]? ?? TE   []??? TA   []??? neuro   []? ?? other: Patient Education: [x]??? Review HEP    []? ?? Progressed/Changed HEP based on:   []??? positioning   []? ?? body mechanics   []? ?? transfers   []? ?? heat/ice application    []? ?? other:       Other Objective/Functional Measures: --         Pain Level (0-10 scale) post treatment: 5/10     ASSESSMENT/Changes in Function:   Pt advised to work within her tolerance and not over do activities at home or with her HEP. Pt reported increase in pain with today's session. Pt will FU with PT next session for reassessment. Patient will continue to benefit from skilled PT services to modify and progress therapeutic interventions, address functional mobility deficits, address ROM deficits, address strength deficits, analyze and address soft tissue restrictions and analyze and cue movement patterns to attain remaining goals.     []? ??  See Plan of Care  []? ??  See progress note/recertification  []? ??  See Discharge Summary         Progress towards goals / Updated goals:  Short Term Goals: To be accomplished in 2-4 treatments:               1.  Pt will be able to perform 10 bridge exercises without pain or difficulty NOT MET               2. Pt will be compliant with initial HEP use MET  Long Term Goals: To be accomplished in 10-12 treatments:               1. Pt will be able to perform light squat exercises without increase in back pain Progressing               2. Pt will be able to ambulate in the community without significant increase in back pain Progressing                3. Pt will be able to perform light yardwork without increase in pain Progressing.                4. Pt will be able to self-manage care using updated HEP for improved independence     PLAN  [x]???  Upgrade activities as tolerated     [x]? ??  Continue plan of care  []? ??  Update interventions per flow sheet       []???  Discharge due to:_  []???  Other:_       [place], PTA, OPTA 11/16/2020

## 2020-11-18 ENCOUNTER — HOSPITAL ENCOUNTER (OUTPATIENT)
Dept: PHYSICAL THERAPY | Age: 60
Discharge: HOME | End: 2020-11-18
Payer: COMMERCIAL

## 2020-11-18 PROCEDURE — 97110 THERAPEUTIC EXERCISES: CPT | Performed by: PHYSICAL THERAPIST

## 2020-11-18 NOTE — PROGRESS NOTES
Physical Therapy at [facility],   a part of [address] [place], [place] [id number] [address], [address] [place] [address], [address] [place] [address]  Phone: [phone] Fax: [phone]    Progress Note    Name: Dino Johnson   : 1960   MD: Padmini Hale MD       Treatment Diagnosis: Low back pain [M54.5]  Start of Care: 2020    Visits from Start of Care: 8  Missed Visits: 6    Summary of Care: Ms. Jazmyne Radford has made good progress over 8 sessions of PT. She is able to complete LE strengthening exercises and lumbo-pelvic stabilization training with only mild discomfort. She feels at least 75% improved at this time. Treatment has been interrupted/complicated by other health issues, which have slowed her progress. Will f/u 1 more session as we have given her an updated, comprehensive home strengthening program to allow her to work on her self-management of care. Short Term Goals: To be accomplished in 2-4 treatments:               1. Pt will be able to perform 10 bridge exercises without pain or difficulty PROGRESSING               2. Pt will be compliant with initial HEP use MET  Long Term Goals: To be accomplished in 10-12 treatments:               1. Pt will be able to perform light squat exercises without increase in back pain Progressing               2. Pt will be able to ambulate in the community without significant increase in back pain Progressing                3. Pt will be able to perform light yardwork without increase in pain Progressing.                4. Pt will be able to self-manage care using updated HEP for improved independence PROGRESSING    Assessment / Recommendations: Other: Plan to f/u 1 more session to assess her ability to self-manage her care. Will anthony d/c at that time. Unknown Akil PT, DPT 2020     ________________________________________________________________________  NOTE TO PHYSICIAN:  Please complete the following and fax to:   [facility]: Fax: [phone] . Jcarlos Desai Retain this original for your records. If you are unable to process this request in 24 hours, please contact our office.        ____ I have read the above report and request that my patient continue therapy with the following changes/special instructions:  ____ I have read the above report and request that my patient be discharged from therapy    Physician's Signature:_________________ Date:___________Time:__________

## 2020-11-18 NOTE — PROGRESS NOTES
PT DAILY TREATMENT NOTE 2-15    Patient Name: Tyra Grove  MSQC:  : 1960  [x]  Patient  Verified  Payor: Fanta Mcgee / Plan: [id number] HMO/CHOICE PLUS/POS / Product Type: HMO /    In time: 400p  Out time: 455p  Total Treatment Time (min): 55  Visit #: 8    Treatment Area: Low back pain [M54.5]    SUBJECTIVE  Pain Level (0-10 scale): 4-5  Any medication changes, allergies to medications, adverse drug reactions, diagnosis change, or new procedure performed?: [x] No    [] Yes (see summary sheet for update)  Subjective functional status/changes:   [] No changes reported  Doing pretty well overall     OBJECTIVE             Modality rationale: decrease pain and increase tissue extensibility to improve the patients ability to walk with reduced pain     Min Type Additional Details        []???? Estim: []?? ?? Att   []????Unatt    []????TENS instruct                  []????IFC  []????Premod   []????NMES                     []?? ??Other:  []????w/US   []? ???w/ice   []? ???w/heat  Position:  Location:        []????  Traction: []???? Cervical       []????Lumbar                       []???? Prone          []????Supine                       []?? ?? Intermittent   []???? Continuous Lbs:  []???? before manual  []???? after manual  []? ???w/heat     []????  Ultrasound: []???? Continuous   []???? Pulsed                       at: []????1MHz   []????3MHz Location:  W/cm2:     []???? Paraffin         Location:   []????w/heat   -- []????  Ice     [x]? ???  Heat  []????  Ice massage Position:  Location: back     []????  Laser  []????  Other: Position:  Location:        []????  Vasopneumatic Device Pressure:       []???? lo []???? med []???? hi   Temperature:       [x]? ??? Skin assessment post-treatment:  [x]? ???intact []? ???redness- no adverse reaction    []? ???redness  adverse reaction:      55 min Therapeutic Exercise:  [x]? ??? See flow sheet :   Rationale: increase ROM, increase strength and improve coordination to improve the patients ability to walk with reduced pain                                                                    With   [x]? ??? TE   []???? TA   []???? neuro   []???? other: Patient Education: [x]???? Review HEP    []???? Progressed/Changed HEP based on:   []???? positioning   []???? body mechanics   []???? transfers   []???? heat/ice application    []???? other:       Other Objective/Functional Measures: --         Pain Level (0-10 scale) post treatment: 5/10     ASSESSMENT/Changes in Function:   Patient will continue to benefit from skilled PT services to modify and progress therapeutic interventions, address functional mobility deficits, address ROM deficits, address strength deficits, analyze and address soft tissue restrictions and analyze and cue movement patterns to attain remaining goals.     []????  See Plan of Care  []????  See progress note/recertification  []????  See Discharge Summary         PLAN  [x]????  Upgrade activities as tolerated     [x]? ???  Continue plan of care  []????  Update interventions per flow sheet       []????  Discharge due to:_  []????  Other:Rosenda Vaughan, PT, DPT 11/18/2020

## 2020-11-20 ENCOUNTER — HOSPITAL ENCOUNTER (OUTPATIENT)
Dept: GENERAL RADIOLOGY | Age: 60
Discharge: HOME | End: 2020-11-20
Payer: COMMERCIAL

## 2020-11-20 ENCOUNTER — TRANSCRIBE ORDER (OUTPATIENT)
Dept: REGISTRATION | Age: 60
End: 2020-11-20

## 2020-11-20 DIAGNOSIS — R06.02 SHORTNESS OF BREATH: Primary | ICD-10-CM

## 2020-11-20 DIAGNOSIS — R06.02 SHORTNESS OF BREATH: ICD-10-CM

## 2020-11-20 PROCEDURE — 71046 X-RAY EXAM CHEST 2 VIEWS: CPT

## 2020-11-24 ENCOUNTER — HOSPITAL ENCOUNTER (OUTPATIENT)
Dept: PHYSICAL THERAPY | Age: 60
Discharge: HOME | End: 2020-11-24
Payer: COMMERCIAL

## 2020-11-24 PROCEDURE — 97110 THERAPEUTIC EXERCISES: CPT | Performed by: PHYSICAL THERAPIST

## 2020-11-24 NOTE — PROGRESS NOTES
PT DAILY TREATMENT NOTE 2-15    Patient Name: Shahrzad Kinney  YWY  : 1960  [x]  Patient  Verified  Payor: Vincenzo Yates / Plan: [facility] HMO/CHOICE PLUS/POS / Product Type: HMO /    In time:245p  Out time: 327p  Total Treatment Time (min): 43  Visit #: 9    Treatment Area: Low back pain [M54.5]    SUBJECTIVE  Pain Level (0-10 scale): 5  Any medication changes, allergies to medications, adverse drug reactions, diagnosis change, or new procedure performed?: [x] No    [] Yes (see summary sheet for update)  Subjective functional status/changes:   [] No changes reported  Had a flare up of her back pain the other day. Some of her lumbo-pelvic stabilization exercises and carrying a heavy box seemed to help reduce discomfort. OBJECTIVE    Modality rationale: decrease pain and increase tissue extensibility to improve the patients ability to walk with reduced pain     Min Type Additional Details        []????? Estim: []??? ? ? Att   []??? ?? Unatt    []?????TENS instruct                  []?????IFC  []??? ? ? Premod   []??? ??NMES                     []??? ?? Other:  []?????w/US   []?????w/ice   []?????w/heat  Position:  Location:        []?????  Traction: []????? Cervical       []??? ? ?Lumbar                       []????? Prone          []??? ? ? Supine                       []??? ? ? Intermittent   []??? ? ? Continuous Lbs:  []????? before manual  []????? after manual  []?????w/heat     []?????  Ultrasound: []??? ? ? Continuous   []????? Pulsed                       at: []?????1MHz   []?????3MHz Location:  W/cm2:     []????? Paraffin         Location:   []?????w/heat   -- []?????  Ice     [x]?????  Heat  []?????  Ice massage Position:  Location: back     []?????  Laser  []?????  Other: Position:  Location:        []?????  Vasopneumatic Device Pressure:       []????? lo []????? med []????? hi   Temperature:       [x]????? Skin assessment post-treatment:  [x]? ????intact []?????redness- no adverse reaction    []?????redness  adverse reaction:      43 min Therapeutic Exercise:  [x]? ???? See flow sheet :   Rationale: increase ROM, increase strength and improve coordination to improve the patients ability to walk with reduced pain                                                                    With   [x]? ???? TE   []????? TA   []????? neuro   []????? other: Patient Education: [x]????? Review HEP    []????? Progressed/Changed HEP based on:   []????? positioning   []????? body mechanics   []????? transfers   []????? heat/ice application    []????? other:       Other Objective/Functional Measures: --         Pain Level (0-10 scale) post treatment: 5/10      ASSESSMENT/Changes in Function:   Continuing to progress with lumbo-pelvic stabilization and hip strengthening exercises. She is still very challenged with these exercises. Patient will continue to benefit from skilled PT services to modify and progress therapeutic interventions, address functional mobility deficits, address ROM deficits, address strength deficits, analyze and address soft tissue restrictions and analyze and cue movement patterns to attain remaining goals. []  See Plan of Care  []  See progress note/recertification  []  See Discharge Summary         Progress towards goals / Updated goals:  Short Term Goals: To be accomplished in 2-4 treatments:               1.  Pt will be able to perform 10 bridge exercises without pain or difficulty PROGRESSING               2. Pt will be compliant with initial HEP use MET  Long Term Goals: To be accomplished in 10-12 treatments:               1. Pt will be able to perform light squat exercises without increase in back pain Progressing               2. Pt will be able to ambulate in the community without significant increase in back pain Progressing                3. Pt will be able to perform light yardwork without increase in pain Progressing.                4. Pt will be able to self-manage care using updated HEP for improved independence PROGRESSING    PLAN  [x]  Upgrade activities as tolerated     [x]  Continue plan of care  []  Update interventions per flow sheet       []  Discharge due to:_  []  Other:_      Sumanth Weiss, PT, DPT 11/24/2020

## 2020-12-01 RX ORDER — LOSARTAN POTASSIUM 50 MG/1
50 TABLET ORAL DAILY
Qty: 30 TAB | Refills: 2 | Status: SHIPPED | OUTPATIENT
Start: 2020-12-01 | End: 2020-12-24 | Stop reason: SDUPTHER

## 2020-12-14 ENCOUNTER — HOSPITAL ENCOUNTER (OUTPATIENT)
Dept: NUTRITION | Age: 60
Discharge: HOME | End: 2020-12-14
Payer: COMMERCIAL

## 2020-12-14 DIAGNOSIS — K58.0 IRRITABLE BOWEL SYNDROME WITH DIARRHEA: ICD-10-CM

## 2020-12-14 DIAGNOSIS — E66.9 OBESITY, UNSPECIFIED CLASSIFICATION, UNSPECIFIED OBESITY TYPE, UNSPECIFIED WHETHER SERIOUS COMORBIDITY PRESENT: ICD-10-CM

## 2020-12-14 PROCEDURE — 97803 MED NUTRITION INDIV SUBSEQ: CPT | Performed by: DIETITIAN, REGISTERED

## 2020-12-14 NOTE — PROGRESS NOTES
NUTRITION  FOLLOW-UP TREATMENT NOTE  Patient Name: Shama Potter         Date: 2020  : 1960    YES Patient  Verified  Diagnosis: IBS, Obesity   In time:   3:00pm           Out time:   3:30pm   Total Treatment Time (min):   30     SUBJECTIVE/ASSESSMENT  Current Wt: 207 Previous Wt: 209 Wt Change: -2     Initial Wt: 209 Total Wt change: -2 Height: 67     Changes in medication or medical history? Any new allergies, surgeries or procedures? YES   If yes, update Summary List   increased to 2 pantoprazole per day. Notes improvement in diarrhea and decreased symptoms. Seeing allergist this week [facility] [place] [facility]). Started papain enzymes. Nutrition Diagnosis        Diagnosis Status:     Altered GI Function R/T diagnosis of IBS-D AEB pt report of constant pain and bloating with severe spasms, and diarrhea type bowel movements multiple times per day. Reported issues with high fat foods, certain vegetables especially raw, and large portions. [x]  Improved []  No Change    []  Declined   []  Discontinued        Nutrition Monitoring and Evaluation: Pt notes since increasing pantoprazole, decreased issues with gas, diarrhea, and bloating stomach. More movement in general with using stairs, small chores, using bike, etc. Notes this is helping with her digestion. Able to eat cabbage, beans, kale without issues. Pt has not tried raw cabbage and kale. Notes paola lettuce still bothers her - pain and cramping within 20min or less. Pain starts above belly button. Then moves lower within 30min. Now reactions occur 1 time in 9-10days compared to daily. Notes not as bad as before but still an issue. Yellow onion causes more of an issue. No issue with braden and fully cooked onions. Notes whole family has similar issues. Increase in foods considered safe. Saturday - stomach issues came on. Feeling bloated and reduced to bland diet for the day.  Not as bad as previous. B-delano  S- none  L- none - not feeling well  S- none  D- manhattan clam chowder soup (stomach issues) - smaller portions than in the past.     Inadequate intake on days not feeling well. Pt notes overall intake is higher when feeling better. More often than in the past.     Previous Goals:  Still not eating when not hungry. -eat every 3-4 hours  Met. Able to expand since increasing pantoprazole. -choose small mixed meals using safe foods list  Met. Continued. -keep a food and symptoms log to help identify possible trigger foods. Nutrition Prescription and  Intervention Estimate Needs = Equation( [x]?? MSJ ; []??  HBE; []?? Tavera; []?? other)  * Activity Factor (1.2) - 250   Calories: 1622  Protein: 81 Carbs: 204 Fat: 54   Kcal/day  g/day  g/day  g/day          0.8g/kg protein percent: 20   50   30                      Self monitoring with food and symptoms journal to help identify food triggers. 6 small meals over the day every 3-4 hours to promote routine GI movement. Increase physical activity to promote digestion. Patient Education:  [x]  Review current plan with patient   []  Other:    Handouts/  Information Provided: []  Carbohydrates  []  Protein  []  Fiber  []  Serving Sizes  []  Fluids  []  General guidelines []  Diabetes  []  Cholesterol  []  Sodium  []  SBGM  []  Food Journals  []  Others:      Patient Goals -continue food and symptoms log  -increase intake as able towards goal of 1622 kcal per day with 0.8g protein daily.       PLAN  [x]  Continue on current plan []  Follow-up PRN   []  Discharge due to :    [x]  Next appt: 2-6 weeks     Dietitian: Argenis Bowie MS, RD, CSSD    Date: 12/14/2020 Time: 3:12 PM

## 2020-12-23 ENCOUNTER — PATIENT MESSAGE (OUTPATIENT)
Dept: INTERNAL MEDICINE CLINIC | Age: 60
End: 2020-12-23

## 2020-12-28 RX ORDER — LOSARTAN POTASSIUM 50 MG/1
50 TABLET ORAL DAILY
Qty: 30 TAB | Refills: 2 | Status: SHIPPED | OUTPATIENT
Start: 2020-12-28 | End: 2021-01-25 | Stop reason: SDUPTHER

## 2020-12-30 NOTE — ANCILLARY DISCHARGE INSTRUCTIONS
Physical Therapy at [facility],  
a part of [facility] Mac Mendez 
[facility], [address] [facility], [address] Phone: [phone]  Fax: [phone] Discharge Summary  2-15 Patient name: Daniel Matthews  : 1960  Provider#: [id number] Referral source: Dasia De La Torre MD     
Medical/Treatment Diagnosis: Low back pain [M54.5] Prior Hospitalization: see medical history Comorbidities: HTN, asthma, arthritis, scoliosis Prior Level of Function: limited function over the last 4 years, previously was able to work with tolerable pain Medications: Verified on Patient Summary List 
  
Start of Care: 2020                                                                              Onset Date: 19 (surgery)            
Visits from Start of Care: 9     Missed Visits: 6 Reporting Period : 2020 to 2020 ASSESSMENT/SUMMARY OF CARE: Ms. Bridgett North was seen for 9 sessions regarding her low back pain and weakness s/p lumbar fusion in 2019. She progressed well during that time, and significant improved her activity tolerance. She last attended PT on 2020, and will be considered discharged at this time. Short Term Goals: To be accomplished in 2-4 treatments: 
             1. Pt will be able to perform 10 bridge exercises without pain or difficulty PROGRESSING 
             2. Pt will be compliant with initial HEP use MET Long Term Goals: To be accomplished in 10-12 treatments: 
             1. Pt will be able to perform light squat exercises without increase in back pain Progressing 
             2. Pt will be able to ambulate in the community without significant increase in back pain Progressing  
             3. Pt will be able to perform light yardwork without increase in pain Progressing.  
             4. Pt will be able to self-manage care using updated HEP for improved independence PROGRESSING 
 
RECOMMENDATIONS: 
 [x]Discontinue therapy: [x]Patient has reached or is progressing toward set goals [x]Patient has abdicated 
    []Due to lack of appreciable progress towards set goals Marco Antonio Escobedo, PT, DPT 12/30/2020

## 2021-01-06 RX ORDER — FLUOXETINE 10 MG/1
10 CAPSULE ORAL DAILY
Qty: 30 CAP | Refills: 0 | Status: SHIPPED | OUTPATIENT
Start: 2021-01-06 | End: 2021-03-15 | Stop reason: ALTCHOICE

## 2021-01-25 RX ORDER — LOSARTAN POTASSIUM 50 MG/1
50 TABLET ORAL DAILY
Qty: 30 TAB | Refills: 2 | Status: SHIPPED | OUTPATIENT
Start: 2021-01-25 | End: 2021-03-15 | Stop reason: SDUPTHER

## 2021-01-25 RX ORDER — SERTRALINE HYDROCHLORIDE 25 MG/1
25 TABLET, FILM COATED ORAL DAILY
Qty: 30 TAB | Refills: 0 | Status: SHIPPED | OUTPATIENT
Start: 2021-01-25 | End: 2021-03-15 | Stop reason: ALTCHOICE

## 2021-01-29 ENCOUNTER — VIRTUAL VISIT (OUTPATIENT)
Dept: INTERNAL MEDICINE CLINIC | Age: 61
End: 2021-01-29
Payer: COMMERCIAL

## 2021-01-29 DIAGNOSIS — K58.8 OTHER IRRITABLE BOWEL SYNDROME: Primary | ICD-10-CM

## 2021-01-29 DIAGNOSIS — F41.9 ANXIETY: ICD-10-CM

## 2021-01-29 PROCEDURE — 99214 OFFICE O/P EST MOD 30 MIN: CPT | Performed by: FAMILY MEDICINE

## 2021-01-29 NOTE — PROGRESS NOTES
Speedy Nolan is a 64 y.o. female who was seen by synchronous (real-time) audio-video technology on 1/29/2021 for Abdominal Pain        Assessment & Plan:   Diagnoses and all orders for this visit:    1. Other irritable bowel syndrome    2. Anxiety            Subjective:     Patient is a 74-year-old female who is following up on Prozac for IBS and anxiety. Patient states that she had to stop Prozac because she had a itchy rash that developed all over her body. We then switched her to Zoloft which she has been taking for the last 1 day and states that it is immensely helping with her IBS and her anxiety. She like to continue his medication and denies any new rashes. Patient denies any suicidal or homicidal ideations. Prior to Admission medications    Medication Sig Start Date End Date Taking? Authorizing Provider   losartan (COZAAR) 50 mg tablet Take 1 Tab by mouth daily. 1/25/21   Denzel Torres MD   sertraline (ZOLOFT) 25 mg tablet Take 1 Tab by mouth daily. 1/25/21   Denzel Torres MD   FLUoxetine (PROzac) 10 mg capsule Take 1 Cap by mouth daily. 1/6/21   Denzel Torres MD   pantoprazole (PROTONIX) 20 mg tablet Take 20 mg by mouth daily. Provider, Historical   colestipoL (COLESTID) 1 gram tablet Take 1 g by mouth two (2) times a day. Provider, Historical   rifAXIMin (Xifaxan) 550 mg tablet Take 550 mg by mouth two (2) times a day. Provider, Historical   busPIRone (BUSPAR) 5 mg tablet Take 1 Tab by mouth daily as needed (Anxiety). 10/19/20   Denzel Torres MD   acetaminophen (TYLENOL) 500 mg tablet Take 1,000 mg by mouth every six (6) hours as needed for Pain. Christiano Novak MD   chlorzoxazone (PARAFON FORTE) 500 mg tablet Take 1 Tab by mouth three (3) times daily as needed (muscle spasms). Indications: muscle spasm 11/14/19   Kiran Traci, NP   FIBER-CAPS, PSYLLIUM HUSK, PO Take 2 Caps by mouth daily (after breakfast). Provider, Historical   turmeric (CURCUMIN) Take  by mouth. [facility] triple turmeric. 766 mg of turmeric/100 mg curcumin per 2 caps. Takes one po once daily. Provider, Historical   MULTIVITAMIN PO Take 1 Tab by mouth. With iron and D3. Takes one tablet by mouth daily    Provider, Historical   Bacillus coagulans (DIGESTIVE ADVANTAGE PO) Take  by mouth. Takes one po twice daily. Provider, Historical   albuterol (PROAIR HFA) 90 mcg/actuation inhaler Take 1-2 Puffs by inhalation as needed for Wheezing. Provider, Historical     Current Outpatient Medications   Medication Sig Dispense Refill    losartan (COZAAR) 50 mg tablet Take 1 Tab by mouth daily. 30 Tab 2    sertraline (ZOLOFT) 25 mg tablet Take 1 Tab by mouth daily. 30 Tab 0    FLUoxetine (PROzac) 10 mg capsule Take 1 Cap by mouth daily. 30 Cap 0    pantoprazole (PROTONIX) 20 mg tablet Take 20 mg by mouth daily.  colestipoL (COLESTID) 1 gram tablet Take 1 g by mouth two (2) times a day.  rifAXIMin (Xifaxan) 550 mg tablet Take 550 mg by mouth two (2) times a day.  busPIRone (BUSPAR) 5 mg tablet Take 1 Tab by mouth daily as needed (Anxiety). 30 Tab 2    acetaminophen (TYLENOL) 500 mg tablet Take 1,000 mg by mouth every six (6) hours as needed for Pain.  chlorzoxazone (PARAFON FORTE) 500 mg tablet Take 1 Tab by mouth three (3) times daily as needed (muscle spasms). Indications: muscle spasm 15 Tab 0    FIBER-CAPS, PSYLLIUM HUSK, PO Take 2 Caps by mouth daily (after breakfast).  turmeric (CURCUMIN) Take  by mouth. [facility] triple turmeric. 766 mg of turmeric/100 mg curcumin per 2 caps. Takes one po once daily.  MULTIVITAMIN PO Take 1 Tab by mouth. With iron and D3. Takes one tablet by mouth daily      Bacillus coagulans (DIGESTIVE ADVANTAGE PO) Take  by mouth. Takes one po twice daily.  albuterol (PROAIR HFA) 90 mcg/actuation inhaler Take 1-2 Puffs by inhalation as needed for Wheezing. Allergies   Allergen Reactions    Demerol [Meperidine] Nausea and Vomiting     Dizziness. Went to ER.  Egg Yolk Swelling    Fentanyl Nausea and Vomiting     Dizziness.  Lactaid [Lactase] Other (comments)     Bloating/gas    Lactose Other (comments)     Bloating/gas    Prednisone Swelling    Soy Nausea and Vomiting     Past Medical History:   Diagnosis Date    Adverse effect of anesthesia     \"so sleepy and tired for the rest of the day\"    Anxiety     Asthma     Chronic pain     back - L5 is \"almost gone\"/left foot neuropathy -pinched nerve L4-5 - spine shifted/degenerative spinal disease/stiff and sore neck and shoulder - in PT for back and neck    Degenerative spinal arthritis     Diabetes ([facility] [address]     PRE DIABETIC    Hypertension     IBS (irritable bowel syndrome)     Ill-defined condition     pre-diabetes    Nausea & vomiting     ? d/t fentanyl    PUD (peptic ulcer disease)     Spondylolisthesis      Past Surgical History:   Procedure Laterality Date    COLONOSCOPY N/A 4/25/2019    COLONOSCOPY performed by Hernán Thornton MD at [address] HX CHOLECYSTECTOMY  2006    HX DILATION AND CURETTAGE  1984    HX GI  04/2019    COLONOSCOPY    HX GI      ENDOSCOPY     Family History   Problem Relation Age of Onset    Delayed Awakening Mother         with anesthesia    Arthritis Mother     Other Mother         IBS/degenerative spinal disease    Anesth Problems Mother         N/V AND SLEEPY ALL DAY    COPD Father     Glaucoma Father     HIV/AIDS Sister     Heart Disease Brother         heart valve problem    Glaucoma Brother     Other Other     Heart Disease Sister         HEART VALVE DISESE        ROS    Objective:   No flowsheet data found.      [INSTRUCTIONS:  \"[x]\" Indicates a positive item  \"[]\" Indicates a negative item  -- DELETE ALL ITEMS NOT EXAMINED]    Constitutional: [x] Appears well-developed and well-nourished [x] No apparent distress      [] Abnormal -     Mental status: [x] Alert and awake  [x] Oriented to person/place/time [x] Able to follow commands    [] Abnormal -     Eyes:   EOM    [x]  Normal    [] Abnormal -   Sclera  [x]  Normal    [] Abnormal -          Discharge [x]  None visible   [] Abnormal -     HENT: [x] Normocephalic, atraumatic  [] Abnormal -   [x] Mouth/Throat: Mucous membranes are moist    External Ears [x] Normal  [] Abnormal -    Neck: [x] No visualized mass [] Abnormal -     Pulmonary/Chest: [x] Respiratory effort normal   [x] No visualized signs of difficulty breathing or respiratory distress        [] Abnormal -      Musculoskeletal:   [x] Normal gait with no signs of ataxia         [x] Normal range of motion of neck        [] Abnormal -     Neurological:        [x] No Facial Asymmetry (Cranial nerve 7 motor function) (limited exam due to video visit)          [x] No gaze palsy        [] Abnormal -          Skin:        [x] No significant exanthematous lesions or discoloration noted on facial skin         [] Abnormal -            Psychiatric:       [x] Normal Affect [] Abnormal -        [x] No Hallucinations    Other pertinent observable physical exam findings:-        We discussed the expected course, resolution and complications of the diagnosis(es) in detail. Medication risks, benefits, costs, interactions, and alternatives were discussed as indicated. I advised her to contact the office if her condition worsens, changes or fails to improve as anticipated. She expressed understanding with the diagnosis(es) and plan. Rolf Powell, who was evaluated through a patient-initiated, synchronous (real-time) audio-video encounter, and/or her healthcare decision maker, is aware that it is a billable service, with coverage as determined by her insurance carrier. She provided verbal consent to proceed: Yes, and patient identification was verified.  It was conducted pursuant to the emergency declaration under the [facility] [place], [address] and Response Supplemental Appropriations Act. A caregiver was present when appropriate. Ability to conduct physical exam was limited. I was at home. The patient was at home.       Michelle Gonzales MD

## 2021-02-15 ENCOUNTER — HOSPITAL ENCOUNTER (EMERGENCY)
Age: 61
Discharge: HOME | End: 2021-02-15
Attending: EMERGENCY MEDICINE | Admitting: EMERGENCY MEDICINE
Payer: MEDICAID

## 2021-02-15 ENCOUNTER — APPOINTMENT (OUTPATIENT)
Dept: CT IMAGING | Age: 61
End: 2021-02-15
Attending: EMERGENCY MEDICINE
Payer: MEDICAID

## 2021-02-15 VITALS
OXYGEN SATURATION: 96 % | TEMPERATURE: 96.9 F | RESPIRATION RATE: 16 BRPM | DIASTOLIC BLOOD PRESSURE: 84 MMHG | SYSTOLIC BLOOD PRESSURE: 143 MMHG | HEART RATE: 100 BPM

## 2021-02-15 DIAGNOSIS — R70.0 ELEVATED SED RATE: ICD-10-CM

## 2021-02-15 DIAGNOSIS — R55 SPELL OF LOSS OF CONSCIOUSNESS: Primary | ICD-10-CM

## 2021-02-15 DIAGNOSIS — R10.84 ABDOMINAL PAIN, GENERALIZED: ICD-10-CM

## 2021-02-15 DIAGNOSIS — R19.7 DIARRHEA, UNSPECIFIED TYPE: ICD-10-CM

## 2021-02-15 LAB
ALBUMIN SERPL-MCNC: 3.5 G/DL (ref 3.5–5)
ALBUMIN/GLOB SERPL: 0.8 {RATIO} (ref 1.1–2.2)
ALP SERPL-CCNC: 121 U/L (ref 45–117)
ALT SERPL-CCNC: 48 U/L (ref 12–78)
ANION GAP SERPL CALC-SCNC: 5 MMOL/L (ref 5–15)
APPEARANCE UR: CLEAR
AST SERPL-CCNC: 25 U/L (ref 15–37)
BACTERIA URNS QL MICRO: ABNORMAL /HPF
BASOPHILS # BLD: 0.1 K/UL (ref 0–0.1)
BASOPHILS NFR BLD: 1 % (ref 0–1)
BILIRUB SERPL-MCNC: 0.7 MG/DL (ref 0.2–1)
BILIRUB UR QL: NEGATIVE
BUN SERPL-MCNC: 7 MG/DL (ref 6–20)
BUN/CREAT SERPL: 9 (ref 12–20)
CALCIUM SERPL-MCNC: 8.6 MG/DL (ref 8.5–10.1)
CHLORIDE SERPL-SCNC: 108 MMOL/L (ref 97–108)
CO2 SERPL-SCNC: 28 MMOL/L (ref 21–32)
COLOR UR: ABNORMAL
COMMENT, HOLDF: NORMAL
CORTIS SERPL-MCNC: 6 UG/DL
CREAT SERPL-MCNC: 0.78 MG/DL (ref 0.55–1.02)
DIFFERENTIAL METHOD BLD: ABNORMAL
EOSINOPHIL # BLD: 0.2 K/UL (ref 0–0.4)
EOSINOPHIL NFR BLD: 2 % (ref 0–7)
EPITH CASTS URNS QL MICRO: ABNORMAL /LPF
ERYTHROCYTE [DISTWIDTH] IN BLOOD BY AUTOMATED COUNT: 13.6 % (ref 11.5–14.5)
ERYTHROCYTE [SEDIMENTATION RATE] IN BLOOD: 54 MM/HR (ref 0–30)
GLOBULIN SER CALC-MCNC: 4.5 G/DL (ref 2–4)
GLUCOSE SERPL-MCNC: 97 MG/DL (ref 65–100)
GLUCOSE UR STRIP.AUTO-MCNC: NEGATIVE MG/DL
HCT VFR BLD AUTO: 35.9 % (ref 35–47)
HGB BLD-MCNC: 11.5 G/DL (ref 11.5–16)
HGB UR QL STRIP: NEGATIVE
IMM GRANULOCYTES # BLD AUTO: 0 K/UL (ref 0–0.04)
IMM GRANULOCYTES NFR BLD AUTO: 0 % (ref 0–0.5)
KETONES UR QL STRIP.AUTO: NEGATIVE MG/DL
LEUKOCYTE ESTERASE UR QL STRIP.AUTO: NEGATIVE
LYMPHOCYTES # BLD: 2.9 K/UL (ref 0.8–3.5)
LYMPHOCYTES NFR BLD: 29 % (ref 12–49)
MAGNESIUM SERPL-MCNC: 1.8 MG/DL (ref 1.6–2.4)
MCH RBC QN AUTO: 29.6 PG (ref 26–34)
MCHC RBC AUTO-ENTMCNC: 32 G/DL (ref 30–36.5)
MCV RBC AUTO: 92.5 FL (ref 80–99)
MONOCYTES # BLD: 0.9 K/UL (ref 0–1)
MONOCYTES NFR BLD: 9 % (ref 5–13)
NEUTS SEG # BLD: 6 K/UL (ref 1.8–8)
NEUTS SEG NFR BLD: 59 % (ref 32–75)
NITRITE UR QL STRIP.AUTO: NEGATIVE
NRBC # BLD: 0 K/UL (ref 0–0.01)
NRBC BLD-RTO: 0 PER 100 WBC
PH UR STRIP: 6 [PH] (ref 5–8)
PLATELET # BLD AUTO: 280 K/UL (ref 150–400)
PMV BLD AUTO: 8.8 FL (ref 8.9–12.9)
POTASSIUM SERPL-SCNC: 3.4 MMOL/L (ref 3.5–5.1)
PROT SERPL-MCNC: 8 G/DL (ref 6.4–8.2)
PROT UR STRIP-MCNC: NEGATIVE MG/DL
RBC # BLD AUTO: 3.88 M/UL (ref 3.8–5.2)
RBC #/AREA URNS HPF: ABNORMAL /HPF (ref 0–5)
SAMPLES BEING HELD,HOLD: NORMAL
SODIUM SERPL-SCNC: 141 MMOL/L (ref 136–145)
SP GR UR REFRACTOMETRY: <1.005
UR CULT HOLD, URHOLD: NORMAL
UROBILINOGEN UR QL STRIP.AUTO: 1 EU/DL (ref 0.2–1)
WBC # BLD AUTO: 10 K/UL (ref 3.6–11)
WBC URNS QL MICRO: ABNORMAL /HPF (ref 0–4)

## 2021-02-15 PROCEDURE — 85025 COMPLETE CBC W/AUTO DIFF WBC: CPT

## 2021-02-15 PROCEDURE — 74011000636 HC RX REV CODE- 636: Performed by: EMERGENCY MEDICINE

## 2021-02-15 PROCEDURE — 85652 RBC SED RATE AUTOMATED: CPT

## 2021-02-15 PROCEDURE — 82533 TOTAL CORTISOL: CPT

## 2021-02-15 PROCEDURE — 74177 CT ABD & PELVIS W/CONTRAST: CPT

## 2021-02-15 PROCEDURE — 80053 COMPREHEN METABOLIC PANEL: CPT

## 2021-02-15 PROCEDURE — 81001 URINALYSIS AUTO W/SCOPE: CPT

## 2021-02-15 PROCEDURE — 99281 EMR DPT VST MAYX REQ PHY/QHP: CPT

## 2021-02-15 PROCEDURE — 36415 COLL VENOUS BLD VENIPUNCTURE: CPT

## 2021-02-15 PROCEDURE — 83735 ASSAY OF MAGNESIUM: CPT

## 2021-02-15 RX ADMIN — IOPAMIDOL 100 ML: 755 INJECTION, SOLUTION INTRAVENOUS at 12:03

## 2021-02-15 NOTE — ED PROVIDER NOTES
HPI .  Patient has a history of chronic back pain, L4-L5 sacral fusion, hypertension, prediabetes, peptic ulcer disease, anxiety, and irritable bowel syndrome. Patient had back surgery and a cholecystectomy in recent years. She reports 3 years of similar less severe episodes about twice a year. Patient says she was eating a bland meal.  She then had explosive liquid diarrhea, lower abdominal pain, sweating, followed by syncope. Patient was quite diaphoretic. She had some movements of her upper body for \"seconds\". She woke up and was not confused. She then vomited. She was helped to her bed by friends. She then felt quite cold. Patient feels \"off\" today. She is having some vague trouble thinking and her lower abdominal pain has persisted. Patient is followed for this problem by her primary care doctor and a gastroenterologist.  She was recently started on a tricyclic and SSRI but developed a rash. Patient reports that the above medications helped with her GI symptoms prior to the rash.     Past Medical History:   Diagnosis Date    Adverse effect of anesthesia     \"so sleepy and tired for the rest of the day\"    Anxiety     Asthma     Chronic pain     back - L5 is \"almost gone\"/left foot neuropathy -pinched nerve L4-5 - spine shifted/degenerative spinal disease/stiff and sore neck and shoulder - in PT for back and neck    Degenerative spinal arthritis     Diabetes ([facility] [address]     PRE DIABETIC    Hypertension     IBS (irritable bowel syndrome)     Ill-defined condition     pre-diabetes    Nausea & vomiting     ? d/t fentanyl    PUD (peptic ulcer disease)     Spondylolisthesis        Past Surgical History:   Procedure Laterality Date    COLONOSCOPY N/A 4/25/2019    COLONOSCOPY performed by Loyda Hutchison MD at [address] [facility] HX CHOLECYSTECTOMY  2006    HX [id number] [facility]    HX GI  04/2019    COLONOSCOPY    HX GI      ENDOSCOPY         Family History: Problem Relation Age of Onset    Delayed Awakening Mother         with anesthesia    Arthritis Mother     Other Mother         IBS/degenerative spinal disease    Anesth Problems Mother         N/V AND SLEEPY ALL DAY    COPD Father     Glaucoma Father     HIV/AIDS Sister     Heart Disease Brother         heart valve problem    Glaucoma Brother     Other Other     Heart Disease Sister         HEART VALVE DISESE        Social History     Socioeconomic History    Marital status: LEGALLY      Spouse name: Not on file    Number of children: Not on file    Years of education: Not on file    Highest education level: Not on file   Occupational History    Not on file   Social Needs    Financial resource strain: Not on file    Food insecurity     Worry: Not on file     Inability: Not on file    Transportation needs     Medical: Not on file     Non-medical: Not on file   Tobacco Use    Smoking status: Former Smoker     Packs/day: 0.50     Years: 12.00     Pack years: 6.00     Quit date: 2012     Years since quittin.2    Smokeless tobacco: Never Used    Tobacco comment: quit    Substance and Sexual Activity    Alcohol use: Yes     Comment: 3-4 times a yr    Drug use: Yes     Types: Marijuana     Comment:  LAST USED IN 2019    Sexual activity: Yes     Partners: Female   Lifestyle    Physical activity     Days per week: Not on file     Minutes per session: Not on file    Stress: Not on file   Relationships    Social connections     Talks on phone: Not on file     Gets together: Not on file     Attends [employer] service: Not on file     Active member of club or organization: Not on file     Attends meetings of clubs or organizations: Not on file     Relationship status: Not on file    Intimate partner violence     Fear of current or ex partner: Not on file     Emotionally abused: Not on file     Physically abused: Not on file     Forced sexual activity: Not on file   Other Topics Concern    Not on file   Social History Narrative    Not on file         ALLERGIES: Demerol [meperidine], Egg yolk, Fentanyl, Lactaid [lactase], Lactose, Prednisone, Prozac [fluoxetine], Soy, and Zoloft [sertraline]    Review of Systems   Constitutional: Positive for chills. HENT: Negative for congestion. Eyes: Negative for redness. Respiratory: Negative for shortness of breath. Cardiovascular: Negative for chest pain. Gastrointestinal: Positive for abdominal pain, diarrhea and vomiting. Genitourinary: Negative for difficulty urinating. Neurological: Negative for speech difficulty. Psychiatric/Behavioral: Negative for agitation and behavioral problems. Vitals:    02/15/21 1030   BP: (!) 143/84   Pulse: 100   Resp: 16   Temp: 96.9 °F (36.1 °C)   SpO2: 96%            Physical Exam  Vitals signs and nursing note reviewed. Constitutional:       Appearance: She is well-developed. HENT:      Head: Normocephalic and atraumatic. Eyes:      Pupils: Pupils are equal, round, and reactive to light. Neck:      Musculoskeletal: Normal range of motion and neck supple. Cardiovascular:      Rate and Rhythm: Normal rate and regular rhythm. Heart sounds: Normal heart sounds. No murmur. No friction rub. No gallop. Pulmonary:      Effort: Pulmonary effort is normal. No respiratory distress. Breath sounds: No wheezing or rales. Abdominal:      Palpations: Abdomen is soft. Tenderness: There is no abdominal tenderness. There is no rebound. Comments: Mild lower abdominal tenderness   Musculoskeletal: Normal range of motion. General: No tenderness. Skin:     Findings: No erythema. Comments: Nondescript macular papular rash on arms and across abdomen   Neurological:      Mental Status: She is alert. Cranial Nerves: No cranial nerve deficit.       Comments: Motor; symmetric   Psychiatric:         Behavior: Behavior normal.          MDM       Procedures

## 2021-02-15 NOTE — ED TRIAGE NOTES
Pt stated she had \"colon spasms\" and then passes out and then has a seizure, this happened last pm, pt stated she has had colon spasms for 10 years, was put on Prozac and Zoloft for it but it caused a rash

## 2021-02-22 ENCOUNTER — OFFICE VISIT (OUTPATIENT)
Dept: INTERNAL MEDICINE CLINIC | Age: 61
End: 2021-02-22
Payer: MEDICAID

## 2021-02-22 VITALS
WEIGHT: 212 LBS | SYSTOLIC BLOOD PRESSURE: 125 MMHG | OXYGEN SATURATION: 95 % | DIASTOLIC BLOOD PRESSURE: 74 MMHG | BODY MASS INDEX: 33.27 KG/M2 | HEIGHT: 67 IN | RESPIRATION RATE: 16 BRPM | HEART RATE: 94 BPM | TEMPERATURE: 98.1 F

## 2021-02-22 DIAGNOSIS — F41.9 ANXIETY: ICD-10-CM

## 2021-02-22 DIAGNOSIS — K58.9 IRRITABLE BOWEL SYNDROME, UNSPECIFIED TYPE: ICD-10-CM

## 2021-02-22 DIAGNOSIS — L50.9 URTICARIA: Primary | ICD-10-CM

## 2021-02-22 PROCEDURE — 99214 OFFICE O/P EST MOD 30 MIN: CPT | Performed by: FAMILY MEDICINE

## 2021-02-22 RX ORDER — DULOXETIN HYDROCHLORIDE 20 MG/1
20 CAPSULE, DELAYED RELEASE ORAL DAILY
Qty: 30 CAP | Refills: 0 | Status: SHIPPED | OUTPATIENT
Start: 2021-02-22 | End: 2021-03-15 | Stop reason: SDUPTHER

## 2021-02-22 NOTE — PROGRESS NOTES
Chief Complaint   Patient presents with   [facility] Follow Up     Patient is here for a hospital follow up. she is a 64y.o. year old female who presents for evaluation of spastic colon. Patient states that she went to the ER on 15 February due to: Spasms cause pain, patient states that she is passed out and that she had a seizure. Patient states this was witnessed by a friend, was seen at the ER and had normal work-up. She has tried 2 SSRIs which helps her spasms but possibly caused a rash. She admits to slight anxiety and depression but denies any suicidal ideations. Reviewed and agree with Nurse Note and duplicated in this note. Reviewed PmHx, RxHx, FmHx, SocHx, AllgHx and updated and dated in the chart.     Family History   Problem Relation Age of Onset    Delayed Awakening Mother         with anesthesia    Arthritis Mother     Other Mother         IBS/degenerative spinal disease    Anesth Problems Mother         N/V AND SLEEPY ALL DAY    COPD Father     Glaucoma Father     HIV/AIDS Sister     Heart Disease Brother         heart valve problem    Glaucoma Brother     Other Other     Heart Disease Sister         HEART VALVE DISESE        Past Medical History:   Diagnosis Date    Adverse effect of anesthesia     \"so sleepy and tired for the rest of the day\"    Anxiety     Asthma     Chronic pain     back - L5 is \"almost gone\"/left foot neuropathy -pinched nerve L4-5 - spine shifted/degenerative spinal disease/stiff and sore neck and shoulder - in PT for back and neck    Degenerative spinal arthritis     Diabetes ([address]     PRE DIABETIC    Hypertension     IBS (irritable bowel syndrome)     Ill-defined condition     pre-diabetes    Nausea & vomiting     ? d/t fentanyl    PUD (peptic ulcer disease)     Spondylolisthesis       Social History     Socioeconomic History    Marital status: LEGALLY      Spouse name: Not on file    Number of children: Not on file    Years of education: Not on file    Highest education level: Not on file   Tobacco Use    Smoking status: Former Smoker     Packs/day: 0.50     Years: 12.00     Pack years: 6.00     Quit date: 2012     Years since quittin.2    Smokeless tobacco: Never Used    Tobacco comment: quit    Substance and Sexual Activity    Alcohol use: Yes     Comment: 3-4 times a yr    Drug use: Yes     Types: Marijuana     Comment:  LAST USED IN 2019    Sexual activity: Yes     Partners: Female        Review of Systems - negative except as listed above      Objective:     Vitals:    21 1546   BP: 125/74   Pulse: 94   Resp: 16   Temp: 98.1 °F (36.7 °C)   SpO2: 95%   Weight: 212 lb (96.2 kg)   Height: 5' 7\" (1.702 m)       Physical Examination: General appearance - alert, well appearing, and in no distress  Eyes - pupils equal and reactive, extraocular eye movements intact  Ears - bilateral TM's and external ear canals normal  Nose - normal and patent, no erythema, discharge or polyps  Mouth - mucous membranes moist, pharynx normal without lesions  Neck - supple, no significant adenopathy  Chest - clear to auscultation, no wheezes, rales or rhonchi, symmetric air entry  Heart - normal rate, regular rhythm, normal S1, S2, no murmurs, rubs, clicks or gallops  Abdomen - soft, nontender, nondistended, no masses or organomegaly  Musculoskeletal - no joint tenderness, deformity or swelling  Extremities - peripheral pulses normal, no pedal edema, no clubbing or cyanosis  Skin - normal coloration and turgor, no rashes, no suspicious skin lesions noted     Assessment/ Plan:   Diagnoses and all orders for this visit:    1. Urticaria  -     REFERRAL TO DERMATOLOGY    2. Anxiety    3. Irritable bowel syndrome, unspecified type    Other orders  -     DULoxetine (CYMBALTA) 20 mg capsule; Take 1 Cap by mouth daily. I have discussed the diagnosis with the patient and the intended plan as seen in the above orders.   The patient has received an after-visit summary and questions were answered concerning future plans. Medication Side Effects and Warnings were discussed with patient,  Patient Labs were reviewed and or requested, and  Patient Past Records were reviewed and or requested  yes       Pt agrees to call or return to clinic and/or go to closest ER with any worsening of symptoms. This may include, but not limited to increased fever (>100.4) with NSAIDS or Tylenol, increased edema, confusion, rash, worsening of presenting symptoms. Please note that this dictation was completed with [employer], the computer voice recognition software. Quite often unanticipated grammatical, syntax, homophones, and other interpretive errors are inadvertently transcribed by the computer software. Please disregard these errors. Please excuse any errors that have escaped final proofreading. Thank you.

## 2021-03-15 ENCOUNTER — VIRTUAL VISIT (OUTPATIENT)
Dept: INTERNAL MEDICINE CLINIC | Age: 61
End: 2021-03-15
Payer: MEDICAID

## 2021-03-15 DIAGNOSIS — Z91.09 ENVIRONMENTAL ALLERGIES: ICD-10-CM

## 2021-03-15 DIAGNOSIS — F41.9 ANXIETY: Primary | ICD-10-CM

## 2021-03-15 PROCEDURE — 99214 OFFICE O/P EST MOD 30 MIN: CPT | Performed by: FAMILY MEDICINE

## 2021-03-15 RX ORDER — LEVOCETIRIZINE DIHYDROCHLORIDE 5 MG/1
2.5 TABLET, FILM COATED ORAL DAILY
Qty: 30 TAB | Refills: 2 | Status: SHIPPED | OUTPATIENT
Start: 2021-03-15 | End: 2021-10-06 | Stop reason: ALTCHOICE

## 2021-03-15 RX ORDER — LOSARTAN POTASSIUM 50 MG/1
50 TABLET ORAL DAILY
Qty: 90 TAB | Refills: 2 | Status: SHIPPED | OUTPATIENT
Start: 2021-03-15 | End: 2021-12-27 | Stop reason: ALTCHOICE

## 2021-03-15 RX ORDER — DULOXETIN HYDROCHLORIDE 20 MG/1
20 CAPSULE, DELAYED RELEASE ORAL DAILY
Qty: 90 CAP | Refills: 1 | Status: SHIPPED | OUTPATIENT
Start: 2021-03-15 | End: 2021-07-02

## 2021-03-15 NOTE — PROGRESS NOTES
Maya Ashley is a 64 y.o. female who was seen by synchronous (real-time) audio-video technology on 3/15/2021 for Anxiety and Abdominal Pain        Assessment & Plan:   Diagnoses and all orders for this visit:    1. Anxiety    2. Environmental allergies    Other orders  -     levocetirizine (XYZAL) 5 mg tablet; Take 0.5 Tabs by mouth daily. -     losartan (COZAAR) 50 mg tablet; Take 1 Tab by mouth daily.  -     DULoxetine (CYMBALTA) 20 mg capsule; Take 1 Cap by mouth daily. Will continue with current dose of Cymbalta 20 mg and clinic in 3 months or recheck. May consider tapering off if needed        Subjective:   Patient is a 51-year-old female who started Cymbalta 2 weeks ago for IBS related symptoms and also anxiety. Patient states that this medication has been tolerated well with minimal side effects. She is currently happy with the effects that has on her GI system as is controlling her IBS symptoms. She denies any depression or suicidal ideations. Prior to Admission medications    Medication Sig Start Date End Date Taking? Authorizing Provider   DULoxetine (CYMBALTA) 20 mg capsule Take 1 Cap by mouth daily. 2/22/21   Therese Valladares MD   losartan (COZAAR) 50 mg tablet Take 1 Tab by mouth daily. 1/25/21   Therese Valladares MD   sertraline (ZOLOFT) 25 mg tablet Take 1 Tab by mouth daily. 1/25/21   Therese Valladares MD   FLUoxetine (PROzac) 10 mg capsule Take 1 Cap by mouth daily. 1/6/21   Therese Valladares MD   pantoprazole (PROTONIX) 20 mg tablet Take 20 mg by mouth daily. Provider, Historical   colestipoL (COLESTID) 1 gram tablet Take 1 g by mouth two (2) times a day. Provider, Historical   rifAXIMin (Xifaxan) 550 mg tablet Take 550 mg by mouth two (2) times a day. Provider, Historical   busPIRone (BUSPAR) 5 mg tablet Take 1 Tab by mouth daily as needed (Anxiety). 10/19/20   Therese Valladares MD   acetaminophen (TYLENOL) 500 mg tablet Take 1,000 mg by mouth every six (6) hours as needed for Pain. Bryant Nickerson MD   chlorzoxazone (PARAFON FORTE) 500 mg tablet Take 1 Tab by mouth three (3) times daily as needed (muscle spasms). Indications: muscle spasm 11/14/19   Virgie Unger NP   FIBER-CAPS, PSYLLIUM HUSK, PO Take 2 Caps by mouth daily (after breakfast). Provider, Historical   turmeric (CURCUMIN) Take  by mouth. [facility] triple turmeric. 766 mg of turmeric/100 mg curcumin per 2 caps. Takes one po once daily. Provider, Historical   MULTIVITAMIN PO Take 1 Tab by mouth. With iron and D3. Takes one tablet by mouth daily    Provider, Historical   Bacillus coagulans (DIGESTIVE ADVANTAGE PO) Take  by mouth. Takes one po twice daily. Provider, Historical   albuterol (PROAIR HFA) 90 mcg/actuation inhaler Take 1-2 Puffs by inhalation as needed for Wheezing. Provider, Historical     Current Outpatient Medications   Medication Sig Dispense Refill    levocetirizine (XYZAL) 5 mg tablet Take 0.5 Tabs by mouth daily. 30 Tab 2    losartan (COZAAR) 50 mg tablet Take 1 Tab by mouth daily. 90 Tab 2    DULoxetine (CYMBALTA) 20 mg capsule Take 1 Cap by mouth daily. 90 Cap 1    pantoprazole (PROTONIX) 20 mg tablet Take 20 mg by mouth daily.  colestipoL (COLESTID) 1 gram tablet Take 1 g by mouth two (2) times a day.  rifAXIMin (Xifaxan) 550 mg tablet Take 550 mg by mouth two (2) times a day.  busPIRone (BUSPAR) 5 mg tablet Take 1 Tab by mouth daily as needed (Anxiety). 30 Tab 2    acetaminophen (TYLENOL) 500 mg tablet Take 1,000 mg by mouth every six (6) hours as needed for Pain.  chlorzoxazone (PARAFON FORTE) 500 mg tablet Take 1 Tab by mouth three (3) times daily as needed (muscle spasms). Indications: muscle spasm 15 Tab 0    FIBER-CAPS, PSYLLIUM HUSK, PO Take 2 Caps by mouth daily (after breakfast).  turmeric (CURCUMIN) Take  by mouth. [facility] triple turmeric. 766 mg of turmeric/100 mg curcumin per 2 caps. Takes one po once daily.  MULTIVITAMIN PO Take 1 Tab by mouth. With iron and D3. Takes one tablet by mouth daily      Bacillus coagulans (DIGESTIVE ADVANTAGE PO) Take  by mouth. Takes one po twice daily.  albuterol (PROAIR HFA) 90 mcg/actuation inhaler Take 1-2 Puffs by inhalation as needed for Wheezing. Allergies   Allergen Reactions    Demerol [Meperidine] Nausea and Vomiting     Dizziness. Went to ER.  Egg Yolk Swelling    Fentanyl Nausea and Vomiting     Dizziness.     Lactaid [Lactase] Other (comments)     Bloating/gas    Lactose Other (comments)     Bloating/gas    Prednisone Swelling    Prozac [Fluoxetine] Rash    Soy Nausea and Vomiting    Zoloft [Sertraline] Rash     Past Medical History:   Diagnosis Date    Adverse effect of anesthesia     \"so sleepy and tired for the rest of the day\"    Anxiety     Asthma     Chronic pain     back - L5 is \"almost gone\"/left foot neuropathy -pinched nerve L4-5 - spine shifted/degenerative spinal disease/stiff and sore neck and shoulder - in PT for back and neck    Degenerative spinal arthritis     Diabetes ([facility] [address]     PRE DIABETIC    Hypertension     IBS (irritable bowel syndrome)     Ill-defined condition     pre-diabetes    Nausea & vomiting     ? d/t fentanyl    PUD (peptic ulcer disease)     Spondylolisthesis      Past Surgical History:   Procedure Laterality Date    COLONOSCOPY N/A 4/25/2019    COLONOSCOPY performed by Hema Willis MD at [address] HX CHOLECYSTECTOMY  2006    HX DILATION AND CURETTAGE  1984    HX GI  04/2019    COLONOSCOPY    HX GI      ENDOSCOPY     Family History   Problem Relation Age of Onset    Delayed Awakening Mother         with anesthesia    Arthritis Mother     Other Mother         IBS/degenerative spinal disease    Anesth Problems Mother         N/V AND SLEEPY ALL DAY    COPD Father     Glaucoma Father     HIV/AIDS Sister     Heart Disease Brother         heart valve problem    Glaucoma Brother     Other Other     Heart Disease Sister         HEART VALVE DISESE        ROS    Objective:   No flowsheet data found. [INSTRUCTIONS:  \"[x]\" Indicates a positive item  \"[]\" Indicates a negative item  -- DELETE ALL ITEMS NOT EXAMINED]    Constitutional: [x] Appears well-developed and well-nourished [x] No apparent distress      [] Abnormal -     Mental status: [x] Alert and awake  [x] Oriented to person/place/time [x] Able to follow commands    [] Abnormal -     Eyes:   EOM    [x]  Normal    [] Abnormal -   Sclera  [x]  Normal    [] Abnormal -          Discharge [x]  None visible   [] Abnormal -     HENT: [x] Normocephalic, atraumatic  [] Abnormal -   [x] Mouth/Throat: Mucous membranes are moist    External Ears [x] Normal  [] Abnormal -    Neck: [x] No visualized mass [] Abnormal -     Pulmonary/Chest: [x] Respiratory effort normal   [x] No visualized signs of difficulty breathing or respiratory distress        [] Abnormal -      Musculoskeletal:   [x] Normal gait with no signs of ataxia         [x] Normal range of motion of neck        [] Abnormal -     Neurological:        [x] No Facial Asymmetry (Cranial nerve 7 motor function) (limited exam due to video visit)          [x] No gaze palsy        [] Abnormal -          Skin:        [x] No significant exanthematous lesions or discoloration noted on facial skin         [] Abnormal -            Psychiatric:       [x] Normal Affect [] Abnormal -        [x] No Hallucinations    Other pertinent observable physical exam findings:-        We discussed the expected course, resolution and complications of the diagnosis(es) in detail. Medication risks, benefits, costs, interactions, and alternatives were discussed as indicated. I advised her to contact the office if her condition worsens, changes or fails to improve as anticipated. She expressed understanding with the diagnosis(es) and plan. Ana Rosa Pavon, was evaluated through a synchronous (real-time) audio-video encounter.  The patient (or guardian if applicable) is aware that this is a billable service. Verbal consent to proceed has been obtained within the past 12 months. The visit was conducted pursuant to the emergency declaration under the [address] [place] [address] authority and the [employer] General Act. Patient identification was verified, and a caregiver was present when appropriate. The patient was located in a state where the provider was credentialed to provide care.       Elijah Horton MD

## 2021-06-08 DIAGNOSIS — R56.9 SEIZURE-LIKE ACTIVITY (HCC): Primary | ICD-10-CM

## 2021-06-15 ENCOUNTER — VIRTUAL VISIT (OUTPATIENT)
Dept: INTERNAL MEDICINE CLINIC | Age: 61
End: 2021-06-15
Payer: MEDICAID

## 2021-06-15 DIAGNOSIS — M51.36 DDD (DEGENERATIVE DISC DISEASE), LUMBAR: ICD-10-CM

## 2021-06-15 DIAGNOSIS — F41.9 ANXIETY: Primary | ICD-10-CM

## 2021-06-15 DIAGNOSIS — F51.01 PRIMARY INSOMNIA: ICD-10-CM

## 2021-06-15 PROCEDURE — 99214 OFFICE O/P EST MOD 30 MIN: CPT | Performed by: FAMILY MEDICINE

## 2021-06-15 RX ORDER — ZOLPIDEM TARTRATE 5 MG/1
5 TABLET ORAL
Qty: 15 TABLET | Refills: 0 | Status: SHIPPED | OUTPATIENT
Start: 2021-06-15 | End: 2021-10-13

## 2021-06-15 RX ORDER — LIDOCAINE 50 MG/G
PATCH TOPICAL
Qty: 1 EACH | Refills: 0 | Status: SHIPPED | OUTPATIENT
Start: 2021-06-15 | End: 2021-10-06 | Stop reason: SDUPTHER

## 2021-06-15 NOTE — PROGRESS NOTES
Cherelle Mora is a 63 y.o. female who was seen by synchronous (real-time) audio-video technology on 6/15/2021 for Anxiety        Assessment & Plan:   Diagnoses and all orders for this visit:    1. Anxiety  We will continue Cymbalta 20 mg  2. Primary insomnia  -     zolpidem (AMBIEN) 5 mg tablet; Take 1 Tablet by mouth nightly as needed for Sleep. Max Daily Amount: 5 mg. 3. DDD (degenerative disc disease), lumbar  Follow-up with orthopedics    -     lidocaine (LIDODERM) 5 %; Apply patch to the affected area for 12 hours a day and remove for 12 hours a day. Follow-up as needed        Subjective:      Patient is a 51-year-old female who is following up on anxiety. Patient states that she is taking her Cymbalta 20 mg with no side effects noted. She is comfortable with this dose and like to continue. She denies any suicidal homicidal ideations. Patient states that after her back surgery she still has pain that goes along the midline low back and it is 10 out of 10 at times. Patient states she continues with her home stretches and exercises. She states that she reached out to orthopedics with a cannot get her into August.  She will try getting on a cancellation list if possible  Patient also complains of insomnia, states that she tried melatonin but that gets her to be feeling groggy in the morning. Patient has not tried any other medications for sleep in the past.  States that she has trouble trying to fall asleep and also wakes up multiple times throughout the night. She is also fatigued at this time    Prior to Admission medications    Medication Sig Start Date End Date Taking? Authorizing Provider   levocetirizine (XYZAL) 5 mg tablet Take 0.5 Tabs by mouth daily. 3/15/21   Junior Shepard MD   losartan (COZAAR) 50 mg tablet Take 1 Tab by mouth daily. 3/15/21   Junior Shepard MD   DULoxetine (CYMBALTA) 20 mg capsule Take 1 Cap by mouth daily.  3/15/21   Junior Shepard MD   pantoprazole (PROTONIX) 20 mg tablet Take 20 mg by mouth daily. Provider, Historical   colestipoL (COLESTID) 1 gram tablet Take 1 g by mouth two (2) times a day. Provider, Historical   rifAXIMin (Xifaxan) 550 mg tablet Take 550 mg by mouth two (2) times a day. Provider, Historical   busPIRone (BUSPAR) 5 mg tablet Take 1 Tab by mouth daily as needed (Anxiety). 10/19/20   Christella Hammans, MD   acetaminophen (TYLENOL) 500 mg tablet Take 1,000 mg by mouth every six (6) hours as needed for Pain. Maureen Pandey MD   chlorzoxazone (PARAFON FORTE) 500 mg tablet Take 1 Tab by mouth three (3) times daily as needed (muscle spasms). Indications: muscle spasm 11/14/19   Ajay Jasso NP   FIBER-CAPS, PSYLLIUM HUSK, PO Take 2 Caps by mouth daily (after breakfast). Provider, Historical   turmeric (CURCUMIN) Take  by mouth. [facility] triple turmeric. 766 mg of turmeric/100 mg curcumin per 2 caps. Takes one po once daily. Provider, Historical   MULTIVITAMIN PO Take 1 Tab by mouth. With iron and D3. Takes one tablet by mouth daily    Provider, Historical   Bacillus coagulans (DIGESTIVE ADVANTAGE PO) Take  by mouth. Takes one po twice daily. Provider, Historical   albuterol (PROAIR HFA) 90 mcg/actuation inhaler Take 1-2 Puffs by inhalation as needed for Wheezing. Provider, Historical     Patient Active Problem List    Diagnosis Date Noted    Spinal stenosis, lumbar 11/11/2019    Elevated hemoglobin A1c 11/05/2019     Current Outpatient Medications   Medication Sig Dispense Refill    levocetirizine (XYZAL) 5 mg tablet Take 0.5 Tabs by mouth daily. 30 Tab 2    losartan (COZAAR) 50 mg tablet Take 1 Tab by mouth daily. 90 Tab 2    DULoxetine (CYMBALTA) 20 mg capsule Take 1 Cap by mouth daily. 90 Cap 1    pantoprazole (PROTONIX) 20 mg tablet Take 20 mg by mouth daily.  colestipoL (COLESTID) 1 gram tablet Take 1 g by mouth two (2) times a day.  rifAXIMin (Xifaxan) 550 mg tablet Take 550 mg by mouth two (2) times a day.       acetaminophen (TYLENOL) 500 mg tablet Take 1,000 mg by mouth every six (6) hours as needed for Pain.  chlorzoxazone (PARAFON FORTE) 500 mg tablet Take 1 Tab by mouth three (3) times daily as needed (muscle spasms). Indications: muscle spasm 15 Tab 0    FIBER-CAPS, PSYLLIUM HUSK, PO Take 2 Caps by mouth daily (after breakfast).  turmeric (CURCUMIN) Take  by mouth. [facility] triple turmeric. 766 mg of turmeric/100 mg curcumin per 2 caps. Takes one po once daily.  MULTIVITAMIN PO Take 1 Tab by mouth. With iron and D3. Takes one tablet by mouth daily      Bacillus coagulans (DIGESTIVE ADVANTAGE PO) Take  by mouth. Takes one po twice daily.  albuterol (PROAIR HFA) 90 mcg/actuation inhaler Take 1-2 Puffs by inhalation as needed for Wheezing. Allergies   Allergen Reactions    Demerol [Meperidine] Nausea and Vomiting     Dizziness. Went to ER.  Egg Yolk Swelling    Fentanyl Nausea and Vomiting     Dizziness.     Lactaid [Lactase] Other (comments)     Bloating/gas    Lactose Other (comments)     Bloating/gas    Prednisone Swelling    Prozac [Fluoxetine] Rash    Soy Nausea and Vomiting    Zoloft [Sertraline] Rash     Past Medical History:   Diagnosis Date    Adverse effect of anesthesia     \"so sleepy and tired for the rest of the day\"    Anxiety     Asthma     Chronic pain     back - L5 is \"almost gone\"/left foot neuropathy -pinched nerve L4-5 - spine shifted/degenerative spinal disease/stiff and sore neck and shoulder - in PT for back and neck    Degenerative spinal arthritis     Diabetes ([address]     PRE DIABETIC    Hypertension     IBS (irritable bowel syndrome)     Ill-defined condition     pre-diabetes    Nausea & vomiting     ? d/t fentanyl    PUD (peptic ulcer disease)     Spondylolisthesis      Past Surgical History:   Procedure Laterality Date    COLONOSCOPY N/A 4/25/2019    COLONOSCOPY performed by Jacobo Brand MD at [address] [facility] CHOLECYSTECTOMY  2006    HX DILATION AND CURETTAGE  1984    HX GI  2019    COLONOSCOPY    HX GI      ENDOSCOPY     Family History   Problem Relation Age of Onset    Delayed Awakening Mother         with anesthesia    Arthritis Mother     Other Mother         IBS/degenerative spinal disease    Anesth Problems Mother         N/V AND SLEEPY ALL DAY    COPD Father     Glaucoma Father     HIV/AIDS Sister     Heart Disease Brother         heart valve problem    Glaucoma Brother     Other Other     Heart Disease Sister         HEART VALVE DISESE      Social History     Tobacco Use    Smoking status: Former Smoker     Packs/day: 0.50     Years: 12.00     Pack years: 6.00     Quit date: 2012     Years since quittin.5    Smokeless tobacco: Never Used    Tobacco comment: quit    Substance Use Topics    Alcohol use: Yes     Comment: 3-4 times a yr       ROS    Objective:     Patient-Reported Vitals 2021   Patient-Reported Weight 211   Patient-Reported Height 5'8\"   Patient-Reported Pulse 97   Patient-Reported Temperature 98.7   Patient-Reported SpO2 92   Patient-Reported Systolic  478   Patient-Reported Diastolic 93        [INSTRUCTIONS:  \"[x]\" Indicates a positive item  \"[]\" Indicates a negative item  -- DELETE ALL ITEMS NOT EXAMINED]    Constitutional: [x] Appears well-developed and well-nourished [x] No apparent distress      [] Abnormal -     Mental status: [x] Alert and awake  [x] Oriented to person/place/time [x] Able to follow commands    [] Abnormal -     Eyes:   EOM    [x]  Normal    [] Abnormal -   Sclera  [x]  Normal    [] Abnormal -          Discharge [x]  None visible   [] Abnormal -     HENT: [x] Normocephalic, atraumatic  [] Abnormal -   [x] Mouth/Throat: Mucous membranes are moist    External Ears [x] Normal  [] Abnormal -    Neck: [x] No visualized mass [] Abnormal -     Pulmonary/Chest: [x] Respiratory effort normal   [x] No visualized signs of difficulty breathing or respiratory distress        [] Abnormal -      Musculoskeletal:   [x] Normal gait with no signs of ataxia         [x] Normal range of motion of neck        [] Abnormal -     Neurological:        [x] No Facial Asymmetry (Cranial nerve 7 motor function) (limited exam due to video visit)          [x] No gaze palsy        [] Abnormal -          Skin:        [x] No significant exanthematous lesions or discoloration noted on facial skin         [] Abnormal -            Psychiatric:       [x] Normal Affect [] Abnormal -        [x] No Hallucinations    Other pertinent observable physical exam findings:-        We discussed the expected course, resolution and complications of the diagnosis(es) in detail. Medication risks, benefits, costs, interactions, and alternatives were discussed as indicated. I advised her to contact the office if her condition worsens, changes or fails to improve as anticipated. She expressed understanding with the diagnosis(es) and plan. Kharirichie Almeida, was evaluated through a synchronous (real-time) audio-video encounter. The patient (or guardian if applicable) is aware that this is a billable service. Verbal consent to proceed has been obtained within the past 12 months. The visit was conducted pursuant to the emergency declaration under the [facility], [address] [place] [address] authority and the [employer] General Act. Patient identification was verified, and a caregiver was present when appropriate. The patient was located in a state where the provider was credentialed to provide care.       Cheko Peña MD

## 2021-07-02 ENCOUNTER — OFFICE VISIT (OUTPATIENT)
Dept: NEUROLOGY | Age: 61
End: 2021-07-02
Payer: MEDICAID

## 2021-07-02 VITALS
SYSTOLIC BLOOD PRESSURE: 126 MMHG | BODY MASS INDEX: 32.31 KG/M2 | DIASTOLIC BLOOD PRESSURE: 90 MMHG | WEIGHT: 213.2 LBS | HEIGHT: 68 IN | OXYGEN SATURATION: 98 % | HEART RATE: 88 BPM

## 2021-07-02 DIAGNOSIS — R55 CONVULSIVE SYNCOPE: ICD-10-CM

## 2021-07-02 DIAGNOSIS — R55 VASOVAGAL SYNCOPE: Primary | ICD-10-CM

## 2021-07-02 DIAGNOSIS — G89.29 ABDOMINAL PAIN, CHRONIC, LEFT UPPER QUADRANT: ICD-10-CM

## 2021-07-02 DIAGNOSIS — R10.12 ABDOMINAL PAIN, CHRONIC, LEFT UPPER QUADRANT: ICD-10-CM

## 2021-07-02 PROCEDURE — 99204 OFFICE O/P NEW MOD 45 MIN: CPT | Performed by: PSYCHIATRY & NEUROLOGY

## 2021-07-02 RX ORDER — CHOLESTYRAMINE 4 G/5.5G
4 POWDER, FOR SUSPENSION ORAL DAILY
COMMUNITY
End: 2021-10-13

## 2021-07-02 RX ORDER — DICYCLOMINE HYDROCHLORIDE 10 MG/1
10 CAPSULE ORAL
Status: ON HOLD | COMMUNITY
End: 2022-03-23

## 2021-07-02 RX ORDER — MONTELUKAST SODIUM 10 MG/1
10 TABLET ORAL DAILY
COMMUNITY
End: 2021-10-06 | Stop reason: ALTCHOICE

## 2021-07-02 RX ORDER — FLUTICASONE FUROATE AND VILANTEROL TRIFENATATE 100; 25 UG/1; UG/1
1 POWDER RESPIRATORY (INHALATION) DAILY
COMMUNITY
End: 2021-10-06 | Stop reason: ALTCHOICE

## 2021-07-02 NOTE — PROGRESS NOTES
Jose Puckett ([birth year]) is a 63 y.o. female, new patient, here for evaluation of the following     Chief complaint(s):   Chief Complaint   Patient presents with    Seizure     2-3 years ago. Happens every 4-6- weeks.  LOW BACK PAIN     s/p fusion 2019.  Neuropathy     started before back surgery- approx 2019       HPI: 63 y.o. female      Patient is referred by GI/ Dr Hugh Parker MD for evaluation of seizure-like episodes    Pt reports has hx of IBS and 20-30 mins after eating, colon starts spasming, severe pain, runs to bathroom, sweating profusely, \"sounds start going away\"/ things sound distant, cloud vision/ dark vision. She says her friend witnesses these episodes. She passes out, tongue hangs out, moves all extremities, lasting 2-3 mins, then can hear friend calling her. Feels very fatigued x 3-4 days afterwards. Reports she has these spells started happening over past 3-4 years, 1-2 times a month, always associated with abdominal pain. Has had the episodes of abdominal pain and sweating x 14 yrs. No hx of seizure. Review of Systems: Abdominal pain, anxiety, diarrhea, frequent headaches, hearing problems, high blood pressure, memory loss, neuropathy, shortness of breath, nausea vomiting, seizures, vision problem, falls, joint pain, fatigue     ==================================================    Allergies   Allergen Reactions    Cat Dander Runny Nose    Demerol [Meperidine] Nausea and Vomiting     Dizziness. Went to ER.  Egg Yolk Swelling    Fentanyl Nausea and Vomiting     Dizziness.     Grass Pollen Runny Nose    House Dust Mite Not Reported This Time    Lactaid [Lactase] Other (comments)     Bloating/gas    Lactose Other (comments)     Bloating/gas    Mold Shortness of Breath    Prednisone Swelling    Prozac [Fluoxetine] Rash    Soy Nausea and Vomiting    Zoloft [Sertraline] Rash       Current Outpatient Medications   Medication Sig Dispense Refill    dicyclomine (BENTYL) 10 mg capsule Take 10 mg by mouth every six (6) hours as needed for Abdominal Cramps.  montelukast (SINGULAIR) 10 mg tablet Take 10 mg by mouth daily.  cholestyramine-aspartame (Prevalite) 4 gram packet Take 4 g by mouth daily.  fluticasone furoate-vilanteroL (Breo Ellipta) 100-25 mcg/dose inhaler Take 1 Puff by inhalation daily.  zolpidem (AMBIEN) 5 mg tablet Take 1 Tablet by mouth nightly as needed for Sleep. Max Daily Amount: 5 mg. 15 Tablet 0    lidocaine (LIDODERM) 5 % Apply patch to the affected area for 12 hours a day and remove for 12 hours a day. 1 Each 0    levocetirizine (XYZAL) 5 mg tablet Take 0.5 Tabs by mouth daily. 30 Tab 2    losartan (COZAAR) 50 mg tablet Take 1 Tab by mouth daily. 90 Tab 2    chlorzoxazone (PARAFON FORTE) 500 mg tablet Take 1 Tab by mouth three (3) times daily as needed (muscle spasms). Indications: muscle spasm 15 Tab 0    MULTIVITAMIN PO Take 1 Tab by mouth. With iron and D3.  Takes one tablet by mouth daily         Past Medical History:   Diagnosis Date    Adverse effect of anesthesia     \"so sleepy and tired for the rest of the day\"    Anxiety     Asthma     Chronic pain     back - L5 is \"almost gone\"/left foot neuropathy -pinched nerve L4-5 - spine shifted/degenerative spinal disease/stiff and sore neck and shoulder - in PT for back and neck    Degenerative spinal arthritis     Diabetes ([address]     PRE DIABETIC    Hypertension     IBS (irritable bowel syndrome)     Ill-defined condition     pre-diabetes    Nausea & vomiting     ? d/t fentanyl    PUD (peptic ulcer disease)     Spondylolisthesis        Past Surgical History:   Procedure Laterality Date    COLONOSCOPY N/A 4/25/2019    COLONOSCOPY performed by Paty Zhu MD at [address] [facility] HX CHOLECYSTECTOMY  2006    HX [address] [facility]    HX GI  04/2019    COLONOSCOPY    HX GI      ENDOSCOPY       family history includes Anesth Problems in her mother; Arthritis in her mother; COPD in her father; Delayed Awakening in her mother; Glaucoma in her brother and father; HIV/AIDS in her sister; Heart Disease in her brother and sister; Other in her mother and another family member. reports that she quit smoking about 8 years ago. She has a 6.00 pack-year smoking history. She has never used smokeless tobacco. She reports current alcohol use. She reports current drug use. Drug: Marijuana. PHYSICAL EXAM    Vitals:    07/02/21 0759   BP: (!) 126/90   Pulse: 88   Height: 5' 8\" (1.727 m)   Weight: 96.7 kg (213 lb 3.2 oz)   SpO2: 98%       General: Head: atraumatic. Eyes: Conjunctivae clear. Vascular/ Carotid Arteries: not examined. Extremities: no edema. Skin: no rashes. Neurologic Exam:  Speech/ Language: normal.   Alertness: oriented person, place, situation. CNs: Smell: not tested. Visual Fields (II): full to confrontation. Pupils (II): equal, round, reactive to light. Funduscopic: not examined, not relevant. Extraocular movements (III, IV, VI): conjugate in all directions, no KATIE. Ptosis (III, VII): none. Facial Sensation (V): intact to LT on both sides. Facial Movements (VII): symmetric at rest and on activation. Hearing (VIII): normal.  Soft palate elevation (IX): symmetic. Shoulder shrug (XI): symmetric, strong. Tongue protrusion (XII): midline      Motor:  5/5 in all exts. Sensory: intact LT, temp, vibration in all exts. Cerebellar: no resting, postural, or intention tremors; normal FNF bilateral.  Mild difficulty with heel-shin bilateral due to back pain. Deep Tendon Reflexes:  1+ symmetric. Plantar response: neutral bilateral.  Gait: normal.  Romberg: negative    ==================================================    ASSESSMENT/ PLAN:       ICD-10-CM ICD-9-CM    1. Vasovagal syncope  R55 780.2 EEG AMB NEURO      MRI BRAIN W WO CONT   2.  Convulsive syncope  R55 780.2 EEG AMB NEURO     780.39 MRI BRAIN W WO CONT   3. Abdominal pain, chronic, left upper quadrant  R10.12 789.02     G89.29 338.29         By history, vasovagal syncope followed by convulsion (convulsive syncope). Will check EEG to exclude any underlying seizure discharges and MRI Brain to look for any structural abnormalities. AED not indicated if no seizure discharges found. Pt was requesting Neurology help on managing her abdominal pain which is the trigger of her spells. D/w her Neurology cannot help that issue as that's not within our field of practice. She will continue working with GI regarding abdominal pain. Follow up in 6 weeks to go over EEG, MRI Brain results        An electronic signature was used to authenticate this note.   -- Alida Pleitez MD

## 2021-07-19 ENCOUNTER — OFFICE VISIT (OUTPATIENT)
Dept: NEUROLOGY | Age: 61
End: 2021-07-19
Payer: MEDICAID

## 2021-07-19 DIAGNOSIS — R55 VASOVAGAL SYNCOPE: Primary | ICD-10-CM

## 2021-07-19 PROCEDURE — 95816 EEG AWAKE AND DROWSY: CPT | Performed by: PSYCHIATRY & NEUROLOGY

## 2021-07-19 NOTE — PROCEDURES
Name:   Yassine Cao  Procedure:   EEG     Location:   Clinic    Date of Recordin21    Date of Interpretation:    21    Interpreting Physician: Jared Hernandez MD     Indication:  63 y.o. female with episodes of severe abdominal pain followed by passing out/ convulsing    Current medications: has a current medication list which includes the following prescription(s): dicyclomine, montelukast, prevalite, breo ellipta, zolpidem, lidocaine, levocetirizine, losartan, chlorzoxazone, and multivitamin. Technical: Digital EEG recorded in 10-20 international placement system, multiple montages    Interpretation:  Patient is awake at onset of this recording. The background pattern in symmetric, low to medium in amplitude with the PDR being 8 to 9 Hz bilateral, better seen on the left than the right. Photic stimulation results in a symmetric driving response. Hyperventilation was not performed. Drowsiness and sleep were not recorded. Single lead EKG was normal.  There were no epileptiform discharges, focal areas of slowing, or electrographic seizures during this recording. There is mild excessive beta activity during the recording which may be due to oily scalp as noted by technician, or medication effect. Impression: Essentially normal awake EEG. No seizure discharges were seen. Mild excess of beta activity which may be due to medication effect or orally scalp as noted above.   Clinical correlation is necessary

## 2021-08-10 ENCOUNTER — HOSPITAL ENCOUNTER (OUTPATIENT)
Dept: MRI IMAGING | Age: 61
Discharge: HOME | End: 2021-08-10
Attending: PSYCHIATRY & NEUROLOGY
Payer: MEDICAID

## 2021-08-10 VITALS — WEIGHT: 213 LBS | BODY MASS INDEX: 32.39 KG/M2

## 2021-08-10 DIAGNOSIS — R55 VASOVAGAL SYNCOPE: ICD-10-CM

## 2021-08-10 DIAGNOSIS — R55 CONVULSIVE SYNCOPE: ICD-10-CM

## 2021-08-10 PROCEDURE — 70553 MRI BRAIN STEM W/O & W/DYE: CPT

## 2021-08-10 PROCEDURE — 74011250636 HC RX REV CODE- 250/636: Performed by: RADIOLOGY

## 2021-08-10 PROCEDURE — A9575 INJ GADOTERATE MEGLUMI 0.1ML: HCPCS | Performed by: RADIOLOGY

## 2021-08-10 RX ORDER — GADOTERATE MEGLUMINE 376.9 MG/ML
18 INJECTION INTRAVENOUS
Status: COMPLETED | OUTPATIENT
Start: 2021-08-10 | End: 2021-08-10

## 2021-08-10 RX ADMIN — GADOTERATE MEGLUMINE 18 ML: 376.9 INJECTION INTRAVENOUS at 15:48

## 2021-08-12 ENCOUNTER — OFFICE VISIT (OUTPATIENT)
Dept: NEUROLOGY | Age: 61
End: 2021-08-12
Payer: MEDICAID

## 2021-08-12 VITALS
BODY MASS INDEX: 32.58 KG/M2 | WEIGHT: 215 LBS | RESPIRATION RATE: 16 BRPM | HEIGHT: 68 IN | SYSTOLIC BLOOD PRESSURE: 122 MMHG | DIASTOLIC BLOOD PRESSURE: 80 MMHG | HEART RATE: 90 BPM | OXYGEN SATURATION: 99 %

## 2021-08-12 DIAGNOSIS — R55 VASOVAGAL SYNCOPE: ICD-10-CM

## 2021-08-12 DIAGNOSIS — R55 CONVULSIVE SYNCOPE: ICD-10-CM

## 2021-08-12 DIAGNOSIS — G43.719 INTRACTABLE CHRONIC MIGRAINE WITHOUT AURA AND WITHOUT STATUS MIGRAINOSUS: Primary | ICD-10-CM

## 2021-08-12 PROCEDURE — 99214 OFFICE O/P EST MOD 30 MIN: CPT | Performed by: PSYCHIATRY & NEUROLOGY

## 2021-08-12 RX ORDER — ALBUTEROL SULFATE 90 UG/1
2 AEROSOL, METERED RESPIRATORY (INHALATION)
COMMUNITY

## 2021-08-12 RX ORDER — PANTOPRAZOLE SODIUM 40 MG/1
40 TABLET, DELAYED RELEASE ORAL 2 TIMES DAILY
COMMUNITY
Start: 2021-07-14 | End: 2022-07-12 | Stop reason: SDUPTHER

## 2021-08-12 RX ORDER — DULOXETIN HYDROCHLORIDE 20 MG/1
20 CAPSULE, DELAYED RELEASE ORAL 2 TIMES DAILY
COMMUNITY
End: 2022-10-10 | Stop reason: ALTCHOICE

## 2021-08-12 RX ORDER — TOPIRAMATE 25 MG/1
25 TABLET ORAL 2 TIMES DAILY
Qty: 60 TABLET | Refills: 1 | Status: SHIPPED | OUTPATIENT
Start: 2021-08-12 | End: 2021-10-13 | Stop reason: SDUPTHER

## 2021-08-12 RX ORDER — AZELASTINE 1 MG/ML
1 SPRAY, METERED NASAL 2 TIMES DAILY
Status: ON HOLD | COMMUNITY
Start: 2021-07-28 | End: 2022-10-31

## 2021-08-12 RX ORDER — CETIRIZINE HCL 10 MG
10 TABLET ORAL DAILY
COMMUNITY
Start: 2021-08-05 | End: 2022-10-10 | Stop reason: ALTCHOICE

## 2021-08-12 NOTE — LETTER
6/49/7512    Patient: Nikki Au   YOB: 1960   Date of Visit: 8/12/2021     Kathrin Braswell MD  [facility]    Dear Kathrin Braswell MD,      Thank you for referring Ms. Nikki Au to [facility] for evaluation. My notes for this consultation are attached. If you have questions, please do not hesitate to call me. I look forward to following your patient along with you.       Sincerely,    Connie Ansari MD

## 2021-08-12 NOTE — PROGRESS NOTES
Axel Gipson ([birth year]) is a 63 y.o. female, established patient, here for evaluation of the following     Chief complaint(s):   Chief Complaint   Patient presents with    Follow-up     after EEG and MRI, patient states she has a headache today       SUBJECTIVE/ OBJECTIVE:    HPI: 63 y.o. female following up to go over EEG, MRI results regarding complaints of seizure-like episodes, always preceded by abdominal pain. See brief hx below for full hx.     EEG (7-19-21): Essentially normal awake EEG. No seizure discharges were seen. Mild excess of beta activity which may be due to medication effect or oily scalp as noted above (noted by EEG tech). Clinical correlation is necessary. MRI Brain (8-10-21): 1. No evident seizure focus. No significant intracranial abnormality. Discussed above results with patient. Reviewed with her that the episodes of abdominal pain followed by passing out (either with or without convulsive activity after passing out), or are nonepileptic seizures, they are vasovagal episodes. She expresses understanding. She reports she has frequent headaches, multiple migraine days per week. We discussed if she would like to start on a daily headache preventive to see if it reduces her headache frequency. She wanted to do that. He has other complaints of chronic neck pain and chronic back pain (history of lumbar spine surgery), pain going down either leg. PCP has referred her to see Dr. Gaby Luong (whom she saw earlier today) and she has plans to start PT for her back. D/w her I will defer her to her orthospine doctor regarding her back pain neck pain complaints    Review of Systems: as above    ========================================    Brief Hx:     From initial visit dated 7-21-21:     Patient is referred by GI/ Dr Reather Meckel, MD for evaluation of seizure-like episodes.   Pt reports has hx of IBS and 20-30 mins after eating, colon starts spasming, severe pain, runs to bathroom, sweating profusely, \"sounds start going away\"/ things sound distant, cloud vision/ dark vision. She says her friend witnesses these episodes. She passes out, tongue hangs out, moves all extremities, lasting 2-3 mins, then can hear friend calling her. Feels very fatigued x 3-4 days afterwards. Reports she has these spells started happening over past 3-4 years, 1-2 times a month, always associated with abdominal pain. Has had the episodes of abdominal pain and sweating x 14 yrs. No hx of seizure. Impression/ Plan: By history, vasovagal syncope followed by convulsion (convulsive syncope). Will check EEG to exclude any underlying seizure discharges and MRI Brain to look for any structural abnormalities. AED not indicated if no seizure discharges found. Pt was requesting Neurology help on managing her abdominal pain which is the trigger of her spells. D/w her Neurology cannot help that issue as that's not within our field of practice. She will continue working with GI regarding abdominal pain. Follow up in 6 weeks to go over EEG, MRI Brain results    Allergies   Allergen Reactions    Cat Dander Runny Nose    Demerol [Meperidine] Nausea and Vomiting     Dizziness. Went to ER.  Egg Yolk Swelling    Fentanyl Nausea and Vomiting     Dizziness.  Grass Pollen Runny Nose    House Dust Mite Not Reported This Time    Lactaid [Lactase] Other (comments)     Bloating/gas    Lactose Other (comments)     Bloating/gas    Mold Shortness of Breath    Prednisone Swelling    Prozac [Fluoxetine] Rash    Soy Nausea and Vomiting    Zoloft [Sertraline] Rash         Current Outpatient Medications:     cetirizine (ZYRTEC) 10 mg tablet, , Disp: , Rfl:     DULoxetine (CYMBALTA) 20 mg capsule, Take 20 mg by mouth daily.  2 capsules by mouth daily   Indications: anxiousness associated with depression, Disp: , Rfl:     azelastine (ASTELIN) 137 mcg (0.1 %) nasal spray, , Disp: , Rfl:    pantoprazole (PROTONIX) 40 mg tablet, , Disp: , Rfl:     albuterol (ProAir HFA) 90 mcg/actuation inhaler, Take  by inhalation. , Disp: , Rfl:     topiramate (TOPAMAX) 25 mg tablet, Take 1 Tablet by mouth two (2) times a day., Disp: 60 Tablet, Rfl: 1    dicyclomine (BENTYL) 10 mg capsule, Take 10 mg by mouth every six (6) hours as needed for Abdominal Cramps., Disp: , Rfl:     cholestyramine-aspartame (Prevalite) 4 gram packet, Take 4 g by mouth daily. , Disp: , Rfl:     lidocaine (LIDODERM) 5 %, Apply patch to the affected area for 12 hours a day and remove for 12 hours a day., Disp: 1 Each, Rfl: 0    losartan (COZAAR) 50 mg tablet, Take 1 Tab by mouth daily. , Disp: 90 Tab, Rfl: 2    MULTIVITAMIN PO, Take 1 Tab by mouth. With iron and D3. Takes one tablet by mouth daily, Disp: , Rfl:     montelukast (SINGULAIR) 10 mg tablet, Take 10 mg by mouth daily. , Disp: , Rfl:     fluticasone furoate-vilanteroL (Breo Ellipta) 100-25 mcg/dose inhaler, Take 1 Puff by inhalation daily. , Disp: , Rfl:     zolpidem (AMBIEN) 5 mg tablet, Take 1 Tablet by mouth nightly as needed for Sleep. Max Daily Amount: 5 mg. (Patient not taking: Reported on 2021), Disp: 15 Tablet, Rfl: 0    levocetirizine (XYZAL) 5 mg tablet, Take 0.5 Tabs by mouth daily. (Patient not taking: Reported on 2021), Disp: 30 Tab, Rfl: 2    chlorzoxazone (PARAFON FORTE) 500 mg tablet, Take 1 Tab by mouth three (3) times daily as needed (muscle spasms). Indications: muscle spasm, Disp: 15 Tab, Rfl: 0     has a past medical history of Adverse effect of anesthesia, Anxiety, Asthma, Chronic pain, Degenerative spinal arthritis, Diabetes ([address] Hypertension, IBS (irritable bowel syndrome), Ill-defined condition, Nausea & vomiting, PUD (peptic ulcer disease), and Spondylolisthesis.      has a past surgical history that includes colonoscopy (N/A, 2019); hx cholecystectomy (); hx gi (2019); hx gi; hx  section (); and hx dilation and mary (1984). Physical Exam:    Vitals:    08/12/21 1526   BP: 122/80   BP 1 Location: Left lower arm   BP Patient Position: Sitting   Pulse: 90   Resp: 16   Height: 5' 8\" (1.727 m)   Weight: 97.5 kg (215 lb)   SpO2: 99%     No exam performed today    ========================================    ASSESSMENT/ PLAN:       ICD-10-CM ICD-9-CM    1. Intractable chronic migraine without aura and without status migrainosus  G43.719 346.71 topiramate (TOPAMAX) 25 mg tablet   2. Vasovagal syncope  R55 780.2    3. Convulsive syncope  R55 780.2      780.39         For frequent headaches, started pt on Topamax 25 mg twice a day, to reduce headache frequency. Common SEFx discussed    Vasovagal syncope due to abdominal pain (associated convulsion, ie convulsive syncope): no seizure discharges on EEG, MRI brain normal for age. Chronic back pain/ neck pain: defer to PCP and Orthopedics    F/u in 2 months re headaches      An electronic signature was used to authenticate this note.   -- Kyle Dixon MD

## 2021-08-12 NOTE — PROGRESS NOTES
Chief Complaint   Patient presents with    Follow-up     after EEG and MRI, patient states she has a headache today     Visit Vitals  /80 (BP 1 Location: Left lower arm, BP Patient Position: Sitting)   Pulse 90   Resp 16   Ht 5' 8\" (1.727 m)   Wt 97.5 kg (215 lb)   SpO2 99%   BMI 32.69 kg/m²

## 2021-08-31 ENCOUNTER — HOSPITAL ENCOUNTER (OUTPATIENT)
Dept: PHYSICAL THERAPY | Age: 61
Discharge: HOME | End: 2021-08-31
Payer: MEDICAID

## 2021-08-31 LAB — EF %, EXTERNAL: NORMAL

## 2021-08-31 PROCEDURE — 97110 THERAPEUTIC EXERCISES: CPT | Performed by: PHYSICAL THERAPIST

## 2021-08-31 PROCEDURE — 97161 PT EVAL LOW COMPLEX 20 MIN: CPT | Performed by: PHYSICAL THERAPIST

## 2021-08-31 NOTE — PROGRESS NOTES
Physical Therapy at [facility],   a part of [facility]  [address] [address], [address] [place] [address]  Phone: [phone]  Fax: [phone]    Plan of Care/Statement of Necessity for Physical Therapy Services  2-15    Patient name: Jad Hernandez  : 1960  Provider#: [id number]  Referral source: Osiris Ramos MD      Medical/Treatment Diagnosis: Lower back pain [M54.5]     Prior Hospitalization: see medical history     Comorbidities: HTN, asthma, arthritis, scoliosis  Prior Level of Function: limited function over the last 4 years, previously was able to work with tolerable pain  Medications: Verified on Patient Summary List    Start of Care: 21      Onset Date: 19 (surgery)       The Plan of Care and following information is based on the information from the initial evaluation. Assessment/ key information: Patient returns to PT with ongoing low back pain stemming from chronic debilitation and subsequent midline lumbar fusion L4-5 performed on 19. She reports continued low back pain and LE pain that has not improved over the last year, though feels her strength has been improving. Demonstrating signs of a leg length discrepancy, and though this could just be due to hip weakness/muscle imbalance, she did respond well to trial with heel lift today. Discussed wear schedule to see if this helps reduced discomfort. Will focus on hip/SI joint stability and strength, as well as low back pain mitigation and management.      Evaluation Complexity History HIGH Complexity :3+ comorbidities / personal factors will impact the outcome/ POC ; Examination HIGH Complexity : 4+ Standardized tests and measures addressing body structure, function, activity limitation and / or participation in recreation  ;Presentation LOW Complexity : Stable, uncomplicated  ;Clinical Decision Making MEDIUM Complexity : FOTO score of 26-74  Overall Complexity Rating: LOW Problem List: pain affecting function, decrease ROM, decrease strength, decrease ADL/ functional abilitiies, decrease activity tolerance and decrease flexibility/ joint mobility   Treatment Plan may include any combination of the following: Therapeutic exercise, Therapeutic activities, Neuromuscular re-education, Physical agent/modality, Manual therapy, Patient education and Self Care training   Patient / Family readiness to learn indicated by: asking questions and interest  Persons(s) to be included in education: patient (P)  Barriers to Learning/Limitations: None  Patient Goal (s): less pain when moving  Patient Self Reported Health Status: fair  Rehabilitation Potential: fair    Short Term Goals: To be accomplished in 2-4 treatments:                1. Pt will be compliant with initial HEP use and PT attendance  [address] [address] be accomplished in 10-12 treatments:               1. Pt will be able to stand for an hour without significant limitations               2. Pt will be able to ambulate in the community without significant increase in back pain               3. Pt will be able to perform light yardwork without increase in pain               4. Pt will be able to self-manage care using updated HEP for improved independence   5. Improved FOTO score to 47 or better to demonstrate improved function    Frequency / Duration: Patient to be seen 2 times per week for 10-12 treatments. Patient/ Caregiver education and instruction: self care and exercises    [x]  Plan of care has been reviewed with STACEY Bartlett PT, DPT 8/31/2021     ________________________________________________________________________    I certify that the above Therapy Services are being furnished while the patient is under my care. I agree with the treatment plan and certify that this therapy is necessary.     Physician's Signature:____________________  Date:____________Time: _________      Sumit Neumann MD

## 2021-08-31 NOTE — PROGRESS NOTES
PT INITIAL EVALUATION NOTE 2-15    Patient Name: Maurice Dallas  JAZD:[id number]  : 1960  [x]  Patient  Verified  Payor: Kellie Whipple / Plan: [facility] COMMUNITY PLAN [facility] / Product Type: Managed Care Medicaid /    In time: 1220p  Out time:105p  Total Treatment Time (min): 45  Visit #: 1     Treatment Area: Lower back pain [M54.5]    SUBJECTIVE  Pain Level (0-10 scale): 4  Any medication changes, allergies to medications, adverse drug reactions, diagnosis change, or new procedure performed?: [] No    [x] Yes (see summary sheet for update)  Subjective:     Patient returns to PT with ongoing low back pain stemming from L4-5 lumbar fusion performed in 2019. She reports she is still having difficulty with some ADLs, including washing herself in the shower and lifting activities. Feels that she is making some improvements with her strength and feels that is better than last year. Reports her neuropathy pain is about the same as last year, affecting L>R sides. She is also having some issues right now with her IBS. She is having seizure-like episodes with loss of consciousness (vasovagal syncope). She has seen a neurologist who has r/o a seizure disorder. Last episode of this was back in . She reports her biggest issue right now is her back pain, which then radiates around her hips and down her legs. Back pain averages about a 3/10. Does avoid some of her yard work due to concerns about her leg pain. She noticed around August she started to get a little bit better and her activity tolerance has improved significantly.      Description of symptoms: back pain, centrally  Aggravating Factors: cleaning, squatting/bending to ground, ADLs  Alleviating Factors: heating pad and meds     Patient reports functional limitations with: prolonged standing activities, yardwork, bending, twisting activities.      OBJECTIVE/EXAMINATION  Posture: standing in slight trendelenberg on L  Other Observations: incision healed well post-operatively  Gait and Functional Mobility: \"stilting\" slightly on L LE, appears L LE longer (mild)  Palpation: TTP over bilateral PSIS and sacral base in particular, extending up into bilateral paraspinals                                                     Lumbar AROM:                                                                                               R                      L                        Flexion                                     WFLs               Extension                                75% loss                                               Side Bending                           25% loss 50% loss                                                                 LOWER QUARTER                            MUSCLE STRENGTH  KEY                                                                             R                      L  0 - No Contraction                   L1, L2 Psoas               4+                     4+  1 - Trace                                  L3 Quads                    5                      5  2 - Poor                                   L4 Tib Ant                    4+                      4+  3 - Fair                                     L5 EHL                        --                      --  4 - Good                                  S1 Peroneals              4                      4+  5 - Normal                               S2 Hams                     4+                     4+           MMT: R hip: 4+/5 ER, 4-/5 abd              L hip: 4/5 ER, 4-/5 abd     Neurological: Reflexes / Sensations: reports reduced sensation L LE with light touch.  Particularly over L5-S1 dermatomes     Special Tests:               Forward bend: WFLs, with mild pain on return, no aberrant movement              Slump: (-)                                Sacral position: R rotation, mild   Leg Length: L: 88.5cm R: 87cm    10 min Therapeutic Exercise:  [x] See flow sheet : review of existing HEP, implemented new HEP   Rationale: increase ROM and increase strength to improve the patients ability to perform ADLs without pain          With   [] TE   [] TA   [] Neuro   [] SC   [] other: Patient Education: [x] Review HEP    [] Progressed/Changed HEP based on:   [] positioning   [] body mechanics   [] transfers   [] heat/ice application    [] other:        Other Objective/Functional Measures: FOTO Functional Measure: 41/100    Pain Level (0-10 scale) post treatment: 4      ASSESSMENT:      [x]  See Plan of OLIVIA Owens, DPT 8/31/2021

## 2021-09-13 ENCOUNTER — HOSPITAL ENCOUNTER (OUTPATIENT)
Dept: PHYSICAL THERAPY | Age: 61
Discharge: HOME | End: 2021-09-13
Payer: MEDICAID

## 2021-09-13 PROCEDURE — 97112 NEUROMUSCULAR REEDUCATION: CPT

## 2021-09-13 PROCEDURE — 97110 THERAPEUTIC EXERCISES: CPT

## 2021-09-13 NOTE — PROGRESS NOTES
PT DAILY TREATMENT NOTE - [facility] 2-15    Patient Name: Jad Hernandez  ETBP:  : 1960  [x]  Patient  Verified  Payor: Guera Ann / Plan: [facility] COMMUNITY PLAN [facility] / Product Type: Managed Care Medicaid /    In time: 10:00A  Out time:11:04A  Total Treatment Time (min): 64  Total Timed Codes (min): 54  1:1 Treatment Time ( only): --   Visit #:  2    Treatment Area: Lower back pain [M54.5]    SUBJECTIVE  Pain Level (0-10 scale): 4-5/10  Any medication changes, allergies to medications, adverse drug reactions, diagnosis change, or new procedure performed?: [x] No    [] Yes (see summary sheet for update)  Subjective functional status/changes:   [] No changes reported  Pt overslept that's why she missed her last appointment.      OBJECTIVE    Modality rationale: decrease pain and increase tissue extensibility to improve the patients ability to decrease LBP   Min Type Additional Details       [] Estim: []Att   []Unatt    []TENS instruct                  []IFC  []Premod   []NMES                     []Other:  []w/US   []w/ice   []w/heat  Position:  Location:       []  Traction: [] Cervical       []Lumbar                       [] Prone          []Supine                       []Intermittent   []Continuous Lbs:  [] before manual  [] after manual  []w/heat    []  Ultrasound: []Continuous   [] Pulsed                       at: []1MHz   []3MHz Location:  W/cm2:    [] Paraffin         Location:   []w/heat   10 []  Ice     [x]  Heat  []  Ice massage Position: supine  Location: back    []  Laser  []  Other: Position:  Location:      []  Vasopneumatic Device Pressure:       [] lo [] med [] hi   Temperature:      [x] Skin assessment post-treatment:  [x]intact []redness- no adverse reaction    []redness  adverse reaction:     44 min Therapeutic Exercise:  [x] See flow sheet :   Rationale: increase ROM, increase strength and improve coordination to improve the patients ability to increase function and mobility       10 min Neuromuscular Re-education:  [x]  See flow sheet :   Rationale: increase ROM, increase strength, improve coordination and increase proprioception  to improve the patients ability to increase core stability with activity          With   [] TE   [] TA   [] Neuro   [] SC   [] other: Patient Education: [x] Review HEP    [] Progressed/Changed HEP based on:   [] positioning   [] body mechanics   [] transfers   [] heat/ice application    [] other:      Other Objective/Functional Measures: --     Pain Level (0-10 scale) post treatment: 3-4/10    ASSESSMENT/Changes in Function:   Pt challenged with today's session. Unable to use green band for clamshells secondary to weakness in gluts. Pt did report having difficulty with her Asthma when performing the PPT. Pt got up and walked outside where she could remove her mask and catch her breath. Able to continue session. Reported less pain after heat. Will need to add extras layer secondary to pt's reports of it getting too hot. Patient will continue to benefit from skilled PT services to modify and progress therapeutic interventions, address functional mobility deficits, address ROM deficits, address strength deficits, analyze and address soft tissue restrictions, analyze and cue movement patterns, analyze and modify body mechanics/ergonomics and assess and modify postural abnormalities to attain remaining goals. []  See Plan of Care  []  See progress note/recertification  []  See Discharge Summary         Progress towards goals / Updated goals:  Short Term Goals: To be accomplished in 2-4 treatments:                1.  Pt will be compliant with initial HEP use and PT attendance  Long Term Goals: To be accomplished in 10-12 treatments:               1. Pt will be able to stand for an hour without significant limitations               2. Pt will be able to ambulate in the community without significant increase in back pain               3. Pt will be able to perform light yardwork without increase in pain               4. Pt will be able to self-manage care using updated HEP for improved independence               5. Improved FOTO score to 47 or better to demonstrate improved function     Frequency / Duration: Patient to be seen 2 times per week for 10-12 treatments.     PLAN  [x]  Upgrade activities as tolerated     [x]  Continue plan of care  []  Update interventions per flow sheet       []  Discharge due to:_  []  Other:_      Joe Rosas PTA, [facility] 9/13/2021

## 2021-09-16 ENCOUNTER — HOSPITAL ENCOUNTER (OUTPATIENT)
Dept: PHYSICAL THERAPY | Age: 61
Discharge: HOME | End: 2021-09-16
Payer: MEDICAID

## 2021-09-16 PROCEDURE — 97110 THERAPEUTIC EXERCISES: CPT | Performed by: PHYSICAL THERAPIST

## 2021-09-16 PROCEDURE — 97112 NEUROMUSCULAR REEDUCATION: CPT | Performed by: PHYSICAL THERAPIST

## 2021-09-16 NOTE — PROGRESS NOTES
PT DAILY TREATMENT NOTE 2-15    Patient Name: Ahmet Jones  TOCP:[id number]  : 1960  [x]  Patient  Verified  Payor: [facility] MEDICAID / Plan: [facility] COMMUNITY PLAN [facility] [employer] / Product Type: Managed Care Medicaid /    In time: 1795B  Out time: [id number]  Total Treatment Time (min): 62  Visit #:  3    Treatment Area: Lower back pain [M54.5]    SUBJECTIVE  Pain Level (0-10 scale): 3  Any medication changes, allergies to medications, adverse drug reactions, diagnosis change, or new procedure performed?: [x] No    [] Yes (see summary sheet for update)  Subjective functional status/changes:   [] No changes reported  Back is doing fair. There is the usual numbness in the leg. She has strained her vocal cords, so she isn't really able to speak very much. Is going to have to go to Speech therapy. OBJECTIVE    Modality rationale: decrease pain and increase tissue extensibility to improve the patients ability to decrease LBP    Min Type Additional Details        []? Estim: []? Att   []? Unatt    []? TENS instruct                  []?IFC  []? Premod   []? NMES                     []?Other:  []?w/US   []?w/ice   []?w/heat  Position:  Location:        []? Traction: []? Cervical       []? Lumbar                       []? Prone          []? Supine                       []?Intermittent   []? Continuous Lbs:  []? before manual  []? after manual  []?w/heat     []? Ultrasound: []? Continuous   []? Pulsed                       at: []?1MHz   []? 3MHz Location:  W/cm2:     []? Paraffin         Location:   []?w/heat   10 []? Ice     [x]? Heat  []? Ice massage Position: supine  Location: back     []? Laser  []? Other: Position:  Location:        []? Vasopneumatic Device Pressure:       []? lo []? med []? hi   Temperature:       [x]? Skin assessment post-treatment:  [x]? intact []? redness- no adverse reaction    []? redness - adverse reaction:      42 min Therapeutic Exercise:  [x]?  See flow sheet :   Rationale: increase ROM, increase strength and improve coordination to improve the patients ability to increase function and mobility        10 min Neuromuscular Re-education:  [x]? See flow sheet : biofeedback cuff training, basic multifidus   Rationale: increase ROM, increase strength, improve coordination and increase proprioception  to improve the patients ability to increase core stability with activity                                                                 With   []? TE   []? TA   []? Neuro   []? SC   []? other: Patient Education: [x]? Review HEP    []? Progressed/Changed HEP based on:   []? positioning   []? body mechanics   []? transfers   []? heat/ice application    []? other:       Other Objective/Functional Measures: --        Pain Level (0-10 scale) post treatment: 2/10     ASSESSMENT/Changes in Function:   Still challenged with hip and core strengthening. Mild increase in back discomfort with exercises today. Patient will continue to benefit from skilled PT services to modify and progress therapeutic interventions, address functional mobility deficits, address ROM deficits, address strength deficits, analyze and address soft tissue restrictions, analyze and cue movement patterns, analyze and modify body mechanics/ergonomics and assess and modify postural abnormalities to attain remaining goals. []? See Plan of Care  []? See progress note/recertification  []? See Discharge Summary         Progress towards goals / Updated goals:  Short Term Goals: To be accomplished in 2-4 treatments:                1.  Pt will be compliant with initial HEP use and PT attendance MET  Long Term Goals: To be accomplished in 10-12 treatments:               8. FA XZIC be able to stand for an hour without significant limitations               2. Pt will be able to ambulate in the community without significant increase in back pain               3. Pt will be able to perform light yardwork without increase in pain               4. Pt will be able to self-manage care using updated HEP for improved independence               5. Improved FOTO score to 47 or better to demonstrate improved function     Frequency / Duration: Patient to be seen 2 times per week for 10-12 treatments.     PLAN  [x]? Upgrade activities as tolerated     [x]? Continue plan of care  []? Update interventions per flow sheet       []? Discharge due to:_  []?   Other:_     Harry Cherry, PT, DPT 9/16/2021

## 2021-09-20 ENCOUNTER — HOSPITAL ENCOUNTER (OUTPATIENT)
Dept: PHYSICAL THERAPY | Age: 61
Discharge: HOME | End: 2021-09-20
Payer: MEDICAID

## 2021-09-20 PROCEDURE — 97110 THERAPEUTIC EXERCISES: CPT | Performed by: PHYSICAL THERAPIST

## 2021-09-20 PROCEDURE — 97112 NEUROMUSCULAR REEDUCATION: CPT | Performed by: PHYSICAL THERAPIST

## 2021-09-20 NOTE — PROGRESS NOTES
PT DAILY TREATMENT NOTE 2-15    Patient Name: Axel Gipson  FCLD:  : 1960  [x]  Patient  Verified  Payor: [facility] MEDICAID / Plan: [facility] COMMUNITY PLAN [employer] [employer] / Product Type: Managed Care Medicaid /    In time:447p  Out time:535p  Total Treatment Time (min): 48  Visit #:  4    Treatment Area: Lower back pain [M54.5]    SUBJECTIVE  Pain Level (0-10 scale): 6-7  Any medication changes, allergies to medications, adverse drug reactions, diagnosis change, or new procedure performed?: [x] No    [] Yes (see summary sheet for update)  Subjective functional status/changes:   [] No changes reported  Back is painful today, mainly at the base (around SI joints)    OBJECTIVE    38 min Therapeutic Exercise:  [x]? ? See flow sheet :   Rationale: increase ROM, increase strength and improve coordination to improve the patients ability to increase function and mobility     10 min Neuromuscular Re-education:  [x]? ?  See flow sheet : biofeedback cuff training, basic multifidus   Rationale: increase ROM, increase strength, improve coordination and increase proprioception  to improve the patients ability to increase core stability with activity                                                                 With   []?? TE   []?? TA   []? ? Neuro   []?? SC   []?? other: Patient Education: [x]? ? Review HEP    []? ? Progressed/Changed HEP based on:   []?? positioning   []? ? body mechanics   []? ? transfers   []? ? heat/ice application    []? ? other:       Other Objective/Functional Measures: --        Pain Level (0-10 scale) post treatment:4-5     ASSESSMENT/Changes in Function:   Reported some transient numbness with pressout exercise today, but was reduced by session's end.    Patient will continue to benefit from skilled PT services to modify and progress therapeutic interventions, address functional mobility deficits, address ROM deficits, address strength deficits, analyze and address soft tissue restrictions, analyze and cue movement patterns, analyze and modify body mechanics/ergonomics and assess and modify postural abnormalities to attain remaining goals.     []? ?  See Plan of Care  []? ?  See progress note/recertification  []? ?  See Discharge Summary         Progress towards goals / Updated goals:  Short Term Goals: To be accomplished in 2-4 treatments:                1. Pt will be compliant with initial HEP use and PT attendance MET  Long Term Goals: To be accomplished in 10-12 treatments:               3. [id number] [id number] be able to stand for an hour without significant limitations               2. Pt will be able to ambulate in the community without significant increase in back pain               3. Pt will be able to perform light yardwork without increase in pain               4. Pt will be able to self-manage care using updated HEP for improved independence               5. Improved FOTO score to 47 or better to demonstrate improved function     Frequency / Duration: Patient to be seen 2 times per week for 10-12 treatments.     PLAN  [x]? ?  Upgrade activities as tolerated     [x]? ?  Continue plan of care  []? ?  Update interventions per flow sheet       []? ?  Discharge due to:_  []??  Other:_          Kamari Garcia, PT, DPT 9/20/2021

## 2021-09-23 ENCOUNTER — HOSPITAL ENCOUNTER (OUTPATIENT)
Dept: PHYSICAL THERAPY | Age: 61
Discharge: HOME | End: 2021-09-23
Payer: MEDICAID

## 2021-09-23 PROCEDURE — 97112 NEUROMUSCULAR REEDUCATION: CPT | Performed by: PHYSICAL THERAPIST

## 2021-09-23 PROCEDURE — 97110 THERAPEUTIC EXERCISES: CPT | Performed by: PHYSICAL THERAPIST

## 2021-09-23 NOTE — PROGRESS NOTES
PT DAILY TREATMENT NOTE 2-15    Patient Name: Nikki Au  HNOD:  : 1960  [x]  Patient  Verified  Payor: [facility] MEDICAID / Plan: [facility] COMMUNITY PLAN [employer] / Product Type: Managed Care Medicaid /    In time: 1194J  Out time: 4139Y  Total Treatment Time (min): 59  Visit #: 5    Treatment Area: Lower back pain [M54.5]    SUBJECTIVE  Pain Level (0-10 scale): 9  Any medication changes, allergies to medications, adverse drug reactions, diagnosis change, or new procedure performed?: [x] No    [] Yes (see summary sheet for update)  Subjective functional status/changes:   [] No changes reported  Had a lot of swelling around her R lumbar spine/hip region after last visit; no known reason why. 9/10 is for stiffness today. OBJECTIVE  Modality rationale: decrease pain and increase tissue extensibility to improve the patients ability to perform ADLs with less pain   Min Type Additional Details       [] Estim: []Att   []Unatt    []TENS instruct                  []IFC  []Premod   []NMES                     []Other:  []w/US   []w/ice   []w/heat  Position:  Location:       []  Traction: [] Cervical       []Lumbar                       [] Prone          []Supine                       []Intermittent   []Continuous Lbs:  [] before manual  [] after manual  []w/heat    []  Ultrasound: []Continuous   [] Pulsed                       at: []1MHz   []3MHz Location:  W/cm2:    [] Paraffin         Location:   []w/heat   10 []  Ice     [x]  Heat  []  Ice massage Position:  Location: back    []  Laser  []  Other: Position:  Location:      []  Vasopneumatic Device Pressure:       [] lo [] med [] hi   Temperature:      [x] Skin assessment post-treatment:  [x]intact []redness- no adverse reaction    []redness - adverse reaction:     41 min Therapeutic Exercise:  [x]? ?? See flow sheet :   Rationale: increase ROM, increase strength and improve coordination to improve the patients ability to increase function and mobility     8 min Neuromuscular Re-education:  [x]? ??  See flow sheet : biofeedback cuff training, SLS   Rationale: increase ROM, increase strength, improve coordination and increase proprioception  to improve the patients ability to increase core stability with activity                                                                 With   []??? TE   []??? TA   []??? Neuro   []??? SC   []? ?? other: Patient Education: [x]??? Review HEP    []? ?? Progressed/Changed HEP based on:   []??? positioning   []? ?? body mechanics   []? ?? transfers   []? ?? heat/ice application    []? ?? other:       Other Objective/Functional Measures: --        Pain Level (0-10 scale) post treatment:0     ASSESSMENT/Changes in Function:   Reported substantial reduction in tightness with lumbar stretch and hip flexor stretching today. Patient will continue to benefit from skilled PT services to modify and progress therapeutic interventions, address functional mobility deficits, address ROM deficits, address strength deficits, analyze and address soft tissue restrictions, analyze and cue movement patterns, analyze and modify body mechanics/ergonomics and assess and modify postural abnormalities to attain remaining goals.     []? ??  See Plan of Care  []? ??  See progress note/recertification  []? ??  See Discharge Summary         Progress towards goals / Updated goals:  Short Term Goals: To be accomplished in 2-4 treatments:                1.  Pt will be compliant with initial HEP use and PT attendance MET  Long Term Goals: To be accomplished in 10-12 treatments:               4. KW WKJR be able to stand for an hour without significant limitations               2. Pt will be able to ambulate in the community without significant increase in back pain               3. Pt will be able to perform light yardwork without increase in pain               4. Pt will be able to self-manage care using updated HEP for improved independence               5. Improved FOTO score to 47 or better to demonstrate improved function     Frequency / Duration: Patient to be seen 2 times per week for 10-12 treatments.     PLAN  [x]???  Upgrade activities as tolerated     [x]? ??  Continue plan of care  []? ??  Update interventions per flow sheet       []???  Discharge due to:_  []???  Other:_     Maryanne Webster, PT, DPT 9/23/2021

## 2021-09-27 ENCOUNTER — HOSPITAL ENCOUNTER (OUTPATIENT)
Dept: PHYSICAL THERAPY | Age: 61
Discharge: HOME | End: 2021-09-27
Payer: MEDICAID

## 2021-09-27 PROCEDURE — 97110 THERAPEUTIC EXERCISES: CPT | Performed by: PHYSICAL THERAPIST

## 2021-09-27 PROCEDURE — 97112 NEUROMUSCULAR REEDUCATION: CPT | Performed by: PHYSICAL THERAPIST

## 2021-09-27 NOTE — PROGRESS NOTES
PT DAILY TREATMENT NOTE 2-15    Patient Name: Collin Nolan  TZMN:[id number]  : 1960  [x]  Patient  Verified  Payor: [facility] MEDICAID / Plan: [facility] [employer] [place] [employer] / Product Type: Managed Care Medicaid /    In time:445  Out time:546  Total Treatment Time (min): 61  Visit #:  6    Treatment Area: Lower back pain [G29.7]     Licensed Physical Therapist was present throughout treatment and actively engaged in all aspects of care. SUBJECTIVE  Pain Level (0-10 scale): 8  Any medication changes, allergies to medications, adverse drug reactions, diagnosis change, or new procedure performed?: [x] No    [] Yes (see summary sheet for update)  Subjective functional status/changes:   [] No changes reported  Patient reported she was vacuuming before coming to therapy and caused some increased pain on R side cupping around into the front of her leg. OBJECTIVE         Modality rationale: decrease pain and increase tissue extensibility to improve the patients ability to perform ADLs with less pain    Min Type Additional Details        []? Estim: []? Att   []? Unatt    []? TENS instruct                  []?IFC  []? Premod   []? NMES                     []?Other:  []?w/US   []?w/ice   []?w/heat  Position:  Location:        []? Traction: []? Cervical       []? Lumbar                       []? Prone          []? Supine                       []?Intermittent   []? Continuous Lbs:  []? before manual  []? after manual  []?w/heat     []? Ultrasound: []? Continuous   []? Pulsed                       at: []?1MHz   []? 3MHz Location:  W/cm2:     []? Paraffin         Location:   []?w/heat   10 []? Ice     [x]? Heat  []? Ice massage Position:  Location: back     []? Laser  []? Other: Position:  Location:        []? Vasopneumatic Device Pressure:       []? lo []? med []? hi   Temperature:       [x]? Skin assessment post-treatment:  [x]? intact []? redness- no adverse reaction    []? redness - adverse reaction:      41 min Therapeutic Exercise:  [x]? ??? See flow sheet :   Rationale: increase ROM, increase strength and improve coordination to improve the patients ability to increase function and mobility     10 min Neuromuscular Re-education:  [x]? ???  See flow sheet : biofeedback cuff training, SLS   Rationale: increase ROM, increase strength, improve coordination and increase proprioception  to improve the patients ability to increase core stability with activity                                                                 With   []???? TE   []???? TA   []???? Neuro   []???? SC   []???? other: Patient Education: [x]???? Review HEP    []???? Progressed/Changed HEP based on:   []???? positioning   []???? body mechanics   []???? transfers   []???? heat/ice application    []???? other:       Other Objective/Functional Measures: --        Pain Level (0-10 scale) post treatment: 5     ASSESSMENT/Changes in Function:   Patient reported significant decrease in pain with exercises, stretches, and heat today. Added an additional hip stretch in supine to target her piriformis which she noted was very tight. Patient will continue to benefit from skilled PT services to modify and progress therapeutic interventions, address functional mobility deficits, address ROM deficits, address strength deficits, analyze and address soft tissue restrictions, analyze and cue movement patterns, analyze and modify body mechanics/ergonomics and assess and modify postural abnormalities to attain remaining goals.     []????  See Plan of Care  []????  See progress note/recertification  []????  See Discharge Summary         Progress towards goals / Updated goals:  Short Term Goals: To be accomplished in 2-4 treatments:                1.  Pt will be compliant with initial HEP use and PT attendance MET  Long Term Goals: To be accomplished in 10-12 treatments:               1. Pt will be able to stand for an hour without significant limitations               2. Pt will be able to ambulate in the community without significant increase in back pain               3. Pt will be able to perform light yardwork without increase in pain               4. Pt will be able to self-manage care using updated HEP for improved independence               5. Improved FOTO score to 47 or better to demonstrate improved function     Frequency / Duration: Patient to be seen 2 times per week for 10-12 treatments.     PLAN  [x]????  Upgrade activities as tolerated     [x]? ???  Continue plan of care  []????  Update interventions per flow sheet       []????  Discharge due to:_  []????  Other:_     Jimbo Gonsalez, SPT 9/27/2021

## 2021-09-30 ENCOUNTER — HOSPITAL ENCOUNTER (OUTPATIENT)
Dept: PHYSICAL THERAPY | Age: 61
Discharge: HOME | End: 2021-09-30
Payer: MEDICAID

## 2021-09-30 PROCEDURE — 97112 NEUROMUSCULAR REEDUCATION: CPT | Performed by: PHYSICAL THERAPIST

## 2021-09-30 PROCEDURE — 97110 THERAPEUTIC EXERCISES: CPT | Performed by: PHYSICAL THERAPIST

## 2021-09-30 NOTE — PROGRESS NOTES
PT DAILY TREATMENT NOTE 2-15    Patient Name: Reji Mejias  OFIC:  : 1960  [x]  Patient  Verified  Payor: [facility] MEDICAID / Plan: [facility] [employer] [place] / Product Type: Managed Care Medicaid /    In time:1020  Out time:108  Total Treatment Time (min): 48  Visit #:  7    Treatment Area: Lower back pain [P07.4]    Licensed Physical Therapist was present throughout treatment and actively engaged in all aspects of care. SUBJECTIVE  Pain Level (0-10 scale): 2  Any medication changes, allergies to medications, adverse drug reactions, diagnosis change, or new procedure performed?: [x] No    [] Yes (see summary sheet for update)  Subjective functional status/changes:   [] No changes reported  Patients reports minimal pain today. She has been keeping up with the stretches and feel like they have been helping. OBJECTIVE       38 min Therapeutic Exercise:  [x] See flow sheet :   Rationale: increase ROM, increase strength and improve coordination to improve the patients ability to increase function and mobility     10 min Neuromuscular Re-education:  [x]  See flow sheet : biofeedback cuff training, SLS   Rationale: increase ROM, increase strength, improve coordination and increase proprioception  to improve the patients ability to increase core stability with activity                                                                 With   [] TE   [] TA   [] Neuro   [] SC   [] other: Patient Education: [x] Review HEP    [] Progressed/Changed HEP based on:   [] positioning   [] body mechanics   [] transfers   [] heat/ice application    [] other:       Other Objective/Functional Measures: --        Pain Level (0-10 scale) post treatment: 3     ASSESSMENT/Changes in Function:   Patient reported minimal pain before the start of the session, so focused more on strengthening exercises today.  During SLS on foam patient reported a little dizzy but symptoms subsided after a short rest. Patient reported slight increase in pain after session possibly due to working muscles more. Patient will continue to benefit from skilled PT services to modify and progress therapeutic interventions, address functional mobility deficits, address ROM deficits, address strength deficits, analyze and address soft tissue restrictions, analyze and cue movement patterns, analyze and modify body mechanics/ergonomics and assess and modify postural abnormalities to attain remaining goals. []  See Plan of Care  []  See progress note/recertification  []  See Discharge Summary         Progress towards goals / Updated goals:  Short Term Goals: To be accomplished in 2-4 treatments:                1. Pt will be compliant with initial HEP use and PT attendance MET  Long Term Goals: To be accomplished in 10-12 treatments:               1. Pt will be able to stand for an hour without significant limitations               2. Pt will be able to ambulate in the community without significant increase in back pain               3. Pt will be able to perform light yardwork without increase in pain               4. Pt will be able to self-manage care using updated HEP for improved independence               5. Improved FOTO score to 47 or better to demonstrate improved function     Frequency / Duration: Patient to be seen 2 times per week for 10-12 treatments.      PLAN  [x]  Upgrade activities as tolerated     [x]  Continue plan of care  []  Update interventions per flow sheet       []  Discharge due to:_  []  Other:_          Daniel Pamrar, SPT 9/30/2021

## 2021-10-06 ENCOUNTER — OFFICE VISIT (OUTPATIENT)
Dept: INTERNAL MEDICINE CLINIC | Age: 61
End: 2021-10-06
Payer: MEDICAID

## 2021-10-06 VITALS
WEIGHT: 216 LBS | HEART RATE: 100 BPM | BODY MASS INDEX: 32.74 KG/M2 | DIASTOLIC BLOOD PRESSURE: 89 MMHG | RESPIRATION RATE: 16 BRPM | SYSTOLIC BLOOD PRESSURE: 138 MMHG | HEIGHT: 68 IN | OXYGEN SATURATION: 95 %

## 2021-10-06 DIAGNOSIS — Z11.59 NEED FOR HEPATITIS C SCREENING TEST: ICD-10-CM

## 2021-10-06 DIAGNOSIS — M48.062 SPINAL STENOSIS OF LUMBAR REGION WITH NEUROGENIC CLAUDICATION: ICD-10-CM

## 2021-10-06 DIAGNOSIS — M77.11 LATERAL EPICONDYLITIS OF RIGHT ELBOW: Primary | ICD-10-CM

## 2021-10-06 DIAGNOSIS — Z23 NEEDS FLU SHOT: ICD-10-CM

## 2021-10-06 DIAGNOSIS — Z12.31 ENCOUNTER FOR SCREENING MAMMOGRAM FOR MALIGNANT NEOPLASM OF BREAST: ICD-10-CM

## 2021-10-06 LAB — HCV AB SERPL QL IA: NONREACTIVE

## 2021-10-06 PROCEDURE — 99214 OFFICE O/P EST MOD 30 MIN: CPT | Performed by: FAMILY MEDICINE

## 2021-10-06 PROCEDURE — 90471 IMMUNIZATION ADMIN: CPT | Performed by: FAMILY MEDICINE

## 2021-10-06 PROCEDURE — 73080 X-RAY EXAM OF ELBOW: CPT | Performed by: FAMILY MEDICINE

## 2021-10-06 PROCEDURE — 90686 IIV4 VACC NO PRSV 0.5 ML IM: CPT | Performed by: FAMILY MEDICINE

## 2021-10-06 RX ORDER — ZOSTER VACCINE RECOMBINANT, ADJUVANTED 50 MCG/0.5
0.5 KIT INTRAMUSCULAR ONCE
Qty: 0.5 ML | Refills: 0 | Status: SHIPPED | OUTPATIENT
Start: 2021-10-06 | End: 2021-10-06

## 2021-10-06 RX ORDER — LIDOCAINE 50 MG/G
PATCH TOPICAL
Qty: 1 EACH | Refills: 0 | Status: SHIPPED | OUTPATIENT
Start: 2021-10-06 | End: 2021-10-20 | Stop reason: SDUPTHER

## 2021-10-06 NOTE — PROGRESS NOTES
Chief Complaint   Patient presents with    Back Pain     she is a 63y.o. year old female who presents for follow up of injury. Follow Up Pain Assessment Encounter      Onset of Symptoms: Patient states she has had pain for months  ________________________________________________________________________  Description: Pain is now  is unchanged      Pain Scale:(1-10): 8  Duration:  continuous  Radiation: low back  What makes it better?: heat and OTC meds  What makes it worse?:exercise, strecthing and walking  Medications tried: ibuprofen  Modalities tried: PT    Patient also complains of reaggravation states that she feels clicking and popping in her right elbow of right elbow pain. Patient has had no trauma and denies any radiation of pain. Patient states that occasionally she will get a zinging that goes up her arm but feels nervelike. 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 Reviewed and agree with Nurse Note and duplicated in this note. Reviewed PmHx, RxHx, FmHx, SocHx, AllgHx and updated and dated in the chart.     Family History   Problem Relation Age of Onset    Delayed Awakening Mother         with anesthesia    Arthritis Mother     Other Mother         IBS/degenerative spinal disease    Anesth Problems Mother         N/V AND SLEEPY ALL DAY    COPD Father     Glaucoma Father     HIV/AIDS Sister     Heart Disease Brother         heart valve problem    Glaucoma Brother     Other Other     Heart Disease Sister         HEART VALVE DISESE        Past Medical History:   Diagnosis Date    Adverse effect of anesthesia     \"so sleepy and tired for the rest of the day\"    Anxiety     Asthma     Chronic pain     back - L5 is \"almost gone\"/left foot neuropathy -pinched nerve L4-5 - spine shifted/degenerative spinal disease/stiff and sore neck and shoulder - in PT for back and neck    Degenerative spinal arthritis     Diabetes ([facility]     PRE DIABETIC    Hypertension     IBS (irritable bowel syndrome)     Ill-defined condition     pre-diabetes    Nausea & vomiting     ? d/t fentanyl    PUD (peptic ulcer disease)     Spondylolisthesis       Social History     Socioeconomic History    Marital status: OTHER     Spouse name: Not on file    Number of children: Not on file    Years of education: Not on file    Highest education level: Not on file   Tobacco Use    Smoking status: Former Smoker     Packs/day: 0.50     Years: 12.00     Pack years: 6.00     Quit date: 2012     Years since quittin.8    Smokeless tobacco: Never Used    Tobacco comment: quit    Substance and Sexual Activity    Alcohol use: Yes     Comment: 3-4 times a yr    Drug use: Yes     Types: Marijuana     Comment:  LAST USED IN 2019    Sexual activity: Yes     Partners: Female     Social Determinants of Health     Financial Resource Strain:     Difficulty of Paying Living Expenses:    Food Insecurity:     Worried About Running Out of Food in the Last Year:     Ran Out of Food in the Last Year:    Transportation Needs:     Lack of Transportation (Medical):      Lack of Transportation (Non-Medical):    Physical Activity:     Days of Exercise per Week:     Minutes of Exercise per Session:    Stress:     Feeling of Stress :    Social Connections:     Frequency of Communication with Friends and Family:     Frequency of Social Gatherings with Friends and Family:     Attends Oriental orthodox Services:     Active Member of Clubs or Organizations:     Attends Club or Organization Meetings:     Marital Status:         Review of Systems - negative except as listed above      Objective:     Vitals:    10/06/21 1016   BP: 138/89   Pulse: 100   Resp: 16   SpO2: 95%   Weight: 216 lb (98 kg)   Height: 5' 8\" (1.727 m)       Physical Examination: General appearance - alert, well appearing, and in no distress  Eyes - pupils equal and reactive, extraocular eye movements intact  Ears - bilateral TM's and external ear canals normal  Nose - normal and patent, no erythema, discharge or polyps  Mouth - mucous membranes moist, pharynx normal without lesions  Neck - supple, no significant adenopathy  Chest - clear to auscultation, no wheezes, rales or rhonchi, symmetric air entry  Heart - normal rate, regular rhythm, normal S1, S2, no murmurs, rubs, clicks or gallops  Abdomen - soft, nontender, nondistended, no masses or organomegaly  Neurological - alert, oriented, normal speech, no focal findings or movement disorder noted  Musculoskeletal - no joint tenderness, deformity or swelling  Extremities - peripheral pulses normal, no pedal edema, no clubbing or cyanosis  Skin - normal coloration and turgor, no rashes, no suspicious skin lesions noted   Indications for study:  Right elbow pain  Limited musculoskeletal ultrasound examination was performed on the lateral right elbow with the following findings: Common extensor carpi radialis (ECR) tendon origin - long: normal echogenic, linearly compact fibrillar birds-beak appearance   Common ECR tendon origin - trans:  normal echogenic, tessellate compact fibrillar pancake-like appearance   Radiocapitellar joint - long: normal joint-space without effusion or intra-articular debris; normal synovial fringe   Annular ligament - long: normal linearly compact trilaminar appearance     Impression: Normal right elbow    Scanned and Interpreted by:  Andrew Luis [facility]    Assessment/ Plan:   Diagnoses and all orders for this visit:    1. Lateral epicondylitis of right elbow  -     XR ELBOW RT MIN 3 V; Future  -     AMB POC US,EXTREMITY,NONVASCULAR,REAL-TIME IMAGE,LIMITED  Will give home physical therapy exercises and recommend tennis elbow brace  2. Spinal stenosis of lumbar region with neurogenic claudication  Stable, will prescribe lidocaine patches and continue physical therapy  3.  Encounter for screening mammogram for malignant neoplasm of breast  -     [facility] MAMMO BI SCREENING INCL CAD; Future    4. Need for hepatitis C screening test  -     HEPATITIS C AB; Future    Other orders  -     lidocaine (LIDODERM) 5 %; Apply patch to the affected area for 12 hours a day and remove for 12 hours a day. -     varicella-zoster recombinant, PF, (Shingrix, PF,) 50 mcg/0.5 mL susr injection; 0.5 mL by IntraMUSCular route once for 1 dose. Pathophysiology, recovery and rehabilitation process discussed and questions answered   Counseling for 30 Minutes of the total visit duration   Pictures and figures used as necessary   Provided reassurance   Recommend activity modification   Recommend  lower impact activities-walking, Eliptical, Nordic Track, cycling or swimming               I have discussed the diagnosis with the patient and the intended plan as seen in the above orders. The patient has received an after-visit summary and questions were answered concerning future plans. Medication Side Effects and Warnings were discussed with patient,  Patient Labs were reviewed and or requested, and  Patient Past Records were reviewed and or requested  yes     Pt agrees to call or return to clinic and/or go to closest ER with any worsening of symptoms. This may include, but not limited to increased fever (>100.4) with NSAIDS or Tylenol, increased edema, confusion, rash, worsening of presenting symptoms. Please note that this dictation was completed with [employer], the computer voice recognition software. Quite often unanticipated grammatical, syntax, homophones, and other interpretive errors are inadvertently transcribed by the computer software. Please disregard these errors. Please excuse any errors that have escaped final proofreading. Thank you.

## 2021-10-13 ENCOUNTER — OFFICE VISIT (OUTPATIENT)
Dept: NEUROLOGY | Age: 61
End: 2021-10-13
Payer: MEDICAID

## 2021-10-13 VITALS
OXYGEN SATURATION: 99 % | DIASTOLIC BLOOD PRESSURE: 74 MMHG | SYSTOLIC BLOOD PRESSURE: 122 MMHG | HEART RATE: 90 BPM | HEIGHT: 68 IN | RESPIRATION RATE: 16 BRPM | WEIGHT: 214 LBS | BODY MASS INDEX: 32.43 KG/M2

## 2021-10-13 DIAGNOSIS — G43.709 CHRONIC MIGRAINE WITHOUT AURA WITHOUT STATUS MIGRAINOSUS, NOT INTRACTABLE: Primary | ICD-10-CM

## 2021-10-13 PROCEDURE — 99214 OFFICE O/P EST MOD 30 MIN: CPT | Performed by: PSYCHIATRY & NEUROLOGY

## 2021-10-13 RX ORDER — TOPIRAMATE 25 MG/1
25 TABLET ORAL DAILY
Qty: 90 TABLET | Refills: 1 | Status: SHIPPED | OUTPATIENT
Start: 2021-10-13 | End: 2022-01-11

## 2021-10-13 RX ORDER — SUMATRIPTAN 50 MG/1
TABLET, FILM COATED ORAL
Qty: 9 TABLET | Refills: 1 | Status: SHIPPED | OUTPATIENT
Start: 2021-10-13 | End: 2022-10-10 | Stop reason: ALTCHOICE

## 2021-10-13 NOTE — PROGRESS NOTES
Tato Murphy ([birth year]) is a 63 y.o. female, established patient, here for evaluation of the following     Chief complaint(s):   Chief Complaint   Patient presents with    Migraine     2 month follow up, stable on medication, topamax       SUBJECTIVE/ OBJECTIVE:    HPI: 63 y.o. female following regarding migraine headache    Last visit, started pt on Topamax 25 mg BID to reduce HA frequency (taking one topamax in evening)     Since starting Topamax:   # of headache days a week: 0-1 (Prior to TPM, was 7 days a week, including \"sinus\" HA)  # of migraine days a month: 0-1 (Prior to TPM, was 2-3 days a week)    Significant reduction in headache frequency  Only SEFx she has noticed is that it seems to keep her up at night  She also notes feeling woozy in AM  She says she started taking Gabapentin QHS at same time (Rx'd by Ortho)  Not sure if the AM wooziness is from Gabapentin      Review of Systems: as above    ========================================    Brief Hx:     From initial visit dated 7-21-21:     Patient is referred by GI/ Dr Lisha Segundo MD for evaluation of seizure-like episodes. Pt reports has hx of IBS and 20-30 mins after eating, colon starts spasming, severe pain, runs to bathroom, sweating profusely, \"sounds start going away\"/ things sound distant, cloud vision/ dark vision. She says her friend witnesses these episodes. She passes out, tongue hangs out, moves all extremities, lasting 2-3 mins, then can hear friend calling her. Feels very fatigued x 3-4 days afterwards. Reports she has these spells started happening over past 3-4 years, 1-2 times a month, always associated with abdominal pain. Has had the episodes of abdominal pain and sweating x 14 yrs. No hx of seizure. Impression/ Plan: By history, vasovagal syncope followed by convulsion (convulsive syncope). Will check EEG to exclude any underlying seizure discharges and MRI Brain to look for any structural abnormalities.   AED not indicated if no seizure discharges found. Pt was requesting Neurology help on managing her abdominal pain which is the trigger of her spells. D/w her Neurology cannot help that issue as that's not within our field of practice. She will continue working with GI regarding abdominal pain. Follow up in 6 weeks to go over EEG, MRI Brain results. EEG (7-19-21): Essentially normal awake EEG. No seizure discharges were seen. Mild excess of beta activity which may be due to medication effect or oily scalp as noted above (noted by EEG tech). Clinical correlation is necessary. MRI Brain (8-10-21): 1. No evident seizure focus. No significant intracranial abnormality. Vasovagal syncope due to abdominal pain (associated convulsion, ie convulsive syncope): no seizure discharges on EEG, MRI brain normal for age. Allergies   Allergen Reactions    Cat Dander Runny Nose    Demerol [Meperidine] Nausea and Vomiting     Dizziness. Went to ER.  Egg Yolk Swelling    Fentanyl Nausea and Vomiting     Dizziness.  Grass Pollen Runny Nose    House Dust Mite Not Reported This Time    Lactaid [Lactase] Other (comments)     Bloating/gas    Lactose Other (comments)     Bloating/gas    Mold Shortness of Breath    Prednisone Swelling    Prozac [Fluoxetine] Rash    Soy Nausea and Vomiting    Zoloft [Sertraline] Rash         Current Outpatient Medications:     SUMAtriptan (IMITREX) 50 mg tablet, Take HALF to one tablet at onset of migraine. Limit use to 2 days per week., Disp: 9 Tablet, Rfl: 1    topiramate (TOPAMAX) 25 mg tablet, Take 1 Tablet by mouth daily for 90 days. To reduce headache frequency, Disp: 90 Tablet, Rfl: 1    lidocaine (LIDODERM) 5 %, Apply patch to the affected area for 12 hours a day and remove for 12 hours a day., Disp: 1 Each, Rfl: 0    cetirizine (ZYRTEC) 10 mg tablet, , Disp: , Rfl:     DULoxetine (CYMBALTA) 20 mg capsule, Take 20 mg by mouth daily.  2 capsules by mouth daily Indications: anxiousness associated with depression, Disp: , Rfl:     azelastine (ASTELIN) 137 mcg (0.1 %) nasal spray, , Disp: , Rfl:     pantoprazole (PROTONIX) 40 mg tablet, , Disp: , Rfl:     albuterol (ProAir HFA) 90 mcg/actuation inhaler, Take  by inhalation. , Disp: , Rfl:     dicyclomine (BENTYL) 10 mg capsule, Take 10 mg by mouth every six (6) hours as needed for Abdominal Cramps., Disp: , Rfl:     losartan (COZAAR) 50 mg tablet, Take 1 Tab by mouth daily. , Disp: 90 Tab, Rfl: 2    MULTIVITAMIN PO, Take 1 Tab by mouth. With iron and D3. Takes one tablet by mouth daily, Disp: , Rfl:     cholestyramine-aspartame (Prevalite) 4 gram packet, Take 4 g by mouth daily. (Patient not taking: Reported on 10/13/2021), Disp: , Rfl:     chlorzoxazone (PARAFON FORTE) 500 mg tablet, Take 1 Tab by mouth three (3) times daily as needed (muscle spasms). Indications: muscle spasm, Disp: 15 Tab, Rfl: 0     has a past medical history of Adverse effect of anesthesia, Anxiety, Asthma, Chronic pain, Degenerative spinal arthritis, Diabetes ([address] Hypertension, IBS (irritable bowel syndrome), Ill-defined condition, Nausea & vomiting, PUD (peptic ulcer disease), and Spondylolisthesis. has a past surgical history that includes colonoscopy (N/A, 2019); hx cholecystectomy (); hx gi (2019); hx gi; hx  section (); and hx dilation and curettage (). Physical Exam:    Vitals:    10/13/21 1534   BP: 122/74   BP 1 Location: Left upper arm   BP Patient Position: Sitting   Pulse: 90   Resp: 16   Height: 5' 8\" (1.727 m)   Weight: 97.1 kg (214 lb)   SpO2: 99%     No exam performed today    ========================================    ASSESSMENT/ PLAN:       ICD-10-CM ICD-9-CM    1.  Chronic migraine without aura without status migrainosus, not intractable  G43.709 346.70 SUMAtriptan (IMITREX) 50 mg tablet      topiramate (TOPAMAX) 25 mg tablet        Significant improvement in HA frequency since starting lowest dose Topamax  Continue with 25 mg daily, suggested she try in AM if it is keeping her up at night    Separately, out of curiosity, I asked if pt was still having her chronic abdominal pain/ spasms (referred here/ Neurology by GI) since starting topamax. She thought about it for a minute and said she really hadn't been having the episodes that she can recall. Reminded her that she started topamax on/ around 8-12-21 so she could go back and correlate. Rx'd sumatriptan 50 mg tablet to use prn migraine  Resent Topamax Rx    F/u in 6 months      An electronic signature was used to authenticate this note.   -- Julio Youngblood MD

## 2021-10-13 NOTE — PROGRESS NOTES
Chief Complaint   Patient presents with    Migraine     2 month follow up, stable on medication, topamax     Visit Vitals  /74 (BP 1 Location: Left upper arm, BP Patient Position: Sitting)   Pulse 90   Resp 16   Ht 5' 8\" (1.727 m)   Wt 97.1 kg (214 lb)   SpO2 99%   BMI 32.54 kg/m²

## 2021-10-18 ENCOUNTER — APPOINTMENT (OUTPATIENT)
Dept: PHYSICAL THERAPY | Age: 61
End: 2021-10-18

## 2021-10-20 ENCOUNTER — APPOINTMENT (OUTPATIENT)
Dept: PHYSICAL THERAPY | Age: 61
End: 2021-10-20

## 2021-10-20 RX ORDER — LIDOCAINE 50 MG/G
PATCH TOPICAL
Qty: 30 EACH | Refills: 3 | Status: ON HOLD | OUTPATIENT
Start: 2021-10-20 | End: 2022-03-24

## 2021-10-26 ENCOUNTER — APPOINTMENT (OUTPATIENT)
Dept: PHYSICAL THERAPY | Age: 61
End: 2021-10-26

## 2021-11-04 ENCOUNTER — DOCUMENTATION ONLY (OUTPATIENT)
Dept: INTERNAL MEDICINE CLINIC | Age: 61
End: 2021-11-04

## 2021-11-05 ENCOUNTER — APPOINTMENT (OUTPATIENT)
Dept: PHYSICAL THERAPY | Age: 61
End: 2021-11-05

## 2021-11-16 ENCOUNTER — APPOINTMENT (OUTPATIENT)
Dept: PHYSICAL THERAPY | Age: 61
End: 2021-11-16

## 2021-11-22 NOTE — PROGRESS NOTES
Physical Therapy at [facility],   a part of [facility], [address] [place] [address], [address]  Phone: [phone]  Fax: [phone]    Discharge Summary  2-15    Patient name: Maya Ashley  : 1960  Provider#: [id number]  Referral source: Shira Tesfaye MD      Medical/Treatment Diagnosis: Lower back pain [M54.5]     Prior Hospitalization: see medical history     Comorbidities: HTN, asthma, arthritis, scoliosis  Prior Level of Function: limited function over the last 4 years, previously was able to work with tolerable pain  Medications: Verified on Patient Summary List     Start of Care: 21                                                                     Onset Date: 19 (surgery)    Visits from Start of Care: 7     Missed Visits: 4  Reporting Period : 21 to 21      ASSESSMENT/SUMMARY OF CARE: Ms. Megha Green was seen for 7 sessions regarding ongoing low back pain s/p lumbar fusion in 2019. Treatment focused on lumbar stabilization and hip strengthening exercises to improve function, and managing her general mobility as tolerated. She has not been able to attend PT appointments due to change in job, and will be discharged at this time. Short Term Goals: To be accomplished in 2-4 treatments:                1. Pt will be compliant with initial HEP use and PT attendance MET  Long Term Goals: To be accomplished in 10-12 treatments:               1. Pt will be able to stand for an hour without significant limitations NOT MET               2. Pt will be able to ambulate in the community without significant increase in back pain NOT MET               3. Pt will be able to perform light yardwork without increase in pain MILD PROGRESS               4. Pt will be able to self-manage care using updated HEP for improved independence PROGRESSING               5.  Improved FOTO score to 47 or better to demonstrate improved function      RECOMMENDATIONS:  [x]Discontinue therapy: []Patient has reached or is progressing toward set goals      [x]Patient has abdicated      []Due to lack of appreciable progress towards set [address] Kimber[address] Hastings, PT, DPT 11/22/2021

## 2021-11-23 ENCOUNTER — VIRTUAL VISIT (OUTPATIENT)
Dept: INTERNAL MEDICINE CLINIC | Age: 61
End: 2021-11-23
Payer: MEDICAID

## 2021-11-23 ENCOUNTER — APPOINTMENT (OUTPATIENT)
Dept: PHYSICAL THERAPY | Age: 61
End: 2021-11-23

## 2021-11-23 DIAGNOSIS — R42 VERTIGO: ICD-10-CM

## 2021-11-23 DIAGNOSIS — D64.9 ANEMIA, UNSPECIFIED TYPE: Primary | ICD-10-CM

## 2021-11-23 PROCEDURE — 99214 OFFICE O/P EST MOD 30 MIN: CPT | Performed by: FAMILY MEDICINE

## 2021-11-23 RX ORDER — FERROUS SULFATE 325(65) MG
325 TABLET, DELAYED RELEASE (ENTERIC COATED) ORAL
Qty: 30 TABLET | Refills: 1 | Status: ON HOLD | OUTPATIENT
Start: 2021-11-23 | End: 2022-03-24

## 2021-11-23 NOTE — PROGRESS NOTES
Eric Palomino is a 63 y.o. female who was seen by synchronous (real-time) audio-video technology on 11/23/2021 for No chief complaint on file. Assessment & Plan:   Diagnoses and all orders for this visit:    1. Anemia, unspecified type  -     CBC W/O DIFF; Future  -     IRON PROFILE; Future    2. Vertigo    Other orders  -     ferrous sulfate (IRON) 325 mg (65 mg iron) EC tablet; Take 1 Tablet by mouth three (3) times daily (with meals). Will increase her iron supplementation until she is seen in the office next week for physical exam and follow-up of dizziness. Low threshold for ER visit if symptoms worsen    Patient will continue to do physical therapy for vertigo    Subjective:   Patient is a 22-year-old female who states that she is having dizziness and vertigo symptoms. Patient is currently in therapy for vertigo and she is doing his exercises at home. Patient Does Have a History of Anemia and States That this feels very similar to her anemia that she has got with her whole life. Denies any trauma history and states that she is started taking over-the-counter iron pills. She has not had any blood work or iron checks recently. She is also overdue for a physical exam.    Prior to Admission medications    Medication Sig Start Date End Date Taking? Authorizing Provider   ferrous sulfate (IRON) 325 mg (65 mg iron) EC tablet Take 1 Tablet by mouth three (3) times daily (with meals). 11/23/21  Yes Scott Méndez MD   lidocaine (LIDODERM) 5 % Apply patch to the affected area for 12 hours a day and remove for 12 hours a day. 10/20/21   Scott Méndez MD   SUMAtriptan (IMITREX) 50 mg tablet Take HALF to one tablet at onset of migraine. Limit use to 2 days per week. 10/13/21   Elsie Goode MD   topiramate (TOPAMAX) 25 mg tablet Take 1 Tablet by mouth daily for 90 days.  To reduce headache frequency 10/13/21 1/11/22  Elsie Goode MD   cetirizine (ZYRTEC) 10 mg tablet  8/5/21   Provider, Historical   DULoxetine (CYMBALTA) 20 mg capsule Take 20 mg by mouth daily. 2 capsules by mouth daily   Indications: anxiousness associated with depression    Provider, Historical   azelastine (ASTELIN) 137 mcg (0.1 %) nasal spray  7/28/21   Provider, Historical   pantoprazole (PROTONIX) 40 mg tablet  7/14/21   Provider, Historical   albuterol (ProAir HFA) 90 mcg/actuation inhaler Take  by inhalation. Provider, Historical   dicyclomine (BENTYL) 10 mg capsule Take 10 mg by mouth every six (6) hours as needed for Abdominal Cramps. Provider, Historical   losartan (COZAAR) 50 mg tablet Take 1 Tab by mouth daily. 3/15/21   Martha Cornejo MD   MULTIVITAMIN PO Take 1 Tab by mouth. With iron and D3. Takes one tablet by mouth daily    Provider, Historical     Patient Active Problem List   Diagnosis Code    Elevated hemoglobin A1c R73.09    Spinal stenosis, lumbar M48.061     Patient Active Problem List    Diagnosis Date Noted    Spinal stenosis, lumbar 11/11/2019    Elevated hemoglobin A1c 11/05/2019     Current Outpatient Medications   Medication Sig Dispense Refill    ferrous sulfate (IRON) 325 mg (65 mg iron) EC tablet Take 1 Tablet by mouth three (3) times daily (with meals). 30 Tablet 1    lidocaine (LIDODERM) 5 % Apply patch to the affected area for 12 hours a day and remove for 12 hours a day. 30 Each 3    SUMAtriptan (IMITREX) 50 mg tablet Take HALF to one tablet at onset of migraine. Limit use to 2 days per week. 9 Tablet 1    topiramate (TOPAMAX) 25 mg tablet Take 1 Tablet by mouth daily for 90 days. To reduce headache frequency 90 Tablet 1    cetirizine (ZYRTEC) 10 mg tablet       DULoxetine (CYMBALTA) 20 mg capsule Take 20 mg by mouth daily. 2 capsules by mouth daily   Indications: anxiousness associated with depression      azelastine (ASTELIN) 137 mcg (0.1 %) nasal spray       pantoprazole (PROTONIX) 40 mg tablet       albuterol (ProAir HFA) 90 mcg/actuation inhaler Take  by inhalation.  dicyclomine (BENTYL) 10 mg capsule Take 10 mg by mouth every six (6) hours as needed for Abdominal Cramps.  losartan (COZAAR) 50 mg tablet Take 1 Tab by mouth daily. 90 Tab 2    MULTIVITAMIN PO Take 1 Tab by mouth. With iron and D3. Takes one tablet by mouth daily       Allergies   Allergen Reactions    Cat Dander Runny Nose    Demerol [Meperidine] Nausea and Vomiting     Dizziness. Went to ER.  Egg Yolk Swelling    Fentanyl Nausea and Vomiting     Dizziness.     Grass Pollen Runny Nose    House Dust Mite Not Reported This Time    Lactaid [Lactase] Other (comments)     Bloating/gas    Lactose Other (comments)     Bloating/gas    Mold Shortness of Breath    Prednisone Swelling    Prozac [Fluoxetine] Rash    Soy Nausea and Vomiting    Zoloft [Sertraline] Rash     Past Medical History:   Diagnosis Date    Adverse effect of anesthesia     \"so sleepy and tired for the rest of the day\"    Anxiety     Asthma     Chronic pain     back - L5 is \"almost gone\"/left foot neuropathy -pinched nerve L4-5 - spine shifted/degenerative spinal disease/stiff and sore neck and shoulder - in PT for back and neck    Degenerative spinal arthritis     Diabetes ([address]     PRE DIABETIC    Hypertension     IBS (irritable bowel syndrome)     Ill-defined condition     pre-diabetes    Nausea & vomiting     ? d/t fentanyl    PUD (peptic ulcer disease)     Spondylolisthesis      Past Surgical History:   Procedure Laterality Date    COLONOSCOPY N/A 4/25/2019    COLONOSCOPY performed by Alejandra Ramon MD at [facility] HX CHOLECYSTECTOMY  2006    HX DILATION AND CURETTAGE  1984    HX GI  04/2019    COLONOSCOPY    HX GI      ENDOSCOPY     Family History   Problem Relation Age of Onset    Delayed Awakening Mother         with anesthesia    Arthritis Mother     Other Mother         IBS/degenerative spinal disease    Anesth Problems Mother         N/V AND SLEEPY ALL DAY    COPD Father     Glaucoma Father     HIV/AIDS Sister     Heart Disease Brother         heart valve problem    Glaucoma Brother     Other Other     Heart Disease Sister         HEART VALVE DISESE        ROS    Objective:     Patient-Reported Vitals 6/8/2021   Patient-Reported Weight 211   Patient-Reported Height 5'8\"   Patient-Reported Pulse 97   Patient-Reported Temperature 98.7   Patient-Reported SpO2 92   Patient-Reported Systolic  827   Patient-Reported Diastolic 93        [INSTRUCTIONS:  \"[x]\" Indicates a positive item  \"[]\" Indicates a negative item  -- DELETE ALL ITEMS NOT EXAMINED]    Constitutional: [x] Appears well-developed and well-nourished [x] No apparent distress      [] Abnormal -     Mental status: [x] Alert and awake  [x] Oriented to person/place/time [x] Able to follow commands    [] Abnormal -     Eyes:   EOM    [x]  Normal    [] Abnormal -   Sclera  [x]  Normal    [] Abnormal -          Discharge [x]  None visible   [] Abnormal -     HENT: [x] Normocephalic, atraumatic  [] Abnormal -   [x] Mouth/Throat: Mucous membranes are moist    External Ears [x] Normal  [] Abnormal -    Neck: [x] No visualized mass [] Abnormal -     Pulmonary/Chest: [x] Respiratory effort normal   [x] No visualized signs of difficulty breathing or respiratory distress        [] Abnormal -      Musculoskeletal:   [x] Normal gait with no signs of ataxia         [x] Normal range of motion of neck        [] Abnormal -     Neurological:        [x] No Facial Asymmetry (Cranial nerve 7 motor function) (limited exam due to video visit)          [x] No gaze palsy        [] Abnormal -          Skin:        [x] No significant exanthematous lesions or discoloration noted on facial skin         [] Abnormal -            Psychiatric:       [x] Normal Affect [] Abnormal -        [x] No Hallucinations    Other pertinent observable physical exam findings:-        We discussed the expected course, resolution and complications of the diagnosis(es) in detail. Medication risks, benefits, costs, interactions, and alternatives were discussed as indicated. I advised her to contact the office if her condition worsens, changes or fails to improve as anticipated. She expressed understanding with the diagnosis(es) and plan. Mima Faria, was evaluated through a synchronous (real-time) audio-video encounter. The patient (or guardian if applicable) is aware that this is a billable service. Verbal consent to proceed has been obtained within the past 12 months. The visit was conducted pursuant to the emergency declaration under the [address] [place] [address], [address] [place] [address] authority and the [employer] General Act. Patient identification was verified, and a caregiver was present when appropriate. The patient was located in a state where the provider was credentialed to provide care.       Salima Robbins MD

## 2021-11-25 LAB
ERYTHROCYTE [DISTWIDTH] IN BLOOD BY AUTOMATED COUNT: 13.1 % (ref 11.7–15.4)
HCT VFR BLD AUTO: 33.8 % (ref 34–46.6)
HGB BLD-MCNC: 11.6 G/DL (ref 11.1–15.9)
IRON SATN MFR SERPL: 22 % (ref 15–55)
IRON SERPL-MCNC: 69 UG/DL (ref 27–159)
MCH RBC QN AUTO: 31.4 PG (ref 26.6–33)
MCHC RBC AUTO-ENTMCNC: 34.3 G/DL (ref 31.5–35.7)
MCV RBC AUTO: 91 FL (ref 79–97)
PLATELET # BLD AUTO: 336 X10E3/UL (ref 150–450)
RBC # BLD AUTO: 3.7 X10E6/UL (ref 3.77–5.28)
TIBC SERPL-MCNC: 308 UG/DL (ref 250–450)
UIBC SERPL-MCNC: 239 UG/DL (ref 131–425)
WBC # BLD AUTO: 7.5 X10E3/UL (ref 3.4–10.8)

## 2021-12-22 ENCOUNTER — VIRTUAL VISIT (OUTPATIENT)
Dept: INTERNAL MEDICINE CLINIC | Age: 61
End: 2021-12-22
Payer: MEDICAID

## 2021-12-22 DIAGNOSIS — D64.9 ANEMIA, UNSPECIFIED TYPE: Primary | ICD-10-CM

## 2021-12-22 DIAGNOSIS — R73.09 ELEVATED HEMOGLOBIN A1C: ICD-10-CM

## 2021-12-22 DIAGNOSIS — R42 VERTIGO: ICD-10-CM

## 2021-12-22 PROCEDURE — 99214 OFFICE O/P EST MOD 30 MIN: CPT | Performed by: FAMILY MEDICINE

## 2021-12-22 NOTE — PROGRESS NOTES
Vinnie Hanson is a 63 y.o. female who was seen by synchronous (real-time) audio-video technology on 12/22/2021 for No chief complaint on file. Assessment & Plan:   Diagnoses and all orders for this visit:    1. Anemia, unspecified type  -     CBC W/O DIFF; Future    2. Elevated hemoglobin A1c  -     HEMOGLOBIN A1C WITH EAG; Future    3. Vertigo    Continue with physical therapy for vertigo  Recommend patient go back to ear nose throat doctor for follow-up visit we will also consider neurology referral if symptoms continue        Subjective:     Patient is seen today for ongoing dizziness. Patient states that there is occasionally times where she has to grab onto the wall to keep her balance. She was diagnosed with vertigo several months ago and sent to ENT. They then referred her to physical therapy, she was only able to do a few sessions with them and then she had family issues which prevented her from going back. Patient states that she is worried that it may be another cause to her dizziness as she is not feeling any better. Patient does have a history of elevated blood sugar and anemia  Prior to Admission medications    Medication Sig Start Date End Date Taking? Authorizing Provider   ferrous sulfate (IRON) 325 mg (65 mg iron) EC tablet Take 1 Tablet by mouth three (3) times daily (with meals). 11/23/21   Kyra Box MD   lidocaine (LIDODERM) 5 % Apply patch to the affected area for 12 hours a day and remove for 12 hours a day. 10/20/21   Kyra Box MD   SUMAtriptan (IMITREX) 50 mg tablet Take HALF to one tablet at onset of migraine. Limit use to 2 days per week. 10/13/21   Bia Pride MD   topiramate (TOPAMAX) 25 mg tablet Take 1 Tablet by mouth daily for 90 days. To reduce headache frequency 10/13/21 1/11/22  Bia Pride MD   cetirizine (ZYRTEC) 10 mg tablet  8/5/21   Provider, Historical   DULoxetine (CYMBALTA) 20 mg capsule Take 20 mg by mouth daily.  2 capsules by mouth daily   Indications: anxiousness associated with depression    Provider, Historical   azelastine (ASTELIN) 137 mcg (0.1 %) nasal spray  7/28/21   Provider, Historical   pantoprazole (PROTONIX) 40 mg tablet  7/14/21   Provider, Historical   albuterol (ProAir HFA) 90 mcg/actuation inhaler Take  by inhalation. Provider, Historical   dicyclomine (BENTYL) 10 mg capsule Take 10 mg by mouth every six (6) hours as needed for Abdominal Cramps. Provider, Historical   losartan (COZAAR) 50 mg tablet Take 1 Tab by mouth daily. 3/15/21   Mohsen Ureña MD   MULTIVITAMIN PO Take 1 Tab by mouth. With iron and D3. Takes one tablet by mouth daily    Provider, Historical     Patient Active Problem List   Diagnosis Code    Elevated hemoglobin A1c R73.09    Spinal stenosis, lumbar M48.061     Patient Active Problem List    Diagnosis Date Noted    Spinal stenosis, lumbar 11/11/2019    Elevated hemoglobin A1c 11/05/2019     Current Outpatient Medications   Medication Sig Dispense Refill    ferrous sulfate (IRON) 325 mg (65 mg iron) EC tablet Take 1 Tablet by mouth three (3) times daily (with meals). 30 Tablet 1    lidocaine (LIDODERM) 5 % Apply patch to the affected area for 12 hours a day and remove for 12 hours a day. 30 Each 3    SUMAtriptan (IMITREX) 50 mg tablet Take HALF to one tablet at onset of migraine. Limit use to 2 days per week. 9 Tablet 1    topiramate (TOPAMAX) 25 mg tablet Take 1 Tablet by mouth daily for 90 days. To reduce headache frequency 90 Tablet 1    cetirizine (ZYRTEC) 10 mg tablet       DULoxetine (CYMBALTA) 20 mg capsule Take 20 mg by mouth daily. 2 capsules by mouth daily   Indications: anxiousness associated with depression      azelastine (ASTELIN) 137 mcg (0.1 %) nasal spray       pantoprazole (PROTONIX) 40 mg tablet       albuterol (ProAir HFA) 90 mcg/actuation inhaler Take  by inhalation.       dicyclomine (BENTYL) 10 mg capsule Take 10 mg by mouth every six (6) hours as needed for Abdominal Cramps.  losartan (COZAAR) 50 mg tablet Take 1 Tab by mouth daily. 90 Tab 2    MULTIVITAMIN PO Take 1 Tab by mouth. With iron and D3. Takes one tablet by mouth daily       Allergies   Allergen Reactions    Cat Dander Runny Nose    Demerol [Meperidine] Nausea and Vomiting     Dizziness. Went to ER.  Egg Yolk Swelling    Fentanyl Nausea and Vomiting     Dizziness.     Grass Pollen Runny Nose    House Dust Mite Not Reported This Time    Lactaid [Lactase] Other (comments)     Bloating/gas    Lactose Other (comments)     Bloating/gas    Mold Shortness of Breath    Prednisone Swelling    Prozac [Fluoxetine] Rash    Soy Nausea and Vomiting    Zoloft [Sertraline] Rash     Past Medical History:   Diagnosis Date    Adverse effect of anesthesia     \"so sleepy and tired for the rest of the day\"    Anxiety     Asthma     Chronic pain     back - L5 is \"almost gone\"/left foot neuropathy -pinched nerve L4-5 - spine shifted/degenerative spinal disease/stiff and sore neck and shoulder - in PT for back and neck    Degenerative spinal arthritis     [address] [address]     PRE DIABETIC    Hypertension     IBS (irritable bowel syndrome)     Ill-defined condition     pre-diabetes    Nausea & vomiting     ? d/t fentanyl    PUD (peptic ulcer disease)     Spondylolisthesis      Past Surgical History:   Procedure Laterality Date    COLONOSCOPY N/A 4/25/2019    COLONOSCOPY performed by aJreth Marrero MD at [address] [place] [address] Said    HX CHOLECYSTECTOMY  2006    HX DILATION AND CURETTAGE  1984    HX GI  04/2019    COLONOSCOPY    HX GI      ENDOSCOPY     Family History   Problem Relation Age of Onset    Delayed Awakening Mother         with anesthesia    Arthritis Mother     Other Mother         IBS/degenerative spinal disease    Anesth Problems Mother         N/V AND SLEEPY ALL DAY    COPD Father     Glaucoma Father     HIV/AIDS Sister     Heart Disease Brother         heart valve problem    Glaucoma Brother     Other Other     Heart Disease Sister         HEART VALVE DISESE      Social History     Tobacco Use    Smoking status: Former Smoker     Packs/day: 0.50     Years: 12.00     Pack years: 6.00     Quit date: 2012     Years since quittin.0    Smokeless tobacco: Never Used    Tobacco comment: quit    Substance Use Topics    Alcohol use: Yes     Comment: 3-4 times a yr       ROS    Objective:     Patient-Reported Vitals 2021   Patient-Reported Weight 207   Patient-Reported Height 5'8\"   Patient-Reported Pulse 100   Patient-Reported Temperature 96.2   Patient-Reported SpO2 95   Patient-Reported Systolic  280   Patient-Reported Diastolic 78        [INSTRUCTIONS:  \"[x]\" Indicates a positive item  \"[]\" Indicates a negative item  -- DELETE ALL ITEMS NOT EXAMINED]    Constitutional: [x] Appears well-developed and well-nourished [x] No apparent distress      [] Abnormal -     Mental status: [x] Alert and awake  [x] Oriented to person/place/time [x] Able to follow commands    [] Abnormal -     Eyes:   EOM    [x]  Normal    [] Abnormal -   Sclera  [x]  Normal    [] Abnormal -          Discharge [x]  None visible   [] Abnormal -     HENT: [x] Normocephalic, atraumatic  [] Abnormal -   [x] Mouth/Throat: Mucous membranes are moist    External Ears [x] Normal  [] Abnormal -    Neck: [x] No visualized mass [] Abnormal -     Pulmonary/Chest: [x] Respiratory effort normal   [x] No visualized signs of difficulty breathing or respiratory distress        [] Abnormal -      Musculoskeletal:   [x] Normal gait with no signs of ataxia         [x] Normal range of motion of neck        [] Abnormal -     Neurological:        [x] No Facial Asymmetry (Cranial nerve 7 motor function) (limited exam due to video visit)          [x] No gaze palsy        [] Abnormal -          Skin:        [x] No significant exanthematous lesions or discoloration noted on facial skin         [] Abnormal - Psychiatric:       [x] Normal Affect [] Abnormal -        [x] No Hallucinations    Other pertinent observable physical exam findings:-        We discussed the expected course, resolution and complications of the diagnosis(es) in detail. Medication risks, benefits, costs, interactions, and alternatives were discussed as indicated. I advised her to contact the office if her condition worsens, changes or fails to improve as anticipated. She expressed understanding with the diagnosis(es) and plan. Elis Seen, was evaluated through a synchronous (real-time) audio-video encounter. The patient (or guardian if applicable) is aware that this is a billable service. Verbal consent to proceed has been obtained within the past 12 months. The visit was conducted pursuant to the emergency declaration under the [address] [place] [address] authority and the [employer] General Act. Patient identification was verified, and a caregiver was present when appropriate. The patient was located in a state where the provider was credentialed to provide care.       Tomi Bell MD

## 2021-12-23 LAB
ERYTHROCYTE [DISTWIDTH] IN BLOOD BY AUTOMATED COUNT: 12.7 % (ref 11.7–15.4)
EST. AVERAGE GLUCOSE BLD GHB EST-MCNC: 131 MG/DL
HBA1C MFR BLD: 6.2 % (ref 4.8–5.6)
HCT VFR BLD AUTO: 34 % (ref 34–46.6)
HGB BLD-MCNC: 11.3 G/DL (ref 11.1–15.9)
MCH RBC QN AUTO: 29.8 PG (ref 26.6–33)
MCHC RBC AUTO-ENTMCNC: 33.2 G/DL (ref 31.5–35.7)
MCV RBC AUTO: 90 FL (ref 79–97)
PLATELET # BLD AUTO: 342 X10E3/UL (ref 150–450)
RBC # BLD AUTO: 3.79 X10E6/UL (ref 3.77–5.28)
WBC # BLD AUTO: 8.8 X10E3/UL (ref 3.4–10.8)

## 2021-12-23 NOTE — PROGRESS NOTES
Your blood cells are all within normal limits. Your hemoglobin A1c has gone up since last check.   Please work on exercise and diet control to keep these numbers low and lets repeat in 6 months

## 2021-12-27 RX ORDER — LOSARTAN POTASSIUM 100 MG/1
100 TABLET ORAL DAILY
Qty: 90 TABLET | Refills: 1 | Status: SHIPPED | OUTPATIENT
Start: 2021-12-27 | End: 2022-06-27

## 2022-01-09 NOTE — PROGRESS NOTES
PT INITIAL EVALUATION NOTE 2-15    Patient Name: Bobby Salcido  RMJW:  : 1960  [x]  Patient  Verified  Payor: Clark Soto / Plan: MD Clark Soto / Product Type: Managed Care Medicaid /    In time:12:41 pm  Out time:1:36 pm  Total Treatment Time (min): 55 minutes  Visit #: 1     Treatment Area: Neck pain [M54.2]  Cervical radiculopathy [M54.12]  Acute bilateral low back pain without sciatica [M54.5]    SUBJECTIVE  Pain Level (0-10 scale): 5/10  Any medication changes, allergies to medications, adverse drug reactions, diagnosis change, or new procedure performed?: [] No    [x] Yes (see summary sheet for update)  Subjective:     Pt was referred to skilled PT for chronic low back pain and acute neck pain with radiculopathy. Today, Pt reports primary complaints of chronic low back pain since  when she fell down brick stairs onto her back. After 2-3 hours standing/walking, Pt notes her legs feel like they are going to give out. She is experiencing numbness/tingling in lateral left>right lower extremity to feet. Her neck pain is also chronic, though aggravated insidiously in 10/2018; pain is described as \"stinging\" and is primarily lateral neck into bilateral posterior/superior shoulders. She notes limited cervical ROM and frequent migraines a few times per week. Aggravating factors include standing, bending, twisting, turning, walking, reading phone/looking down (neck). Relieving factors include sitting in a recliner, pain medication. Prednisone caused significant swelling and allergic reaction, gaining 9 lbs of fluid in 5 days. Patient reports functional limitations with: gardening (without pain), grocery shopping, carrying grocerie    PLOF: Sedentary lifestyle; gardening.   Previous Treatment/Compliance: PT at [facility] for 6 sessions 10/2018 with minimal benefit; Chiropractor in 7th grade for scoliosis; cortisone shot in  which relieved symptoms for 9 months Subjective:   Patient ID: Lucy Larsen is a 82year old male. HPI  Ct Copeland returns in follow-up. He was last seen in November 2016.       As per previous notes the patient developed a clinical syndrome consistent with ischemic colitis in November 2015 Ma.    Evaluation on 12/2/2021 revealed an initial normal white cell count. Hemoglobin was 12.4 which is near the patient's last baseline. A CT scan revealed long segment pericolonic fat stranding and wall thickening involving the descending colon.   Ther Work Hx: Self-employed as a  (does not paint herself)  Living Situation: Pt lives with her girlfriend and her girlfriend's brother. PMHx/Surgical Hx: High blood pressure      X-rays Cervical spine (3/13/19): Uncovertebral and facet arthropathy causing neural foraminal narrowing at C3-4  and C4-5. Pt Goals: Get rid of numbness, standing longer 2-3 hours, not wake up in night due to pain     OBJECTIVE    Posture:  Mild rounded shoulders, slumped posture  Other Observations:  Anxious, easily distracted  Gait and Functional Mobility:  Decreased left push off, left trendelenburg; numb lateral left leg, per Pt  Palpation: TTP L5/S1 spinous processes>L3/4; TTP bilateral glute musculature and greater trochanters; TTP mid-lower cervical spine, biblateral cervical paraspinals, upper trap and levator scap        Cervical AROM:        R  L    Flexion    35 pulling      Extension   25      Side Bending   15 pulling 15 pulling    Rotation   40  40          Lumbar AROM:          R  L    Flexion    50% p! Low back (had to sit down) - leaning back helps      Extension   50% eases p! And numbness    Side Bending   75% central low back p! 50% increased numbness    Rotation   75% no change  75% no change           UPPER QUARTER   MUSCLE STRENGTH  KEY       R  L  0 - No Contraction  C1, C2 Neck Flex 5  5  1 - Trace   C3 Side Flex  5  5  2 - Poor   C4 Sh Elev  5  4  3 - Fair    C5 Deltoid/Biceps 5  4 p!  4 - Good   C6 Wrist Ext  5  5  5 - Normal   C7 Triceps  5  5      C8 Thumb Ext  5  5      T1 Hand Inst  5  5    P!  Left shoulder abduction    LOWER QUARTER   MUSCLE STRENGTH  KEY       R  L  0 - No Contraction  L1, L2 Psoas  4  3+  1 - Trace   L3 Quads  5  5  2 - Poor   L4 Tib Ant  5  5  3 - Fair    L5 EHL  5  5  4 - Good   S1 Peroneals  5  5  5 - Normal   S2 Hams  5  5    Flexibility: Good hamstring flexibility       MMT:               HIP Ext: 4 B              HIP Abd: R: 4-; L: 3+      Neurological: Reflexes / Sensations: Diminished light touch sensation left L5 dermatome  Special Tests: Cervical Distraction: NT  Cervical Compression: (-) eases pain   Spurling Test: (-) eases pain       Trendelenberg: (+)    FABERS: (-)   Forward Bend: (+)    Slump: (-)   H.S. SLR: (-) B    Piriformis Ext: (-)   FADDIR: (+) B L>R    10 min Therapeutic Exercise:  [] See flow sheet :   Rationale: increase ROM and increase strength to improve the patients ability to perform ADLs with decreased pain or discomfort. With   [x] TE   [] TA   [] neuro   [] other: Patient Education: [x] Review HEP    [] Progressed/Changed HEP based on:   [] positioning   [] body mechanics   [] transfers   [] heat/ice application    [x] other: Educated Pt regarding impairments, role of PT, and POC.          Other Objective/Functional Measures:  FOTO Score:   Lumbar spine: 46/100  Neck: 53/100    Pain Level (0-10 scale) post treatment: 5/10      ASSESSMENT:      [x]  See Plan of [address] 3/25/2019 mouth 3 (three) times daily. • Calcium Carbonate-Vitamin D (CALCIUM 600 + D OR) Take 1 tablet by mouth 2 (two) times daily. • Coenzyme Q10 (COQ10 OR) Take 1 capsule by mouth daily.      • Probiotic Product (PROBIOTIC OR) Take 1 capsule by mouth gabriel RBC      3.80 - 5.80 x10(6)uL 3.80 3.99 4.10   Hemoglobin      13.0 - 17.5 g/dL 11.7 (L) 12.3 (L) 12.4 (L)   Hematocrit      39.0 - 53.0 % 34.9 (L) 36.7 (L) 38.3 (L)   MCV      80.0 - 100.0 fL 91.8 92.0 93.4   MCH      26.0 - 34.0 pg 30.8 30.8 30.2   MCH Negative    Vibrio Pcr   Negative Negative    Vibrio Cholera Pcr   Negative Negative    Yersinia Entercolitica Pcr   Negative Negative    Enteroaggregative E. Coli Pcr   Negative Negative    Enteropathogenic E.  Coli Pcr   Negative Negative    [facility] for this assay is available upon request from the laboratory. [facility] [place] [facility]   [address] [facility]   Phone: [phone]          PROCEDURE: CT ABDOMEN PELVIS calcifications of the major aortic branch vessels as well.  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    RETROPERITONEUM: No mass or enlarged adenopathy.     BOWEL Severe atherosclerotic calcification of the abdominal aorta and major branches.  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       5.  Additional incidental findings as above.                 Dictated by (CST): Dejah Zhu

## 2022-01-26 DIAGNOSIS — M54.2 NECK PAIN: ICD-10-CM

## 2022-01-26 DIAGNOSIS — M25.559 HIP PAIN: ICD-10-CM

## 2022-01-26 DIAGNOSIS — M48.062 SPINAL STENOSIS OF LUMBAR REGION WITH NEUROGENIC CLAUDICATION: Primary | ICD-10-CM

## 2022-01-26 DIAGNOSIS — M51.36 DDD (DEGENERATIVE DISC DISEASE), LUMBAR: ICD-10-CM

## 2022-02-07 DIAGNOSIS — R53.83 FATIGUE, UNSPECIFIED TYPE: Primary | ICD-10-CM

## 2022-02-09 DIAGNOSIS — M48.062 SPINAL STENOSIS OF LUMBAR REGION WITH NEUROGENIC CLAUDICATION: Primary | ICD-10-CM

## 2022-02-10 RX ORDER — GABAPENTIN 100 MG/1
100 CAPSULE ORAL
Qty: 30 CAPSULE | Refills: 0 | Status: SHIPPED | OUTPATIENT
Start: 2022-02-10 | End: 2022-03-21 | Stop reason: SDUPTHER

## 2022-03-03 LAB
BUN SERPL-MCNC: 12 MG/DL (ref 6–24)
BUN/CREAT SERPL: 15 (ref 9–23)
CALCIUM SERPL-MCNC: 9.1 MG/DL (ref 8.7–10.2)
CHLORIDE SERPL-SCNC: 102 MMOL/L (ref 96–106)
CO2 SERPL-SCNC: 21 MMOL/L (ref 20–29)
CREAT SERPL-MCNC: 0.82 MG/DL (ref 0.57–1)
EGFR: 82 ML/MIN/1.73
ERYTHROCYTE [DISTWIDTH] IN BLOOD BY AUTOMATED COUNT: 13.2 % (ref 11.7–15.4)
GLUCOSE SERPL-MCNC: 100 MG/DL (ref 65–99)
HCT VFR BLD AUTO: 36.1 % (ref 34–46.6)
HGB BLD-MCNC: 11.8 G/DL (ref 11.1–15.9)
MCH RBC QN AUTO: 29.9 PG (ref 26.6–33)
MCHC RBC AUTO-ENTMCNC: 32.7 G/DL (ref 31.5–35.7)
MCV RBC AUTO: 91 FL (ref 79–97)
PLATELET # BLD AUTO: 377 X10E3/UL (ref 150–450)
POTASSIUM SERPL-SCNC: 3.7 MMOL/L (ref 3.5–5.2)
RBC # BLD AUTO: 3.95 X10E6/UL (ref 3.77–5.28)
SODIUM SERPL-SCNC: 141 MMOL/L (ref 134–144)
WBC # BLD AUTO: 10.1 X10E3/UL (ref 3.4–10.8)

## 2022-03-07 NOTE — PROGRESS NOTES
Problem: Mobility Impaired (Adult and Pediatric)  Goal: *Acute Goals and Plan of Care (Insert Text)  Description  FUNCTIONAL STATUS PRIOR TO ADMISSION: Patient was independent and active without use of DME.    HOME SUPPORT PRIOR TO ADMISSION: The patient lived with family but did not require assist.    Physical Therapy Goals  Initiated 11/12/2019    1. Patient will move from supine to sit and sit to supine , scoot up and down and roll side to side in bed with modified independence within 4 days. 2. Patient will perform sit to stand with modified independence within 4 days. 3. Patient will ambulate with modified independence for 300 feet with the least restrictive device within 4 days. 4. Patient will ascend/descend 4 stairs with 1 handrail(s) with supervision/set-up within 4 days. 5. Patient will verbalize and demonstrate understanding of spinal precautions (No bending, lifting greater than 5 lbs, or twisting; log-roll technique; frequent repositioning as instructed) within 4 days. Outcome: Progressing Towards Goal     PHYSICAL THERAPY TREATMENT  Patient: Esau Lira (76 y.o. female)  Date: 11/13/2019  Diagnosis: Spinal stenosis, lumbar [M48.061] <principal problem not specified>  Procedure(s) (LRB):  LUMBAR LAMINECTOMY L4-S1 WITH L4-5 MIDLINE FUSION - MAZOR (N/A) 2 Days Post-Op  Precautions:    Chart, physical therapy assessment, plan of care and goals were reviewed. ASSESSMENT  Patient continues with skilled PT services and is progressing towards goals. Pt c/o significant pain upon entering but appears controlled with objective measurement scale. Discussed typical increase in soreness POD 2. Pt able to ambulate 300ft with continued variable duarte and performance, appears to overthink movement at times but improves with distraction. Pt stands approx 8min at sink to brush her teeth without assistance.      Current Level of Function Impacting Discharge (mobility/balance): supervision         PLAN Stop prandin 0.5mg during 4-5pm  prandin 0.5mg with 9pm meal     Goal sugars     Ok to continue dexcom        :  Patient continues to benefit from skilled intervention to address the above impairments. Continue treatment per established plan of care. to address goals. Recommendation for discharge: (in order for the patient to meet his/her long term goals)  Physical therapy at least 2 days/week in the home     This discharge recommendation:  Has been made in collaboration with the attending provider and/or case management    IF patient discharges home will need the following DME: patient owns DME required for discharge       SUBJECTIVE:   Patient stated Corrine Black was in so much more pain today.     OBJECTIVE DATA SUMMARY:   Critical Behavior:  Neurologic State: Alert  Orientation Level: Oriented X4  Cognition: Appropriate decision making, Appropriate for age attention/concentration, Appropriate safety awareness, Follows commands     Functional Mobility Training:  Bed Mobility:                    Transfers:  Sit to Stand: Supervision  Stand to Sit: Supervision                             Balance:  Sitting: Intact  Standing: Intact; With support  Ambulation/Gait Training:  Distance (ft): 300 Feet (ft)  Assistive Device: Brace/Splint; Walker, rolling  Ambulation - Level of Assistance: Supervision        Gait Abnormalities: Trunk sway increased        Base of Support: Widened     Speed/Gogo: Fluctuations  Step Length: Left shortened;Right shortened     Pain Rating:  Pt c/o severe pain. Pt pain appears stable with use of FLACC objective measurement pain scale:   Face 1, Legs 0, Activity 0, Cry 1, Consolability 1 for a score of 3/10    Face  0 No particular expression or smile 1 Occasional grimace or frown, withdrawn, uninterested 2 Frequent to constant quivering chin, clenched jaw  Legs  0 Normal position or relaxed  1 Uneasy, restless, tense    2 Kicking, or legs drawn up  Activity  0 Lying quietly/normally, moves easily 1 Squirming, shifting, back and forth, tense  2 Arched, rigid or jerking  Cry  0 No cry (awake or asleep)  1 Moans or whimpers; occasional complaint   2 Crying steadily, screams or sobs, frequent complaints  Consolability  0 Content, relaxed   1 Reassured by touching, being talked to, distractible 2 Difficult to console or comfort      Activity Tolerance:   Good and requires rest breaks  Please refer to the flowsheet for vital signs taken during this treatment.     After treatment patient left in no apparent distress:   Sitting in chair and Call bell within reach    COMMUNICATION/COLLABORATION:   The patients plan of care was discussed with: Registered Nurse and     Maggie Guerin, PT   Time Calculation: 18 mins

## 2022-03-18 PROBLEM — M48.061 SPINAL STENOSIS, LUMBAR: Status: ACTIVE | Noted: 2019-11-11

## 2022-03-19 PROBLEM — R73.09 ELEVATED HEMOGLOBIN A1C: Status: ACTIVE | Noted: 2019-11-05

## 2022-03-21 DIAGNOSIS — M48.062 SPINAL STENOSIS OF LUMBAR REGION WITH NEUROGENIC CLAUDICATION: ICD-10-CM

## 2022-03-23 ENCOUNTER — APPOINTMENT (OUTPATIENT)
Dept: CT IMAGING | Age: 62
DRG: 244 | End: 2022-03-23
Attending: NURSE PRACTITIONER
Payer: MEDICAID

## 2022-03-23 ENCOUNTER — HOSPITAL ENCOUNTER (INPATIENT)
Age: 62
LOS: 4 days | Discharge: HOME | DRG: 244 | End: 2022-03-27
Attending: STUDENT IN AN ORGANIZED HEALTH CARE EDUCATION/TRAINING PROGRAM | Admitting: INTERNAL MEDICINE
Payer: MEDICAID

## 2022-03-23 DIAGNOSIS — R55 NEAR SYNCOPE: ICD-10-CM

## 2022-03-23 DIAGNOSIS — R53.83 FATIGUE, UNSPECIFIED TYPE: ICD-10-CM

## 2022-03-23 DIAGNOSIS — K62.5 RECTAL BLEEDING: ICD-10-CM

## 2022-03-23 DIAGNOSIS — R00.0 TACHYCARDIA: Primary | ICD-10-CM

## 2022-03-23 PROBLEM — K57.92 ACUTE DIVERTICULITIS: Status: ACTIVE | Noted: 2022-03-23

## 2022-03-23 LAB
ABO + RH BLD: NORMAL
ALBUMIN SERPL-MCNC: 4 G/DL (ref 3.5–5)
ALBUMIN/GLOB SERPL: 0.8 {RATIO} (ref 1.1–2.2)
ALP SERPL-CCNC: 146 U/L (ref 45–117)
ALT SERPL-CCNC: 53 U/L (ref 12–78)
ANION GAP SERPL CALC-SCNC: 5 MMOL/L (ref 5–15)
AST SERPL-CCNC: 30 U/L (ref 15–37)
BASOPHILS # BLD: 0 K/UL (ref 0–0.1)
BASOPHILS NFR BLD: 0 % (ref 0–1)
BILIRUB SERPL-MCNC: 0.7 MG/DL (ref 0.2–1)
BLOOD GROUP ANTIBODIES SERPL: NORMAL
BUN SERPL-MCNC: 9 MG/DL (ref 6–20)
BUN/CREAT SERPL: 10 (ref 12–20)
CALCIUM SERPL-MCNC: 9.2 MG/DL (ref 8.5–10.1)
CHLORIDE SERPL-SCNC: 104 MMOL/L (ref 97–108)
CO2 SERPL-SCNC: 29 MMOL/L (ref 21–32)
COMMENT, HOLDF: NORMAL
CREAT SERPL-MCNC: 0.93 MG/DL (ref 0.55–1.02)
DIFFERENTIAL METHOD BLD: ABNORMAL
EOSINOPHIL # BLD: 0 K/UL (ref 0–0.4)
EOSINOPHIL NFR BLD: 0 % (ref 0–7)
ERYTHROCYTE [DISTWIDTH] IN BLOOD BY AUTOMATED COUNT: 13.5 % (ref 11.5–14.5)
GLOBULIN SER CALC-MCNC: 4.8 G/DL (ref 2–4)
GLUCOSE SERPL-MCNC: 126 MG/DL (ref 65–100)
HCT VFR BLD AUTO: 38.7 % (ref 35–47)
HGB BLD-MCNC: 12.3 G/DL (ref 11.5–16)
IMM GRANULOCYTES # BLD AUTO: 0 K/UL (ref 0–0.04)
IMM GRANULOCYTES NFR BLD AUTO: 0 % (ref 0–0.5)
LYMPHOCYTES # BLD: 2.2 K/UL (ref 0.8–3.5)
LYMPHOCYTES NFR BLD: 17 % (ref 12–49)
MCH RBC QN AUTO: 30.4 PG (ref 26–34)
MCHC RBC AUTO-ENTMCNC: 31.8 G/DL (ref 30–36.5)
MCV RBC AUTO: 95.6 FL (ref 80–99)
MONOCYTES # BLD: 0.7 K/UL (ref 0–1)
MONOCYTES NFR BLD: 6 % (ref 5–13)
NEUTS SEG # BLD: 10.1 K/UL (ref 1.8–8)
NEUTS SEG NFR BLD: 77 % (ref 32–75)
NRBC # BLD: 0 K/UL (ref 0–0.01)
NRBC BLD-RTO: 0 PER 100 WBC
PLATELET # BLD AUTO: 354 K/UL (ref 150–400)
PMV BLD AUTO: 8.5 FL (ref 8.9–12.9)
POTASSIUM SERPL-SCNC: 3.3 MMOL/L (ref 3.5–5.1)
PROT SERPL-MCNC: 8.8 G/DL (ref 6.4–8.2)
RBC # BLD AUTO: 4.05 M/UL (ref 3.8–5.2)
SAMPLES BEING HELD,HOLD: NORMAL
SODIUM SERPL-SCNC: 138 MMOL/L (ref 136–145)
SPECIMEN EXP DATE BLD: NORMAL
WBC # BLD AUTO: 13.2 K/UL (ref 3.6–11)

## 2022-03-23 PROCEDURE — 74011000636 HC RX REV CODE- 636: Performed by: STUDENT IN AN ORGANIZED HEALTH CARE EDUCATION/TRAINING PROGRAM

## 2022-03-23 PROCEDURE — 85025 COMPLETE CBC W/AUTO DIFF WBC: CPT

## 2022-03-23 PROCEDURE — 99285 EMERGENCY DEPT VISIT HI MDM: CPT

## 2022-03-23 PROCEDURE — 74011000250 HC RX REV CODE- 250: Performed by: NURSE PRACTITIONER

## 2022-03-23 PROCEDURE — 86900 BLOOD TYPING SEROLOGIC ABO: CPT

## 2022-03-23 PROCEDURE — 96374 THER/PROPH/DIAG INJ IV PUSH: CPT

## 2022-03-23 PROCEDURE — C9113 INJ PANTOPRAZOLE SODIUM, VIA: HCPCS | Performed by: NURSE PRACTITIONER

## 2022-03-23 PROCEDURE — 74011250636 HC RX REV CODE- 250/636: Performed by: NURSE PRACTITIONER

## 2022-03-23 PROCEDURE — 65660000000 HC RM CCU STEPDOWN

## 2022-03-23 PROCEDURE — 74011250636 HC RX REV CODE- 250/636: Performed by: INTERNAL MEDICINE

## 2022-03-23 PROCEDURE — 74177 CT ABD & PELVIS W/CONTRAST: CPT

## 2022-03-23 PROCEDURE — 74011250637 HC RX REV CODE- 250/637: Performed by: INTERNAL MEDICINE

## 2022-03-23 PROCEDURE — 80053 COMPREHEN METABOLIC PANEL: CPT

## 2022-03-23 RX ORDER — TOPIRAMATE 25 MG/1
25 TABLET ORAL
COMMUNITY
End: 2022-10-10 | Stop reason: ALTCHOICE

## 2022-03-23 RX ORDER — ACETAMINOPHEN 325 MG/1
650 TABLET ORAL
Status: DISCONTINUED | OUTPATIENT
Start: 2022-03-23 | End: 2022-03-24

## 2022-03-23 RX ORDER — GABAPENTIN 100 MG/1
100 CAPSULE ORAL
Qty: 30 CAPSULE | Refills: 0 | Status: SHIPPED | OUTPATIENT
Start: 2022-03-23 | End: 2022-10-10 | Stop reason: ALTCHOICE

## 2022-03-23 RX ORDER — MORPHINE SULFATE 2 MG/ML
2 INJECTION, SOLUTION INTRAMUSCULAR; INTRAVENOUS
Status: DISCONTINUED | OUTPATIENT
Start: 2022-03-23 | End: 2022-03-27 | Stop reason: HOSPADM

## 2022-03-23 RX ORDER — SODIUM CHLORIDE, SODIUM LACTATE, POTASSIUM CHLORIDE, CALCIUM CHLORIDE 600; 310; 30; 20 MG/100ML; MG/100ML; MG/100ML; MG/100ML
125 INJECTION, SOLUTION INTRAVENOUS CONTINUOUS
Status: DISCONTINUED | OUTPATIENT
Start: 2022-03-23 | End: 2022-03-27 | Stop reason: HOSPADM

## 2022-03-23 RX ORDER — HYOSCYAMINE SULFATE 0.38 MG/1
0.38 TABLET, EXTENDED RELEASE ORAL
Status: ON HOLD | COMMUNITY
End: 2022-10-31

## 2022-03-23 RX ORDER — SODIUM CHLORIDE 0.9 % (FLUSH) 0.9 %
10 SYRINGE (ML) INJECTION ONCE
Status: COMPLETED | OUTPATIENT
Start: 2022-03-23 | End: 2022-03-23

## 2022-03-23 RX ORDER — METRONIDAZOLE 500 MG/100ML
500 INJECTION, SOLUTION INTRAVENOUS EVERY 12 HOURS
Status: DISCONTINUED | OUTPATIENT
Start: 2022-03-23 | End: 2022-03-24

## 2022-03-23 RX ORDER — POTASSIUM CHLORIDE 14.9 MG/ML
10 INJECTION INTRAVENOUS
Status: COMPLETED | OUTPATIENT
Start: 2022-03-23 | End: 2022-03-24

## 2022-03-23 RX ORDER — PANTOPRAZOLE SODIUM 40 MG/10ML
40 INJECTION, POWDER, LYOPHILIZED, FOR SOLUTION INTRAVENOUS ONCE
Status: COMPLETED | OUTPATIENT
Start: 2022-03-23 | End: 2022-03-23

## 2022-03-23 RX ORDER — LEVOFLOXACIN 5 MG/ML
750 INJECTION, SOLUTION INTRAVENOUS EVERY 24 HOURS
Status: DISCONTINUED | OUTPATIENT
Start: 2022-03-23 | End: 2022-03-24

## 2022-03-23 RX ADMIN — SODIUM CHLORIDE 1000 ML: 900 INJECTION, SOLUTION INTRAVENOUS at 20:10

## 2022-03-23 RX ADMIN — PANTOPRAZOLE SODIUM 40 MG: 40 INJECTION, POWDER, FOR SOLUTION INTRAVENOUS at 18:30

## 2022-03-23 RX ADMIN — ACETAMINOPHEN 650 MG: 325 TABLET ORAL at 21:41

## 2022-03-23 RX ADMIN — SODIUM CHLORIDE, PRESERVATIVE FREE 10 ML: 5 INJECTION INTRAVENOUS at 18:30

## 2022-03-23 RX ADMIN — SODIUM CHLORIDE 1000 ML: 9 INJECTION, SOLUTION INTRAVENOUS at 18:30

## 2022-03-23 RX ADMIN — SODIUM CHLORIDE, POTASSIUM CHLORIDE, SODIUM LACTATE AND CALCIUM CHLORIDE 125 ML/HR: 600; 310; 30; 20 INJECTION, SOLUTION INTRAVENOUS at 21:30

## 2022-03-23 RX ADMIN — METRONIDAZOLE 500 MG: 500 INJECTION, SOLUTION INTRAVENOUS at 23:11

## 2022-03-23 RX ADMIN — IOPAMIDOL 100 ML: 755 INJECTION, SOLUTION INTRAVENOUS at 18:02

## 2022-03-23 NOTE — ED PROVIDER NOTES
This is a 61-year-old female who presents ambulatory to the emergency room with complaints of abdominal pain. Patient states at 1430 today she started having a colon spasm followed by GI bleed. States that she is \"bleeding from the inside. \"  States she had a similar episode in 1989 where she was hospitalized for a week. At that time she had pain and then \"passed out. \"  Patient states last night she started to feel ill and \"they had to call me down with a washcloth. \" She then vomited. Patient was able to go to bed woke up today vomiting again and then at 1430 started having symptoms again. Patient states it \"is not a hemorrhoid that is actually from deep within. \"  Is denying any chest pain, shortness of breath, dizziness, nausea or vomiting, fevers or chills. States she is just cold at baseline. Is noted to be tachycardic in triage at 116. States \"I just do not feel well. \"  Appears pale. There are no further complaints at this time. Sidney Owens MD  Past Medical History:  No date: Adverse effect of anesthesia      Comment:  \"so sleepy and tired for the rest of the day\"  No date:  Anxiety  No date: Asthma  No date: Chronic pain      Comment:  back - L5 is \"almost gone\"/left foot neuropathy -pinched               nerve L4-5 - spine shifted/degenerative spinal                disease/stiff and sore neck and shoulder - in PT for back               and neck  No date: Degenerative spinal arthritis  No date: Diabetes ([facility] [address]      Comment:  PRE DIABETIC  No date: Hypertension  No date: IBS (irritable bowel syndrome)  No date: Ill-defined condition      Comment:  pre-diabetes  No date: Nausea & vomiting      Comment:  ? d/t fentanyl  No date: PUD (peptic ulcer disease)  No date: Spondylolisthesis  Past Surgical History:  4/25/2019: COLONOSCOPY; N/A      Comment:  COLONOSCOPY performed by Parminder Waite MD at [facility] [address]  2006: HX CHOLECYSTECTOMY  1984: [address] CURETTAGE  2019: HX GI      Comment:  COLONOSCOPY  No date: HX GI      Comment:  ENDOSCOPY             Past Medical History:   Diagnosis Date    Adverse effect of anesthesia     \"so sleepy and tired for the rest of the day\"    Anxiety     Asthma     Chronic pain     back - L5 is \"almost gone\"/left foot neuropathy -pinched nerve L4-5 - spine shifted/degenerative spinal disease/stiff and sore neck and shoulder - in PT for back and neck    Degenerative spinal arthritis     Diabetes ([facility] [address]     PRE DIABETIC    Hypertension     IBS (irritable bowel syndrome)     Ill-defined condition     pre-diabetes    Nausea & vomiting     ? d/t fentanyl    PUD (peptic ulcer disease)     Spondylolisthesis        Past Surgical History:   Procedure Laterality Date    COLONOSCOPY N/A 2019    COLONOSCOPY performed by Zhang Finney MD at [address] [facility] HX CHOLECYSTECTOMY  2006    HX DILATION AND CURETTAGE  1984    HX GI  2019    COLONOSCOPY    HX GI      ENDOSCOPY         Family History:   Problem Relation Age of Onset    Delayed Awakening Mother         with anesthesia    Arthritis Mother     Other Mother         IBS/degenerative spinal disease    Anesth Problems Mother         N/V AND SLEEPY ALL DAY    COPD Father     Glaucoma Father     HIV/AIDS Sister     Heart Disease Brother         heart valve problem    Glaucoma Brother     Other Other     Heart Disease Sister         HEART VALVE DISESE        Social History     Socioeconomic History    Marital status: OTHER     Spouse name: Not on file    Number of children: Not on file    Years of education: Not on file    Highest education level: Not on file   Occupational History    Not on file   Tobacco Use    Smoking status: Former Smoker     Packs/day: 0.50     Years: 12.00     Pack years: 6.00     Quit date: 2012     Years since quittin.3    Smokeless tobacco: Never Used    Tobacco comment: quit  Substance and Sexual Activity    Alcohol use: Yes     Comment: 3-4 times a yr    Drug use: Yes     Types: Marijuana     Comment:  LAST USED IN 9/2019    Sexual activity: Yes     Partners: Female   Other Topics Concern    Not on file   Social History Narrative    Not on file     Social Determinants of Health     Financial Resource Strain:     Difficulty of Paying Living Expenses: Not on file   Food Insecurity:     Worried About Running Out of Food in the Last Year: Not on file    Yenni of Food in the Last Year: Not on file   Transportation Needs:     Lack of Transportation (Medical): Not on file    Lack of Transportation (Non-Medical): Not on file   Physical Activity:     Days of Exercise per Week: Not on file    Minutes of Exercise per Session: Not on file   Stress:     Feeling of Stress : Not on file   Social Connections:     Frequency of Communication with Friends and Family: Not on file    Frequency of Social Gatherings with Friends and Family: Not on file    Attends Spiritism Services: Not on file    Active Member of [address] [place] [address] or Organizations: Not on file    Attends Club or Organization Meetings: Not on file    Marital Status: Not on file   Intimate Partner Violence:     Fear of Current or Ex-Partner: Not on file    Emotionally Abused: Not on file    Physically Abused: Not on file    Sexually Abused: Not on file   Housing Stability:     Unable to Pay for Housing in the Last Year: Not on file    Number of [place] in the Last Year: Not on file    Unstable Housing in the Last Year: Not on file         ALLERGIES: Cat dander, Demerol [meperidine], Egg yolk, Fentanyl, Grass pollen, House dust mite, Lactaid [lactase], Lactose, Mold, Prednisone, Prozac [fluoxetine], Soy, and Zoloft [sertraline]    Review of Systems   Constitutional: Negative for appetite change, chills, diaphoresis, fatigue and fever.    HENT: Negative for congestion, ear discharge, ear pain, sinus pressure, sinus pain, sore throat and trouble swallowing. Eyes: Negative for photophobia, pain, redness and visual disturbance. Respiratory: Negative for chest tightness, shortness of breath and wheezing. Cardiovascular: Negative for chest pain and palpitations. Gastrointestinal: Positive for abdominal pain, blood in stool, nausea and vomiting. Negative for abdominal distention. Endocrine: Negative. Genitourinary: Negative for difficulty urinating, flank pain, frequency and urgency. Musculoskeletal: Negative for back pain, neck pain and neck stiffness. Skin: Negative for color change, pallor, rash and wound. Allergic/Immunologic: Negative. Neurological: Negative for dizziness, speech difficulty, weakness and headaches. Hematological: Does not bruise/bleed easily. Psychiatric/Behavioral: Negative for behavioral problems. The patient is not nervous/anxious. Vitals:    03/23/22 1627   BP: (!) 145/86   Pulse: (!) 116   Resp: 16   Temp: 97.6 °F (36.4 °C)   SpO2: 96%            Physical Exam  Vitals and nursing note reviewed. Constitutional:       General: She is not in acute distress. Appearance: Normal appearance. She is well-developed. She is not ill-appearing or diaphoretic. HENT:      Head: Normocephalic and atraumatic. Right Ear: External ear normal.      Left Ear: External ear normal.      Nose: Nose normal. No congestion. Mouth/Throat:      Mouth: Mucous membranes are moist.   Eyes:      General:         Right eye: No discharge. Left eye: No discharge. Conjunctiva/sclera: Conjunctivae normal.      Pupils: Pupils are equal, round, and reactive to light. Neck:      Vascular: No JVD. Trachea: No tracheal deviation. Cardiovascular:      Rate and Rhythm: Regular rhythm. Tachycardia present. Pulses: Normal pulses. Heart sounds: Normal heart sounds. No murmur heard. No gallop. Pulmonary:      Effort: Pulmonary effort is normal. No respiratory distress. Breath sounds: Normal breath sounds. No wheezing or rales. Chest:      Chest wall: No tenderness. Abdominal:      General: Bowel sounds are normal. There is no distension. Palpations: Abdomen is soft. Tenderness: There is generalized abdominal tenderness. There is no guarding or rebound. Genitourinary:     Comments: C/o gi bleed    Musculoskeletal:         General: No tenderness. Normal range of motion. Cervical back: Normal range of motion and neck supple. Skin:     General: Skin is warm and dry. Capillary Refill: Capillary refill takes less than 2 seconds. Coloration: Skin is pale. Findings: No erythema or rash. Neurological:      General: No focal deficit present. Mental Status: She is alert and oriented to person, place, and time. Psychiatric:         Mood and Affect: Mood normal. Mood is not anxious or depressed. Behavior: Behavior normal.         Thought Content: Thought content normal.         Judgment: Judgment normal.          MDM  Number of Diagnoses or Management Options  Fatigue, unspecified type: new and requires workup  Near syncope: new and requires workup  Rectal bleeding: new and requires workup  Tachycardia: new and requires workup  Diagnosis management comments: Differential diagnosis includes GI bleed, hemorrhoid, diverticulitis and others. After physical examination and review of laboratory data, patient was admitted to the hospitalist service for further evaluation and treatment, GI consult. Discussed with Dr. Lilliana Negron who is in agreement with plan of care. Patient in agreement with plan of care. Amount and/or Complexity of Data Reviewed  Clinical lab tests: ordered and reviewed  Tests in the radiology section of CPT®: ordered and reviewed      Labs Reviewed   CBC WITH AUTOMATED DIFF - Abnormal; Notable for the following components:       Result Value    WBC 13.2 (*)     MPV 8.5 (*)     NEUTROPHILS 77 (*)     ABS.  NEUTROPHILS 10.1 (*)     All other components within normal limits   METABOLIC PANEL, COMPREHENSIVE - Abnormal; Notable for the following components:    Potassium 3.3 (*)     Glucose 126 (*)     BUN/Creatinine ratio 10 (*)     Alk. phosphatase 146 (*)     Protein, total 8.8 (*)     Globulin 4.8 (*)     A-G Ratio 0.8 (*)     All other components within normal limits   SAMPLES BEING HELD   SAMPLE TO BLOOD BANK   TYPE & SCREEN     No results found. Perfect Serve Consult for Admission  6:10 PM    ED Room Number: Room/bed info not found  Patient Name and age:  Sonia Crabtree 63 y.o.  female  Working Diagnosis:   1. Tachycardia    2. Rectal bleeding    3. Near syncope    4. Fatigue, unspecified type        COVID-19 Suspicion:  no  Sepsis present:  no  Reassessment needed: no  Code Status:  Full Code  Readmission: no  Isolation Requirements:  no  Recommended Level of Care:  telemetry  Department:[facility] Adult ED - 21   Other:  Will need GI consult, Thanks.      Procedures

## 2022-03-23 NOTE — ED TRIAGE NOTES
She reports she has left sided abdominal pain that started yesterday with vomiting last night and again early this am. She says she also has blood in her stool that started this am.She says she has had similar pain in the past and it was dx as spastic colon but she says this time she feels something is wrong. The blood in her stool is new.

## 2022-03-24 PROBLEM — K57.92 ACUTE DIVERTICULITIS: Status: ACTIVE | Noted: 2022-03-23

## 2022-03-24 LAB
ALBUMIN SERPL-MCNC: 3.2 G/DL (ref 3.5–5)
ALBUMIN/GLOB SERPL: 0.8 {RATIO} (ref 1.1–2.2)
ALP SERPL-CCNC: 113 U/L (ref 45–117)
ALT SERPL-CCNC: 42 U/L (ref 12–78)
ANION GAP SERPL CALC-SCNC: 4 MMOL/L (ref 5–15)
AST SERPL-CCNC: 22 U/L (ref 15–37)
BASOPHILS # BLD: 0.1 K/UL (ref 0–0.1)
BASOPHILS NFR BLD: 1 % (ref 0–1)
BILIRUB SERPL-MCNC: 0.9 MG/DL (ref 0.2–1)
BUN SERPL-MCNC: 6 MG/DL (ref 6–20)
BUN/CREAT SERPL: 10 (ref 12–20)
CALCIUM SERPL-MCNC: 8.3 MG/DL (ref 8.5–10.1)
CHLORIDE SERPL-SCNC: 112 MMOL/L (ref 97–108)
CO2 SERPL-SCNC: 24 MMOL/L (ref 21–32)
CREAT SERPL-MCNC: 0.62 MG/DL (ref 0.55–1.02)
CRP SERPL-MCNC: 1 MG/DL (ref 0–0.6)
DIFFERENTIAL METHOD BLD: ABNORMAL
EOSINOPHIL # BLD: 0.1 K/UL (ref 0–0.4)
EOSINOPHIL NFR BLD: 1 % (ref 0–7)
ERYTHROCYTE [DISTWIDTH] IN BLOOD BY AUTOMATED COUNT: 13.5 % (ref 11.5–14.5)
EST. AVERAGE GLUCOSE BLD GHB EST-MCNC: 131 MG/DL
GLOBULIN SER CALC-MCNC: 4 G/DL (ref 2–4)
GLUCOSE SERPL-MCNC: 89 MG/DL (ref 65–100)
HBA1C MFR BLD: 6.2 % (ref 4–5.6)
HCT VFR BLD AUTO: 31.4 % (ref 35–47)
HGB BLD-MCNC: 9.9 G/DL (ref 11.5–16)
IMM GRANULOCYTES # BLD AUTO: 0 K/UL (ref 0–0.04)
IMM GRANULOCYTES NFR BLD AUTO: 0 % (ref 0–0.5)
LIPASE SERPL-CCNC: 47 U/L (ref 73–393)
LYMPHOCYTES # BLD: 2.9 K/UL (ref 0.8–3.5)
LYMPHOCYTES NFR BLD: 31 % (ref 12–49)
MAGNESIUM SERPL-MCNC: 1.8 MG/DL (ref 1.6–2.4)
MCH RBC QN AUTO: 29.9 PG (ref 26–34)
MCHC RBC AUTO-ENTMCNC: 31.5 G/DL (ref 30–36.5)
MCV RBC AUTO: 94.9 FL (ref 80–99)
MONOCYTES # BLD: 0.8 K/UL (ref 0–1)
MONOCYTES NFR BLD: 9 % (ref 5–13)
NEUTS SEG # BLD: 5.6 K/UL (ref 1.8–8)
NEUTS SEG NFR BLD: 58 % (ref 32–75)
NRBC # BLD: 0 K/UL (ref 0–0.01)
NRBC BLD-RTO: 0 PER 100 WBC
PHOSPHATE SERPL-MCNC: 2.1 MG/DL (ref 2.6–4.7)
PLATELET # BLD AUTO: 275 K/UL (ref 150–400)
PMV BLD AUTO: 8.7 FL (ref 8.9–12.9)
POTASSIUM SERPL-SCNC: 3.5 MMOL/L (ref 3.5–5.1)
PROT SERPL-MCNC: 7.2 G/DL (ref 6.4–8.2)
RBC # BLD AUTO: 3.31 M/UL (ref 3.8–5.2)
SODIUM SERPL-SCNC: 140 MMOL/L (ref 136–145)
TSH SERPL DL<=0.05 MIU/L-ACNC: 3.2 UIU/ML (ref 0.36–3.74)
WBC # BLD AUTO: 9.5 K/UL (ref 3.6–11)

## 2022-03-24 PROCEDURE — 83735 ASSAY OF MAGNESIUM: CPT

## 2022-03-24 PROCEDURE — 74011250636 HC RX REV CODE- 250/636: Performed by: INTERNAL MEDICINE

## 2022-03-24 PROCEDURE — C9113 INJ PANTOPRAZOLE SODIUM, VIA: HCPCS | Performed by: INTERNAL MEDICINE

## 2022-03-24 PROCEDURE — 83036 HEMOGLOBIN GLYCOSYLATED A1C: CPT

## 2022-03-24 PROCEDURE — 74011000250 HC RX REV CODE- 250: Performed by: INTERNAL MEDICINE

## 2022-03-24 PROCEDURE — 83690 ASSAY OF LIPASE: CPT

## 2022-03-24 PROCEDURE — 74011250636 HC RX REV CODE- 250/636: Performed by: FAMILY MEDICINE

## 2022-03-24 PROCEDURE — 36415 COLL VENOUS BLD VENIPUNCTURE: CPT

## 2022-03-24 PROCEDURE — 84443 ASSAY THYROID STIM HORMONE: CPT

## 2022-03-24 PROCEDURE — 86140 C-REACTIVE PROTEIN: CPT

## 2022-03-24 PROCEDURE — 65660000000 HC RM CCU STEPDOWN

## 2022-03-24 PROCEDURE — 74011250637 HC RX REV CODE- 250/637: Performed by: FAMILY MEDICINE

## 2022-03-24 PROCEDURE — 94760 N-INVAS EAR/PLS OXIMETRY 1: CPT

## 2022-03-24 PROCEDURE — 80053 COMPREHEN METABOLIC PANEL: CPT

## 2022-03-24 PROCEDURE — 74011250637 HC RX REV CODE- 250/637: Performed by: INTERNAL MEDICINE

## 2022-03-24 PROCEDURE — 74011000258 HC RX REV CODE- 258: Performed by: FAMILY MEDICINE

## 2022-03-24 PROCEDURE — 85025 COMPLETE CBC W/AUTO DIFF WBC: CPT

## 2022-03-24 PROCEDURE — 84100 ASSAY OF PHOSPHORUS: CPT

## 2022-03-24 RX ORDER — SODIUM CHLORIDE 0.9 % (FLUSH) 0.9 %
5-40 SYRINGE (ML) INJECTION AS NEEDED
Status: DISCONTINUED | OUTPATIENT
Start: 2022-03-24 | End: 2022-03-27 | Stop reason: HOSPADM

## 2022-03-24 RX ORDER — TOPIRAMATE 25 MG/1
25 TABLET ORAL
Status: DISCONTINUED | OUTPATIENT
Start: 2022-03-24 | End: 2022-03-27 | Stop reason: HOSPADM

## 2022-03-24 RX ORDER — ONDANSETRON 2 MG/ML
4 INJECTION INTRAMUSCULAR; INTRAVENOUS
Status: DISCONTINUED | OUTPATIENT
Start: 2022-03-24 | End: 2022-03-27 | Stop reason: HOSPADM

## 2022-03-24 RX ORDER — SODIUM CHLORIDE 0.9 % (FLUSH) 0.9 %
5-40 SYRINGE (ML) INJECTION EVERY 8 HOURS
Status: DISCONTINUED | OUTPATIENT
Start: 2022-03-24 | End: 2022-03-27 | Stop reason: HOSPADM

## 2022-03-24 RX ORDER — LIDOCAINE 4 G/100G
1 PATCH TOPICAL DAILY PRN
Status: DISCONTINUED | OUTPATIENT
Start: 2022-03-24 | End: 2022-03-27 | Stop reason: HOSPADM

## 2022-03-24 RX ORDER — POLYETHYLENE GLYCOL 3350 17 G/17G
17 POWDER, FOR SOLUTION ORAL DAILY PRN
Status: DISCONTINUED | OUTPATIENT
Start: 2022-03-24 | End: 2022-03-27 | Stop reason: HOSPADM

## 2022-03-24 RX ORDER — FLUTICASONE PROPIONATE 50 MCG
2 SPRAY, SUSPENSION (ML) NASAL DAILY
Status: ON HOLD | COMMUNITY
End: 2022-10-31

## 2022-03-24 RX ORDER — DULOXETIN HYDROCHLORIDE 20 MG/1
20 CAPSULE, DELAYED RELEASE ORAL DAILY
Status: DISCONTINUED | OUTPATIENT
Start: 2022-03-24 | End: 2022-03-24

## 2022-03-24 RX ORDER — LIDOCAINE 50 MG/G
1 PATCH TOPICAL
Status: ON HOLD | COMMUNITY
End: 2022-10-31

## 2022-03-24 RX ORDER — CETIRIZINE HCL 10 MG
10 TABLET ORAL DAILY
Status: DISCONTINUED | OUTPATIENT
Start: 2022-03-24 | End: 2022-03-27 | Stop reason: HOSPADM

## 2022-03-24 RX ORDER — ONDANSETRON 4 MG/1
4 TABLET, ORALLY DISINTEGRATING ORAL
Status: DISCONTINUED | OUTPATIENT
Start: 2022-03-24 | End: 2022-03-27 | Stop reason: HOSPADM

## 2022-03-24 RX ORDER — ACETAMINOPHEN 650 MG/1
650 SUPPOSITORY RECTAL
Status: DISCONTINUED | OUTPATIENT
Start: 2022-03-24 | End: 2022-03-27 | Stop reason: HOSPADM

## 2022-03-24 RX ORDER — FLUTICASONE PROPIONATE 50 MCG
2 SPRAY, SUSPENSION (ML) NASAL DAILY
Status: DISCONTINUED | OUTPATIENT
Start: 2022-03-24 | End: 2022-03-27 | Stop reason: HOSPADM

## 2022-03-24 RX ORDER — HYOSCYAMINE SULFATE 0.38 MG/1
375 TABLET, EXTENDED RELEASE ORAL
Status: DISCONTINUED | OUTPATIENT
Start: 2022-03-24 | End: 2022-03-27 | Stop reason: HOSPADM

## 2022-03-24 RX ORDER — LIDOCAINE 4 G/100G
PATCH TOPICAL DAILY
Status: DISCONTINUED | OUTPATIENT
Start: 2022-03-24 | End: 2022-03-24

## 2022-03-24 RX ORDER — ACETAMINOPHEN 325 MG/1
650 TABLET ORAL
Status: DISCONTINUED | OUTPATIENT
Start: 2022-03-24 | End: 2022-03-27 | Stop reason: HOSPADM

## 2022-03-24 RX ORDER — IPRATROPIUM BROMIDE AND ALBUTEROL SULFATE 2.5; .5 MG/3ML; MG/3ML
3 SOLUTION RESPIRATORY (INHALATION)
Status: DISCONTINUED | OUTPATIENT
Start: 2022-03-24 | End: 2022-03-27 | Stop reason: HOSPADM

## 2022-03-24 RX ORDER — DULOXETIN HYDROCHLORIDE 20 MG/1
20 CAPSULE, DELAYED RELEASE ORAL 2 TIMES DAILY
Status: DISCONTINUED | OUTPATIENT
Start: 2022-03-24 | End: 2022-03-27 | Stop reason: HOSPADM

## 2022-03-24 RX ORDER — LOSARTAN POTASSIUM 50 MG/1
100 TABLET ORAL DAILY
Status: DISCONTINUED | OUTPATIENT
Start: 2022-03-24 | End: 2022-03-27 | Stop reason: HOSPADM

## 2022-03-24 RX ORDER — GABAPENTIN 300 MG/1
300 CAPSULE ORAL
Status: DISCONTINUED | OUTPATIENT
Start: 2022-03-24 | End: 2022-03-27 | Stop reason: HOSPADM

## 2022-03-24 RX ORDER — GABAPENTIN 100 MG/1
100 CAPSULE ORAL
Status: DISCONTINUED | OUTPATIENT
Start: 2022-03-24 | End: 2022-03-24

## 2022-03-24 RX ADMIN — MORPHINE SULFATE 2 MG: 2 INJECTION, SOLUTION INTRAMUSCULAR; INTRAVENOUS at 02:08

## 2022-03-24 RX ADMIN — GABAPENTIN 300 MG: 300 CAPSULE ORAL at 21:40

## 2022-03-24 RX ADMIN — GABAPENTIN 100 MG: 100 CAPSULE ORAL at 06:28

## 2022-03-24 RX ADMIN — PANTOPRAZOLE SODIUM 40 MG: 40 INJECTION, POWDER, FOR SOLUTION INTRAVENOUS at 21:40

## 2022-03-24 RX ADMIN — PIPERACILLIN AND TAZOBACTAM 3.38 G: 3; .375 INJECTION, POWDER, LYOPHILIZED, FOR SOLUTION INTRAVENOUS at 17:35

## 2022-03-24 RX ADMIN — Medication 1 CAPSULE: at 09:54

## 2022-03-24 RX ADMIN — CETIRIZINE HYDROCHLORIDE 10 MG: 10 TABLET, FILM COATED ORAL at 09:55

## 2022-03-24 RX ADMIN — TOPIRAMATE 25 MG: 25 TABLET, FILM COATED ORAL at 06:28

## 2022-03-24 RX ADMIN — DULOXETINE 20 MG: 20 CAPSULE, DELAYED RELEASE ORAL at 17:33

## 2022-03-24 RX ADMIN — LOSARTAN POTASSIUM 100 MG: 50 TABLET, FILM COATED ORAL at 09:54

## 2022-03-24 RX ADMIN — MORPHINE SULFATE 2 MG: 2 INJECTION, SOLUTION INTRAMUSCULAR; INTRAVENOUS at 14:52

## 2022-03-24 RX ADMIN — LEVOFLOXACIN 750 MG: 5 INJECTION, SOLUTION INTRAVENOUS at 00:25

## 2022-03-24 RX ADMIN — PIPERACILLIN AND TAZOBACTAM 3.38 G: 3; .375 INJECTION, POWDER, LYOPHILIZED, FOR SOLUTION INTRAVENOUS at 12:20

## 2022-03-24 RX ADMIN — PANTOPRAZOLE SODIUM 40 MG: 40 INJECTION, POWDER, FOR SOLUTION INTRAVENOUS at 09:55

## 2022-03-24 RX ADMIN — POTASSIUM CHLORIDE 10 MEQ: 14.9 INJECTION INTRAVENOUS at 04:34

## 2022-03-24 RX ADMIN — POTASSIUM CHLORIDE 10 MEQ: 14.9 INJECTION INTRAVENOUS at 02:56

## 2022-03-24 RX ADMIN — POTASSIUM CHLORIDE 10 MEQ: 14.9 INJECTION INTRAVENOUS at 02:03

## 2022-03-24 RX ADMIN — DULOXETINE 20 MG: 20 CAPSULE, DELAYED RELEASE ORAL at 12:00

## 2022-03-24 RX ADMIN — TOPIRAMATE 25 MG: 25 TABLET, FILM COATED ORAL at 21:40

## 2022-03-24 NOTE — PROGRESS NOTES
Pharmacist Admission Medication Reconciliation:    Spoke with patient by phone to update PTA medication list.   Rx Query data available? ¹ YES  Reviewed active and held orders. YES    Medication changes (since last review): Added Flonase, as Zyrtec alone has not been controlling her allergies. Changed Cymbalta to 20 mg BID. Gabapentin to 300 mg daily (recently increased). No recent Imitrex use, still taking Topamax as ordered. Protonix updated to 40 BID  Lidocaine patches just when needed. Thank you for allowing me to participate in this patient's care. Please call [phone] or [phone] with any questions. Maranda Jacobo, Pharmacist          [facility] pharmacy benefit data reflects medications filled and processed through the patient's insurance,   however this data does NOT capture whether the medication was picked up or is currently being taken by the patient. Prior to Admission Medications:   Prior to Admission Medications   Prescriptions Last Dose Informant Taking? DULoxetine (CYMBALTA) 20 mg capsule 3/22/2022 at 0900  Yes   Sig: Take 20 mg by mouth two (2) times a day. Indications: anxiousness associated with depression   SUMAtriptan (IMITREX) 50 mg tablet Not Taking at Unknown time  No   Sig: Take HALF to one tablet at onset of migraine. Limit use to 2 days per week. Patient not taking: Reported on 3/24/2022   albuterol (ProAir HFA) 90 mcg/actuation inhaler 3/19/2022 at 0900  Yes   Sig: Take 2 Puffs by inhalation every six (6) hours as needed for Wheezing or Shortness of Breath. azelastine (ASTELIN) 137 mcg (0.1 %) nasal spray 3/22/2022 at 0900  Yes   Si [place] by Both Nostrils route two (2) times a day. cetirizine (ZYRTEC) 10 mg tablet 3/22/2022 at 0900  Yes   Sig: Take 10 mg by mouth daily. fluticasone propionate (Flonase Allergy Relief) 50 mcg/actuation nasal spray   Yes   Si Sprays by Both Nostrils route daily.    gabapentin (NEURONTIN) 100 mg capsule 3/22/2022 at 0900  Yes Sig: Take 1 Capsule by mouth nightly. Max Daily Amount: 100 mg.   hyoscyamine SR (LEVBID) 0.375 mg SR tablet 3/22/2022 at 0900  Yes   Sig: Take 0.375 mg by mouth every twelve (12) hours as needed for Cramping.   lidocaine (Lidoderm) 5 %   Yes   Si Patch by TransDERmal route daily as needed. Apply patch to the affected area for 12 hours a day and remove for 12 hours a day. losartan (COZAAR) 100 mg tablet 3/23/2022 at 1430  Yes   Sig: Take 1 Tablet by mouth daily. pantoprazole (PROTONIX) 40 mg tablet 3/22/2022 at 0900  Yes   Sig: Take 40 mg by mouth two (2) times a day. topiramate (TOPAMAX) 25 mg tablet 3/21 at 2100  Yes   Sig: Take 25 mg by mouth nightly.       Facility-Administered Medications: None

## 2022-03-24 NOTE — H&P
[address]   HISTORY AND PHYSICAL    Name:  Sonali Tim  MR#:  [id number]  :  1960  ACCOUNT #:  [de-identified]  ADMIT DATE:  2022      The patient was seen, evaluated, and admitted by me on 2022. PRIMARY CARE PHYSICIAN:  Maru Liu MD    SOURCE OF INFORMATION:  Patient and review of ED and old electronic medical records. CHIEF COMPLAINT:  Abdominal pain. HISTORY OF PRESENT ILLNESS:  This is a 65-year-old woman with a past medical history significant for hypertension, asthma, anxiety/depression, irritable bowel syndrome, who was in her usual state of health until yesterday when the patient developed abdominal pain. This is located at the left side of the abdomen, constant with no radiation, dull ache, 8/10 in severity, no known aggravating or relieving factors. The abdominal pain became associated with vomiting. When the patient woke up this morning, the patient's symptoms did not improve. She stated that she had similar symptoms in the past, she was admitted to the hospital for about a week, was diagnosed initially with spastic colon but was later told that she has irritable bowel syndrome. Evaluation for Crohn's disease was negative according to her. When the patient arrived at the emergency room, CT scan of the abdomen and pelvis was obtained. The CT scan shows findings suggestive of colitis/diverticulitis. She was then referred to the hospitalist service for evaluation for admission. The patient was last admitted to the hospital from 2019 to 11/15/2019. The patient was admitted to the Orthopedic Service and underwent lumbar laminectomy secondary to degenerative disk disease. In addition to the abdominal pain, the patient also complained of rectal bleeding. The patient stated that she has been passing bright red blood per rectum with clot formation.     PAST MEDICAL HISTORY:  Hypertension, anxiety/depression, asthma, and irritable bowel syndrome. ALLERGIES:  THE PATIENT IS ALLERGIC TO DEMEROL, FENTANYL, PREDNISONE, PROZAC, AND ZOLOFT. MEDICATIONS:  1. Albuterol 90 mcg as needed for shortness of breath. 2.  Zyrtec 10 mg daily. 3.  Cymbalta 20 mg daily. 4.  Ferrous sulfate 325 mg 1 tablet 3 times daily. 5.  Neurontin 100 mg daily at bedtime. 6.  Levbid 0.375 mg every 12 hours. 7.  Losartan 100 mg daily. 8.  Protonix 40 mg daily. 9.  Topamax 25 mg daily as needed for headache. FAMILY HISTORY:  This was reviewed. Her mother had arthritis and irritable bowel syndrome. Her father had COPD and glaucoma. SOCIAL HISTORY:  The patient is a former smoker. No history of alcohol abuse. REVIEW OF SYSTEMS:  HEAD, EYES, EARS, NOSE, AND THROAT:  No headache, no dizziness, no blurring of vision, no photophobia. RESPIRATORY:  No cough, no shortness of breath, no hemoptysis. CARDIOVASCULAR:  No chest pain, no orthopnea, no palpitation. GASTROINTESTINAL:  This is positive for abdominal pain, nausea, and vomiting. No diarrhea. No constipation. GENITOURINARY:  No dysuria, no urgency, and no frequency. All other systems are reviewed and they are negative. PHYSICAL EXAMINATION:  GENERAL APPEARANCE:  The patient appeared ill, in moderate distress. VITAL SIGNS:  On arrival at the emergency room, temperature 97.6, pulse 116, respiratory rate 16, blood pressure 145/86, and oxygen saturation 96% on room air. HEAD:  Normocephalic, atraumatic. EYES:  Normal eye movement. No redness, no drainage, no discharge. EARS:  Normal external ears with no obvious drainage. NOSE:  No deformity and no drainage. MOUTH AND THROAT:  No visible oral lesion. Dry oral mucosa. NECK:  Neck is supple. No JVD, no thyromegaly. CHEST:  Clear breath sounds. No wheezing, no crackles. HEART:  Normal S1 and S2, regular. No clinically appreciable murmur. ABDOMEN:  Diffuse tenderness. No rebound tenderness. No guarding. Normal bowel sounds.   CENTRAL NERVOUS SYSTEM:  Alert and oriented x3. No gross focal neurological deficit. EXTREMITIES:  No edema. Pulses 2+ bilaterally. MUSCULOSKELETAL:  No obvious joint deformity or swelling. SKIN:  No active skin lesions seen in the exposed parts of the body. PSYCHIATRIC:  Normal mood and affect. LYMPHATIC:  No cervical lymphadenopathy. DIAGNOSTIC DATA:  CT scan of the abdomen and pelvis shows findings suggestive of colitis/diverticulitis. LABORATORY DATA:  Hematology, WBC 13.2, hemoglobin 12.3, hematocrit 38.7, and platelets 766. Chemistry, sodium 138, potassium 3.3, chloride 104, CO2 of 29, glucose 126, BUN 9, creatinine 0.93, calcium 9.2. Total bilirubin 0.7, ALT 53, AST 30, alkaline phosphatase 146, total protein 8.8, albumin level 4.0, globulin 4.8. ASSESSMENT:  1. Acute diverticulitis. 2.  Acute lower gastrointestinal bleed. 3.  Hypertension. 4.  Anxiety/depression. 5.  Asthma. 6.  Hypokalemia. 7.  Hyperglycemia. PLAN:  1. Acute diverticulitis. We will admit the patient for further evaluation and treatment. This is the most likely cause of the patient's abdominal pain. We will start the patient on Flagyl and Levaquin. Gastroenterology consult will be requested to assist in further evaluation and treatment. We will check lipase level. We will also check urinalysis. We will check C-reactive protein level. The patient will be n.p.o. in anticipation of intervention by the gastroenterologist.  We will carry out pain control. 2.  Acute lower GI bleed. The patient's hemoglobin and hematocrit are presently stable. We will continue to monitor. We will await further recommendation from the gastroenterologist.  We will start the patient on Protonix because there could be a component of upper GI bleed. 3.  Hypertension. We will resume preadmission medication. We will monitor the patient's blood pressure closely. 4.  Anxiety/depression. We will resume home medication.   We will check urine drug screen. 5.  Asthma. We will place the patient on DuoNeb as needed. The patient is not in acute exacerbation at this time. 6.  Hypokalemia. We will replace potassium and repeat potassium level. We will also check magnesium level. 7.  Hyperglycemia. We will check hemoglobin A1c level. The patient has no history of diabetes. 8.  Other issues:  Code status, the patient is a full code. We will request SCD for DVT prophylaxis. FUNCTIONAL STATUS PRIOR TO ADMISSION:  The patient came from home. The patient is ambulatory with no assistive device. COVID PRECAUTION:  The patient was wearing a face mask. I was wearing a face mask and gloves for this patient's encounter.         Lizzy Mcelroy MD      [id number]  D:  03/23/2022 22:46  T:  03/24/2022 0:00  JOB #:  [id number]  CC:  Radha Jennings MD

## 2022-03-24 NOTE — CONSULTS
[facility].  [address] [facility] [address], [address]  [phone]                     GI CONSULTATION NOTE      NAME:  Rachel Lomeli   :   1960   MRN:   [id number]     Consult Date: 3/24/2022     Chief Complaint: Acute diverticulitis, acute lower GI    History of Present Illness:  Patient is a 63 y.o. who is seen in consultation at the request of Dr. Jonathan Bradley for the above mentioned problems. Ms. Jackolyn Lefort has a past medical history significant for IBS, diverticulosis, PUD, anxiety, chronic pain, HTN, and DM. Reports having left sided abdominal pain off /on since October. She has tried to adjust her diet and habits and the pains would fluctuate. She has a history of vasovagal response with extreme abdominal/IBS pain response and has been referred to 2 separate neurologist for evaluation. Reports yesterday evening she began with sharp severe left side abdominal pain and the urge for a bowel movement which was bright red blood and blood clots in the toilet followed by vomiting. She went to bed, woke up and with same severe pains, followed by another bloody BM. She presented to [facility] for further evaluation. On arrival to ED WBC 13.2, hgb 12.3, K+3.3. CTAP revealed short segment of concentric mural thickening in the proximal descending colon with adjacent stranding, diverticulitis which extends from the distal transverse colon through the sigmoid colon. She was started on IV ABX and IVF. Colonoscopy 19:   Rectum: normal  Sigmoid: moderate diverticulosis; Descending Colon: moderate diverticulosis;  Transverse Colon: mild diverticulosis;   Ascending Colon: normal  Cecum: normal    PMH:  Past Medical History:   Diagnosis Date    Adverse effect of anesthesia     \"so sleepy and tired for the rest of the day\"    Anxiety     Asthma     Chronic pain     back - L5 is \"almost gone\"/left foot neuropathy -pinched nerve L4-5 - spine shifted/degenerative spinal disease/stiff and sore neck and shoulder - in PT for back and neck    Degenerative spinal arthritis     Diabetes ([facility] [address]     PRE DIABETIC    Hypertension     IBS (irritable bowel syndrome)     Ill-defined condition     pre-diabetes    Nausea & vomiting     ? d/t fentanyl    PUD (peptic ulcer disease)     Spondylolisthesis        PSH:  Past Surgical History:   Procedure Laterality Date    COLONOSCOPY N/A 2019    COLONOSCOPY performed by Jose Cohen MD at [address] [facility] HX CHOLECYSTECTOMY  2006    HX [id number] [facility]    HX GI  2019    COLONOSCOPY    HX GI      ENDOSCOPY       Allergies: Allergies   Allergen Reactions    Cat Dander Runny Nose    Demerol [Meperidine] Nausea and Vomiting     Dizziness. Went to ER.  Egg Yolk Swelling    Fentanyl Nausea and Vomiting     Dizziness.  Grass Pollen Runny Nose    House Dust Mite Not Reported This Time    Lactaid [Lactase] Other (comments)     Bloating/gas    Lactose Other (comments)     Bloating/gas    Mold Shortness of Breath    Prednisone Swelling    Prozac [Fluoxetine] Rash    Soy Nausea and Vomiting    Zoloft [Sertraline] Rash       Home Medications:  Prior to Admission Medications   Prescriptions Last Dose Informant Patient Reported? Taking? DULoxetine (CYMBALTA) 20 mg capsule 3/22/2022 at 0900  Yes Yes   Sig: Take 20 mg by mouth two (2) times a day. Indications: anxiousness associated with depression   SUMAtriptan (IMITREX) 50 mg tablet Not Taking at Unknown time  No No   Sig: Take HALF to one tablet at onset of migraine. Limit use to 2 days per week. Patient not taking: Reported on 3/24/2022   albuterol (ProAir HFA) 90 mcg/actuation inhaler 3/19/2022 at 0900  Yes Yes   Sig: Take 2 Puffs by inhalation every six (6) hours as needed for Wheezing or Shortness of Breath.    azelastine (ASTELIN) 137 mcg (0.1 %) nasal spray 3/22/2022 at 0900  Yes Yes   Si [place] by Both Nostrils route two (2) times a day. cetirizine (ZYRTEC) 10 mg tablet 3/22/2022 at 0900  Yes Yes   Sig: Take 10 mg by mouth daily. fluticasone propionate (Flonase Allergy Relief) 50 mcg/actuation nasal spray   Yes Yes   Si Sprays by Both Nostrils route daily. gabapentin (NEURONTIN) 100 mg capsule 3/22/2022 at 0900  No Yes   Sig: Take 1 Capsule by mouth nightly. Max Daily Amount: 100 mg.   hyoscyamine SR (LEVBID) 0.375 mg SR tablet 3/22/2022 at 0900  Yes Yes   Sig: Take 0.375 mg by mouth every twelve (12) hours as needed for Cramping.   lidocaine (Lidoderm) 5 %   Yes Yes   Si Patch by TransDERmal route daily as needed. Apply patch to the affected area for 12 hours a day and remove for 12 hours a day. losartan (COZAAR) 100 mg tablet 3/23/2022 at 1430  No Yes   Sig: Take 1 Tablet by mouth daily. pantoprazole (PROTONIX) 40 mg tablet 3/22/2022 at 0900  Yes Yes   Sig: Take 40 mg by mouth two (2) times a day. topiramate (TOPAMAX) 25 mg tablet 3/21 at 2100  Yes Yes   Sig: Take 25 mg by mouth nightly.       Facility-Administered Medications: None       Hospital Medications:  Current Facility-Administered Medications   Medication Dose Route Frequency    albuterol-ipratropium (DUO-NEB) 2.5 MG-0.5 MG/3 ML  3 mL Nebulization Q6H PRN    cetirizine (ZYRTEC) tablet 10 mg  10 mg Oral DAILY    hyoscyamine SR (LEVBID) tablet 375 mcg  375 mcg Oral Q12H PRN    losartan (COZAAR) tablet 100 mg  100 mg Oral DAILY    pantoprazole (PROTONIX) 40 mg in 0.9% sodium chloride 10 mL injection  40 mg IntraVENous Q12H    topiramate (TOPAMAX) tablet 25 mg  25 mg Oral QHS    sodium chloride (NS) flush 5-40 mL  5-40 mL IntraVENous Q8H    sodium chloride (NS) flush 5-40 mL  5-40 mL IntraVENous PRN    acetaminophen (TYLENOL) tablet 650 mg  650 mg Oral Q6H PRN    Or    acetaminophen (TYLENOL) suppository 650 mg  650 mg Rectal Q6H PRN    polyethylene glycol (MIRALAX) packet 17 g  17 g Oral DAILY PRN    ondansetron (ZOFRAN ODT) tablet 4 mg  4 mg Oral Q8H PRN    Or    ondansetron (ZOFRAN) injection 4 mg  4 mg IntraVENous Q6H PRN    L.acidophilus-paracasei-S.thermophil-bifidobacter (RISAQUAD) 8 billion cell capsule  1 Capsule Oral DAILY    fluticasone propionate (FLONASE) 50 mcg/actuation nasal spray 2 Spray  2 Spray Both Nostrils DAILY    DULoxetine (CYMBALTA) capsule 20 mg  20 mg Oral BID    gabapentin (NEURONTIN) capsule 300 mg  300 mg Oral QHS    lidocaine 4 % patch 1 Patch  1 Patch TransDERmal DAILY PRN    piperacillin-tazobactam (ZOSYN) 3.375 g in 0.9% sodium chloride (MBP/ADV) 100 mL MBP  3.375 g IntraVENous Q8H    lactated [place] infusion  125 mL/hr IntraVENous CONTINUOUS    morphine injection 2 mg  2 mg IntraVENous Q4H PRN       Social History:  Social History     Tobacco Use    Smoking status: Former Smoker     Packs/day: 0.50     Years: 12.00     Pack years: 6.00     Quit date: 2012     Years since quittin.3    Smokeless tobacco: Never Used    Tobacco comment: quit    Substance Use Topics    Alcohol use: Yes     Comment: 3-4 times a yr       Family History:  Family History   Problem Relation Age of Onset    Delayed Awakening Mother         with anesthesia    Arthritis Mother     Other Mother         IBS/degenerative spinal disease    Anesth Problems Mother         N/V AND SLEEPY ALL DAY    COPD Father     Glaucoma Father     HIV/AIDS Sister     Heart Disease Brother         heart valve problem    Glaucoma Brother     Other Other     Heart Disease Sister         HEART VALVE DISESE        Review of Systems:    Constitutional: negative fever, negative chills, negative weight loss  Eyes:   negative visual changes  ENT:   negative sore throat, tongue or lip swelling  Respiratory:  negative cough, negative dyspnea  Cards:  negative for chest pain, palpitations, lower extremity edema  GI:   See HPI  :  negative for frequency, dysuria  Integument:  negative for rash and pruritus  Heme:  negative for easy bruising and gum/nose bleeding  Musculoskel: negative for myalgias, back pain and muscle weakness  Neuro: negative for headaches, dizziness, vertigo  Psych:  negative for feelings of anxiety, depression      Objective:     Patient Vitals for the past 8 hrs:   BP Temp Pulse Resp SpO2   03/24/22 1200 -- -- 94 -- --   03/24/22 1157 (!) 132/56 98.3 °F (36.8 °C) 93 18 93 %   03/24/22 1000 -- -- 93 -- --   03/24/22 0800 (!) 142/84 100 °F (37.8 °C) 92 18 93 %   03/24/22 0559 -- -- 94 -- --     No intake/output data recorded. No intake/output data recorded. PHYSICAL EXAM:  General appearance: cooperative, female in NAD  Skin: Extremities and face reveal no rashes or jaundice  HEENT: Sclerae anicteric. Extra-occular muscles are intact. Cardiovascular: Regular rate and rhythm. No murmurs, gallops, or rubs. Respiratory:  Clear breath sounds with no wheezes, rales, or rhonchi. GI: Abdomen nondistended, soft, and mild left side tenderness. Normal active bowel sounds. No enlargement of the liver or spleen. No masses palpable. Rectal: Deferred   Musculoskeletal: No pitting edema of the lower legs. Neurological: Patient is alert and oriented. Psychiatric:  No anxiety or agitation. Data Review     Recent Labs     03/24/22  0552 03/23/22  1700   WBC 9.5 13.2*   HGB 9.9* 12.3   HCT 31.4* 38.7    354     Recent Labs     03/24/22  0552 03/23/22  1700    138   K 3.5 3.3*   * 104   CO2 24 29   BUN 6 9   CREA 0.62 0.93   GLU 89 126*   PHOS 2.1*  --    CA 8.3* 9.2     Recent Labs     03/24/22  0552 03/23/22  1700    146*   TP 7.2 8.8*   ALB 3.2* 4.0   GLOB 4.0 4.8*   LPSE 47*  --      No results for input(s): INR, PTP, APTT, INREXT in the last 72 hours. Imaging studies reviewed    Assessment / Plan :     Abdominal pain  Diverticulitis  Hematochezia    64 yo female with h/o diverticulosis, IBS, chronic pain, vasovagal response to IBS pain  and anxiety.   Presents with left side abdominal pain, nausea, and hematochezia. She has pictures of blood clots on her phone.     - continue IV ABX; Levoquin and Flagyl  - IVF   - start clear liquid diet  - supportive care per hospitalist; antiemetics, analgesics         Patient Active Hospital Problem List:   Principal Problem:    Acute diverticulitis (3/23/2022)    Cornelio Price NP  I have personally reviewed the history and independently examined the patient. I have reviewed the chart and agree with the documentation recorded by the Mid Level Provider, including the assessment, treatment plan, and disposition.     Ailyn Nolasco MD

## 2022-03-24 NOTE — PROGRESS NOTES
Day #1 of Zosyn  Indication:  Intra-abdominal infection  Current regimen:  3.375g IV q6h    Recent Labs     22  0552 22  1700   WBC 9.5 13.2*   CREA 0.62 0.93   BUN 6 9     Est CrCl: >100 ml/min; UO: -- ml/kg/hr  Temp (24hrs), Av.4 °F (36.9 °C), Min:97.6 °F (36.4 °C), Max:100 °F (37.8 °C)    Cultures: None    Plan: Change to 3.375g IV q8h per extended infusion dosing protocol.

## 2022-03-24 NOTE — ED NOTES
TRANSFER - OUT REPORT:    Verbal report given to [facility] on Rayo Paulson  being transferred to [address] [place] [address] for routine progression of care       Report consisted of patients Situation, Background, Assessment and   Recommendations(SBAR). Information from the following report(s) SBAR was reviewed with the receiving nurse. Lines:   Peripheral IV 03/23/22 Left Antecubital (Active)   Site Assessment Clean, dry, & intact 03/23/22 1700   Phlebitis Assessment 0 03/23/22 1700   Infiltration Assessment 0 03/23/22 1700   Dressing Status Clean, dry, & intact 03/23/22 1700   Dressing Type Transparent 03/23/22 1700   Hub Color/Line Status Pink;Flushed;Patent 03/23/22 1700   Action Taken Blood drawn 03/23/22 1700        Opportunity for questions and clarification was provided.       Patient transported with:   [employer]

## 2022-03-24 NOTE — PROGRESS NOTES
[address] [facility] Adult  Hospitalist Group                                                                                          Hospitalist Progress Note  Naman Chaney MD  Answering service: [phone] from in house phone        Date of Service:    NAME:  Ramon Daugherty  :  1960  MRN:  [id number]      Admission Summary: This is a 65-year-old woman with a past medical history significant for hypertension, asthma, anxiety/depression, irritable bowel syndrome, who was in her usual state of health until yesterday when the patient developed abdominal pain. This is located at the left side of the abdomen, constant with no radiation, dull ache, 8/10 in severity, no known aggravating or relieving factors. The abdominal pain became associated with vomiting. When the patient woke up this morning, the patient's symptoms did not improve. She stated that she had similar symptoms in the past, she was admitted to the hospital for about a week, was diagnosed initially with spastic colon but was later told that she has irritable bowel syndrome. Evaluation for Crohn's disease was negative according to her. When the patient arrived at the emergency room, CT scan of the abdomen and pelvis was obtained.   The CT scan shows findings suggestive of colitis/diverticulitis    Interval history / Subjective:   Pt resting in bed, continues to have abd pain          Assessment & Plan:     Acute diverticulitis  GI bleed  Hypertension  Anxiety/depression  Asthma  Hypokalemia  Hyperglycemia      Continue broad-spectrum IV antibiotics, change antibiotics from Levaquin and Flagyl to Zosyn, GI consult, n.p.o., pain control, IV hydration, antiemetics and close monitoring  Protonix twice daily, GI consult, monitor H&H and further intervention per hospital course  continue to optimize blood pressure  Not in exacerbation, continue monitor  Replace potassium  Hemoglobin A1c pending      Code status: full  DVT prophylaxis: NPO    Care Plan discussed with: Patient/Family  Anticipated Disposition: Home w/Family  Anticipated Discharge: 24 hours to 48 hours     Hospital Problems  Date Reviewed: 3/24/2022          Codes Class Noted POA    * (Principal) Acute diverticulitis ICD-10-CM: U51.06  ICD-9-CM: 562.11  3/23/2022 Yes                Review of Systems:   A comprehensive review of systems was negative except for that written in the HPI. Vital Signs:    Last 24hrs VS reviewed since prior progress note. Most recent are:  Visit Vitals  BP (!) 142/84 (BP 1 Location: Right upper arm, BP Patient Position: Supine;Lying)   Pulse 93   Temp 100 °F (37.8 °C)   Resp 18   SpO2 93%       No intake or output data in the 24 hours ending 03/24/22 1039     Physical Examination:     I had a face to face encounter with this patient and independently examined them on 3/24/2022 as outlined below:          General : alert, awake, no acute distress  HEENT: PEERL, EOMI, moist mucus membrane, TM clear  Neck: supple, no JVD, no meningeal signs  Chest: Clear to auscultation bilaterally   CVS: S1 S2 heard, Capillary refill less than 2 seconds  Abd: soft/tender to palpation/no rebound/no guarding  Ext: no clubbing, no cyanosis, no edema, brisk 2+ DP pulses  Neuro/Psych: No focal neurological deficit  Skin: warm     Data Review:    Review and/or order of clinical lab test      Labs:     Recent Labs     03/24/22  0552 03/23/22  1700   WBC 9.5 13.2*   HGB 9.9* 12.3   HCT 31.4* 38.7    354     Recent Labs     03/24/22  0552 03/23/22  1700    138   K 3.5 3.3*   * 104   CO2 24 29   BUN 6 9   CREA 0.62 0.93   GLU 89 126*   CA 8.3* 9.2   MG 1.8  --    PHOS 2.1*  --      Recent Labs     03/24/22  0552 03/23/22  1700   ALT 42 53    146*   TBILI 0.9 0.7   TP 7.2 8.8*   ALB 3.2* 4.0   GLOB 4.0 4.8*   LPSE 47*  --      No results for input(s): INR, PTP, APTT, INREXT in the last 72 hours.    No results for input(s): FE, TIBC, PSAT, FERR in the last 72 hours. No results found for: FOL, RBCF   No results for input(s): PH, PCO2, PO2 in the last 72 hours. No results for input(s): CPK, CKNDX, TROIQ in the last 72 hours.     No lab exists for component: CPKMB  Lab Results   Component Value Date/Time    Cholesterol, total 221 (H) 03/26/2019 11:18 AM    HDL Cholesterol 61 03/26/2019 11:18 AM    LDL, calculated 139 (H) 03/26/2019 11:18 AM    Triglyceride 107 03/26/2019 11:18 AM     Lab Results   Component Value Date/Time    Glucose (POC) 149 (H) 11/14/2019 12:55 PM    Glucose (POC) 102 (H) 11/11/2019 12:44 PM     Lab Results   Component Value Date/Time    Color YELLOW/STRAW 02/15/2021 12:27 PM    Appearance CLEAR 02/15/2021 12:27 PM    Specific gravity <1.005 02/15/2021 12:27 PM    Specific gravity 1.013 11/04/2019 03:41 PM    pH (UA) 6.0 02/15/2021 12:27 PM    Protein Negative 02/15/2021 12:27 PM    Glucose Negative 02/15/2021 12:27 PM    Ketone Negative 02/15/2021 12:27 PM    Bilirubin Negative 02/15/2021 12:27 PM    Urobilinogen 1.0 02/15/2021 12:27 PM    Nitrites Negative 02/15/2021 12:27 PM    Leukocyte Esterase Negative 02/15/2021 12:27 PM    Epithelial cells MODERATE (A) 02/15/2021 12:27 PM    Bacteria 1+ (A) 02/15/2021 12:27 PM    WBC 0-4 02/15/2021 12:27 PM    RBC 0-5 02/15/2021 12:27 PM         Medications Reviewed:     Current Facility-Administered Medications   Medication Dose Route Frequency    albuterol-ipratropium (DUO-NEB) 2.5 MG-0.5 MG/3 ML  3 mL Nebulization Q6H PRN    cetirizine (ZYRTEC) tablet 10 mg  10 mg Oral DAILY    hyoscyamine SR (LEVBID) tablet 375 mcg  375 mcg Oral Q12H PRN    losartan (COZAAR) tablet 100 mg  100 mg Oral DAILY    pantoprazole (PROTONIX) 40 mg in 0.9% sodium chloride 10 mL injection  40 mg IntraVENous Q12H    topiramate (TOPAMAX) tablet 25 mg  25 mg Oral QHS    sodium chloride (NS) flush 5-40 mL  5-40 mL IntraVENous Q8H    sodium chloride (NS) flush 5-40 mL  5-40 mL IntraVENous PRN    acetaminophen (TYLENOL) tablet 650 mg  650 mg Oral Q6H PRN    Or    acetaminophen (TYLENOL) suppository 650 mg  650 mg Rectal Q6H PRN    polyethylene glycol (MIRALAX) packet 17 g  17 g Oral DAILY PRN    ondansetron (ZOFRAN ODT) tablet 4 mg  4 mg Oral Q8H PRN    Or    ondansetron (ZOFRAN) injection 4 mg  4 mg IntraVENous Q6H PRN    L.acidophilus-paracasei-S.thermophil-bifidobacter (RISAQUAD) 8 billion cell capsule  1 Capsule Oral DAILY    fluticasone propionate (FLONASE) 50 mcg/actuation nasal spray 2 Spray  2 Spray Both Nostrils DAILY    DULoxetine (CYMBALTA) capsule 20 mg  20 mg Oral BID    gabapentin (NEURONTIN) capsule 300 mg  300 mg Oral QHS    lidocaine 4 % patch 1 Patch  1 Patch TransDERmal DAILY PRN    piperacillin-tazobactam (ZOSYN) 3.375 g in 0.9% sodium chloride (MBP/ADV) 100 mL MBP  3.375 g IntraVENous Q6H    lactated Ringers infusion  125 mL/hr IntraVENous CONTINUOUS    morphine injection 2 mg  2 mg IntraVENous Q4H PRN     ______________________________________________________________________  EXPECTED LENGTH OF STAY: - - -  ACTUAL LENGTH OF STAY:          1      Please note that this dictation was completed with [employer], the computer voice recognition software. Quite often unanticipated grammatical, syntax, homophones, and other interpretive errors are inadvertently transcribed by the computer software. Please disregard these errors. Please excuse any errors that have escaped final proofreading.                 Jenny Cesar MD

## 2022-03-24 NOTE — PROGRESS NOTES
Transition of Care Plan   RUR- 6% Low Risk   DISPOSITION: The disposition plan is pending medical progression and recommendation.  F/U with PCP/Specialist     Transport: [facility]/family     Reason for Admission:  \"bleeding\"                   RUR Score:  6% Low Risk                   Plan for utilizing home health:  N/A       PCP: First and Last name:  Corrine Estes MD     Name of Practice: [facility]   Are you a current patient: Yes/No: Yes   Approximate date of last visit: Less than 6 months ago   Can you participate in a virtual visit with your PCP: N/A                    Current Advanced Directive/Advance Care Plan: Full Code  Has ACP documentation on file at this time. Healthcare Decision Maker:   Click here to complete [address] including selection of the Healthcare Decision Maker Relationship (ie \"Primary\")   Son                       Transition of Care Plan: Pending recommendation. Reviewed chart for transitions of care and discussed in rounds. CM met with patient at bedside to explain role and offer support. Patient is alert and oriented x4 and confirmed demographics. Baseline: DME - none  ADLs/IDALS: Independent   Previous Home Health: N/A  Previous SNF/IPR: N/A  ER Contact: Friend Thomas Heredia - [phone]    Patient lives in a1 level house with 0 steps to enter. Patient reports that she lives with her family. Patient is independent with ADLs/IDALs. Patient uses DMEs (cane) to ambulate. Patient's preferred pharmacy is [address] located on [address]. Patient's family is expected to transport at discharge. Care Management Interventions  PCP Verified by CM: Yes  Palliative Care Criteria Met (RRAT>21 & CHF Dx)?: No  Mode of Transport at Discharge:  Other (see comment)  Transition of Care Consult (CM Consult): Discharge Planning  MyChart Signup: Yes  Discharge Durable Medical Equipment: No  Physical Therapy Consult: No  Occupational Therapy Consult: No  Speech Therapy Consult: No  Support Systems: Child(bob)  Confirm Follow Up Transport: Family  The Patient and/or Patient Representative was Provided with a Choice of Provider and Agrees with the Discharge Plan?: Yes  Freedom of Choice List was Provided with Basic Dialogue that Supports the Patient's Individualized Plan of Care/Goals, Treatment Preferences and Shares the Quality Data Associated with the Providers?: Yes  [employer] Information Provided?: No  Discharge Location  Patient Expects to be Discharged to[de-identified] [address] [facility] [address], [place], [place], [facility]  Available in Perfect Serve

## 2022-03-25 LAB
ANION GAP SERPL CALC-SCNC: 4 MMOL/L (ref 5–15)
BASOPHILS # BLD: 0.1 K/UL (ref 0–0.1)
BASOPHILS NFR BLD: 1 % (ref 0–1)
BUN SERPL-MCNC: 6 MG/DL (ref 6–20)
BUN/CREAT SERPL: 8 (ref 12–20)
CALCIUM SERPL-MCNC: 8.8 MG/DL (ref 8.5–10.1)
CHLORIDE SERPL-SCNC: 108 MMOL/L (ref 97–108)
CO2 SERPL-SCNC: 26 MMOL/L (ref 21–32)
CREAT SERPL-MCNC: 0.77 MG/DL (ref 0.55–1.02)
DIFFERENTIAL METHOD BLD: ABNORMAL
EOSINOPHIL # BLD: 0.1 K/UL (ref 0–0.4)
EOSINOPHIL NFR BLD: 1 % (ref 0–7)
ERYTHROCYTE [DISTWIDTH] IN BLOOD BY AUTOMATED COUNT: 13.2 % (ref 11.5–14.5)
GLUCOSE SERPL-MCNC: 105 MG/DL (ref 65–100)
HCT VFR BLD AUTO: 33.5 % (ref 35–47)
HGB BLD-MCNC: 10.8 G/DL (ref 11.5–16)
IMM GRANULOCYTES # BLD AUTO: 0 K/UL (ref 0–0.04)
IMM GRANULOCYTES NFR BLD AUTO: 0 % (ref 0–0.5)
LYMPHOCYTES # BLD: 2.4 K/UL (ref 0.8–3.5)
LYMPHOCYTES NFR BLD: 26 % (ref 12–49)
MCH RBC QN AUTO: 30.4 PG (ref 26–34)
MCHC RBC AUTO-ENTMCNC: 32.2 G/DL (ref 30–36.5)
MCV RBC AUTO: 94.4 FL (ref 80–99)
MONOCYTES # BLD: 0.6 K/UL (ref 0–1)
MONOCYTES NFR BLD: 6 % (ref 5–13)
NEUTS SEG # BLD: 6.2 K/UL (ref 1.8–8)
NEUTS SEG NFR BLD: 66 % (ref 32–75)
NRBC # BLD: 0 K/UL (ref 0–0.01)
NRBC BLD-RTO: 0 PER 100 WBC
PLATELET # BLD AUTO: 277 K/UL (ref 150–400)
PMV BLD AUTO: 8.4 FL (ref 8.9–12.9)
POTASSIUM SERPL-SCNC: 3.5 MMOL/L (ref 3.5–5.1)
RBC # BLD AUTO: 3.55 M/UL (ref 3.8–5.2)
SODIUM SERPL-SCNC: 138 MMOL/L (ref 136–145)
WBC # BLD AUTO: 9.5 K/UL (ref 3.6–11)

## 2022-03-25 PROCEDURE — C9113 INJ PANTOPRAZOLE SODIUM, VIA: HCPCS | Performed by: INTERNAL MEDICINE

## 2022-03-25 PROCEDURE — 80048 BASIC METABOLIC PNL TOTAL CA: CPT

## 2022-03-25 PROCEDURE — 94760 N-INVAS EAR/PLS OXIMETRY 1: CPT

## 2022-03-25 PROCEDURE — 74011000250 HC RX REV CODE- 250: Performed by: INTERNAL MEDICINE

## 2022-03-25 PROCEDURE — 65660000000 HC RM CCU STEPDOWN

## 2022-03-25 PROCEDURE — 74011250637 HC RX REV CODE- 250/637: Performed by: INTERNAL MEDICINE

## 2022-03-25 PROCEDURE — 74011250636 HC RX REV CODE- 250/636: Performed by: FAMILY MEDICINE

## 2022-03-25 PROCEDURE — 85025 COMPLETE CBC W/AUTO DIFF WBC: CPT

## 2022-03-25 PROCEDURE — 36415 COLL VENOUS BLD VENIPUNCTURE: CPT

## 2022-03-25 PROCEDURE — 74011000258 HC RX REV CODE- 258: Performed by: FAMILY MEDICINE

## 2022-03-25 PROCEDURE — 74011250637 HC RX REV CODE- 250/637: Performed by: FAMILY MEDICINE

## 2022-03-25 PROCEDURE — 74011250636 HC RX REV CODE- 250/636: Performed by: INTERNAL MEDICINE

## 2022-03-25 RX ORDER — LEVOFLOXACIN 500 MG/1
500 TABLET, FILM COATED ORAL EVERY 24 HOURS
Status: DISCONTINUED | OUTPATIENT
Start: 2022-03-26 | End: 2022-03-26

## 2022-03-25 RX ORDER — METRONIDAZOLE 250 MG/1
500 TABLET ORAL EVERY 12 HOURS
Status: DISCONTINUED | OUTPATIENT
Start: 2022-03-26 | End: 2022-03-27 | Stop reason: HOSPADM

## 2022-03-25 RX ORDER — METRONIDAZOLE 250 MG/1
500 TABLET ORAL 3 TIMES DAILY
Status: DISCONTINUED | OUTPATIENT
Start: 2022-03-26 | End: 2022-03-25 | Stop reason: DRUGHIGH

## 2022-03-25 RX ADMIN — GABAPENTIN 300 MG: 300 CAPSULE ORAL at 20:32

## 2022-03-25 RX ADMIN — MORPHINE SULFATE 2 MG: 2 INJECTION, SOLUTION INTRAMUSCULAR; INTRAVENOUS at 00:48

## 2022-03-25 RX ADMIN — PIPERACILLIN AND TAZOBACTAM 3.38 G: 3; .375 INJECTION, POWDER, LYOPHILIZED, FOR SOLUTION INTRAVENOUS at 20:32

## 2022-03-25 RX ADMIN — LOSARTAN POTASSIUM 100 MG: 50 TABLET, FILM COATED ORAL at 09:00

## 2022-03-25 RX ADMIN — FLUTICASONE PROPIONATE 2 SPRAY: 50 SPRAY, METERED NASAL at 09:00

## 2022-03-25 RX ADMIN — Medication 1 CAPSULE: at 09:00

## 2022-03-25 RX ADMIN — DULOXETINE 20 MG: 20 CAPSULE, DELAYED RELEASE ORAL at 17:01

## 2022-03-25 RX ADMIN — SODIUM CHLORIDE, POTASSIUM CHLORIDE, SODIUM LACTATE AND CALCIUM CHLORIDE 125 ML/HR: 600; 310; 30; 20 INJECTION, SOLUTION INTRAVENOUS at 09:03

## 2022-03-25 RX ADMIN — PANTOPRAZOLE SODIUM 40 MG: 40 INJECTION, POWDER, FOR SOLUTION INTRAVENOUS at 20:32

## 2022-03-25 RX ADMIN — MORPHINE SULFATE 2 MG: 2 INJECTION, SOLUTION INTRAMUSCULAR; INTRAVENOUS at 11:33

## 2022-03-25 RX ADMIN — SODIUM CHLORIDE, POTASSIUM CHLORIDE, SODIUM LACTATE AND CALCIUM CHLORIDE 125 ML/HR: 600; 310; 30; 20 INJECTION, SOLUTION INTRAVENOUS at 00:34

## 2022-03-25 RX ADMIN — DULOXETINE 20 MG: 20 CAPSULE, DELAYED RELEASE ORAL at 09:00

## 2022-03-25 RX ADMIN — SODIUM CHLORIDE, POTASSIUM CHLORIDE, SODIUM LACTATE AND CALCIUM CHLORIDE 125 ML/HR: 600; 310; 30; 20 INJECTION, SOLUTION INTRAVENOUS at 17:01

## 2022-03-25 RX ADMIN — CETIRIZINE HYDROCHLORIDE 10 MG: 10 TABLET, FILM COATED ORAL at 09:00

## 2022-03-25 RX ADMIN — PANTOPRAZOLE SODIUM 40 MG: 40 INJECTION, POWDER, FOR SOLUTION INTRAVENOUS at 09:00

## 2022-03-25 RX ADMIN — PIPERACILLIN AND TAZOBACTAM 3.38 G: 3; .375 INJECTION, POWDER, LYOPHILIZED, FOR SOLUTION INTRAVENOUS at 11:33

## 2022-03-25 RX ADMIN — PIPERACILLIN AND TAZOBACTAM 3.38 G: 3; .375 INJECTION, POWDER, LYOPHILIZED, FOR SOLUTION INTRAVENOUS at 03:36

## 2022-03-25 RX ADMIN — TOPIRAMATE 25 MG: 25 TABLET, FILM COATED ORAL at 20:31

## 2022-03-25 NOTE — PROGRESS NOTES
[facility] Adult  Hospitalist Group                                                                                          Hospitalist Progress Note  Padma Cheung MD  Answering service: [phone] OR [phone] from in house phone        Date of Service:  3289  NAME:  Leonor Lawson  :  1960  MRN:  [id number]      Admission Summary: This is a 82-year-old woman with a past medical history significant for hypertension, asthma, anxiety/depression, irritable bowel syndrome, who was in her usual state of health until yesterday when the patient developed abdominal pain. This is located at the left side of the abdomen, constant with no radiation, dull ache, 8/10 in severity, no known aggravating or relieving factors. The abdominal pain became associated with vomiting. When the patient woke up this morning, the patient's symptoms did not improve. She stated that she had similar symptoms in the past, she was admitted to the hospital for about a week, was diagnosed initially with spastic colon but was later told that she has irritable bowel syndrome. Evaluation for Crohn's disease was negative according to her. When the patient arrived at the emergency room, CT scan of the abdomen and pelvis was obtained. The CT scan shows findings suggestive of colitis/diverticulitis    Interval history / Subjective:   Pt resting in bed, reports symptoms improved, on clear liquid diet         Assessment & Plan:     Acute diverticulitis  GI bleed  Hypertension  Anxiety/depression  Asthma  Hypokalemia  Hyperglycemia      Continue broad-spectrum IV antibiotics,  Zosyn, appreciate GI consult, advance to full liquid diet. , pain control, IV hydration, antiemetics and close monitoring  Protonix twice daily, monitor H&H and further intervention per hospital course  continue to optimize blood pressure  Not in exacerbation, continue monitor  Replace potassium  Hemoglobin A1c pending      Code status: full  DVT prophylaxis: SCD    Care Plan discussed with: Patient/Family  Anticipated Disposition: Home w/Family  Anticipated Discharge: 24 hours to 48 hours     Hospital Problems  Date Reviewed: 3/24/2022          Codes Class Noted POA    * (Principal) Acute diverticulitis ICD-10-CM: R19.47  ICD-9-CM: 562.11  3/23/2022 Yes                Review of Systems:   A comprehensive review of systems was negative except for that written in the HPI. Vital Signs:    Last 24hrs VS reviewed since prior progress note.  Most recent are:  Visit Vitals  /75 (BP 1 Location: Right arm, BP Patient Position: Semi fowlers)   Pulse 89   Temp 98.3 °F (36.8 °C)   Resp 18   SpO2 93%       No intake or output data in the 24 hours ending 03/25/22 0947     Physical Examination:     I had a face to face encounter with this patient and independently examined them on 3/25/2022 as outlined below:          General : alert, awake, no acute distress  HEENT: PEERL, EOMI, moist mucus membrane, TM clear  Neck: supple, no JVD, no meningeal signs  Chest: Clear to auscultation bilaterally   CVS: S1 S2 heard, Capillary refill less than 2 seconds  Abd: soft/tender to palpation/no rebound/no guarding  Ext: no clubbing, no cyanosis, no edema, brisk 2+ DP pulses  Neuro/Psych: No focal neurological deficit  Skin: warm     Data Review:    Review and/or order of clinical lab test      Labs:     Recent Labs     03/25/22  0510 03/24/22  0552   WBC 9.5 9.5   HGB 10.8* 9.9*   HCT 33.5* 31.4*    275     Recent Labs     03/25/22  0510 03/24/22  0552 03/23/22  1700    140 138   K 3.5 3.5 3.3*    112* 104   CO2 26 24 29   BUN 6 6 9   CREA 0.77 0.62 0.93   * 89 126*   CA 8.8 8.3* 9.2   MG  --  1.8  --    PHOS  --  2.1*  --      Recent Labs     03/24/22  0552 03/23/22  1700   ALT 42 53    146*   TBILI 0.9 0.7   TP 7.2 8.8*   ALB 3.2* 4.0   GLOB 4.0 4.8*   LPSE 47*  --      No results for input(s): INR, PTP, APTT, INREXT, INREXT in the last 72 hours. No results for input(s): FE, TIBC, PSAT, FERR in the last 72 hours. No results found for: FOL, RBCF   No results for input(s): PH, PCO2, PO2 in the last 72 hours. No results for input(s): CPK, CKNDX, TROIQ in the last 72 hours.     No lab exists for component: CPKMB  Lab Results   Component Value Date/Time    Cholesterol, total 221 (H) 03/26/2019 11:18 AM    HDL Cholesterol 61 03/26/2019 11:18 AM    LDL, calculated 139 (H) 03/26/2019 11:18 AM    Triglyceride 107 03/26/2019 11:18 AM     Lab Results   Component Value Date/Time    Glucose (POC) 149 (H) 11/14/2019 12:55 PM    Glucose (POC) 102 (H) 11/11/2019 12:44 PM     Lab Results   Component Value Date/Time    Color YELLOW/STRAW 02/15/2021 12:27 PM    Appearance CLEAR 02/15/2021 12:27 PM    Specific gravity <1.005 02/15/2021 12:27 PM    Specific gravity 1.013 11/04/2019 03:41 PM    pH (UA) 6.0 02/15/2021 12:27 PM    Protein Negative 02/15/2021 12:27 PM    Glucose Negative 02/15/2021 12:27 PM    Ketone Negative 02/15/2021 12:27 PM    Bilirubin Negative 02/15/2021 12:27 PM    Urobilinogen 1.0 02/15/2021 12:27 PM    Nitrites Negative 02/15/2021 12:27 PM    Leukocyte Esterase Negative 02/15/2021 12:27 PM    Epithelial cells MODERATE (A) 02/15/2021 12:27 PM    Bacteria 1+ (A) 02/15/2021 12:27 PM    WBC 0-4 02/15/2021 12:27 PM    RBC 0-5 02/15/2021 12:27 PM         Medications Reviewed:     Current Facility-Administered Medications   Medication Dose Route Frequency    albuterol-ipratropium (DUO-NEB) 2.5 MG-0.5 MG/3 ML  3 mL Nebulization Q6H PRN    cetirizine (ZYRTEC) tablet 10 mg  10 mg Oral DAILY    hyoscyamine SR (LEVBID) tablet 375 mcg  375 mcg Oral Q12H PRN    losartan (COZAAR) tablet 100 mg  100 mg Oral DAILY    pantoprazole (PROTONIX) 40 mg in 0.9% sodium chloride 10 mL injection  40 mg IntraVENous Q12H    topiramate (TOPAMAX) tablet 25 mg  25 mg Oral QHS    sodium chloride (NS) flush 5-40 mL  5-40 mL IntraVENous Q8H    sodium chloride (NS) flush 5-40 mL  5-40 mL IntraVENous PRN    acetaminophen (TYLENOL) tablet 650 mg  650 mg Oral Q6H PRN    Or    acetaminophen (TYLENOL) suppository 650 mg  650 mg Rectal Q6H PRN    polyethylene glycol (MIRALAX) packet 17 g  17 g Oral DAILY PRN    ondansetron (ZOFRAN ODT) tablet 4 mg  4 mg Oral Q8H PRN    Or    ondansetron (ZOFRAN) injection 4 mg  4 mg IntraVENous Q6H PRN    L.acidophilus-paracasei-S.thermophil-bifidobacter (RISAQUAD) 8 billion cell capsule  1 Capsule Oral DAILY    fluticasone propionate (FLONASE) 50 mcg/actuation nasal spray 2 Spray  2 Spray Both Nostrils DAILY    DULoxetine (CYMBALTA) capsule 20 mg  20 mg Oral BID    gabapentin (NEURONTIN) capsule 300 mg  300 mg Oral QHS    lidocaine 4 % patch 1 Patch  1 Patch TransDERmal DAILY PRN    piperacillin-tazobactam (ZOSYN) 3.375 g in 0.9% sodium chloride (MBP/ADV) 100 mL MBP  3.375 g IntraVENous Q8H    lactated Ringers infusion  125 mL/hr IntraVENous CONTINUOUS    morphine injection 2 mg  2 mg IntraVENous Q4H PRN     ______________________________________________________________________  EXPECTED LENGTH OF STAY: - - -  ACTUAL LENGTH OF STAY:          2      Please note that this dictation was completed with [employer], the computer voice recognition software. Quite often unanticipated grammatical, syntax, homophones, and other interpretive errors are inadvertently transcribed by the computer software. Please disregard these errors. Please excuse any errors that have escaped final proofreading.                 Sara Collazo MD

## 2022-03-25 NOTE — PROGRESS NOTES
0730: Verbal shift change report given to Catie Schwartz RN (oncoming nurse) by Tayo Hernandez RN (offgoing nurse). Report included the following information SBAR, Kardex, MAR and Recent Results. 1930: Verbal shift change report given to Tayo Hernandez RN (oncoming nurse) by Catie Schwartz RN (offgoing nurse). Report included the following information SBAR, Kardex, MAR and Recent Results.

## 2022-03-26 LAB
ANION GAP SERPL CALC-SCNC: 4 MMOL/L (ref 5–15)
ANION GAP SERPL CALC-SCNC: 4 MMOL/L (ref 5–15)
BASOPHILS # BLD: 0 K/UL (ref 0–0.1)
BASOPHILS # BLD: 0.1 K/UL (ref 0–0.1)
BASOPHILS NFR BLD: 1 % (ref 0–1)
BASOPHILS NFR BLD: 1 % (ref 0–1)
BUN SERPL-MCNC: 4 MG/DL (ref 6–20)
BUN SERPL-MCNC: 5 MG/DL (ref 6–20)
BUN/CREAT SERPL: 6 (ref 12–20)
BUN/CREAT SERPL: 8 (ref 12–20)
CALCIUM SERPL-MCNC: 8.5 MG/DL (ref 8.5–10.1)
CALCIUM SERPL-MCNC: 8.6 MG/DL (ref 8.5–10.1)
CHLORIDE SERPL-SCNC: 108 MMOL/L (ref 97–108)
CHLORIDE SERPL-SCNC: 110 MMOL/L (ref 97–108)
CO2 SERPL-SCNC: 27 MMOL/L (ref 21–32)
CO2 SERPL-SCNC: 28 MMOL/L (ref 21–32)
CREAT SERPL-MCNC: 0.62 MG/DL (ref 0.55–1.02)
CREAT SERPL-MCNC: 0.63 MG/DL (ref 0.55–1.02)
D DIMER PPP FEU-MCNC: 0.92 MG/L FEU (ref 0–0.65)
DIFFERENTIAL METHOD BLD: ABNORMAL
DIFFERENTIAL METHOD BLD: ABNORMAL
EOSINOPHIL # BLD: 0.3 K/UL (ref 0–0.4)
EOSINOPHIL # BLD: 0.3 K/UL (ref 0–0.4)
EOSINOPHIL NFR BLD: 3 % (ref 0–7)
EOSINOPHIL NFR BLD: 4 % (ref 0–7)
ERYTHROCYTE [DISTWIDTH] IN BLOOD BY AUTOMATED COUNT: 13.2 % (ref 11.5–14.5)
ERYTHROCYTE [DISTWIDTH] IN BLOOD BY AUTOMATED COUNT: 13.2 % (ref 11.5–14.5)
GLUCOSE SERPL-MCNC: 108 MG/DL (ref 65–100)
GLUCOSE SERPL-MCNC: 91 MG/DL (ref 65–100)
HCT VFR BLD AUTO: 30.5 % (ref 35–47)
HCT VFR BLD AUTO: 32.3 % (ref 35–47)
HCT VFR BLD AUTO: 32.4 % (ref 35–47)
HGB BLD-MCNC: 10.1 G/DL (ref 11.5–16)
HGB BLD-MCNC: 10.4 G/DL (ref 11.5–16)
HGB BLD-MCNC: 10.5 G/DL (ref 11.5–16)
IMM GRANULOCYTES # BLD AUTO: 0 K/UL (ref 0–0.04)
IMM GRANULOCYTES # BLD AUTO: 0 K/UL (ref 0–0.04)
IMM GRANULOCYTES NFR BLD AUTO: 0 % (ref 0–0.5)
IMM GRANULOCYTES NFR BLD AUTO: 0 % (ref 0–0.5)
LYMPHOCYTES # BLD: 2.6 K/UL (ref 0.8–3.5)
LYMPHOCYTES # BLD: 2.8 K/UL (ref 0.8–3.5)
LYMPHOCYTES NFR BLD: 31 % (ref 12–49)
LYMPHOCYTES NFR BLD: 32 % (ref 12–49)
MAGNESIUM SERPL-MCNC: 1.8 MG/DL (ref 1.6–2.4)
MCH RBC QN AUTO: 30.1 PG (ref 26–34)
MCH RBC QN AUTO: 30.9 PG (ref 26–34)
MCHC RBC AUTO-ENTMCNC: 32.1 G/DL (ref 30–36.5)
MCHC RBC AUTO-ENTMCNC: 33.1 G/DL (ref 30–36.5)
MCV RBC AUTO: 93.3 FL (ref 80–99)
MCV RBC AUTO: 93.9 FL (ref 80–99)
MONOCYTES # BLD: 0.5 K/UL (ref 0–1)
MONOCYTES # BLD: 0.6 K/UL (ref 0–1)
MONOCYTES NFR BLD: 6 % (ref 5–13)
MONOCYTES NFR BLD: 7 % (ref 5–13)
NEUTS SEG # BLD: 4.6 K/UL (ref 1.8–8)
NEUTS SEG # BLD: 5.2 K/UL (ref 1.8–8)
NEUTS SEG NFR BLD: 57 % (ref 32–75)
NEUTS SEG NFR BLD: 58 % (ref 32–75)
NRBC # BLD: 0 K/UL (ref 0–0.01)
NRBC # BLD: 0 K/UL (ref 0–0.01)
NRBC BLD-RTO: 0 PER 100 WBC
NRBC BLD-RTO: 0 PER 100 WBC
PLATELET # BLD AUTO: 267 K/UL (ref 150–400)
PLATELET # BLD AUTO: 287 K/UL (ref 150–400)
PMV BLD AUTO: 8.6 FL (ref 8.9–12.9)
PMV BLD AUTO: 9.1 FL (ref 8.9–12.9)
POTASSIUM SERPL-SCNC: 3.6 MMOL/L (ref 3.5–5.1)
POTASSIUM SERPL-SCNC: 3.8 MMOL/L (ref 3.5–5.1)
RBC # BLD AUTO: 3.27 M/UL (ref 3.8–5.2)
RBC # BLD AUTO: 3.45 M/UL (ref 3.8–5.2)
SODIUM SERPL-SCNC: 140 MMOL/L (ref 136–145)
SODIUM SERPL-SCNC: 141 MMOL/L (ref 136–145)
TROPONIN-HIGH SENSITIVITY: 17 NG/L (ref 0–51)
WBC # BLD AUTO: 8.1 K/UL (ref 3.6–11)
WBC # BLD AUTO: 9 K/UL (ref 3.6–11)

## 2022-03-26 PROCEDURE — 84484 ASSAY OF TROPONIN QUANT: CPT

## 2022-03-26 PROCEDURE — 85018 HEMOGLOBIN: CPT

## 2022-03-26 PROCEDURE — 80048 BASIC METABOLIC PNL TOTAL CA: CPT

## 2022-03-26 PROCEDURE — 94760 N-INVAS EAR/PLS OXIMETRY 1: CPT

## 2022-03-26 PROCEDURE — 85025 COMPLETE CBC W/AUTO DIFF WBC: CPT

## 2022-03-26 PROCEDURE — 74011250636 HC RX REV CODE- 250/636: Performed by: INTERNAL MEDICINE

## 2022-03-26 PROCEDURE — 74011250637 HC RX REV CODE- 250/637: Performed by: INTERNAL MEDICINE

## 2022-03-26 PROCEDURE — 74011250637 HC RX REV CODE- 250/637: Performed by: FAMILY MEDICINE

## 2022-03-26 PROCEDURE — 93005 ELECTROCARDIOGRAM TRACING: CPT

## 2022-03-26 PROCEDURE — 74011000250 HC RX REV CODE- 250: Performed by: INTERNAL MEDICINE

## 2022-03-26 PROCEDURE — 74011250636 HC RX REV CODE- 250/636: Performed by: FAMILY MEDICINE

## 2022-03-26 PROCEDURE — 36415 COLL VENOUS BLD VENIPUNCTURE: CPT

## 2022-03-26 PROCEDURE — 65660000000 HC RM CCU STEPDOWN

## 2022-03-26 PROCEDURE — 83735 ASSAY OF MAGNESIUM: CPT

## 2022-03-26 PROCEDURE — 85379 FIBRIN DEGRADATION QUANT: CPT

## 2022-03-26 PROCEDURE — C9113 INJ PANTOPRAZOLE SODIUM, VIA: HCPCS | Performed by: INTERNAL MEDICINE

## 2022-03-26 RX ORDER — LEVOFLOXACIN 500 MG/1
500 TABLET, FILM COATED ORAL EVERY 24 HOURS
Status: DISCONTINUED | OUTPATIENT
Start: 2022-03-26 | End: 2022-03-27 | Stop reason: HOSPADM

## 2022-03-26 RX ORDER — ADENOSINE 3 MG/ML
6 INJECTION, SOLUTION INTRAVENOUS ONCE
Status: COMPLETED | OUTPATIENT
Start: 2022-03-26 | End: 2022-03-26

## 2022-03-26 RX ADMIN — Medication 1 CAPSULE: at 10:07

## 2022-03-26 RX ADMIN — FLUTICASONE PROPIONATE 2 SPRAY: 50 SPRAY, METERED NASAL at 08:00

## 2022-03-26 RX ADMIN — SODIUM CHLORIDE 1000 ML: 9 INJECTION, SOLUTION INTRAVENOUS at 14:30

## 2022-03-26 RX ADMIN — ADENOSINE 6 MG: 3 INJECTION, SOLUTION INTRAVENOUS at 19:30

## 2022-03-26 RX ADMIN — GABAPENTIN 300 MG: 300 CAPSULE ORAL at 21:13

## 2022-03-26 RX ADMIN — SODIUM CHLORIDE, POTASSIUM CHLORIDE, SODIUM LACTATE AND CALCIUM CHLORIDE 125 ML/HR: 600; 310; 30; 20 INJECTION, SOLUTION INTRAVENOUS at 10:21

## 2022-03-26 RX ADMIN — PANTOPRAZOLE SODIUM 40 MG: 40 INJECTION, POWDER, FOR SOLUTION INTRAVENOUS at 21:12

## 2022-03-26 RX ADMIN — SODIUM CHLORIDE, PRESERVATIVE FREE 10 ML: 5 INJECTION INTRAVENOUS at 22:31

## 2022-03-26 RX ADMIN — CETIRIZINE HYDROCHLORIDE 10 MG: 10 TABLET, FILM COATED ORAL at 10:07

## 2022-03-26 RX ADMIN — LOSARTAN POTASSIUM 100 MG: 50 TABLET, FILM COATED ORAL at 10:06

## 2022-03-26 RX ADMIN — DULOXETINE 20 MG: 20 CAPSULE, DELAYED RELEASE ORAL at 10:07

## 2022-03-26 RX ADMIN — METRONIDAZOLE 500 MG: 250 TABLET ORAL at 10:07

## 2022-03-26 RX ADMIN — MORPHINE SULFATE 2 MG: 2 INJECTION, SOLUTION INTRAMUSCULAR; INTRAVENOUS at 01:18

## 2022-03-26 RX ADMIN — METRONIDAZOLE 500 MG: 250 TABLET ORAL at 21:13

## 2022-03-26 RX ADMIN — SODIUM CHLORIDE, POTASSIUM CHLORIDE, SODIUM LACTATE AND CALCIUM CHLORIDE 125 ML/HR: 600; 310; 30; 20 INJECTION, SOLUTION INTRAVENOUS at 23:46

## 2022-03-26 RX ADMIN — LEVOFLOXACIN 500 MG: 500 TABLET, FILM COATED ORAL at 10:06

## 2022-03-26 RX ADMIN — PANTOPRAZOLE SODIUM 40 MG: 40 INJECTION, POWDER, FOR SOLUTION INTRAVENOUS at 10:07

## 2022-03-26 RX ADMIN — DULOXETINE 20 MG: 20 CAPSULE, DELAYED RELEASE ORAL at 19:03

## 2022-03-26 RX ADMIN — SODIUM CHLORIDE, PRESERVATIVE FREE 10 ML: 5 INJECTION INTRAVENOUS at 14:24

## 2022-03-26 RX ADMIN — TOPIRAMATE 25 MG: 25 TABLET, FILM COATED ORAL at 21:13

## 2022-03-26 NOTE — PROGRESS NOTES
Problem: Falls - Risk of  Goal: *Absence of Falls  Description: Document Aayush Cardenas Fall Risk and appropriate interventions in the flowsheet. Outcome: Progressing Towards Goal  Note: Fall Risk Interventions:  Mobility Interventions: Patient to call before getting OOB         Medication Interventions: Evaluate medications/consider consulting pharmacy         History of Falls Interventions: Evaluate medications/consider consulting pharmacy         Problem: Patient Education: Go to Patient Education Activity  Goal: Patient/Family Education  Outcome: Progressing Towards Goal     Problem: Pressure Injury - Risk of  Goal: *Prevention of pressure injury  Description: Document Peterson Scale and appropriate interventions in the flowsheet.   Outcome: Progressing Towards Goal  Note: Pressure Injury Interventions:  Sensory Interventions: Assess changes in LOC    Moisture Interventions: Apply protective barrier, creams and emollients,Check for incontinence Q2 hours and as needed    Activity Interventions: Increase time out of bed    Mobility Interventions: HOB 30 degrees or less    Nutrition Interventions: Document food/fluid/supplement intake,Offer support with meals,snacks and hydration                     Problem: Patient Education: Go to Patient Education Activity  Goal: Patient/Family Education  Outcome: Progressing Towards Goal

## 2022-03-26 NOTE — PROGRESS NOTES
1915 rapid response called for rapid heart rate, possible SVT. 1918 Team arrived EKG ordered. Valsalva maneuver unsuccessful. Patient was dyspneic, 2 liters of oxygen placed. 1925 Adenosine 6mg push ordered. 1931 Adenosine given    1932 12 lead EKG ordered again. 1935 Patient converted to sinus tach.     1939 No transfer order given per Norberto Mendez NP

## 2022-03-26 NOTE — PROGRESS NOTES
Clinical Pharmacy Note: Metronidazole Dosing    Please note that the metronidazole dose for Amy Landerso has been changed to 500 mg q12h per [facility]-approved protocol. Please contact the pharmacy with any questions.     Bita Shen, [address] [facility]

## 2022-03-26 NOTE — PROGRESS NOTES
[address] [facility] Adult  Hospitalist Group                                                                                          Hospitalist Progress Note  Bonilla Encarnacion MD  Answering service: [phone] from in house phone        Date of Service:    NAME:  Debora Hartmann  :  1960  MRN:  [id number]      Admission Summary: This is a 42-year-old woman with a past medical history significant for hypertension, asthma, anxiety/depression, irritable bowel syndrome, who was in her usual state of health until yesterday when the patient developed abdominal pain. This is located at the left side of the abdomen, constant with no radiation, dull ache, 8/10 in severity, no known aggravating or relieving factors. The abdominal pain became associated with vomiting. When the patient woke up this morning, the patient's symptoms did not improve. She stated that she had similar symptoms in the past, she was admitted to the hospital for about a week, was diagnosed initially with spastic colon but was later told that she has irritable bowel syndrome. Evaluation for Crohn's disease was negative according to her. When the patient arrived at the emergency room, CT scan of the abdomen and pelvis was obtained. The CT scan shows findings suggestive of colitis/diverticulitis    Interval history / Subjective:       Patient reporting having diarrhea and feeling dizzy now   diarrhea is continuous but is flushed now and we dont know if it was bloody as patient dodnt notice    Will give a bolus of NS now            Assessment & Plan:     Acute diverticulitis  CT findings have been reviewed   Still has abd pain and severe diarrhea      Colonoscopy 19:   Rectum: normal  Sigmoid: moderate diverticulosis; Descending Colon: moderate diverticulosis;  Transverse Colon: mild diverticulosis;   Ascending Colon: normal  Cecum: normal      on levaquin and flagyl and would need 2 weeks total for the same   Give bolus of NS   Check ortho stats and CBC in am     GI bleed  Patient still complaining of diarrhea and may be blood in it and is not sure   On protonix as well     Hypertension  Losartan     Anxiety/depression  cymbalta              Code status: full  DVT prophylaxis: SCD    Care Plan discussed with: Patient/Family  Anticipated Disposition: Home w/Family  Anticipated Discharge: 24 hours to 48 hours     Hospital Problems  Date Reviewed: 3/24/2022          Codes Class Noted POA    * (Principal) Acute diverticulitis ICD-10-CM: X52.80  ICD-9-CM: 562.11  3/23/2022 Yes                Review of Systems:   A comprehensive review of systems was negative except for that written in the HPI. Vital Signs:    Last 24hrs VS reviewed since prior progress note.  Most recent are:  Visit Vitals  /78 (BP 1 Location: Right upper arm, BP Patient Position: Sitting)   Pulse 97   Temp 98 °F (36.7 °C)   Resp 16   SpO2 92%         Intake/Output Summary (Last 24 hours) at 3/26/2022 1428  Last data filed at 3/26/2022 1022  Gross per 24 hour   Intake 240 ml   Output --   Net 240 ml        Physical Examination:     I had a face to face encounter with this patient and independently examined them on 3/26/2022 as outlined below:          General : alert, awake, no acute distress  HEENT: PEERL, EOMI, moist mucus membrane, TM clear  Neck: supple, no JVD, no meningeal signs  Chest: Clear to auscultation bilaterally   CVS: S1 S2 heard, Capillary refill less than 2 seconds  Abd: soft tender diffuse   Ext: no clubbing, no cyanosis, no edema, brisk 2+ DP pulses  Neuro/Psych: No focal neurological deficit  Skin: warm     Data Review:    Review and/or order of clinical lab test      Labs:     Recent Labs     03/26/22  0627 03/26/22  0400   WBC 8.1 9.0   HGB 10.4* 10.1*   HCT 32.4* 30.5*    267     Recent Labs     03/26/22  0627 03/25/22  0510 03/24/22  0552    138 140   K 3.8 3.5 3.5    108 112*   CO2 28 26 24   BUN 4* 6 6   CREA 0. 63 0.77 0.62   GLU 91 105* 89   CA 8.6 8.8 8.3*   MG  --   --  1.8   PHOS  --   --  2.1*     Recent Labs     03/24/22  0552 03/23/22  1700   ALT 42 53    146*   TBILI 0.9 0.7   TP 7.2 8.8*   ALB 3.2* 4.0   GLOB 4.0 4.8*   LPSE 47*  --      No results for input(s): INR, PTP, APTT, INREXT, INREXT in the last 72 hours. No results for input(s): FE, TIBC, PSAT, FERR in the last 72 hours. No results found for: FOL, RBCF   No results for input(s): PH, PCO2, PO2 in the last 72 hours. No results for input(s): CPK, CKNDX, TROIQ in the last 72 hours.     No lab exists for component: CPKMB  Lab Results   Component Value Date/Time    Cholesterol, total 221 (H) 03/26/2019 11:18 AM    HDL Cholesterol 61 03/26/2019 11:18 AM    LDL, calculated 139 (H) 03/26/2019 11:18 AM    Triglyceride 107 03/26/2019 11:18 AM     Lab Results   Component Value Date/Time    Glucose (POC) 149 (H) 11/14/2019 12:55 PM    Glucose (POC) 102 (H) 11/11/2019 12:44 PM     Lab Results   Component Value Date/Time    Color YELLOW/STRAW 02/15/2021 12:27 PM    Appearance CLEAR 02/15/2021 12:27 PM    Specific gravity <1.005 02/15/2021 12:27 PM    Specific gravity 1.013 11/04/2019 03:41 PM    pH (UA) 6.0 02/15/2021 12:27 PM    Protein Negative 02/15/2021 12:27 PM    Glucose Negative 02/15/2021 12:27 PM    Ketone Negative 02/15/2021 12:27 PM    Bilirubin Negative 02/15/2021 12:27 PM    Urobilinogen 1.0 02/15/2021 12:27 PM    Nitrites Negative 02/15/2021 12:27 PM    Leukocyte Esterase Negative 02/15/2021 12:27 PM    Epithelial cells MODERATE (A) 02/15/2021 12:27 PM    Bacteria 1+ (A) 02/15/2021 12:27 PM    WBC 0-4 02/15/2021 12:27 PM    RBC 0-5 02/15/2021 12:27 PM         Medications Reviewed:     Current Facility-Administered Medications   Medication Dose Route Frequency    levoFLOXacin (LEVAQUIN) tablet 500 mg  500 mg Oral Q24H    sodium chloride 0.9 % bolus infusion 1,000 mL  1,000 mL IntraVENous ONCE    metroNIDAZOLE (FLAGYL) tablet 500 mg  500 mg Oral Q12H    albuterol-ipratropium (DUO-NEB) 2.5 MG-0.5 MG/3 ML  3 mL Nebulization Q6H PRN    cetirizine (ZYRTEC) tablet 10 mg  10 mg Oral DAILY    hyoscyamine SR (LEVBID) tablet 375 mcg  375 mcg Oral Q12H PRN    losartan (COZAAR) tablet 100 mg  100 mg Oral DAILY    pantoprazole (PROTONIX) 40 mg in 0.9% sodium chloride 10 mL injection  40 mg IntraVENous Q12H    topiramate (TOPAMAX) tablet 25 mg  25 mg Oral QHS    sodium chloride (NS) flush 5-40 mL  5-40 mL IntraVENous Q8H    sodium chloride (NS) flush 5-40 mL  5-40 mL IntraVENous PRN    acetaminophen (TYLENOL) tablet 650 mg  650 mg Oral Q6H PRN    Or    acetaminophen (TYLENOL) suppository 650 mg  650 mg Rectal Q6H PRN    polyethylene glycol (MIRALAX) packet 17 g  17 g Oral DAILY PRN    ondansetron (ZOFRAN ODT) tablet 4 mg  4 mg Oral Q8H PRN    Or    ondansetron (ZOFRAN) injection 4 mg  4 mg IntraVENous Q6H PRN    L.acidophilus-paracasei-S.thermophil-bifidobacter (RISAQUAD) 8 billion cell capsule  1 Capsule Oral DAILY    fluticasone propionate (FLONASE) 50 mcg/actuation nasal spray 2 Spray  2 Spray Both Nostrils DAILY    DULoxetine (CYMBALTA) capsule 20 mg  20 mg Oral BID    gabapentin (NEURONTIN) capsule 300 mg  300 mg Oral QHS    lidocaine 4 % patch 1 Patch  1 Patch TransDERmal DAILY PRN    lactated Ringers infusion  125 mL/hr IntraVENous CONTINUOUS    morphine injection 2 mg  2 mg IntraVENous Q4H PRN     ______________________________________________________________________  EXPECTED LENGTH OF STAY: 2d 2h  ACTUAL LENGTH OF STAY:          3      Please note that this dictation was completed with [employer], the [employer] voice recognition software. Quite often unanticipated grammatical, syntax, homophones, and other interpretive errors are inadvertently transcribed by the computer software. Please disregard these errors. Please excuse any errors that have escaped final proofreading.                 Nicolle Mejia MD

## 2022-03-27 ENCOUNTER — APPOINTMENT (OUTPATIENT)
Dept: VASCULAR SURGERY | Age: 62
DRG: 244 | End: 2022-03-27
Attending: FAMILY MEDICINE
Payer: MEDICAID

## 2022-03-27 ENCOUNTER — APPOINTMENT (OUTPATIENT)
Dept: CT IMAGING | Age: 62
DRG: 244 | End: 2022-03-27
Attending: FAMILY MEDICINE
Payer: MEDICAID

## 2022-03-27 ENCOUNTER — APPOINTMENT (OUTPATIENT)
Dept: GENERAL RADIOLOGY | Age: 62
DRG: 244 | End: 2022-03-27
Attending: FAMILY MEDICINE
Payer: MEDICAID

## 2022-03-27 VITALS
RESPIRATION RATE: 18 BRPM | SYSTOLIC BLOOD PRESSURE: 137 MMHG | TEMPERATURE: 97.8 F | DIASTOLIC BLOOD PRESSURE: 76 MMHG | OXYGEN SATURATION: 97 % | HEART RATE: 77 BPM

## 2022-03-27 LAB
ATRIAL RATE: 107 BPM
ATRIAL RATE: 138 BPM
BASOPHILS # BLD: 0.1 K/UL (ref 0–0.1)
BASOPHILS NFR BLD: 1 % (ref 0–1)
CALCULATED P AXIS, ECG09: 32 DEGREES
CALCULATED R AXIS, ECG10: 23 DEGREES
CALCULATED R AXIS, ECG10: 29 DEGREES
CALCULATED T AXIS, ECG11: 33 DEGREES
CALCULATED T AXIS, ECG11: 39 DEGREES
DIAGNOSIS, 93000: NORMAL
DIAGNOSIS, 93000: NORMAL
DIFFERENTIAL METHOD BLD: ABNORMAL
EOSINOPHIL # BLD: 0.3 K/UL (ref 0–0.4)
EOSINOPHIL NFR BLD: 4 % (ref 0–7)
ERYTHROCYTE [DISTWIDTH] IN BLOOD BY AUTOMATED COUNT: 13.6 % (ref 11.5–14.5)
HCT VFR BLD AUTO: 34.8 % (ref 35–47)
HGB BLD-MCNC: 10.7 G/DL (ref 11.5–16)
IMM GRANULOCYTES # BLD AUTO: 0 K/UL (ref 0–0.04)
IMM GRANULOCYTES NFR BLD AUTO: 0 % (ref 0–0.5)
LYMPHOCYTES # BLD: 2.2 K/UL (ref 0.8–3.5)
LYMPHOCYTES NFR BLD: 27 % (ref 12–49)
MCH RBC QN AUTO: 30.4 PG (ref 26–34)
MCHC RBC AUTO-ENTMCNC: 30.7 G/DL (ref 30–36.5)
MCV RBC AUTO: 98.9 FL (ref 80–99)
MONOCYTES # BLD: 0.7 K/UL (ref 0–1)
MONOCYTES NFR BLD: 8 % (ref 5–13)
NEUTS SEG # BLD: 5.1 K/UL (ref 1.8–8)
NEUTS SEG NFR BLD: 61 % (ref 32–75)
NRBC # BLD: 0 K/UL (ref 0–0.01)
NRBC BLD-RTO: 0 PER 100 WBC
P-R INTERVAL, ECG05: 172 MS
PLATELET # BLD AUTO: 275 K/UL (ref 150–400)
PMV BLD AUTO: 9.2 FL (ref 8.9–12.9)
Q-T INTERVAL, ECG07: 298 MS
Q-T INTERVAL, ECG07: 330 MS
QRS DURATION, ECG06: 64 MS
QRS DURATION, ECG06: 70 MS
QTC CALCULATION (BEZET), ECG08: 440 MS
QTC CALCULATION (BEZET), ECG08: 490 MS
RBC # BLD AUTO: 3.52 M/UL (ref 3.8–5.2)
VENTRICULAR RATE, ECG03: 107 BPM
VENTRICULAR RATE, ECG03: 163 BPM
WBC # BLD AUTO: 8.3 K/UL (ref 3.6–11)

## 2022-03-27 PROCEDURE — 71275 CT ANGIOGRAPHY CHEST: CPT

## 2022-03-27 PROCEDURE — 74011250636 HC RX REV CODE- 250/636: Performed by: INTERNAL MEDICINE

## 2022-03-27 PROCEDURE — 93970 EXTREMITY STUDY: CPT

## 2022-03-27 PROCEDURE — 74011250637 HC RX REV CODE- 250/637: Performed by: INTERNAL MEDICINE

## 2022-03-27 PROCEDURE — 36415 COLL VENOUS BLD VENIPUNCTURE: CPT

## 2022-03-27 PROCEDURE — 74011000250 HC RX REV CODE- 250: Performed by: INTERNAL MEDICINE

## 2022-03-27 PROCEDURE — 94760 N-INVAS EAR/PLS OXIMETRY 1: CPT

## 2022-03-27 PROCEDURE — 74011000636 HC RX REV CODE- 636: Performed by: RADIOLOGY

## 2022-03-27 PROCEDURE — 85025 COMPLETE CBC W/AUTO DIFF WBC: CPT

## 2022-03-27 PROCEDURE — C9113 INJ PANTOPRAZOLE SODIUM, VIA: HCPCS | Performed by: INTERNAL MEDICINE

## 2022-03-27 PROCEDURE — 74011250637 HC RX REV CODE- 250/637: Performed by: FAMILY MEDICINE

## 2022-03-27 PROCEDURE — 71045 X-RAY EXAM CHEST 1 VIEW: CPT

## 2022-03-27 RX ORDER — METRONIDAZOLE 500 MG/1
500 TABLET ORAL EVERY 12 HOURS
Qty: 20 TABLET | Refills: 0 | Status: ON HOLD | OUTPATIENT
Start: 2022-03-27 | End: 2022-06-02

## 2022-03-27 RX ORDER — ATENOLOL 25 MG/1
25 TABLET ORAL DAILY
Status: DISCONTINUED | OUTPATIENT
Start: 2022-03-27 | End: 2022-03-27 | Stop reason: HOSPADM

## 2022-03-27 RX ORDER — POTASSIUM CHLORIDE 750 MG/1
40 TABLET, FILM COATED, EXTENDED RELEASE ORAL
Status: COMPLETED | OUTPATIENT
Start: 2022-03-27 | End: 2022-03-27

## 2022-03-27 RX ORDER — ATENOLOL 25 MG/1
25 TABLET ORAL DAILY
Qty: 90 TABLET | Refills: 0 | Status: ON HOLD | OUTPATIENT
Start: 2022-03-28 | End: 2022-06-28 | Stop reason: SDUPTHER

## 2022-03-27 RX ORDER — LEVOFLOXACIN 500 MG/1
500 TABLET, FILM COATED ORAL DAILY
Qty: 10 TABLET | Refills: 0 | Status: ON HOLD | OUTPATIENT
Start: 2022-03-27 | End: 2022-06-02

## 2022-03-27 RX ORDER — LANOLIN ALCOHOL/MO/W.PET/CERES
400 CREAM (GRAM) TOPICAL ONCE
Status: COMPLETED | OUTPATIENT
Start: 2022-03-27 | End: 2022-03-27

## 2022-03-27 RX ADMIN — SODIUM CHLORIDE, PRESERVATIVE FREE 10 ML: 5 INJECTION INTRAVENOUS at 04:10

## 2022-03-27 RX ADMIN — ATENOLOL 25 MG: 25 TABLET ORAL at 12:55

## 2022-03-27 RX ADMIN — METRONIDAZOLE 500 MG: 250 TABLET ORAL at 09:44

## 2022-03-27 RX ADMIN — LOSARTAN POTASSIUM 100 MG: 50 TABLET, FILM COATED ORAL at 09:44

## 2022-03-27 RX ADMIN — LEVOFLOXACIN 500 MG: 500 TABLET, FILM COATED ORAL at 09:44

## 2022-03-27 RX ADMIN — IOPAMIDOL 80 ML: 755 INJECTION, SOLUTION INTRAVENOUS at 03:00

## 2022-03-27 RX ADMIN — FLUTICASONE PROPIONATE 2 SPRAY: 50 SPRAY, METERED NASAL at 09:45

## 2022-03-27 RX ADMIN — Medication 1 CAPSULE: at 09:44

## 2022-03-27 RX ADMIN — POTASSIUM CHLORIDE 40 MEQ: 750 TABLET, EXTENDED RELEASE ORAL at 07:22

## 2022-03-27 RX ADMIN — Medication 400 MG: at 07:22

## 2022-03-27 RX ADMIN — DULOXETINE 20 MG: 20 CAPSULE, DELAYED RELEASE ORAL at 09:44

## 2022-03-27 RX ADMIN — PANTOPRAZOLE SODIUM 40 MG: 40 INJECTION, POWDER, FOR SOLUTION INTRAVENOUS at 09:44

## 2022-03-27 RX ADMIN — CETIRIZINE HYDROCHLORIDE 10 MG: 10 TABLET, FILM COATED ORAL at 09:44

## 2022-03-27 NOTE — PROGRESS NOTES
Transition of Care Plan  RUR 6%    Rapid Response for tachycardia 3/26/22    Disposition  Home pending medical progress and recommendations  Admitted -acute diverticulitis    [address] [address]  Will arrange if ordered   No Hx of HH    Medical follow up  PCP and specialist    [address] [phone]    CM continues to follow for transition of care planning. Prior to admission, patient lived in one level home with family--independent with adl's and iadl's using a can for mobility. No hx of HH or rehab/Snf.   Patient has [employer] medicaid    CM will assist with any need that arise

## 2022-03-27 NOTE — PROGRESS NOTES
S: [place] [id number] [address] for tachycardia to the 160s. Rapid response team at bedside. Patient complains of mild shortness of breath. She states that she has had episodes of fast palpitations in the past relieved with deep breathing. She denies chest pain, nausea, vomiting, or lightheadedness initially, but later in themorningn c/o chest tightness and palpiations at which times her vitals were normal.    O: /92, , [id number]% on 2Lnc, RR 18  gen-alert and oriented  cv- tachycardia, regular  pulm-CTAB  abd-soft NT/ND    12 lead EKG>SVT,   A/P:  -SVT at 163 not resolved after vagal maneuvers or carotid sinus massage. Adenosine 6mg IV administered with conversion of rhythm to sinus tachycardia to 107  -ordered H/H, BMP, Mg2+, d-dimer, and troponin. Pt. With spo2 93-95% on RA, and has not been on ppx anticoagulation due to suspected GIB so will eval for dvt/pe with lower extremity dopplers>CTA  Echocardiogram in the AM. Cardiology consulted  -CTA and doppler negative for DVT/PE, H/H stable, trop and EKG not c/w ischemia  -K+ 3.6, and Mg2+ 1.8, will give 40meq K+, and Mgox 400mg times one dose for goal K+>4, and Mg2+>2.

## 2022-03-27 NOTE — DISCHARGE SUMMARY
Discharge Summary       PATIENT ID: Kain Chavira  MRN: [id number]   YOB: 1960    DATE OF ADMISSION: 3/23/2022  6:13 PM    DATE OF DISCHARGE: 03/27/22   PRIMARY CARE PROVIDER: Brandi Salcido MD     ATTENDING PHYSICIAN: Shari Chang   DISCHARGING PROVIDER: Lorraine Healy MD    To contact this individual call [phone] and ask the  to page. If unavailable ask to be transferred the Adult Hospitalist Department. CONSULTATIONS: IP CONSULT TO GASTROENTEROLOGY  IP CONSULT TO CARDIOLOGY    PROCEDURES/SURGERIES: * No surgery found *    DISCHARGE DIAGNOSES:   Diverticulitis and atrial tachycardia         ADMISSION SUMMARY AND HOSPITAL COURSE:   This is a 53-year-old woman with a past medical history significant for hypertension, asthma, anxiety/depression, irritable bowel syndrome, who was in her usual state of health until yesterday when the patient developed abdominal pain.  This is located at the left side of the abdomen, constant with no radiation, dull ache, 8/10 in severity, no known aggravating or relieving factors.  The abdominal pain became associated with vomiting.  When the patient woke up this morning, the patient's symptoms did not improve.  She stated that she had similar symptoms in the past, she was admitted to the hospital for about a week, was diagnosed initially with spastic colon but was later told that she has irritable bowel syndrome.  Evaluation for Crohn's disease was negative according to her. Anastasia Hairston the patient arrived at the emergency room, CT scan of the abdomen and pelvis was obtained.  The CT scan shows findings suggestive of colitis/diverticulitis    Acute diverticulitis  CT findings have been reviewed   Was started on zosyn and then was switched to levaquin and flagyl and was given script to take home   She had diarrhea in the hospital and improved on discharge    Rapid response called on 3/26  1914 for tachycardia to the 160s. Rapid response team at bedside.   Patient complains of mild shortness of breath. She states that she has had episodes of fast palpitations in the past relieved with deep breathing. She denies chest pain, nausea, vomiting, or lightheadedness initially, but later in themorningn c/o chest tightness and palpiations at which times her vitals were normal.     O: /92, , [id number]% on 2Lnc, RR 18  gen-alert and oriented  cv- tachycardia, regular  pulm-CTAB  abd-soft NT/ND     12 lead EKG>SVT,   A/P:  -SVT at 163 not resolved after vagal maneuvers or carotid sinus massage. Adenosine 6mg IV administered with conversion of rhythm to sinus tachycardia to 107  Seen by cardiology the next day and was diagnosed with atrial Tachycardia and was discharge don atenolol as the patient was stable . She will follow up with her pcp and the cardiologist     Valeriano Patino / PLAN:          BMI: There is no height or weight on file to calculate BMI. . This patient: Meets criteria for obesity given BMI >/= 30 and < 40 due to excess calories/nutritional. Weight loss and lifestyle modifications should be encouraged as an outpatient. PENDING TEST RESULTS:   At the time of discharge the following test results are still pending: none      ADDITIONAL CARE RECOMMENDATIONS:   Please follow up with pcp and  Cardiology and the Gastro     NOTIFY [address] FOLLOWING:   Fever over 101 degrees for 24 hours. Chest pain, shortness of breath, fever, chills, nausea, vomiting, diarrhea, change in mentation, falling, weakness, bleeding. Severe pain or pain not relieved by medications, as well as any other signs or symptoms that you may have questions about.     FOLLOW UP APPOINTMENTS:    Follow-up Information     Follow up With Specialties Details Why Contact Info    Rashad Cai MD Family Medicine, Sports Medicine In 1 week  [address]  [phone]      Taran Wilkes, DO Cardiology, Interventional Cardiology In 2 weeks  [address]  [address]  [facility]  [phone]      Justyn Farmer MD Gastroenterology In 2 weeks  [address]  [phone]               DIET: Regular Diet    ACTIVITY: Activity as tolerated    EQUIPMENT needed:     DISCHARGE MEDICATIONS:  Current Discharge Medication List      START taking these medications    Details   atenoloL (TENORMIN) 25 mg tablet Take 1 Tablet by mouth daily. Qty: 90 Tablet, Refills: 0  Start date: 3/28/2022      metroNIDAZOLE (FLAGYL) 500 mg tablet Take 1 Tablet by mouth every twelve (12) hours. Qty: 20 Tablet, Refills: 0  Start date: 3/27/2022      levoFLOXacin (Levaquin) 500 mg tablet Take 1 Tablet by mouth daily. Qty: 10 Tablet, Refills: 0  Start date: 3/27/2022         CONTINUE these medications which have NOT CHANGED    Details   fluticasone propionate (Flonase Allergy Relief) 50 mcg/actuation nasal spray 2 Sprays by Both Nostrils route daily. lidocaine (Lidoderm) 5 % 1 Patch by TransDERmal route daily as needed. Apply patch to the affected area for 12 hours a day and remove for 12 hours a day.      gabapentin (NEURONTIN) 100 mg capsule Take 1 Capsule by mouth nightly. Max Daily Amount: 100 mg. Qty: 30 Capsule, Refills: 0  Start date: 3/23/2022    Associated Diagnoses: Spinal stenosis of lumbar region with neurogenic claudication      hyoscyamine SR (LEVBID) 0.375 mg SR tablet Take 0.375 mg by mouth every twelve (12) hours as needed for Cramping. topiramate (TOPAMAX) 25 mg tablet Take 25 mg by mouth nightly. losartan (COZAAR) 100 mg tablet Take 1 Tablet by mouth daily. Qty: 90 Tablet, Refills: 1      cetirizine (ZYRTEC) 10 mg tablet Take 10 mg by mouth daily. DULoxetine (CYMBALTA) 20 mg capsule Take 20 mg by mouth two (2) times a day. Indications: anxiousness associated with depression      azelastine (ASTELIN) 137 mcg (0.1 %) nasal spray 1 [place] by Both Nostrils route two (2) times a day. pantoprazole (PROTONIX) 40 mg tablet Take 40 mg by mouth two (2) times a day. albuterol (ProAir HFA) 90 mcg/actuation inhaler Take 2 Puffs by inhalation every six (6) hours as needed for Wheezing or Shortness of Breath. SUMAtriptan (IMITREX) 50 mg tablet Take HALF to one tablet at onset of migraine. Limit use to 2 days per week. Qty: 9 Tablet, Refills: 1    Associated Diagnoses: Chronic migraine without aura without status migrainosus, not intractable             DISPOSITION:  xx  Home With:   OT  PT  HH  RN       Long term SNF/Inpatient Rehab    Independent/assisted living    Hospice    Other:       PATIENT CONDITION AT DISCHARGE:     Functional status    Poor     Deconditioned     Independent      Cognition   x  Lucid     Forgetful     Dementia      Catheters/lines (plus indication)    [place]     PICC     PEG    x None      Code status   x  Full code     DNR      PHYSICAL EXAMINATION AT DISCHARGE:  General:          Alert, cooperative, no distress, appears stated age. HEENT:           Atraumatic, anicteric sclerae, pink conjunctivae                          No oral ulcers, mucosa moist, throat clear, dentition fair  Neck:               Supple, symmetrical  Lungs:             Clear to auscultation bilaterally. No Wheezing or Rhonchi. No rales. Chest wall:      No tenderness  No Accessory muscle use. Heart:              Regular  rhythm,  No  murmur   No edema  Abdomen:        Soft, non-tender. Not distended. Bowel sounds normal  Extremities:     No cyanosis. No clubbing,                            Skin turgor normal, Capillary refill normal  Skin:                Not pale. Not Jaundiced  No rashes   Psych:             Not anxious or agitated.   Neurologic:      Alert, moves all extremities, answers questions appropriately and responds to commands       CHRONIC MEDICAL DIAGNOSES:  Problem List as of 3/27/2022 Date Reviewed: 3/24/2022          Codes Class Noted - Resolved    * (Principal) Acute diverticulitis ICD-10-CM: K57.92  ICD-9-CM: 562.11  3/23/2022 - Present        Spinal stenosis, lumbar ICD-10-CM: M48.061  ICD-9-CM: 724.02  11/11/2019 - Present        Elevated hemoglobin A1c ICD-10-CM: R73.09  ICD-9-CM: 790.29  11/5/2019 - Present    Overview Signed 11/5/2019  3:02 PM by Sharonda Perez NP     6.1 11/4/19                   Greater than  30  minutes were spent with the patient on counseling and coordination of care    Signed:   Jostin Whittaker MD  3/27/2022  2:10 PM

## 2022-03-27 NOTE — CONSULTS
[address] [facility] Cardiology Consultation    Date of Consult:  03/27/22  Date of Admission: 3/23/2022  Primary Cardiologist: Dr. Melva Duke  Physician Requesting consult: Dr. Jaylan Roa    Chief Complaint / Reason for Consult:   SVT    History of Present Illness:  Phan Le is a 63 y.o. female with the below listed medical history who was admitted with abdominal pain. Per available notes:  \"63year-old woman with a past medical history significant for hypertension, asthma, anxiety/depression, irritable bowel syndrome, who was in her usual state of health until yesterday when the patient developed abdominal pain. This is located at the left side of the abdomen, constant with no radiation, dull ache, 8/10 in severity, no known aggravating or relieving factors. The abdominal pain became associated with vomiting. When the patient woke up this morning, the patient's symptoms did not improve. She stated that she had similar symptoms in the past, she was admitted to the hospital for about a week, was diagnosed initially with spastic colon but was later told that she has irritable bowel syndrome. Evaluation for Crohn's disease was negative according to her. When the patient arrived at the emergency room, CT scan of the abdomen and pelvis was obtained. The CT scan shows findings suggestive of colitis/diverticulitis. She was then referred to the hospitalist service for evaluation for admission. The patient was last admitted to the hospital from 11/11/2019 to 11/15/2019. The patient was admitted to the Orthopedic Service and underwent lumbar laminectomy secondary to degenerative disk disease.     In addition to the abdominal pain, the patient also complained of rectal bleeding. The patient stated that she has been passing bright red blood per rectum with clot formation. \"    Last night a rapid response was called due to tachcycardia in the 160s. She had mild dyspnea at that time.   Has had palpitations in the past that resolve with deep breathing. Denied chest discomfort, LH. The team tried vagal maneuvers and carotid sinus massage. Adenosine 6 mg IV was given with conversion to sinus tachycardia in the 100s. Potassium was 3.6 and magnesium 1.8 and these were supplemented. ECG was obtained while she was in the SVT and this appears to be atrial tachycardia (personally reviewed). HS troponin was checked and is 17. Above history corroborated with patient. Feels well now. No CP, LH, dyspnea, palpitations or abdominal pain (says this is much improved). Only has chest tightness and dyspnea with the palpitations. Has been intermittent since this past fall. Past Medical History:   Diagnosis Date    Adverse effect of anesthesia     \"so sleepy and tired for the rest of the day\"    Anxiety     Asthma     Chronic pain     back - L5 is \"almost gone\"/left foot neuropathy -pinched nerve L4-5 - spine shifted/degenerative spinal disease/stiff and sore neck and shoulder - in PT for back and neck    Degenerative spinal arthritis     Diabetes ([facility] [address]     PRE DIABETIC    Hypertension     IBS (irritable bowel syndrome)     Ill-defined condition     pre-diabetes    Nausea & vomiting     ? d/t fentanyl    PUD (peptic ulcer disease)     Spondylolisthesis        Prior to Admission medications    Medication Sig Start Date End Date Taking? Authorizing Provider   fluticasone propionate (Flonase Allergy Relief) 50 mcg/actuation nasal spray 2 Sprays by Both Nostrils route daily. Yes Provider, Historical   lidocaine (Lidoderm) 5 % 1 Patch by TransDERmal route daily as needed. Apply patch to the affected area for 12 hours a day and remove for 12 hours a day. Yes Provider, Historical   gabapentin (NEURONTIN) 100 mg capsule Take 1 Capsule by mouth nightly.  Max Daily Amount: 100 mg. 3/23/22  Yes PARESH Macias   hyoscyamine SR (LEVBID) 0.375 mg SR tablet Take 0.375 mg by mouth every twelve (12) hours as needed for Cramping. Yes Provider, Historical   topiramate (TOPAMAX) 25 mg tablet Take 25 mg by mouth nightly. Yes Provider, Historical   losartan (COZAAR) 100 mg tablet Take 1 Tablet by mouth daily. 12/27/21  Yes Luz Cleveland MD   cetirizine (ZYRTEC) 10 mg tablet Take 10 mg by mouth daily. 8/5/21  Yes Provider, Historical   DULoxetine (CYMBALTA) 20 mg capsule Take 20 mg by mouth two (2) times a day. Indications: anxiousness associated with depression   Yes Provider, Historical   azelastine (ASTELIN) 137 mcg (0.1 %) nasal spray 1 [place] by Both Nostrils route two (2) times a day. 7/28/21  Yes Provider, Historical   pantoprazole (PROTONIX) 40 mg tablet Take 40 mg by mouth two (2) times a day. 7/14/21  Yes Provider, Historical   albuterol (ProAir HFA) 90 mcg/actuation inhaler Take 2 Puffs by inhalation every six (6) hours as needed for Wheezing or Shortness of Breath. Yes Provider, Historical   SUMAtriptan (IMITREX) 50 mg tablet Take HALF to one tablet at onset of migraine. Limit use to 2 days per week.   Patient not taking: Reported on 3/24/2022 10/13/21   Sharona Muse MD       Current Facility-Administered Medications   Medication Dose Route Frequency    levoFLOXacin (LEVAQUIN) tablet 500 mg  500 mg Oral Q24H    metroNIDAZOLE (FLAGYL) tablet 500 mg  500 mg Oral Q12H    albuterol-ipratropium (DUO-NEB) 2.5 MG-0.5 MG/3 ML  3 mL Nebulization Q6H PRN    cetirizine (ZYRTEC) tablet 10 mg  10 mg Oral DAILY    hyoscyamine SR (LEVBID) tablet 375 mcg  375 mcg Oral Q12H PRN    losartan (COZAAR) tablet 100 mg  100 mg Oral DAILY    pantoprazole (PROTONIX) 40 mg in 0.9% sodium chloride 10 mL injection  40 mg IntraVENous Q12H    topiramate (TOPAMAX) tablet 25 mg  25 mg Oral QHS    sodium chloride (NS) flush 5-40 mL  5-40 mL IntraVENous Q8H    sodium chloride (NS) flush 5-40 mL  5-40 mL IntraVENous PRN    acetaminophen (TYLENOL) tablet 650 mg  650 mg Oral Q6H PRN    Or    acetaminophen (TYLENOL) suppository 650 mg  650 mg Rectal Q6H PRN    polyethylene glycol (MIRALAX) packet 17 g  17 g Oral DAILY PRN    ondansetron (ZOFRAN ODT) tablet 4 mg  4 mg Oral Q8H PRN    Or    ondansetron (ZOFRAN) injection 4 mg  4 mg IntraVENous Q6H PRN    L.acidophilus-paracasei-S.thermophil-bifidobacter (RISAQUAD) 8 billion cell capsule  1 Capsule Oral DAILY    fluticasone propionate (FLONASE) 50 mcg/actuation nasal spray 2 Spray  2 Spray Both Nostrils DAILY    DULoxetine (CYMBALTA) capsule 20 mg  20 mg Oral BID    gabapentin (NEURONTIN) capsule 300 mg  300 mg Oral QHS    lidocaine 4 % patch 1 Patch  1 Patch TransDERmal DAILY PRN    lactated Ringers infusion  125 mL/hr IntraVENous CONTINUOUS    morphine injection 2 mg  2 mg IntraVENous Q4H PRN       Family History   Problem Relation Age of Onset    Delayed Awakening Mother         with anesthesia    Arthritis Mother     Other Mother         IBS/degenerative spinal disease    Anesth Problems Mother         N/V AND SLEEPY ALL DAY    COPD Father     Glaucoma Father     HIV/AIDS Sister     Heart Disease Brother         heart valve problem    Glaucoma Brother     Other Other     Heart Disease Sister         HEART VALVE DISESE        Social History     Socioeconomic History    Marital status: LEGALLY      Spouse name: Not on file    Number of children: Not on file    Years of education: Not on file    Highest education level: Not on file   Occupational History    Not on file   Tobacco Use    Smoking status: Former Smoker     Packs/day: 0.50     Years: 12.00     Pack years: 6.00     Quit date: 2012     Years since quittin.3    Smokeless tobacco: Never Used    Tobacco comment: quit    Substance and Sexual Activity    Alcohol use: Yes     Comment: 3-4 times a yr    Drug use: Yes     Types: Marijuana     Comment:  LAST USED IN 2019    Sexual activity: Yes     Partners: Female   Other Topics Concern    Not on file   Social History Narrative    Not on file Social Determinants of Health     Financial Resource Strain:     Difficulty of Paying Living Expenses: Not on file   Food Insecurity:     Worried About Running Out of Food in the Last Year: Not on file    Yenni of Food in the Last Year: Not on file   Transportation Needs:     Lack of Transportation (Medical): Not on file    Lack of Transportation (Non-Medical):  Not on file   Physical Activity:     Days of Exercise per Week: Not on file    Minutes of Exercise per Session: Not on file   Stress:     Feeling of Stress : Not on file   Social Connections:     Frequency of Communication with Friends and Family: Not on file    Frequency of Social Gatherings with Friends and Family: Not on file    Attends Islam Services: Not on file    Active Member of [address] [place] [address] [employer] or Organizations: Not on file    Attends Club or Organization Meetings: Not on file    Marital Status: Not on file   Intimate Partner Violence:     Fear of Current or Ex-Partner: Not on file    Emotionally Abused: Not on file    Physically Abused: Not on file    Sexually Abused: Not on file   Housing Stability:     Unable to Pay for Housing in the Last Year: Not on file    Number of [place] in the Last Year: Not on file    Unstable Housing in the Last Year: Not on file       ROS    Visit Vitals  /76   Pulse 91   Temp 97.8 °F (36.6 °C)   Resp 18   SpO2 97%         Intake/Output Summary (Last 24 hours) at 3/27/2022 1155  Last data filed at 3/27/2022 1013  Gross per 24 hour   Intake 240 ml   Output --   Net 240 ml        Physical Exam  GEN: NAD, appears stated age  HEENT: EOMI, MMM, OP clear  NECK: Normal JVP, carotids 2+ b/l and symmetrical  CV: RRR, normal S1 and S2, no M/R/G  LUNGS: CTAB, no W/R/R  ABD: Obese, NABS, soft, NT/ND  EXT: No edema, 2+ and symmetrical radial pulses b/l  PSYCH: Mood and affect normal  NEURO: AAO, MAEW, face symmetrical, speech intact    Lab Review:  BMP:   Lab Results   Component Value Date/Time    NA 141 2022 10:03 PM    K 3.6 2022 10:03 PM     (H) 2022 10:03 PM    CO2 27 2022 10:03 PM    AGAP 4 (L) 2022 10:03 PM     (H) 2022 10:03 PM    BUN 5 (L) 2022 10:03 PM    CREA 0.62 2022 10:03 PM    GFRAA >60 2022 10:03 PM    GFRNA >60 2022 10:03 PM        CBC:  Lab Results   Component Value Date/Time    WBC 8.3 2022 02:19 AM    HGB 10.7 (L) 2022 02:19 AM    HCT 34.8 (L) 2022 02:19 AM    PLATELET 932  02:19 AM    MCV 98.9 2022 02:19 AM       All Cardiac Markers in the last 24 hours:  No results found for: CPK, CK, CKMMB, CKMB, RCK3, CKMBT, CKMBPOC, CKNDX, CKND1, ELAINE, TROPT, TROIQ, BRENDEN, TROPT, TNIPOC, BNP, BNPP, BNPNT    Lab Results   Component Value Date/Time    Cholesterol, total 221 (H) 2019 11:18 AM    HDL Cholesterol 61 2019 11:18 AM    LDL, calculated 139 (H) 2019 11:18 AM    VLDL, calculated 21 2019 11:18 AM    Triglyceride 107 2019 11:18 AM        Data Review:  ECG tracing personally reviewed:   As above    Telemetry reviewed: shows abrupt onset of atrial tachycardia in the 160s at 04:14 with termination to sinus tachycardia at 04:24    Echocardiogram:  10/23/19    ECHO ADULT COMPLETE 10/23/2019 10/23/2019    Interpretation Summary  · Left Ventricle: Normal cavity size, wall thickness, systolic function (ejection fraction normal) and diastolic function. Estimated left ventricular ejection fraction is 56 - 60%. Visually measured ejection fraction. · Mitral Valve: Mitral valve non-specific thickening and thickening. Mild mitral valve regurgitation is present.     Signed by: Sonia Gregorio MD on 10/23/2019  1:40 PM    TTE 21:        Other cardiac testin21:  [address] Nuclear without adjunct exercise      Provider:  Vivian Dalal DO   Technologist:  Crystal Canseco, [facility] Stressing Staff:  Rodney Brennan  Referring 1:  Xin Cortes MD, Karli Sutherland K Referring 2:    Clinical Indications: Other chest painOther form of dyspneaMixed hyperlipidemia  Medications:    Height:  68.00 in     Weight:  217.00 lbs BSA:  2.36    Clinical Data:  A 63 year old female with no known coronary artery disease being evaluated for chest pain and shortness of breath. Procedure:  Malik Fail Nuclear without adjunct exercise. Dosage: 0.4 mg given IV. Radiopharmaceutical Dose:    Resting: Tc 99m Sestamibi 10.40 mCi IV  Stress: Tc 99m Sestamibi 32.30 mCi IV    Stress Data: Heart Rate: Blood Pressure: O2 Sat: Arrhythmias/Comments  Restin 151/93 98  Peak: 132 167/87     Recovery Comments:  Patient's CP resolved 2 minutes into recovery phase    Stress Test Findings:   The patient underwent a fifteen second IV infusion of 0.4mg Lexiscan. Patient unable to exercise due to  Covid 19 Protocol. Infusion was discontinued secondary to completion of protocol. Symptoms:  Shortness of breath, Chest Pain 10/10  HR Response:  Normal.  BP Response:  Normal  Arrhythmias:  None  Resting ECG:  NSR  Stress ECG:  No significant ST shift. Stress Summary:  No significant STT changes to suggest inducible ischemia. Description of Procedure: The patient received an IV injection of Tc-99m- labeled Sestamibi at rest. SPECT images were obtained approximately 45 minutes after injection. A dose of Lexiscan was given as a 15 second  intravenous bolus 1 1/2 minutes into the test, followed by a saline flush. At 30 seconds after the Lexiscan bolus, an IV injection of Tc-99m -labeled Sestamibi was injected. Gated SPECT images were obtained approximately 15-60 minutes after the radiotracer was injected. Myocardial Perfusion Imaging Findings:  Review of the raw data in cine format shows no significant patient motion artifact. The image quality is adequate. The heart size is normal.   Stress tomographic slices show a normal myocardial perfusion pattern.   Resting tomographic slices show a similar uptake pattern without evidence of reversibility. No prior study available for comparison. Impression:  Perfusion Status:  Myocardial perfusion imaging shows no evidence of ischemia or infarction during stress test.  Stress Summary: No significant STT changes to suggest inducible ischemia. Attenuation: No significant patient motion artifact present. LV Function:  The heart size is normal. Separate review of dynamic gated displays reveal: Normal wall motion and thickening with an estimated EF of 68%. Other imaging:  CTA chest:  IMPRESSION  No evidence of acute pulmonary embolus. Assessment:    1. SVT  2. History of atypical chest pain  3. Acute diverticulitis  4. HTN  5. Possible LGIB  6. Chronic normocytic anemia    Recommendations / Plan:  - Start atenolol 25 mg daily; discussed purpose and possible side effects with patient  - Recent office echo with normal EF; no need to repeat  - Follow-up with primary cardiologist, Dr. Aracelis Rangel, after discharge  - Telemetry    OK to discharge from CV standpoint today. Discussed with Dr. Prince Russ. We will sign-off, but are available if additional questions arise. Thank you for the opportunity to participate in the care of Mary Rivas and please do not hesitate to contact us should you have any questions. Signed:  Hill Kline.  Linda Whitten MD  Structural / [id number]  03/27/22 [place] Counseling: I discussed with the patient the risks of risankizumab-rzaa including but not limited to immunosuppression, and serious infections.  The patient understands that monitoring is required including a PPD at baseline and must alert us or the primary physician if symptoms of infection or other concerning signs are noted.

## 2022-03-27 NOTE — DISCHARGE INSTRUCTIONS
Discharge Instructions       PATIENT ID: Mervyn Schaumann  MRN: [id number]   YOB: 1960    DATE OF ADMISSION: 3/23/2022  6:13 PM    DATE OF DISCHARGE: 3/27/2022    PRIMARY CARE PROVIDER: Lupe Miller MD     ATTENDING PHYSICIAN: Heydi Beck MD  DISCHARGING PROVIDER: Bryant Azul MD    To contact this individual call [phone] and ask the  to page. If unavailable ask to be transferred the Adult Hospitalist Department. DISCHARGE DIAGNOSES   Diverticulitis and Atrial tachycardia     CONSULTATIONS: IP CONSULT TO GASTROENTEROLOGY  IP CONSULT TO CARDIOLOGY    PROCEDURES/SURGERIES: * No surgery found *    PENDING TEST RESULTS:   At the time of discharge the following test results are still pending:     FOLLOW UP APPOINTMENTS:   Follow-up Information     Follow up With Specialties Details Why Contact Info    Lupe Miller MD Family Medicine, Sports Medicine In 1 week  [address] [facility] [phone]      Therman Harada, DO Cardiology, Interventional Cardiology In 2 weeks  [address]  [address]  [address] [phone]      Lisette Moss MD Gastroenterology In 2 weeks  [address]  [phone]             ADDITIONAL CARE RECOMMENDATIONS:   Please follow up with the pcp   Take antibiotics as prescribed   Take any over the counter probiotic with it   Follow up with the cardiologist and the GI doc       DIET: Regular Diet  Oral Nutritional Supplements:     ACTIVITY: Activity as tolerated    WOUND CARE:     EQUIPMENT needed:       Radiology      DISCHARGE MEDICATIONS:   See Medication Reconciliation Form    · It is important that you take the medication exactly as they are prescribed. · Keep your medication in the bottles provided by the pharmacist and keep a list of the medication names, dosages, and times to be taken in your wallet. · Do not take other medications without consulting your doctor. NOTIFY YOUR PHYSICIAN FOR ANY OF THE FOLLOWING:   Fever over 101 degrees for 24 hours. Chest pain, shortness of breath, fever, chills, nausea, vomiting, diarrhea, change in mentation, falling, weakness, bleeding. Severe pain or pain not relieved by medications. Or, any other signs or symptoms that you may have questions about.       DISPOSITION:  xx  Home With:   OT  PT  HH  RN       SNF/Inpatient Rehab/LTAC    Independent/assisted living    Hospice    Other:     CDMP Checked:   Yes x     PROBLEM LIST Updated:  Yes x       Signed:   Ximena Sanford MD  3/27/2022  2:08 PM

## 2022-03-27 NOTE — PROGRESS NOTES
Problem: Falls - Risk of  Goal: *Absence of Falls  Description: Document Malaika Skill Fall Risk and appropriate interventions in the flowsheet. Outcome: Progressing Towards Goal  Note: Fall Risk Interventions:  Mobility Interventions: Patient to call before getting OOB         Medication Interventions: Patient to call before getting OOB         History of Falls Interventions: Evaluate medications/consider consulting pharmacy         Problem: Patient Education: Go to Patient Education Activity  Goal: Patient/Family Education  Outcome: Progressing Towards Goal     Problem: Pressure Injury - Risk of  Goal: *Prevention of pressure injury  Description: Document Peterson Scale and appropriate interventions in the flowsheet.   Outcome: Progressing Towards Goal  Note: Pressure Injury Interventions:  Sensory Interventions: Assess changes in LOC    Moisture Interventions: Apply protective barrier, creams and emollients,Check for incontinence Q2 hours and as needed    Activity Interventions: Increase time out of bed    Mobility Interventions: HOB 30 degrees or less    Nutrition Interventions: Document food/fluid/supplement intake,Offer support with meals,snacks and hydration                     Problem: Patient Education: Go to Patient Education Activity  Goal: Patient/Family Education  Outcome: Progressing Towards Goal

## 2022-04-14 ENCOUNTER — OFFICE VISIT (OUTPATIENT)
Dept: INTERNAL MEDICINE CLINIC | Age: 62
End: 2022-04-14
Payer: MEDICAID

## 2022-04-14 VITALS
HEART RATE: 101 BPM | DIASTOLIC BLOOD PRESSURE: 85 MMHG | HEIGHT: 68 IN | OXYGEN SATURATION: 96 % | WEIGHT: 209 LBS | BODY MASS INDEX: 31.67 KG/M2 | RESPIRATION RATE: 16 BRPM | SYSTOLIC BLOOD PRESSURE: 131 MMHG | TEMPERATURE: 98.7 F

## 2022-04-14 DIAGNOSIS — K76.0 FATTY LIVER: ICD-10-CM

## 2022-04-14 DIAGNOSIS — I47.1 ATRIAL TACHYCARDIA (HCC): ICD-10-CM

## 2022-04-14 DIAGNOSIS — K57.92 ACUTE DIVERTICULITIS: Primary | ICD-10-CM

## 2022-04-14 DIAGNOSIS — G47.30 SLEEP APNEA, UNSPECIFIED TYPE: ICD-10-CM

## 2022-04-14 DIAGNOSIS — R73.09 ELEVATED HEMOGLOBIN A1C: ICD-10-CM

## 2022-04-14 DIAGNOSIS — E78.00 ELEVATED CHOLESTEROL: ICD-10-CM

## 2022-04-14 DIAGNOSIS — K85.90 ACUTE PANCREATITIS, UNSPECIFIED COMPLICATION STATUS, UNSPECIFIED PANCREATITIS TYPE: ICD-10-CM

## 2022-04-14 PROCEDURE — 99214 OFFICE O/P EST MOD 30 MIN: CPT | Performed by: FAMILY MEDICINE

## 2022-04-14 RX ORDER — MONTELUKAST SODIUM 4 MG/1
TABLET, CHEWABLE ORAL
COMMUNITY
Start: 2022-03-29 | End: 2022-10-10 | Stop reason: ALTCHOICE

## 2022-04-14 RX ORDER — HYOSCYAMINE SULFATE 0.38 MG/1
0.38 TABLET, EXTENDED RELEASE ORAL
COMMUNITY
Start: 2022-01-27 | End: 2022-04-14 | Stop reason: SDUPTHER

## 2022-04-14 RX ORDER — FLUTICASONE FUROATE AND VILANTEROL TRIFENATATE 200; 25 UG/1; UG/1
POWDER RESPIRATORY (INHALATION)
Status: ON HOLD | COMMUNITY
Start: 2022-03-24 | End: 2022-10-31

## 2022-04-14 NOTE — PROGRESS NOTES
Chief Complaint   Patient presents with   [facility] Follow Up     Patient is here for a follow up. she is a 63y.o. year old female who presents for evaluation of diverticulitis and atrial tachycardia. Patient was admitted to hospital on 323 and discharged on 327 with a diagnosis discharge of diverticulitis and atrial tachycardia. She was put on levofloxacin metronidazole and atenolol respectively and states that her symptoms are slightly better but still has abdominal pain and bouts of palpitations. Patient denies any chest pain shortness breath daily. In regards to her bowels, she has come back to her baseline diarrhea but does not have any bloody stools or increased abdominal pain    Reviewed and agree with Nurse Note and duplicated in this note. Reviewed PmHx, RxHx, FmHx, SocHx, AllgHx and updated and dated in the chart.     Family History   Problem Relation Age of Onset    Delayed Awakening Mother         with anesthesia    Arthritis Mother     Other Mother         IBS/degenerative spinal disease    Anesth Problems Mother         N/V AND SLEEPY ALL DAY    COPD Father     Glaucoma Father     HIV/AIDS Sister     Heart Disease Brother         heart valve problem    Glaucoma Brother     Other Other     Heart Disease Sister         HEART VALVE DISESE        Past Medical History:   Diagnosis Date    Adverse effect of anesthesia     \"so sleepy and tired for the rest of the day\"    Anxiety     Asthma     Chronic pain     back - L5 is \"almost gone\"/left foot neuropathy -pinched nerve L4-5 - spine shifted/degenerative spinal disease/stiff and sore neck and shoulder - in PT for back and neck    Degenerative spinal arthritis     Diabetes ([facility] [address]     PRE DIABETIC    Hypertension     IBS (irritable bowel syndrome)     Ill-defined condition     pre-diabetes    Nausea & vomiting     ? d/t fentanyl    PUD (peptic ulcer disease)     Spondylolisthesis       Social History     Socioeconomic History  Marital status: LEGALLY    Tobacco Use    Smoking status: Former Smoker     Packs/day: 0.50     Years: 12.00     Pack years: 6.00     Quit date: 2012     Years since quittin.3    Smokeless tobacco: Never Used    Tobacco comment: quit    Substance and Sexual Activity    Alcohol use: Yes     Comment: 3-4 times a yr    Drug use: Yes     Types: Marijuana     Comment:  LAST USED IN 2019    Sexual activity: Yes     Partners: Female        Review of Systems - negative except as listed above      Objective:     Vitals:    22 1143   BP: 131/85   Pulse: (!) 101   Resp: 16   Temp: 98.7 °F (37.1 °C)   SpO2: 96%   Weight: 209 lb (94.8 kg)   Height: 5' 8\" (1.727 m)       Physical Examination: General appearance - alert, well appearing, and in no distress  Eyes - pupils equal and reactive, extraocular eye movements intact  Ears - bilateral TM's and external ear canals normal  Nose - normal and patent, no erythema, discharge or polyps  Mouth - mucous membranes moist, pharynx normal without lesions  Neck - supple, no significant adenopathy  Chest - clear to auscultation, no wheezes, rales or rhonchi, symmetric air entry  Heart - normal rate, regular rhythm, normal S1, S2, no murmurs, rubs, clicks or gallops  Abdomen - soft, nontender, nondistended, no masses or organomegaly  Neurological - alert, oriented, normal speech, no focal findings or movement disorder noted  Musculoskeletal - no joint tenderness, deformity or swelling  Extremities - peripheral pulses normal, no pedal edema, no clubbing or cyanosis  Skin - normal coloration and turgor, no rashes, no suspicious skin lesions noted     Assessment/ Plan:   Diagnoses and all orders for this visit:    1. Acute diverticulitis  Current episode is resolved, recommend follow-up with GI  2. Atrial tachycardia (HCC)  -     CBC W/O DIFF; Future  Patient will continue with cardiology follow-up tomorrow and likely Holter monitor 3.  Elevated he at the total of the mass moglobin A1c  -     HEMOGLOBIN A1C WITH EAG; Future    4. Fatty liver    5. Acute pancreatitis, unspecified complication status, unspecified pancreatitis type  -     AMYLASE; Future    6. Elevated cholesterol  -     LIPID PANEL; Future    7. Sleep apnea, unspecified type  -     REFERRAL TO SLEEP STUDIES       ER if symptoms return or worsen. Blood imaging. I have discussed the diagnosis with the patient and the intended plan as seen in the above orders. The patient has received an after-visit summary and questions were answered concerning future plans. Medication Side Effects and Warnings were discussed with patient,  Patient Labs were reviewed and or requested, and  Patient Past Records were reviewed and or requested  yes       Pt agrees to call or return to clinic and/or go to closest ER with any worsening of symptoms. This may include, but not limited to increased fever (>100.4) with NSAIDS or Tylenol, increased edema, confusion, rash, worsening of presenting symptoms. Please note that this dictation was completed with [employer], the computer voice recognition software. Quite often unanticipated grammatical, syntax, homophones, and other interpretive errors are inadvertently transcribed by the computer software. Please disregard these errors. Please excuse any errors that have escaped final proofreading. Thank you.

## 2022-04-15 LAB
AMYLASE SERPL-CCNC: 55 U/L (ref 25–115)
CHOLEST SERPL-MCNC: 219 MG/DL
COMMENT, HOLDF: NORMAL
ERYTHROCYTE [DISTWIDTH] IN BLOOD BY AUTOMATED COUNT: 13.8 % (ref 11.5–14.5)
EST. AVERAGE GLUCOSE BLD GHB EST-MCNC: 126 MG/DL
HBA1C MFR BLD: 6 % (ref 4–5.6)
HCT VFR BLD AUTO: 39.8 % (ref 35–47)
HDLC SERPL-MCNC: 55 MG/DL
HDLC SERPL: 4 {RATIO} (ref 0–5)
HGB BLD-MCNC: 12.1 G/DL (ref 11.5–16)
LDLC SERPL CALC-MCNC: 124.8 MG/DL (ref 0–100)
MCH RBC QN AUTO: 30.4 PG (ref 26–34)
MCHC RBC AUTO-ENTMCNC: 30.4 G/DL (ref 30–36.5)
MCV RBC AUTO: 100 FL (ref 80–99)
NRBC # BLD: 0 K/UL (ref 0–0.01)
NRBC BLD-RTO: 0 PER 100 WBC
PLATELET # BLD AUTO: 380 K/UL (ref 150–400)
PMV BLD AUTO: 9.4 FL (ref 8.9–12.9)
RBC # BLD AUTO: 3.98 M/UL (ref 3.8–5.2)
SAMPLES BEING HELD,HOLD: NORMAL
TRIGL SERPL-MCNC: 196 MG/DL (ref ?–150)
VLDLC SERPL CALC-MCNC: 39.2 MG/DL
WBC # BLD AUTO: 9.8 K/UL (ref 3.6–11)

## 2022-04-15 NOTE — PROGRESS NOTES
Sugars are better controlled, will recheck in 6 months. Your \"Bad\" cholesterol (LDL and/or triglycerides) are elevated. Please eat a healthier diet as described below. In particular avoid fried, fatty and junk foods, while increasing fiber (fruits and vegetables). If you cannot increase fiber through diet, you can supplement with metamucil as directed on bottle daily. Also, make sure you are taking 1 to 2 grams of over the counter fish oil. Increase exercise to 5 times per week of cardio lasting at least 30 min's each (biking, walking, elliptical, swimming). Lets recheck the fasting (atleast eight hours) in 6 months. Mediterranean diet: Choose this heart-healthy diet option  The Mediterranean diet is a heart-healthy eating plan combining elements of Mediterranean-style cooking. Here's how to adopt the Mediterranean diet. If you're looking for a heart-healthy eating plan, the Mediterranean diet might be right for you. The Mediterranean diet incorporates the basics of healthy eating - plus a splash of flavorful olive oil and perhaps a glass of red wine - among other components characterizing the traditional cooking style of countries bordering the [facility]. Most healthy diets include fruits, vegetables, fish and whole grains, and limit unhealthy fats. While these parts of a healthy diet remain tried-and-true, subtle variations or differences in proportions of certain foods may make a difference in your risk of heart disease. Benefits of the [address] has shown that the traditional Mediterranean diet reduces the risk of heart disease. In fact, a recent analysis of more than 1.5 million healthy adults demonstrated that following a Mediterranean diet was associated with a reduced risk of overall and cardiovascular mortality, a reduced incidence of cancer and cancer mortality, and a reduced incidence of Parkinson's and Alzheimer's diseases.    For this reason, most if not all major scientific organizations encourage healthy adults to adapt a style of eating like that of the [address] for prevention of major chronic diseases. Key components of the Mediterranean diet  The Mediterranean diet emphasizes:   Getting plenty of exercise   Eating primarily plant-based foods, such as fruits and vegetables, whole grains, legumes and nuts   Replacing butter with healthy fats such as olive oil and canola oil   Using herbs and spices instead of salt to flavor foods   Limiting red meat to no more than a few times a month   Eating fish and poultry at least twice a week   Drinking red wine in moderation (optional)   The diet also recognizes the importance of enjoying meals with family and friends. Fruits, vegetables, nuts and grains  The Mediterranean diet traditionally includes fruits, vegetables, pasta and rice. For example, residents of [facility] eat very little red meat and average nine servings a day of antioxidant-rich fruits and vegetables. The Mediterranean diet has been associated with a lower level of oxidized low-density lipoprotein (LDL) cholesterol - the \"bad\" cholesterol that's more likely to build up deposits in your arteries. Nuts are another part of a healthy Mediterranean diet. Nuts are high in fat (approximately 80 percent of their calories come from fat), but most of the fat is not saturated. Because nuts are high in calories, they should not be eaten in large amounts - generally no more than a handful a day. For the best nutrition, avoid candied or honey-roasted and heavily salted nuts. Grains in the [address] [place] [address] region are typically whole grain and usually contain very few unhealthy trans fats, and bread is an important part of the diet there. However, throughout the [address] [place] [address] region, bread is eaten plain or dipped in olive oil - not eaten with butter or margarines, which contain saturated or trans fats.

## 2022-06-01 ENCOUNTER — VIRTUAL VISIT (OUTPATIENT)
Dept: INTERNAL MEDICINE CLINIC | Age: 62
End: 2022-06-01
Payer: COMMERCIAL

## 2022-06-01 DIAGNOSIS — I47.1 SVT (SUPRAVENTRICULAR TACHYCARDIA) (HCC): Primary | ICD-10-CM

## 2022-06-01 PROCEDURE — 99213 OFFICE O/P EST LOW 20 MIN: CPT | Performed by: FAMILY MEDICINE

## 2022-06-01 NOTE — LETTER
NOTIFICATION FOR JURY     6/1/2022 12:46 PM    Ms. Anisha Lund  [address] [place] [id number]    Participant Number - [id number]        To Whom It May Concern:    Anisha Lund is currently under the care of Atul Chanel. Patient notes a diagnosis of supraventricular tachycardia and has difficulty walking up and down stairs or short distances due to near syncopal or passing out episodes. Please excuse her from jury duty.         Sincerely,      Yumiko Galo MD

## 2022-06-01 NOTE — PROGRESS NOTES
Landry Hassan is a 63 y.o. female who was seen by synchronous (real-time) audio-video technology on 6/1/2022 for Irregular Heart Beat        Assessment & Plan:   Diagnoses and all orders for this visit:    1. SVT (supraventricular tachycardia) ([address]    Letter written for jury duty excusing her for the next session        Subjective:     Patient is a 82-year-old female who is following up for SVT. She is currently seeing an electrical specialist for the heart as a tilt table test on schedule for tomorrow. She asked her cardiologist to write her a jury duty note excusing her from jury duty this month but they instructed her to follow-up with a PCP. States that whenever she walks short distances or up and down stairs that she will have near syncopal episodes. Denies any passing out or hitting her head recently. Prior to Admission medications    Medication Sig Start Date End Date Taking? Authorizing Provider   colestipoL (COLESTID) 1 gram tablet TAKE 1 TABLET BY MOUTH DAILY. TAKE AT LEAST 1 HOUR AFTER OR 4 HOURS BEFORE OTHER MEDICATIONS. 3/29/22   Provider, Historical   Breo Ellipta 200-25 mcg/dose inhaler  3/24/22   Provider, Historical   atenoloL (TENORMIN) 25 mg tablet Take 1 Tablet by mouth daily. 3/28/22   Uli Bailon MD   metroNIDAZOLE (FLAGYL) 500 mg tablet Take 1 Tablet by mouth every twelve (12) hours. 3/27/22   Uli Bailon MD   levoFLOXacin (Levaquin) 500 mg tablet Take 1 Tablet by mouth daily. 3/27/22   Uli Bailon MD   fluticasone propionate (Flonase Allergy Relief) 50 mcg/actuation nasal spray 2 Sprays by Both Nostrils route daily. Provider, Historical   lidocaine (Lidoderm) 5 % 1 Patch by TransDERmal route daily as needed. Apply patch to the affected area for 12 hours a day and remove for 12 hours a day. Provider, Historical   gabapentin (NEURONTIN) 100 mg capsule Take 1 Capsule by mouth nightly.  Max Daily Amount: 100 mg. 3/23/22   PARESH Ragland   hyoscyamine SR (LEVBID) 0.375 mg SR tablet Take 0.375 mg by mouth every twelve (12) hours as needed for Cramping. Provider, Historical   topiramate (TOPAMAX) 25 mg tablet Take 25 mg by mouth nightly. Provider, Historical   losartan (COZAAR) 100 mg tablet Take 1 Tablet by mouth daily. 12/27/21   Shadia Michaels MD   SUMAtriptan (IMITREX) 50 mg tablet Take HALF to one tablet at onset of migraine. Limit use to 2 days per week. 10/13/21   Dylan Reich MD   cetirizine (ZYRTEC) 10 mg tablet Take 10 mg by mouth daily. 8/5/21   Provider, Historical   DULoxetine (CYMBALTA) 20 mg capsule Take 20 mg by mouth two (2) times a day. Indications: anxiousness associated with depression    Provider, Historical   azelastine (ASTELIN) 137 mcg (0.1 %) nasal spray 1 [place] [employer] by Both Nostrils route two (2) times a day. 7/28/21   Provider, Historical   pantoprazole (PROTONIX) 40 mg tablet Take 40 mg by mouth two (2) times a day. 7/14/21   Provider, Historical   albuterol (ProAir HFA) 90 mcg/actuation inhaler Take 2 Puffs by inhalation every six (6) hours as needed for Wheezing or Shortness of Breath. Provider, Historical     Patient Active Problem List    Diagnosis Date Noted    Acute diverticulitis 03/23/2022    Spinal stenosis, lumbar 11/11/2019    Elevated hemoglobin A1c 11/05/2019     Current Outpatient Medications   Medication Sig Dispense Refill    colestipoL (COLESTID) 1 gram tablet TAKE 1 TABLET BY MOUTH DAILY. TAKE AT LEAST 1 HOUR AFTER OR 4 HOURS BEFORE OTHER MEDICATIONS.  Breo Ellipta 200-25 mcg/dose inhaler       atenoloL (TENORMIN) 25 mg tablet Take 1 Tablet by mouth daily. 90 Tablet 0    metroNIDAZOLE (FLAGYL) 500 mg tablet Take 1 Tablet by mouth every twelve (12) hours. 20 Tablet 0    levoFLOXacin (Levaquin) 500 mg tablet Take 1 Tablet by mouth daily. 10 Tablet 0    fluticasone propionate (Flonase Allergy Relief) 50 mcg/actuation nasal spray 2 Sprays by Both Nostrils route daily.       lidocaine (Lidoderm) 5 % 1 Patch by TransDERmal route daily as needed. Apply patch to the affected area for 12 hours a day and remove for 12 hours a day.  gabapentin (NEURONTIN) 100 mg capsule Take 1 Capsule by mouth nightly. Max Daily Amount: 100 mg. 30 Capsule 0    hyoscyamine SR (LEVBID) 0.375 mg SR tablet Take 0.375 mg by mouth every twelve (12) hours as needed for Cramping.  topiramate (TOPAMAX) 25 mg tablet Take 25 mg by mouth nightly.  losartan (COZAAR) 100 mg tablet Take 1 Tablet by mouth daily. 90 Tablet 1    SUMAtriptan (IMITREX) 50 mg tablet Take HALF to one tablet at onset of migraine. Limit use to 2 days per week. 9 Tablet 1    cetirizine (ZYRTEC) 10 mg tablet Take 10 mg by mouth daily.  DULoxetine (CYMBALTA) 20 mg capsule Take 20 mg by mouth two (2) times a day. Indications: anxiousness associated with depression      azelastine (ASTELIN) 137 mcg (0.1 %) nasal spray 1 [place] by Both Nostrils route two (2) times a day.  pantoprazole (PROTONIX) 40 mg tablet Take 40 mg by mouth two (2) times a day.  albuterol (ProAir HFA) 90 mcg/actuation inhaler Take 2 Puffs by inhalation every six (6) hours as needed for Wheezing or Shortness of Breath. Allergies   Allergen Reactions    Cat Dander Runny Nose    Demerol [Meperidine] Nausea and Vomiting     Dizziness. Went to ER.  Egg Yolk Swelling    Fentanyl Nausea and Vomiting     Dizziness.     Grass Pollen Runny Nose    House Dust Mite Not Reported This Time    Lactaid [Lactase] Other (comments)     Bloating/gas    Lactose Other (comments)     Bloating/gas    Mold Shortness of Breath    Prednisone Swelling    Prozac [Fluoxetine] Rash    Soy Nausea and Vomiting    Zoloft [Sertraline] Rash     Past Medical History:   Diagnosis Date    Adverse effect of anesthesia     \"so sleepy and tired for the rest of the day\"    Anxiety     Asthma     Chronic pain     back - L5 is \"almost gone\"/left foot neuropathy -pinched nerve L4-5 - spine shifted/degenerative spinal disease/stiff and sore neck and shoulder - in PT for back and neck    Degenerative spinal arthritis     Diabetes ([facility] [address]     PRE DIABETIC    Hypertension     IBS (irritable bowel syndrome)     Ill-defined condition     pre-diabetes    Nausea & vomiting     ? d/t fentanyl    PUD (peptic ulcer disease)     Spondylolisthesis      Past Surgical History:   Procedure Laterality Date    COLONOSCOPY N/A 2019    COLONOSCOPY performed by Seema Rodas MD at [address] HX CHOLECYSTECTOMY  2006    HX DILATION AND CURETTAGE  1984    HX GI  2019    COLONOSCOPY    HX GI      ENDOSCOPY     Family History   Problem Relation Age of Onset    Delayed Awakening Mother         with anesthesia    Arthritis Mother     Other Mother         IBS/degenerative spinal disease    Anesth Problems Mother         N/V AND SLEEPY ALL DAY    COPD Father     Glaucoma Father     HIV/AIDS Sister     Heart Disease Brother         heart valve problem    Glaucoma Brother     Other Other     Heart Disease Sister         HEART VALVE DISESE      Social History     Tobacco Use    Smoking status: Former Smoker     Packs/day: 0.50     Years: 12.00     Pack years: 6.00     Quit date: 2012     Years since quittin.5    Smokeless tobacco: Never Used    Tobacco comment: quit    Substance Use Topics    Alcohol use: Yes     Comment: 3-4 times a yr       ROS    Objective:     Patient-Reported Vitals 2021   Patient-Reported Weight 207   Patient-Reported Height 5'8\"   Patient-Reported Pulse 100   Patient-Reported Temperature 96.2   Patient-Reported SpO2 95   Patient-Reported Systolic  016   Patient-Reported Diastolic 78        [INSTRUCTIONS:  \"[x]\" Indicates a positive item  \"[]\" Indicates a negative item  -- DELETE ALL ITEMS NOT EXAMINED]    Constitutional: [x] Appears well-developed and well-nourished [x] No apparent distress      [] Abnormal -     Mental status: [x] Alert and awake  [x] Oriented to person/place/time [x] Able to follow commands    [] Abnormal -     Eyes:   EOM    [x]  Normal    [] Abnormal -   Sclera  [x]  Normal    [] Abnormal -          Discharge [x]  None visible   [] Abnormal -     HENT: [x] Normocephalic, atraumatic  [] Abnormal -   [x] Mouth/Throat: Mucous membranes are moist    External Ears [x] Normal  [] Abnormal -    Neck: [x] No visualized mass [] Abnormal -     Pulmonary/Chest: [x] Respiratory effort normal   [x] No visualized signs of difficulty breathing or respiratory distress        [] Abnormal -      Musculoskeletal:   [x] Normal gait with no signs of ataxia         [x] Normal range of motion of neck        [] Abnormal -     Neurological:        [x] No Facial Asymmetry (Cranial nerve 7 motor function) (limited exam due to video visit)          [x] No gaze palsy        [] Abnormal -          Skin:        [x] No significant exanthematous lesions or discoloration noted on facial skin         [] Abnormal -            Psychiatric:       [x] Normal Affect [] Abnormal -        [x] No Hallucinations    Other pertinent observable physical exam findings:-        We discussed the expected course, resolution and complications of the diagnosis(es) in detail. Medication risks, benefits, costs, interactions, and alternatives were discussed as indicated. I advised her to contact the office if her condition worsens, changes or fails to improve as anticipated. She expressed understanding with the diagnosis(es) and plan. Lavallette Holstein, was evaluated through a synchronous (real-time) audio-video encounter. The patient (or guardian if applicable) is aware that this is a billable service, which includes applicable co-pays. This Virtual Visit was conducted with patient's (and/or legal guardian's) consent.  The visit was conducted pursuant to the emergency declaration under the [facility] waiver authority and the [employer] General Act. Patient identification was verified, and a caregiver was present when appropriate. The patient was located at: Home: [address] [place] [phone]  The provider was located at: Facility ([facility]): [address] Eldridge MD

## 2022-06-02 ENCOUNTER — HOSPITAL ENCOUNTER (OUTPATIENT)
Dept: CARDIAC CATH/INVASIVE PROCEDURES | Age: 62
Discharge: HOME | End: 2022-06-02
Attending: INTERNAL MEDICINE | Admitting: INTERNAL MEDICINE
Payer: MEDICAID

## 2022-06-02 VITALS
BODY MASS INDEX: 31.52 KG/M2 | WEIGHT: 208 LBS | SYSTOLIC BLOOD PRESSURE: 154 MMHG | HEART RATE: 88 BPM | DIASTOLIC BLOOD PRESSURE: 93 MMHG | OXYGEN SATURATION: 99 % | HEIGHT: 68 IN | TEMPERATURE: 98.1 F | RESPIRATION RATE: 18 BRPM

## 2022-06-02 DIAGNOSIS — R55 SYNCOPE, UNSPECIFIED SYNCOPE TYPE: ICD-10-CM

## 2022-06-02 PROCEDURE — 77030018729 HC ELECTRD DEFIB PAD CARD -B

## 2022-06-02 PROCEDURE — 93660 TILT TABLE EVALUATION: CPT

## 2022-06-02 NOTE — PROCEDURES
[address]  [phone]    Patient ID:  Patient: Doris Samuel  MRN: [id number]  Age: 63 y.o.  : 1960  Gender: female  Study Date: 2022    History:  This is a female referred for elective tilt table testing secondary to unexplained near-syncope associated with the upright position. Procedure:  Head upright TILT table testing (06539)  The patient was brought to the EP laboratory in a post absorptive state after informed consent had been previously obtained. Continuous electrocardiographic and intermittent blood pressure monitoring was performed.  The patient was placed in the supine position on the tilt table and baseline measurements were taken. The table was then raised to the 70 degrees incline position and measurements were repeated for up to 40 minutes. All symptoms and signs were recorded. At the completion of the test, the patient was returned to the supine position and then transported to the recovery area at the end of all testing. Preoperative Diagnosis: As above. Postoperative Diagnosis: As above. Procedure:  As above. Surgeon(s) and Role:  Zachary Blancas MD - Primary   Anesthesia:   None. Estimated Blood Loss:  <5 cc. Specimens: * No specimens in log *   Findings:  As below. Complications:  None. FINDINGS:  1.  At baseline, sinus rhythm was present at a rate of 87 beats per minute with a blood pressure of 141/84 was recorded.  No symptoms. 2.  Upon rising to tilt, no orthostatic changes or symptoms were observed. 3.  Throughout the 40 minutes of upright study, there was no hemodynamic issues. 4  Carotid sinus massage was performed on the left and then the right neck.  There was no significant bradycardia or pauses. No symptoms. IMPRESSION:  1.  Negative tilt table test.  2.  No evidence of carotid sinus hypersensitivity.     RECOMMENDATIONS:  1.  Maintain adequate hydration and salt liberalization.  Avoid alcohol.  Avoid standing in the heat for prolonged periods of time. Get into a safe position if the prodrome of dizziness occurs. 2.  She is scheduled for and EP study in the future. 3.  The above was discussed this with the patient and all questions answered.     Signed By: Raine Pitts MD     June 2, 2022

## 2022-06-02 NOTE — PROGRESS NOTES
Cardiac Cath Lab Recovery Arrival Note:      Landry Hassan arrived to Cardiac Cath Lab, Recovery Area. Staff introduced to patient. Patient identifiers verified with NAME and DATE OF BIRTH. Procedure verified with patient. Consent forms reviewed and signed by patient or authorized representative and verified. Allergies verified. Patient and family oriented to department. Patient and family informed of procedure and plan of care. Questions answered with review. Patient prepped for procedure, per orders from physician, prior to arrival.    Patient on cardiac monitor, non-invasive blood pressure, SPO2 monitor. On RA. Patient is A&Ox 4. Patient reports complaints of ROGERS. Patient in stretcher, in low position, with side rails up, call bell within reach, patient instructed to call if assistance as needed. Patient prep in: [address], [place]. Patient family has pager # none  Family in: daughter- Malaika Route.    Prep by: John Martin
Normal tilt table test.  No evidence of CSH.
Patient ambulated in hallway with steady gait; no complaints of discomfort, dizziness, sob. Escorted patient to lobby.
Primary Nurse Drucilla Harada and Lizz Rubio RN performed a dual skin assessment on this patient No impairment noted  Peterson score is 23; pt refused meplix
TRANSFER - IN REPORT:    Verbal report received from [facility] Recovery Area(name) on Ramesh Del Angel   routine progression of care      Report consisted of patients Situation, Background, Assessment and   Recommendations(SBAR). Information from the following report(s) SBAR was reviewed with the receiving nurse. Opportunity for questions and clarification was provided. Assessment completed upon patients arrival to unit and care assumed.
TRANSFER - OUT REPORT:    Verbal report given to [facility] Recovery Area(name) on Stephelygui Dotyter being transferred to [facility] Recovery Area(unit) for routine progression of care       Report consisted of patient's Situation, Background, Assessment and   Recommendations(SBAR). Information from the following report(s) SBAR and Procedure Summary was reviewed with the receiving nurse. Opportunity for questions and clarification was provided.       Patient transported with:   Registered Nurse
Clear

## 2022-06-26 NOTE — ADDENDUM NOTE
Addended by: Mallory Quiñones on: 10/8/2021 03:11 PM     Modules accepted: Orders, SmartSet per chart

## 2022-06-27 RX ORDER — LOSARTAN POTASSIUM 100 MG/1
TABLET ORAL
Qty: 30 TABLET | Refills: 5 | Status: SHIPPED
Start: 2022-06-27 | End: 2022-11-02

## 2022-06-28 ENCOUNTER — ANESTHESIA EVENT (OUTPATIENT)
Dept: CARDIAC CATH/INVASIVE PROCEDURES | Age: 62
End: 2022-06-28
Payer: MEDICAID

## 2022-06-28 ENCOUNTER — HOSPITAL ENCOUNTER (OUTPATIENT)
Age: 62
Setting detail: OUTPATIENT SURGERY
Discharge: HOME | End: 2022-06-28
Attending: INTERNAL MEDICINE | Admitting: INTERNAL MEDICINE
Payer: MEDICAID

## 2022-06-28 ENCOUNTER — ANESTHESIA (OUTPATIENT)
Dept: CARDIAC CATH/INVASIVE PROCEDURES | Age: 62
End: 2022-06-28
Payer: MEDICAID

## 2022-06-28 VITALS
HEIGHT: 68 IN | SYSTOLIC BLOOD PRESSURE: 147 MMHG | RESPIRATION RATE: 21 BRPM | WEIGHT: 209 LBS | HEART RATE: 96 BPM | TEMPERATURE: 97.9 F | DIASTOLIC BLOOD PRESSURE: 104 MMHG | BODY MASS INDEX: 31.67 KG/M2 | OXYGEN SATURATION: 96 %

## 2022-06-28 DIAGNOSIS — I47.1 SVT (SUPRAVENTRICULAR TACHYCARDIA) (HCC): ICD-10-CM

## 2022-06-28 PROCEDURE — 74011250636 HC RX REV CODE- 250/636: Performed by: NURSE ANESTHETIST, CERTIFIED REGISTERED

## 2022-06-28 PROCEDURE — 77030015398 HC CBL EP EXT STJU -C: Performed by: INTERNAL MEDICINE

## 2022-06-28 PROCEDURE — 74011250636 HC RX REV CODE- 250/636: Performed by: INTERNAL MEDICINE

## 2022-06-28 PROCEDURE — C1894 INTRO/SHEATH, NON-LASER: HCPCS | Performed by: INTERNAL MEDICINE

## 2022-06-28 PROCEDURE — C1730 CATH, EP, 19 OR FEW ELECT: HCPCS | Performed by: INTERNAL MEDICINE

## 2022-06-28 PROCEDURE — 77030010880 HC CBL EP SUPRME STJU -C: Performed by: INTERNAL MEDICINE

## 2022-06-28 PROCEDURE — 77030018733 HC ELECTRD KT ENSITE STJU -G: Performed by: INTERNAL MEDICINE

## 2022-06-28 PROCEDURE — 2709999900 HC NON-CHARGEABLE SUPPLY: Performed by: INTERNAL MEDICINE

## 2022-06-28 PROCEDURE — 74011000250 HC RX REV CODE- 250: Performed by: INTERNAL MEDICINE

## 2022-06-28 PROCEDURE — 74011000250 HC RX REV CODE- 250: Performed by: NURSE ANESTHETIST, CERTIFIED REGISTERED

## 2022-06-28 PROCEDURE — 93653 COMPRE EP EVAL TX SVT: CPT | Performed by: INTERNAL MEDICINE

## 2022-06-28 PROCEDURE — 76060000034 HC ANESTHESIA 1.5 TO 2 HR: Performed by: INTERNAL MEDICINE

## 2022-06-28 PROCEDURE — 77030016894 HC CBL EP DX CATH3 STJU -B: Performed by: INTERNAL MEDICINE

## 2022-06-28 PROCEDURE — C1760 CLOSURE DEV, VASC: HCPCS | Performed by: INTERNAL MEDICINE

## 2022-06-28 PROCEDURE — 77030033352 HC TBNG IRR PMP COOL PNT STJU -B: Performed by: INTERNAL MEDICINE

## 2022-06-28 PROCEDURE — 77030010869 HC CBL EP ABL STJU -B: Performed by: INTERNAL MEDICINE

## 2022-06-28 PROCEDURE — 74011000258 HC RX REV CODE- 258: Performed by: NURSE ANESTHETIST, CERTIFIED REGISTERED

## 2022-06-28 PROCEDURE — 77030018729 HC ELECTRD DEFIB PAD CARD -B: Performed by: INTERNAL MEDICINE

## 2022-06-28 PROCEDURE — C2630 CATH, EP, COOL-TIP: HCPCS | Performed by: INTERNAL MEDICINE

## 2022-06-28 RX ORDER — HEPARIN SODIUM 200 [USP'U]/100ML
INJECTION, SOLUTION INTRAVENOUS
Status: DISCONTINUED | OUTPATIENT
Start: 2022-06-28 | End: 2022-06-28 | Stop reason: HOSPADM

## 2022-06-28 RX ORDER — DEXMEDETOMIDINE HYDROCHLORIDE 100 UG/ML
INJECTION, SOLUTION INTRAVENOUS AS NEEDED
Status: DISCONTINUED | OUTPATIENT
Start: 2022-06-28 | End: 2022-06-28 | Stop reason: HOSPADM

## 2022-06-28 RX ORDER — MIDAZOLAM HYDROCHLORIDE 1 MG/ML
INJECTION, SOLUTION INTRAMUSCULAR; INTRAVENOUS AS NEEDED
Status: DISCONTINUED | OUTPATIENT
Start: 2022-06-28 | End: 2022-06-28 | Stop reason: HOSPADM

## 2022-06-28 RX ORDER — SODIUM CHLORIDE 9 MG/ML
INJECTION, SOLUTION INTRAVENOUS
Status: DISCONTINUED | OUTPATIENT
Start: 2022-06-28 | End: 2022-06-28 | Stop reason: HOSPADM

## 2022-06-28 RX ORDER — HEPARIN SODIUM 200 [USP'U]/100ML
INJECTION, SOLUTION INTRAVENOUS
Status: COMPLETED | OUTPATIENT
Start: 2022-06-28 | End: 2022-06-28

## 2022-06-28 RX ORDER — LIDOCAINE HYDROCHLORIDE 10 MG/ML
INJECTION INFILTRATION; PERINEURAL AS NEEDED
Status: DISCONTINUED | OUTPATIENT
Start: 2022-06-28 | End: 2022-06-28 | Stop reason: HOSPADM

## 2022-06-28 RX ORDER — ATENOLOL 25 MG/1
25 TABLET ORAL 2 TIMES DAILY
Qty: 90 TABLET | Refills: 3 | Status: SHIPPED
Start: 2022-06-28 | End: 2022-11-02

## 2022-06-28 RX ORDER — PROPOFOL 10 MG/ML
INJECTION, EMULSION INTRAVENOUS
Status: DISCONTINUED | OUTPATIENT
Start: 2022-06-28 | End: 2022-06-28 | Stop reason: HOSPADM

## 2022-06-28 RX ADMIN — PROPOFOL 10 MG: 10 INJECTION, EMULSION INTRAVENOUS at 09:27

## 2022-06-28 RX ADMIN — DEXMEDETOMIDINE HYDROCHLORIDE 2 MCG: 100 INJECTION, SOLUTION, CONCENTRATE INTRAVENOUS at 09:30

## 2022-06-28 RX ADMIN — MIDAZOLAM HYDROCHLORIDE 1 MG: 1 INJECTION, SOLUTION INTRAMUSCULAR; INTRAVENOUS at 08:38

## 2022-06-28 RX ADMIN — PROPOFOL 50 MCG/KG/MIN: 10 INJECTION, EMULSION INTRAVENOUS at 08:38

## 2022-06-28 RX ADMIN — DEXMEDETOMIDINE HYDROCHLORIDE 4 MCG: 100 INJECTION, SOLUTION, CONCENTRATE INTRAVENOUS at 08:53

## 2022-06-28 RX ADMIN — PROPOFOL 20 MG: 10 INJECTION, EMULSION INTRAVENOUS at 08:54

## 2022-06-28 RX ADMIN — SODIUM CHLORIDE: 9 INJECTION, SOLUTION INTRAVENOUS at 08:31

## 2022-06-28 RX ADMIN — MIDAZOLAM HYDROCHLORIDE 1 MG: 1 INJECTION, SOLUTION INTRAMUSCULAR; INTRAVENOUS at 08:45

## 2022-06-28 RX ADMIN — DEXMEDETOMIDINE HYDROCHLORIDE 4 MCG: 100 INJECTION, SOLUTION, CONCENTRATE INTRAVENOUS at 08:45

## 2022-06-28 RX ADMIN — DEXMEDETOMIDINE HYDROCHLORIDE 2 MCG: 100 INJECTION, SOLUTION, CONCENTRATE INTRAVENOUS at 09:28

## 2022-06-28 RX ADMIN — DEXMEDETOMIDINE HYDROCHLORIDE 2 MCG: 100 INJECTION, SOLUTION, CONCENTRATE INTRAVENOUS at 08:55

## 2022-06-28 RX ADMIN — DEXMEDETOMIDINE HYDROCHLORIDE 2 MCG: 100 INJECTION, SOLUTION, CONCENTRATE INTRAVENOUS at 09:35

## 2022-06-28 NOTE — PROGRESS NOTES
Patient ambulated in hallway with steady gait; no complaints of discomfort, dizziness, sob. Right groin site clean dry and intact. Discharge instructions reviewed with patient and patients friend; answered questions as needed. Transported patient in wheelchair to car.

## 2022-06-28 NOTE — PROGRESS NOTES
Primary Nurse Katie Valero and Blaire Sutherland, PEY[place] performed a dual skin assessment on this patient No impairment noted  Peterson score is 23; patient refused meplix on sacrum

## 2022-06-28 NOTE — PROGRESS NOTES
Cardiac Cath Lab Recovery Arrival Note:      Josiah Singh arrived to Cardiac Cath Lab, Recovery Area. Staff introduced to patient. Patient identifiers verified with NAME and DATE OF BIRTH. Procedure verified with patient. Consent forms reviewed and signed by patient or authorized representative and verified. Allergies verified. Patient and family oriented to department. Patient and family informed of procedure and plan of care. Questions answered with review. Patient prepped for procedure, per orders from physician, prior to arrival.    Patient on cardiac monitor, non-invasive blood pressure, SPO2 monitor. On RA. Patient is A&Ox 4. Patient reports no complaints. Patient in stretcher, in low position, with side rails up, call bell within reach, patient instructed to call if assistance as needed. Patient prep in: [address], [place]. Patient family has pager # none  Family in: [place] friend [place].    Prep by: ezekiel bella and paula bella

## 2022-06-28 NOTE — Clinical Note
TRANSFER - IN REPORT:     Verbal report received from: recovery. Report consisted of patient's Situation, Background, Assessment and   Recommendations(SBAR). Opportunity for questions and clarification was provided. Assessment completed upon patient's arrival to unit and care assumed. Patient transported with a Registered Nurse and [address] [place] [address] / [facility].

## 2022-06-28 NOTE — H&P
EP/ ARRHYTHMIA    Patient ID:  Patient: Ramesh Del Angel  MRN: [id number]  Age: 63 y.o.  : 1960    Date of  Admission: 2022  6:11 AM   PCP:  Karlo Germain MD    Assessment: 1. Paroxysmal symptomatic SVT consistent with atrial tachycardia. Plan:     1. EP study and SVT ablation, given the potential benefits, risks, and alternatives she agrees to proceed. Ramesh Del Angel is a 63 y.o. female with a history of the above, here for her procedure.       Past Medical History:   Diagnosis Date    Adverse effect of anesthesia     \"so sleepy and tired for the rest of the day\"    Anxiety     Asthma     Chronic pain     back - L5 is \"almost gone\"/left foot neuropathy -pinched nerve L4-5 - spine shifted/degenerative spinal disease/stiff and sore neck and shoulder - in PT for back and neck    Degenerative spinal arthritis     Diabetes ([facility] [address]     PRE DIABETIC    Hypertension     IBS (irritable bowel syndrome)     Ill-defined condition     pre-diabetes    Nausea & vomiting     ? d/t fentanyl    PUD (peptic ulcer disease)     Spondylolisthesis         Past Surgical History:   Procedure Laterality Date    COLONOSCOPY N/A 2019    COLONOSCOPY performed by Marcie Murrieta MD at [address] [facility] HX CHOLECYSTECTOMY      HX [id number] [facility]    HX GI  2019    COLONOSCOPY    HX GI      ENDOSCOPY       Social History     Tobacco Use    Smoking status: Former Smoker     Packs/day: 0.50     Years: 12.00     Pack years: 6.00     Quit date: 2012     Years since quittin.5    Smokeless tobacco: Never Used    Tobacco comment: quit    Substance Use Topics    Alcohol use: Yes     Comment: 3-4 times a yr        Family History   Problem Relation Age of Onset    Delayed Awakening Mother         with anesthesia    Arthritis Mother     Other Mother         IBS/degenerative spinal disease    Anesth Problems Mother         N/V AND SLEEPY ALL DAY    COPD Father     Glaucoma Father     HIV/AIDS Sister     Heart Disease Brother         heart valve problem    Glaucoma Brother     Other Other     Heart Disease Sister         HEART VALVE DISESE         Allergies   Allergen Reactions    Cat Dander Runny Nose    Demerol [Meperidine] Nausea and Vomiting     Dizziness. Went to ER.  Egg Yolk Swelling    Fentanyl Nausea and Vomiting     Dizziness.  Grass Pollen Runny Nose    House Dust Mite Not Reported This Time    Lactaid [Lactase] Other (comments)     Bloating/gas    Lactose Other (comments)     Bloating/gas    Mold Shortness of Breath    Prednisone Swelling    Prozac [Fluoxetine] Rash    Soy Nausea and Vomiting    Zoloft [Sertraline] Rash        Meds reviewed. Review of Symptoms:  See office note. Objective:      Physical Exam:  Temp (24hrs), Av.8 °F (36.6 °C), Min:97.8 °F (36.6 °C), Max:97.8 °F (36.6 °C)    Patient Vitals for the past 8 hrs:   Pulse   22 0715 96    Patient Vitals for the past 8 hrs:   Resp   22 0715 18    Patient Vitals for the past 8 hrs:   BP   22 0715 (!) 169/103          Intake/Output Summary (Last 24 hours) at 2022 1008  Last data filed at 2022 0945  Gross per 24 hour   Intake 300 ml   Output --   Net 300 ml       Nondiaphoretic, not in acute distress. Neuro grossly nonfocal.  No tremor. Awake and appropriate. CARDIOGRAPHICS and STUDIES, I reviewed:    Telemetry:  Sinus.     Adithya Rivera MD  2022

## 2022-06-28 NOTE — PROGRESS NOTES
DC instructions reviewed with pt and friend. Both verbalize understanding. Opportunity for questions given.

## 2022-06-28 NOTE — ANESTHESIA POSTPROCEDURE EVALUATION
Procedure(s):  ABLATION SVT. MAC    Anesthesia Post Evaluation        Patient location during evaluation: PACU  Note status: Adequate. Level of consciousness: responsive to verbal stimuli and sleepy but conscious  Pain management: satisfactory to patient  Airway patency: patent  Anesthetic complications: no  Cardiovascular status: acceptable  Respiratory status: acceptable  Hydration status: acceptable  Comments: +Post-Anesthesia Evaluation and Assessment    Patient: Ramesh Del Angel MRN: [id number]  SSN: [id number]   YOB: 1960  Age: 63 y.o. Sex: female          Cardiovascular Function/Vital Signs    BP (!) 147/104 (BP 1 Location: Right arm, BP Patient Position: Walking)   Pulse 96   Temp 36.6 °C (97.9 °F)   Resp 21   Ht 5' 8\" (1.727 m)   Wt 94.8 kg (209 lb)   SpO2 96%   Breastfeeding No   BMI 31.78 kg/m²     Patient is status post Procedure(s):  ABLATION SVT. Nausea/Vomiting: Controlled. Postoperative hydration reviewed and adequate. Pain:  Pain Scale 1: Numeric (0 - 10) (06/28/22 1230)  Pain Intensity 1: 0 (06/28/22 1230)   Managed. Neurological Status: At baseline. Mental Status and Level of Consciousness: Arousable. Pulmonary Status:   O2 Device: None (Room air) (06/28/22 1230)   Adequate oxygenation and airway patent. Complications related to anesthesia: None    Post-anesthesia assessment completed. No concerns. I have evaluated the patient and the patient is stable and ready to be discharged from PACU . Signed By: Miguel A Nettles MD    6/28/2022        INITIAL Post-op Vital signs:   Vitals Value Taken Time   /102 06/28/22 1239   Temp 36.6 °C (97.9 °F) 06/28/22 1014   Pulse 97 06/28/22 1240   Resp 21 06/28/22 1240   SpO2 96 % 06/28/22 1231   Vitals shown include unvalidated device data.

## 2022-06-28 NOTE — ANESTHESIA PREPROCEDURE EVALUATION
Relevant Problems   No relevant active problems       Anesthetic History   No history of anesthetic complications  PONV          Review of Systems / Medical History  Patient summary reviewed, nursing notes reviewed and pertinent labs reviewed    Pulmonary  Within defined limits        Shortness of breath  Asthma        Neuro/Psych   Within defined limits           Cardiovascular  Within defined limits  Hypertension        Dysrhythmias : SVT      Exercise tolerance: >4 METS     GI/Hepatic/Renal  Within defined limits         PUD     Endo/Other  Within defined limits  Diabetes    Arthritis     Other Findings              Physical Exam    Airway  Mallampati: II  TM Distance: > 6 cm  Neck ROM: normal range of motion   Mouth opening: Normal     Cardiovascular  Regular rate and rhythm,  S1 and S2 normal,  no murmur, click, rub, or gallop             Dental    Dentition: Upper partial plate     Pulmonary  Breath sounds clear to auscultation               Abdominal  GI exam deferred       Other Findings            Anesthetic Plan    ASA: 3  Anesthesia type: MAC          Induction: Intravenous  Anesthetic plan and risks discussed with: Patient

## 2022-06-28 NOTE — PROGRESS NOTES
Brief Procedure Note    Patient: Garrett Andrews MRN: [id number]  SSN: [id number]    YOB: 1960  Age: 63 y.o. Sex: female      Date of Procedure: 6/28/2022     Pre-procedure Diagnosis: Atrial tachycardia    Post-procedure Diagnosis: Same    Procedure: SVT ablation    Performed By: Aneudy Kaur MD     Anesthesia: MAC Sedation    Estimated Blood Loss: Less than 10 mL      Specimens:  None    Findings:  High right atrial tachycardia with earliest activation (target zone) near the phrenic nerve as judged by stimulation with pacing. Low power ablation performed, slowed the tachycardia from 400 to 450 msec, but still inducible afterward. Decision to not pursue further ablation weighing risk:benefit. Full report to follow. Complications: None    Implants:  Vascade    Recommendations: Continue medical therapy.     Signed By: Aneudy Kaur MD     June 28, 2022

## 2022-06-28 NOTE — DISCHARGE INSTRUCTIONS
POST-ABLATION DISCHARGE INSTRUCTIONS:    You had an attempted ablation for a high right atrial tachycardia (a type of SVT) today with Dr. Virginia Nathan. Because your right phrenic nerve runs aside the right atrium at the target area, full power ablation could not be performed. Therefore, you still may have some residual SVT but your phrenic nerve activity remains intact. Increase your atenolol to 25 mg TWICE daily. Call the office to make an appointment in one month [phone]. Do not drive, operate any machinery, or sign any legal documents for 24 hours after your procedure. You must have someone to drive you home. You may take a shower 24 hours after your cardiac procedure. Be sure to get the dressing wet and then remove it; gently wash the area with warm soapy water. Pat dry and leave open to air. To help prevent infections, be sure to keep the cath site clean and dry. No lotions, creams, powders, ointments, etc. in the cath site for approximately 1 week.  Do not take a tub bath, get in a hot tub or swimming pool for approximately 5 days or until the cath site is completely healed.  No strenuous activity or heavy lifting over 20 lbs. for 7 days.  After your procedure, some bruising or discomfort is common during the healing process. Tylenol, 1-2 tablets every 6 hours as needed, is recommended if you experience any discomfort. If you experience any signs or symptoms of infection such as fever, chills, or poorly healing incision, persistent tenderness or swelling in the groin, redness and/or warmth to the touch, numbness, significant tingling or pain at the groin site or affected extremity, rash, drainage from the site, or if the leg feels tight or swollen, call your physician right away.  If bleeding at the site occurs, take a clean gauze pad and apply direct pressure to the groin just above the puncture site, and call your physician right away.      If your procedure involved ablation therapy, you may feel some mild or vague chest discomfort due to delivery of heat therapy to the heart muscle. This should resolve in 1-2 days. If it gets worse or is associated with shortness of breath, dizziness, loss of consciousness, call your physician right away or call 911 if emergency medical care is needed.     Signed By: Kim Wu MD     June 28, 2022

## 2022-06-28 NOTE — Clinical Note
TRANSFER - OUT REPORT:     Verbal report given to: recovery. Report consisted of patient's Situation, Background, Assessment and   Recommendations(SBAR). Opportunity for questions and clarification was provided. Patient transported with a Registered Nurse and [address] [place] [address] / [facility]. Patient transported to: recovery.

## 2022-06-28 NOTE — PROGRESS NOTES
TRANSFER - IN REPORT:    Verbal report received from SUGEY Watkins RN and GLYNN Werner on Migel Chery  being received from EP for routine progression of care. Report consisted of patients Situation, Background, Assessment and Recommendations(SBAR). Information from the following report(s) Procedure Summary and MAR was reviewed with the receiving clinician. Opportunity for questions and clarification was provided. Assessment completed upon patients arrival to [address] [place] [address] and care assumed. Cardiac Cath Lab Recovery Arrival Note:    Migel Chery arrived to [facility] recovery area. Patient procedure= SVT ablation. Patient on cardiac monitor, non-invasive blood pressure, SPO2 monitor. On O2 @ 2 lpm via NC. Patient status doing well without problems. Patient is A&Ox 3. Patient reports no c/o. PROCEDURE SITE CHECK:    Procedure site:without any bleeding and no hematoma, no pain/discomfort reported at procedure site. No change in patient status. Continue to monitor patient and status.

## 2022-07-11 ENCOUNTER — HOSPITAL ENCOUNTER (EMERGENCY)
Age: 62
Discharge: HOME | End: 2022-07-12
Attending: EMERGENCY MEDICINE
Payer: MEDICAID

## 2022-07-11 ENCOUNTER — APPOINTMENT (OUTPATIENT)
Dept: GENERAL RADIOLOGY | Age: 62
End: 2022-07-11
Attending: EMERGENCY MEDICINE
Payer: MEDICAID

## 2022-07-11 DIAGNOSIS — R07.9 ACUTE CHEST PAIN: Primary | ICD-10-CM

## 2022-07-11 LAB
ALBUMIN SERPL-MCNC: 3.9 G/DL (ref 3.5–5)
ALBUMIN/GLOB SERPL: 0.8 {RATIO} (ref 1.1–2.2)
ALP SERPL-CCNC: 117 U/L (ref 45–117)
ALT SERPL-CCNC: 44 U/L (ref 12–78)
ANION GAP SERPL CALC-SCNC: 3 MMOL/L (ref 5–15)
AST SERPL-CCNC: 29 U/L (ref 15–37)
BASOPHILS # BLD: 0 K/UL (ref 0–0.1)
BASOPHILS NFR BLD: 0 % (ref 0–1)
BILIRUB SERPL-MCNC: 0.5 MG/DL (ref 0.2–1)
BUN SERPL-MCNC: 10 MG/DL (ref 6–20)
BUN/CREAT SERPL: 10 (ref 12–20)
CALCIUM SERPL-MCNC: 9.1 MG/DL (ref 8.5–10.1)
CHLORIDE SERPL-SCNC: 107 MMOL/L (ref 97–108)
CO2 SERPL-SCNC: 28 MMOL/L (ref 21–32)
CREAT SERPL-MCNC: 1.01 MG/DL (ref 0.55–1.02)
DIFFERENTIAL METHOD BLD: ABNORMAL
EOSINOPHIL # BLD: 0.2 K/UL (ref 0–0.4)
EOSINOPHIL NFR BLD: 2 % (ref 0–7)
ERYTHROCYTE [DISTWIDTH] IN BLOOD BY AUTOMATED COUNT: 14 % (ref 11.5–14.5)
GLOBULIN SER CALC-MCNC: 4.7 G/DL (ref 2–4)
GLUCOSE SERPL-MCNC: 109 MG/DL (ref 65–100)
HCT VFR BLD AUTO: 37.8 % (ref 35–47)
HGB BLD-MCNC: 12 G/DL (ref 11.5–16)
IMM GRANULOCYTES # BLD AUTO: 0.1 K/UL (ref 0–0.04)
IMM GRANULOCYTES NFR BLD AUTO: 1 % (ref 0–0.5)
LYMPHOCYTES # BLD: 3.5 K/UL (ref 0.8–3.5)
LYMPHOCYTES NFR BLD: 32 % (ref 12–49)
MCH RBC QN AUTO: 30.2 PG (ref 26–34)
MCHC RBC AUTO-ENTMCNC: 31.7 G/DL (ref 30–36.5)
MCV RBC AUTO: 95 FL (ref 80–99)
MONOCYTES # BLD: 0.7 K/UL (ref 0–1)
MONOCYTES NFR BLD: 6 % (ref 5–13)
NEUTS SEG # BLD: 6.6 K/UL (ref 1.8–8)
NEUTS SEG NFR BLD: 59 % (ref 32–75)
NRBC # BLD: 0 K/UL (ref 0–0.01)
NRBC BLD-RTO: 0 PER 100 WBC
PLATELET # BLD AUTO: 334 K/UL (ref 150–400)
PMV BLD AUTO: 8.5 FL (ref 8.9–12.9)
POTASSIUM SERPL-SCNC: 3.9 MMOL/L (ref 3.5–5.1)
PROT SERPL-MCNC: 8.6 G/DL (ref 6.4–8.2)
RBC # BLD AUTO: 3.98 M/UL (ref 3.8–5.2)
SODIUM SERPL-SCNC: 138 MMOL/L (ref 136–145)
TROPONIN-HIGH SENSITIVITY: <4 NG/L (ref 0–51)
WBC # BLD AUTO: 11 K/UL (ref 3.6–11)

## 2022-07-11 PROCEDURE — 96375 TX/PRO/DX INJ NEW DRUG ADDON: CPT

## 2022-07-11 PROCEDURE — 85025 COMPLETE CBC W/AUTO DIFF WBC: CPT

## 2022-07-11 PROCEDURE — 93005 ELECTROCARDIOGRAM TRACING: CPT

## 2022-07-11 PROCEDURE — 71046 X-RAY EXAM CHEST 2 VIEWS: CPT

## 2022-07-11 PROCEDURE — 36415 COLL VENOUS BLD VENIPUNCTURE: CPT

## 2022-07-11 PROCEDURE — 80053 COMPREHEN METABOLIC PANEL: CPT

## 2022-07-11 PROCEDURE — 96374 THER/PROPH/DIAG INJ IV PUSH: CPT

## 2022-07-11 PROCEDURE — 84484 ASSAY OF TROPONIN QUANT: CPT

## 2022-07-11 PROCEDURE — 99285 EMERGENCY DEPT VISIT HI MDM: CPT

## 2022-07-11 RX ORDER — KETOROLAC TROMETHAMINE 30 MG/ML
15 INJECTION, SOLUTION INTRAMUSCULAR; INTRAVENOUS
Status: COMPLETED | OUTPATIENT
Start: 2022-07-12 | End: 2022-07-12

## 2022-07-11 RX ORDER — FAMOTIDINE 10 MG/ML
20 INJECTION INTRAVENOUS
Status: COMPLETED | OUTPATIENT
Start: 2022-07-11 | End: 2022-07-12

## 2022-07-11 NOTE — Clinical Note
[address]  [facility] EMERGENCY DEPT  [address] [facility] [id number]  [phone]    Work/School Note    Date: 7/11/2022    To Whom It May concern:      Reji Mejias was seen and treated today in the emergency room by the following provider(s):  Attending Provider: Leidy Del Rio MD.      Reji Mejias is excused from work/school on 07/12/22. She is clear to return to work/school on 07/13/22.         Sincerely,          Tuan Davis MD

## 2022-07-12 VITALS
OXYGEN SATURATION: 97 % | SYSTOLIC BLOOD PRESSURE: 116 MMHG | WEIGHT: 212.08 LBS | BODY MASS INDEX: 32.14 KG/M2 | DIASTOLIC BLOOD PRESSURE: 67 MMHG | RESPIRATION RATE: 14 BRPM | TEMPERATURE: 97.5 F | HEART RATE: 78 BPM | HEIGHT: 68 IN

## 2022-07-12 LAB
ATRIAL RATE: 91 BPM
BNP SERPL-MCNC: 10 PG/ML
CALCULATED P AXIS, ECG09: 50 DEGREES
CALCULATED R AXIS, ECG10: 33 DEGREES
CALCULATED T AXIS, ECG11: 52 DEGREES
D DIMER PPP FEU-MCNC: 0.43 MG/L FEU (ref 0–0.65)
DIAGNOSIS, 93000: NORMAL
P-R INTERVAL, ECG05: 172 MS
Q-T INTERVAL, ECG07: 356 MS
QRS DURATION, ECG06: 68 MS
QTC CALCULATION (BEZET), ECG08: 437 MS
TROPONIN-HIGH SENSITIVITY: 4 NG/L (ref 0–51)
VENTRICULAR RATE, ECG03: 91 BPM

## 2022-07-12 PROCEDURE — 74011250636 HC RX REV CODE- 250/636: Performed by: EMERGENCY MEDICINE

## 2022-07-12 PROCEDURE — 36415 COLL VENOUS BLD VENIPUNCTURE: CPT

## 2022-07-12 PROCEDURE — 83880 ASSAY OF NATRIURETIC PEPTIDE: CPT

## 2022-07-12 PROCEDURE — 85379 FIBRIN DEGRADATION QUANT: CPT

## 2022-07-12 PROCEDURE — 84484 ASSAY OF TROPONIN QUANT: CPT

## 2022-07-12 RX ORDER — PANTOPRAZOLE SODIUM 40 MG/1
40 TABLET, DELAYED RELEASE ORAL 2 TIMES DAILY
Qty: 30 TABLET | Refills: 0 | Status: ON HOLD
Start: 2022-07-12 | End: 2022-10-31

## 2022-07-12 RX ORDER — NAPROXEN 500 MG/1
500 TABLET ORAL 2 TIMES DAILY WITH MEALS
Qty: 20 TABLET | Refills: 0 | Status: SHIPPED | OUTPATIENT
Start: 2022-07-12 | End: 2022-07-22

## 2022-07-12 RX ADMIN — FAMOTIDINE 20 MG: 10 INJECTION INTRAVENOUS at 00:35

## 2022-07-12 RX ADMIN — KETOROLAC TROMETHAMINE 15 MG: 30 INJECTION, SOLUTION INTRAMUSCULAR at 00:35

## 2022-07-12 NOTE — ED PROVIDER NOTES
EMERGENCY DEPARTMENT HISTORY AND PHYSICAL EXAM      Date: 7/11/2022  Patient Name: Barb Luis    History of Presenting Illness     Chief Complaint   Patient presents with    Chest Pain     Patient ambulatory to triage for c/o chest pain. Patient had a cath by Dr. Aditi Zaragoza about 2 weeks ago. History Provided By: Patient    HPI: Barb Luis, 63 y.o. female with PMHx significant for anxiety, prediabetes, hypertension, irritable bowel syndrome, SVT, status post ablation by Dr. Orville Monge on June 28 presents to the ED with chest pain across her chest that has been present since prior to the ablation, but seems to be worse in the past 2 weeks. Patient states that her chest pain has been significantly worse in the past 4 to 5 days. Reports shortness of breath with minimal exertion and has palpitations when she walks through the house. She reports chills but denies any fever. She reports nausea today but denies any vomiting. She reports cough that is productive of a small amount of clear sputum. She also reports left-sided neck pain and upper back pain. She reports her pain is intermittent and comes in waves. It occurs about 10-15 times per day and lasts a few minutes at a time. She denies any calf pain or redness. PCP: Steve Whiting MD    No current facility-administered medications on file prior to encounter. Current Outpatient Medications on File Prior to Encounter   Medication Sig Dispense Refill    atenoloL (TENORMIN) 25 mg tablet Take 1 Tablet by mouth two (2) times a day. 90 Tablet 3    losartan (COZAAR) 100 mg tablet TAKE ONE TABLET BY MOUTH DAILY 30 Tablet 5    colestipoL (COLESTID) 1 gram tablet TAKE 1 TABLET BY MOUTH DAILY. TAKE AT LEAST 1 HOUR AFTER OR 4 HOURS BEFORE OTHER MEDICATIONS.  Breo Ellipta 200-25 mcg/dose inhaler       fluticasone propionate (Flonase Allergy Relief) 50 mcg/actuation nasal spray 2 Sprays by Both Nostrils route daily.       lidocaine (Lidoderm) 5 % 1 Patch by TransDERmal route daily as needed. Apply patch to the affected area for 12 hours a day and remove for 12 hours a day.  gabapentin (NEURONTIN) 100 mg capsule Take 1 Capsule by mouth nightly. Max Daily Amount: 100 mg. 30 Capsule 0    hyoscyamine SR (LEVBID) 0.375 mg SR tablet Take 0.375 mg by mouth every twelve (12) hours as needed for Cramping.  topiramate (TOPAMAX) 25 mg tablet Take 25 mg by mouth nightly.  SUMAtriptan (IMITREX) 50 mg tablet Take HALF to one tablet at onset of migraine. Limit use to 2 days per week. 9 Tablet 1    cetirizine (ZYRTEC) 10 mg tablet Take 10 mg by mouth daily.  DULoxetine (CYMBALTA) 20 mg capsule Take 20 mg by mouth two (2) times a day. Indications: anxiousness associated with depression      azelastine (ASTELIN) 137 mcg (0.1 %) nasal spray 1 [place] by Both Nostrils route two (2) times a day.  pantoprazole (PROTONIX) 40 mg tablet Take 40 mg by mouth two (2) times a day.  albuterol (ProAir HFA) 90 mcg/actuation inhaler Take 2 Puffs by inhalation every six (6) hours as needed for Wheezing or Shortness of Breath.          Past History     Past Medical History:  Past Medical History:   Diagnosis Date    Adverse effect of anesthesia     \"so sleepy and tired for the rest of the day\"    Anxiety     Asthma     Chronic pain     back - L5 is \"almost gone\"/left foot neuropathy -pinched nerve L4-5 - spine shifted/degenerative spinal disease/stiff and sore neck and shoulder - in PT for back and neck    Degenerative spinal arthritis     Diabetes ([facility] [address]     PRE DIABETIC    Hypertension     IBS (irritable bowel syndrome)     Ill-defined condition     pre-diabetes    Nausea & vomiting     ? d/t fentanyl    PUD (peptic ulcer disease)     Spondylolisthesis        Past Surgical History:  Past Surgical History:   Procedure Laterality Date    COLONOSCOPY N/A 4/25/2019    COLONOSCOPY performed by Hernán Thornton MD at [place] [id number] SECTION  1998    HX CHOLECYSTECTOMY  2006    HX DILATION AND CURETTAGE  1984    HX GI  2019    COLONOSCOPY    HX GI      ENDOSCOPY       Family History:  Family History   Problem Relation Age of Onset    Delayed Awakening Mother         with anesthesia    Arthritis Mother     Other Mother         IBS/degenerative spinal disease    Anesth Problems Mother         N/V AND SLEEPY ALL DAY    COPD Father     Glaucoma Father     HIV/AIDS Sister     Heart Disease Brother         heart valve problem    Glaucoma Brother     Other Other     Heart Disease Sister         HEART VALVE DISESE        Social History:  Social History     Tobacco Use    Smoking status: Former Smoker     Packs/day: 0.50     Years: 12.00     Pack years: 6.00     Quit date: 2012     Years since quittin.6    Smokeless tobacco: Never Used    Tobacco comment: quit    Substance Use Topics    Alcohol use: Yes     Comment: 3-4 times a yr    Drug use: Yes     Types: Marijuana     Comment:  LAST USED IN 2019       Allergies: Allergies   Allergen Reactions    Cat Dander Runny Nose    Demerol [Meperidine] Nausea and Vomiting     Dizziness. Went to ER.  Egg Yolk Swelling    Fentanyl Nausea and Vomiting     Dizziness.  Grass Pollen Runny Nose    House Dust Mite Not Reported This Time    Lactaid [Lactase] Other (comments)     Bloating/gas    Lactose Other (comments)     Bloating/gas    Mold Shortness of Breath    Prednisone Swelling    Prozac [Fluoxetine] Rash    Soy Nausea and Vomiting    Zoloft [Sertraline] Rash         Review of Systems   Review of Systems   Constitutional: Negative for chills and fever. HENT: Negative for congestion and sinus pressure. Respiratory: Positive for chest tightness and shortness of breath. Cardiovascular: Positive for chest pain and palpitations. Gastrointestinal: Positive for nausea. Negative for abdominal pain, diarrhea and vomiting.    Genitourinary: Negative for dysuria and flank pain. Musculoskeletal: Negative for back pain, myalgias and neck pain. Skin: Negative for rash and wound. Neurological: Negative for dizziness, syncope, light-headedness and headaches. Psychiatric/Behavioral: Negative for confusion. The patient is nervous/anxious. All other systems reviewed and are negative.         Physical Exam    General appearance - well nourished, well appearing, and in no distress  Eyes - pupils equal and reactive, extraocular eye movements intact  ENT - mucous membranes moist, pharynx normal without lesions  Neck - supple, no significant adenopathy; non-tender to palpation  Chest - clear to auscultation, no wheezes, rales or rhonchi; tender to palpation anterior chest wall   heart - normal rate and regular rhythm, S1 and S2 normal, no murmurs noted  Abdomen - soft, nontender, nondistended, no masses or organomegaly  Musculoskeletal - no joint tenderness, deformity or swelling; normal ROM  Extremities - peripheral pulses normal, no pedal edema  Skin - normal coloration and turgor, no rashes  Neurological - alert, oriented x3, normal speech, no focal findings or movement disorder noted    Diagnostic Study Results     Labs -     Recent Results (from the past 12 hour(s))   EKG, 12 LEAD, INITIAL    Collection Time: 07/11/22  7:41 PM   Result Value Ref Range    Ventricular Rate 91 BPM    Atrial Rate 91 BPM    P-R Interval 172 ms    QRS Duration 68 ms    Q-T Interval 356 ms    QTC Calculation (Bezet) 437 ms    Calculated P Axis 50 degrees    Calculated R Axis 33 degrees    Calculated T Axis 52 degrees    Diagnosis       Normal sinus rhythm  Cannot rule out Anterior infarct , age undetermined  When compared with ECG of 26-MAR-2022 19:34,  No significant change was found     CBC WITH AUTOMATED DIFF    Collection Time: 07/11/22  8:37 PM   Result Value Ref Range    WBC 11.0 3.6 - 11.0 K/uL    RBC 3.98 3.80 - 5.20 M/uL    HGB 12.0 11.5 - 16.0 g/dL    HCT 37.8 35.0 - 47.0 %    MCV 95.0 80.0 - 99.0 FL    MCH 30.2 26.0 - 34.0 PG    MCHC 31.7 30.0 - 36.5 g/dL    RDW 14.0 11.5 - 14.5 %    PLATELET 572 732 - 732 K/uL    MPV 8.5 (L) 8.9 - 12.9 FL    NRBC 0.0 0  WBC    ABSOLUTE NRBC 0.00 0.00 - 0.01 K/uL    NEUTROPHILS 59 32 - 75 %    LYMPHOCYTES 32 12 - 49 %    MONOCYTES 6 5 - 13 %    EOSINOPHILS 2 0 - 7 %    BASOPHILS 0 0 - 1 %    IMMATURE GRANULOCYTES 1 (H) 0.0 - 0.5 %    ABS. NEUTROPHILS 6.6 1.8 - 8.0 K/UL    ABS. LYMPHOCYTES 3.5 0.8 - 3.5 K/UL    ABS. MONOCYTES 0.7 0.0 - 1.0 K/UL    ABS. EOSINOPHILS 0.2 0.0 - 0.4 K/UL    ABS. BASOPHILS 0.0 0.0 - 0.1 K/UL    ABS. IMM. GRANS. 0.1 (H) 0.00 - 0.04 K/UL    DF AUTOMATED     METABOLIC PANEL, COMPREHENSIVE    Collection Time: 07/11/22  8:37 PM   Result Value Ref Range    Sodium 138 136 - 145 mmol/L    Potassium 3.9 3.5 - 5.1 mmol/L    Chloride 107 97 - 108 mmol/L    CO2 28 21 - 32 mmol/L    Anion gap 3 (L) 5 - 15 mmol/L    Glucose 109 (H) 65 - 100 mg/dL    BUN 10 6 - 20 MG/DL    Creatinine 1.01 0.55 - 1.02 MG/DL    BUN/Creatinine ratio 10 (L) 12 - 20      GFR est AA >60 >60 ml/min/1.73m2    GFR est non-AA 56 (L) >60 ml/min/1.73m2    Calcium 9.1 8.5 - 10.1 MG/DL    Bilirubin, total 0.5 0.2 - 1.0 MG/DL    ALT (SGPT) 44 12 - 78 U/L    AST (SGOT) 29 15 - 37 U/L    Alk. phosphatase 117 45 - 117 U/L    Protein, total 8.6 (H) 6.4 - 8.2 g/dL    Albumin 3.9 3.5 - 5.0 g/dL    Globulin 4.7 (H) 2.0 - 4.0 g/dL    A-G Ratio 0.8 (L) 1.1 - 2.2     TROPONIN-HIGH SENSITIVITY    Collection Time: 07/11/22  8:37 PM   Result Value Ref Range    Troponin-High Sensitivity <4 0 - 51 ng/L       Radiologic Studies -   XR CHEST PA LAT   Final Result   No acute process. CT Results  (Last 48 hours)    None        CXR Results  (Last 48 hours)               07/11/22 1937  XR CHEST PA LAT Final result    Impression:  No acute process.             Narrative:  INDICATION: Chest pain       COMPARISON: March 27, 2022       FINDINGS:       Frontal and lateral views of the chest demonstrate a normal cardiomediastinal   silhouette. The lungs are adequately expanded. There is no edema, effusion,   consolidation, or pneumothorax. The osseous structures are unremarkable. Medical Decision Making   I am the first provider for this patient. I reviewed the vital signs, available nursing notes, past medical history, past surgical history, family history and social history. Vital Signs-Reviewed the patient's vital signs. Patient Vitals for the past 12 hrs:   Temp Pulse Resp BP SpO2   07/11/22 1925 97.5 °F (36.4 °C) 96 20 121/85 96 %       EKG: Normal sinus rhythm, 91 bpm, normal axis, normal SD, QRS, QTc intervals, no ischemic changes. Records Reviewed: Nursing Notes and Old Medical Records    Provider Notes (Medical Decision Making):   Differential diagnoses: Chest wall strain, acute coronary syndrome, GERD, pneumonia, pneumothorax  We will check CBC, CMP, serial troponins, D-dimer, chest x-ray      ED Course:   Initial assessment performed. The patients presenting problems have been discussed, and they are in agreement with the care plan formulated and outlined with them. I have encouraged them to ask questions as they arise throughout their visit. Progress Notes:   Labs and imaging reviewed. CBC is unremarkable. CMP shows mildly elevated glucose at 109. Serial high-sensitivity troponins are normal.  proBNP is normal.  D-dimer is normal.  Chest x-ray does not show any acute findings. Reviewed results with patient. She was instructed to follow-up with cardiology and PCP. Return to ED for worsening symptoms. Disposition:  Discharge home    PLAN:  1. Discharge Medication List as of 7/12/2022  3:05 AM      START taking these medications    Details   naproxen (Naprosyn) 500 mg tablet Take 1 Tablet by mouth two (2) times daily (with meals) for 10 days. , Normal, Disp-20 Tablet, R-0         CONTINUE these medications which have CHANGED    Details   pantoprazole (PROTONIX) 40 mg tablet Take 1 Tablet by mouth two (2) times a day., Normal, Disp-30 Tablet, R-0         CONTINUE these medications which have NOT CHANGED    Details   atenoloL (TENORMIN) 25 mg tablet Take 1 Tablet by mouth two (2) times a day., Normal, Disp-90 Tablet, R-3      losartan (COZAAR) 100 mg tablet TAKE ONE TABLET BY MOUTH DAILY, Normal, Disp-30 Tablet, R-5      colestipoL (COLESTID) 1 gram tablet TAKE 1 TABLET BY MOUTH DAILY. TAKE AT LEAST 1 HOUR AFTER OR 4 HOURS BEFORE OTHER MEDICATIONS., Historical Med      Breo Ellipta 200-25 mcg/dose inhaler Historical Med, [facility]      fluticasone propionate (Flonase Allergy Relief) 50 mcg/actuation nasal spray 2 Sprays by Both Nostrils route daily. , Historical Med      lidocaine (Lidoderm) 5 % 1 Patch by TransDERmal route daily as needed. Apply patch to the affected area for 12 hours a day and remove for 12 hours a day., Historical Med      gabapentin (NEURONTIN) 100 mg capsule Take 1 Capsule by mouth nightly. Max Daily Amount: 100 mg., Normal, Disp-30 Capsule, R-0      hyoscyamine SR (LEVBID) 0.375 mg SR tablet Take 0.375 mg by mouth every twelve (12) hours as needed for Cramping., Historical Med      topiramate (TOPAMAX) 25 mg tablet Take 25 mg by mouth nightly., Historical Med      cetirizine (ZYRTEC) 10 mg tablet Take 10 mg by mouth daily. , Historical Med      DULoxetine (CYMBALTA) 20 mg capsule Take 20 mg by mouth two (2) times a day. Indications: anxiousness associated with depression, Historical Med      azelastine (ASTELIN) 137 mcg (0.1 %) nasal spray 1 [place] by Both Nostrils route two (2) times a day., Historical Med      SUMAtriptan (IMITREX) 50 mg tablet Take HALF to one tablet at onset of migraine. Limit use to 2 days per week., Normal, Disp-9 Tablet, R-1      albuterol (ProAir HFA) 90 mcg/actuation inhaler Take 2 Puffs by inhalation every six (6) hours as needed for Wheezing or Shortness of Breath., Historical Med           2.    Follow-up Information Follow up With Specialties Details Why Contact Info    Walter Tesfaye MD Family Medicine, Sports Medicine Physician Schedule an appointment as soon as possible for a visit   [address] [address]  [phone]      [facility] EMERGENCY DEPT Emergency Medicine Go to  If symptoms worsen [address] Cardoso MD Cardio Vascular Surgery, Clinical Cardiac Electrophysiology Physician, Cardiovascular Disease Physician Schedule an appointment as soon as possible for a visit   [address]  [address]  [address] [phone]          Return to ED if worse     Diagnosis     Clinical Impression:   1.  Acute chest pain

## 2022-07-21 RX ORDER — TOPIRAMATE 25 MG/1
TABLET ORAL
Qty: 30 TABLET | OUTPATIENT
Start: 2022-07-21

## 2022-08-22 ENCOUNTER — VIRTUAL VISIT (OUTPATIENT)
Dept: INTERNAL MEDICINE CLINIC | Age: 62
End: 2022-08-22
Payer: MEDICAID

## 2022-08-22 DIAGNOSIS — U07.1 COVID-19: Primary | ICD-10-CM

## 2022-08-22 PROCEDURE — 99214 OFFICE O/P EST MOD 30 MIN: CPT | Performed by: FAMILY MEDICINE

## 2022-08-22 RX ORDER — NIRMATRELVIR AND RITONAVIR 300-100 MG
KIT ORAL
Qty: 1 BOX | Refills: 0 | Status: SHIPPED | OUTPATIENT
Start: 2022-08-22 | End: 2022-10-10 | Stop reason: ALTCHOICE

## 2022-08-22 NOTE — PROGRESS NOTES
Elis Foss is a 63 y.o. female who was seen by synchronous (real-time) audio-video technology on 8/22/2022 for Positive For Covid-19        Assessment & Plan:   Diagnoses and all orders for this visit:    1. COVID-19    Other orders  -     nirmatrelvir-ritonavir (Paxlovid, EUA,) 300 mg (150 mg x 2)-100 mg tablet; Take 3 tabs in morning and 3 tabs in the evening for 5 days    Adults: For nasal congestion, cough and cold/flu symptoms I advised:    - Seek medical care if symptoms become more severe or if you develop      chest pain, shortness of breath, confusion.    - Contact us if your symptoms fail to improve after 7-10 days   - Rest as much as possible and stay home from work/school at least 24 hours                  after last fever              - Wash hand frequently and cough/sneeze into your sleeve to help prevent       infection of others   - Drink plenty of fluids   - Ibuprofen (Advil, Motrin) 400-800mg every 6 hours or                  Aleve 220 mg 1-2 pills every 8 hours for fever, headache, pain   - Tylenol extra strength 500 mg every 6 hours for pain, headache, fever   - Nasal saline rinses 2-3 times daily for nasal congestion   - Mucinex 1200 mg twice daily or Guaifenesin 400 mg every 4 hours for chest       congestion              - Robitussin DM or Delsym for cough(suppress cough and thin mucus.  )   - Cepacol throat lozenges and saline gargles (1 tsp salt in 8 oz water) for sore       throat   - Tea with honey for cough (buckwheat honey preferred)              - Benadryl (diphenhydramine) 50 mg at night for nasal congestion/allergies   - Pseudoephedrine 12-hour tablets twice daily for nasal and inner ear       -Ask your pharmacist (this is kept behind the counter)     -If you have high blood pressure or heart disease, use this        medication with caution (ask your doctor), alternative coricidin   - Afrin (oxymetazoline) nasal spray 2 sprays in each nostril twice daily for severe congestion.       -Do not use this medication for more than 3 days as it may cause         \"rebound congestion\". -If you have high blood pressure or heart disease, use this medication       with caution (ask your doctor)             Subjective:     hx diarrhea, headache, for 5 days. sweats and chills. no nausea and no vomiting . No dry cough and productive cough. Over-the-counter remedies including none   has never been tried. Hx Asthma:  no  Smoker:  no  Contacts with similar infections: yes   Recent travel:no     Prior to Admission medications    Medication Sig Start Date End Date Taking? Authorizing Provider   pantoprazole (PROTONIX) 40 mg tablet Take 1 Tablet by mouth two (2) times a day. 7/12/22   Arlene Malone MD   atenoloL (TENORMIN) 25 mg tablet Take 1 Tablet by mouth two (2) times a day. 6/28/22   Attila Napier MD   losartan (COZAAR) 100 mg tablet TAKE ONE TABLET BY MOUTH DAILY 6/27/22   Mohini Cox MD   colestipoL (COLESTID) 1 gram tablet TAKE 1 TABLET BY MOUTH DAILY. TAKE AT LEAST 1 HOUR AFTER OR 4 HOURS BEFORE OTHER MEDICATIONS. 3/29/22   Provider, Historical   Daysi Began 200-25 mcg/dose inhaler  3/24/22   Provider, Historical   fluticasone propionate (Flonase Allergy Relief) 50 mcg/actuation nasal spray 2 Sprays by Both Nostrils route daily. Provider, Historical   lidocaine (Lidoderm) 5 % 1 Patch by TransDERmal route daily as needed. Apply patch to the affected area for 12 hours a day and remove for 12 hours a day. Provider, Historical   gabapentin (NEURONTIN) 100 mg capsule Take 1 Capsule by mouth nightly. Max Daily Amount: 100 mg. 3/23/22   PARESH Quinonez   hyoscyamine SR (LEVBID) 0.375 mg SR tablet Take 0.375 mg by mouth every twelve (12) hours as needed for Cramping. Provider, Historical   topiramate (TOPAMAX) 25 mg tablet Take 25 mg by mouth nightly.     Provider, Historical   SUMAtriptan (IMITREX) 50 mg tablet Take HALF to one tablet at onset of migraine. Limit use to 2 days per week. 10/13/21   Amrit Giordano MD   cetirizine (ZYRTEC) 10 mg tablet Take 10 mg by mouth daily. 8/5/21   Provider, Historical   DULoxetine (CYMBALTA) 20 mg capsule Take 20 mg by mouth two (2) times a day. Indications: anxiousness associated with depression    Provider, Historical   azelastine (ASTELIN) 137 mcg (0.1 %) nasal spray 1 [place] by Both Nostrils route two (2) times a day. 7/28/21   Provider, Historical   albuterol (ProAir HFA) 90 mcg/actuation inhaler Take 2 Puffs by inhalation every six (6) hours as needed for Wheezing or Shortness of Breath. Provider, Historical     Patient Active Problem List   Diagnosis Code    Elevated hemoglobin A1c R73.09    Spinal stenosis, lumbar M48.061    Acute diverticulitis K57.92     Patient Active Problem List    Diagnosis Date Noted    Acute diverticulitis 03/23/2022    Spinal stenosis, lumbar 11/11/2019    Elevated hemoglobin A1c 11/05/2019     Current Outpatient Medications   Medication Sig Dispense Refill    pantoprazole (PROTONIX) 40 mg tablet Take 1 Tablet by mouth two (2) times a day. 30 Tablet 0    atenoloL (TENORMIN) 25 mg tablet Take 1 Tablet by mouth two (2) times a day. 90 Tablet 3    losartan (COZAAR) 100 mg tablet TAKE ONE TABLET BY MOUTH DAILY 30 Tablet 5    colestipoL (COLESTID) 1 gram tablet TAKE 1 TABLET BY MOUTH DAILY. TAKE AT LEAST 1 HOUR AFTER OR 4 HOURS BEFORE OTHER MEDICATIONS. Breo Ellipta 200-25 mcg/dose inhaler       fluticasone propionate (Flonase Allergy Relief) 50 mcg/actuation nasal spray 2 Sprays by Both Nostrils route daily. lidocaine (Lidoderm) 5 % 1 Patch by TransDERmal route daily as needed. Apply patch to the affected area for 12 hours a day and remove for 12 hours a day.      gabapentin (NEURONTIN) 100 mg capsule Take 1 Capsule by mouth nightly.  Max Daily Amount: 100 mg. 30 Capsule 0    hyoscyamine SR (LEVBID) 0.375 mg SR tablet Take 0.375 mg by mouth every twelve (12) hours as needed for Cramping. topiramate (TOPAMAX) 25 mg tablet Take 25 mg by mouth nightly. SUMAtriptan (IMITREX) 50 mg tablet Take HALF to one tablet at onset of migraine. Limit use to 2 days per week. 9 Tablet 1    cetirizine (ZYRTEC) 10 mg tablet Take 10 mg by mouth daily. DULoxetine (CYMBALTA) 20 mg capsule Take 20 mg by mouth two (2) times a day. Indications: anxiousness associated with depression      azelastine (ASTELIN) 137 mcg (0.1 %) nasal spray 1 [place] by Both Nostrils route two (2) times a day. albuterol (ProAir HFA) 90 mcg/actuation inhaler Take 2 Puffs by inhalation every six (6) hours as needed for Wheezing or Shortness of Breath. Allergies   Allergen Reactions    Cat Dander Runny Nose    Demerol [Meperidine] Nausea and Vomiting     Dizziness. Went to ER. Egg Yolk Swelling    Fentanyl Nausea and Vomiting     Dizziness.     Grass Pollen Runny Nose    House Dust Mite Not Reported This Time    Lactaid [Lactase] Other (comments)     Bloating/gas    Lactose Other (comments)     Bloating/gas    Mold Shortness of Breath    Prednisone Swelling    Prozac [Fluoxetine] Rash    Soy Nausea and Vomiting    Zoloft [Sertraline] Rash     Past Medical History:   Diagnosis Date    Adverse effect of anesthesia     \"so sleepy and tired for the rest of the day\"    Anxiety     Asthma     Chronic pain     back - L5 is \"almost gone\"/left foot neuropathy -pinched nerve L4-5 - spine shifted/degenerative spinal disease/stiff and sore neck and shoulder - in PT for back and neck    Degenerative spinal arthritis     Diabetes ([facility] [address]     PRE DIABETIC    Hypertension     IBS (irritable bowel syndrome)     Ill-defined condition     pre-diabetes    Nausea & vomiting     ? d/t fentanyl    PUD (peptic ulcer disease)     Spondylolisthesis      Past Surgical History:   Procedure Laterality Date    COLONOSCOPY N/A 4/25/2019    COLONOSCOPY performed by Tomeka Cardenas MD at [address] [place] [address] CHOLECYSTECTOMY  2006    HX DILATION AND CURETTAGE  1984    HX GI  2019    COLONOSCOPY    HX GI      ENDOSCOPY     Family History   Problem Relation Age of Onset    Delayed Awakening Mother         with anesthesia    Arthritis Mother     Other Mother         IBS/degenerative spinal disease    Anesth Problems Mother         N/V AND SLEEPY ALL DAY    COPD Father     Glaucoma Father     HIV/AIDS Sister     Heart Disease Brother         heart valve problem    Glaucoma Brother     Other Other     Heart Disease Sister         HEART VALVE DISESE      Social History     Tobacco Use    Smoking status: Former     Packs/day: 0.50     Years: 12.00     Pack years: 6.00     Types: Cigarettes     Quit date: 2012     Years since quittin.7    Smokeless tobacco: Never    Tobacco comments:     quit    Substance Use Topics    Alcohol use: Yes     Comment: 3-4 times a yr       ROS    Objective:     Patient-Reported Vitals 2021   Patient-Reported Weight 207   Patient-Reported Height 5'8\"   Patient-Reported Pulse 100   Patient-Reported Temperature 96.2   Patient-Reported SpO2 95   Patient-Reported Systolic  898   Patient-Reported Diastolic 78        [INSTRUCTIONS:  \"[x]\" Indicates a positive item  \"[]\" Indicates a negative item  -- DELETE ALL ITEMS NOT EXAMINED]    Constitutional: [x] Appears well-developed and well-nourished [x] No apparent distress      [] Abnormal -     Mental status: [x] Alert and awake  [x] Oriented to person/place/time [x] Able to follow commands    [] Abnormal -     Eyes:   EOM    [x]  Normal    [] Abnormal -   Sclera  [x]  Normal    [] Abnormal -          Discharge [x]  None visible   [] Abnormal -     HENT: [x] Normocephalic, atraumatic  [] Abnormal -   [x] Mouth/Throat: Mucous membranes are moist    External Ears [x] Normal  [] Abnormal -    Neck: [x] No visualized mass [] Abnormal -     Pulmonary/Chest: [x] Respiratory effort normal   [x] No visualized signs of difficulty breathing or respiratory distress        [] Abnormal -      Musculoskeletal:   [x] Normal gait with no signs of ataxia         [x] Normal range of motion of neck        [] Abnormal -     Neurological:        [x] No Facial Asymmetry (Cranial nerve 7 motor function) (limited exam due to video visit)          [x] No gaze palsy        [] Abnormal -          Skin:        [x] No significant exanthematous lesions or discoloration noted on facial skin         [] Abnormal -            Psychiatric:       [x] Normal Affect [] Abnormal -        [x] No Hallucinations    Other pertinent observable physical exam findings:-        We discussed the expected course, resolution and complications of the diagnosis(es) in detail. Medication risks, benefits, costs, interactions, and alternatives were discussed as indicated. I advised her to contact the office if her condition worsens, changes or fails to improve as anticipated. She expressed understanding with the diagnosis(es) and plan. Neena Kramer, was evaluated through a synchronous (real-time) audio-video encounter. The patient (or guardian if applicable) is aware that this is a billable service, which includes applicable co-pays. This Virtual Visit was conducted with patient's (and/or legal guardian's) consent. The visit was conducted pursuant to the emergency declaration under the [facility], [address] [place] [address] authority and the [place] and [employer] Act. Patient identification was verified, and a caregiver was present when appropriate. The patient was located at: Home: [address] [place], [place] [id number]  The provider was located at: Facility (Appt Department):  SIMON Saucedo MD

## 2022-08-22 NOTE — PATIENT INSTRUCTIONS
Viral Respiratory Infection: Care Instructions  Your Care Instructions     Viruses are very small organisms. They grow in number after they enter your body. There are many types that cause different illnesses, such as colds and the mumps. The symptoms of a viral respiratory infection often start quickly. They include a fever, sore throat, and runny nose. You may also just not feel well. Or you may not want to eat much. Most viral respiratory infections are not serious. They usually get better with time and self-care. Antibiotics are not used to treat a viral infection. That's because antibiotics will not help cure a viral illness. In some cases, antiviral medicine can help your body fight a serious viral infection. Follow-up care is a key part of your treatment and safety. Be sure to make and go to all appointments, and call your doctor if you are having problems. It's also a good idea to know your test results and keep a list of the medicines you take. How can you care for yourself at home? Rest as much as possible until you feel better. Be safe with medicines. Take your medicine exactly as prescribed. Call your doctor if you think you are having a problem with your medicine. You will get more details on the specific medicine your doctor prescribes. Take an over-the-counter pain medicine, such as acetaminophen (Tylenol), ibuprofen (Advil, Motrin), or naproxen (Aleve), as needed for pain and fever. Read and follow all instructions on the label. Do not give aspirin to anyone younger than 20. It has been linked to Reye syndrome, a serious illness. Drink plenty of fluids. Hot fluids, such as tea or soup, may help relieve congestion in your nose and throat. If you have kidney, heart, or liver disease and have to limit fluids, talk with your doctor before you increase the amount of fluids you drink. Try to clear mucus from your lungs by breathing deeply and coughing.   Gargle with warm salt water once an hour. This can help reduce swelling and throat pain. Use 1 teaspoon of salt mixed in 1 cup of warm water. Do not smoke or allow others to smoke around you. If you need help quitting, talk to your doctor about stop-smoking programs and medicines. These can increase your chances of quitting for good. To avoid spreading the virus  Cough or sneeze into a tissue. Then throw the tissue away. If you don't have a tissue, use your hand to cover your cough or sneeze. Then clean your hand. You can also cough into your sleeve. Wash your hands often. Use soap and warm water. Wash for 15 to 20 seconds each time. If you don't have soap and water near you, you can clean your hands with alcohol wipes or gel. When should you call for help? Call your doctor now or seek immediate medical care if:    You have a new or higher fever. Your fever lasts more than 48 hours. You have trouble breathing. You have a fever with a stiff neck or a severe headache. You are sensitive to light. You feel very sleepy or confused. Watch closely for changes in your health, and be sure to contact your doctor if:    You do not get better as expected. Where can you learn more? Go to http://www.gray.com/  Enter Q795 in the search box to learn more about \"Viral Respiratory Infection: Care Instructions. \"  Current as of: July 6, 2021               Content Version: 13.2  © 3987-6289 [employer]. Care instructions adapted under license by [employer] (which disclaims liability or warranty for this information). If you have questions about a medical condition or this instruction, always ask your healthcare professional. [place] [address] any warranty or liability for your use of this information. Viral Respiratory Infection: Care Instructions  Your Care Instructions     Viruses are very small organisms. They grow in number after they enter your body.  There are many types that cause different illnesses, such as colds and the mumps. The symptoms of a viral respiratory infection often start quickly. They include a fever, sore throat, and runny nose. You may also just not feel well. Or you may not want to eat much. Most viral respiratory infections are not serious. They usually get better with time and self-care. Antibiotics are not used to treat a viral infection. That's because antibiotics will not help cure a viral illness. In some cases, antiviral medicine can help your body fight a serious viral infection. Follow-up care is a key part of your treatment and safety. Be sure to make and go to all appointments, and call your doctor if you are having problems. It's also a good idea to know your test results and keep a list of the medicines you take. How can you care for yourself at home? Rest as much as possible until you feel better. Be safe with medicines. Take your medicine exactly as prescribed. Call your doctor if you think you are having a problem with your medicine. You will get more details on the specific medicine your doctor prescribes. Take an over-the-counter pain medicine, such as acetaminophen (Tylenol), ibuprofen (Advil, Motrin), or naproxen (Aleve), as needed for pain and fever. Read and follow all instructions on the label. Do not give aspirin to anyone younger than 20. It has been linked to Reye syndrome, a serious illness. Drink plenty of fluids. Hot fluids, such as tea or soup, may help relieve congestion in your nose and throat. If you have kidney, heart, or liver disease and have to limit fluids, talk with your doctor before you increase the amount of fluids you drink. Try to clear mucus from your lungs by breathing deeply and coughing. Gargle with warm salt water once an hour. This can help reduce swelling and throat pain. Use 1 teaspoon of salt mixed in 1 cup of warm water. Do not smoke or allow others to smoke around you.  If you need help quitting, talk to your doctor about stop-smoking programs and medicines. These can increase your chances of quitting for good. To avoid spreading the virus  Cough or sneeze into a tissue. Then throw the tissue away. If you don't have a tissue, use your hand to cover your cough or sneeze. Then clean your hand. You can also cough into your sleeve. Wash your hands often. Use soap and warm water. Wash for 15 to 20 seconds each time. If you don't have soap and water near you, you can clean your hands with alcohol wipes or gel. When should you call for help? Call your doctor now or seek immediate medical care if:    You have a new or higher fever. Your fever lasts more than 48 hours. You have trouble breathing. You have a fever with a stiff neck or a severe headache. You are sensitive to light. You feel very sleepy or confused. Watch closely for changes in your health, and be sure to contact your doctor if:    You do not get better as expected. Where can you learn more? Go to http://www.gray.com/  Enter Q795 in the search box to learn more about \"Viral Respiratory Infection: Care Instructions. \"  Current as of: July 6, 2021               Content Version: 13.2  © 2006-2022 [employer]. Care instructions adapted under license by [employer] (which disclaims liability or warranty for this information). If you have questions about a medical condition or this instruction, always ask your healthcare professional. [place] [address] any warranty or liability for your use of this information.

## 2022-10-10 ENCOUNTER — OFFICE VISIT (OUTPATIENT)
Dept: INTERNAL MEDICINE CLINIC | Age: 62
End: 2022-10-10
Payer: MEDICAID

## 2022-10-10 VITALS
TEMPERATURE: 98.1 F | WEIGHT: 196 LBS | HEIGHT: 68 IN | HEART RATE: 77 BPM | OXYGEN SATURATION: 97 % | SYSTOLIC BLOOD PRESSURE: 146 MMHG | DIASTOLIC BLOOD PRESSURE: 87 MMHG | BODY MASS INDEX: 29.7 KG/M2 | RESPIRATION RATE: 16 BRPM

## 2022-10-10 DIAGNOSIS — F41.9 ANXIETY: Primary | ICD-10-CM

## 2022-10-10 DIAGNOSIS — Z12.31 SCREENING MAMMOGRAM, ENCOUNTER FOR: ICD-10-CM

## 2022-10-10 DIAGNOSIS — Z23 ENCOUNTER FOR IMMUNIZATION: ICD-10-CM

## 2022-10-10 PROCEDURE — 99214 OFFICE O/P EST MOD 30 MIN: CPT | Performed by: FAMILY MEDICINE

## 2022-10-10 PROCEDURE — 90686 IIV4 VACC NO PRSV 0.5 ML IM: CPT | Performed by: FAMILY MEDICINE

## 2022-10-10 RX ORDER — ALPRAZOLAM 0.25 MG/1
0.25 TABLET ORAL
Qty: 6 TABLET | Refills: 0 | Status: SHIPPED | OUTPATIENT
Start: 2022-10-10 | End: 2022-10-27 | Stop reason: SDUPTHER

## 2022-10-10 RX ORDER — DULOXETIN HYDROCHLORIDE 60 MG/1
60 CAPSULE, DELAYED RELEASE ORAL DAILY
Qty: 30 CAPSULE | Refills: 2 | Status: SHIPPED
Start: 2022-10-10 | End: 2022-11-02

## 2022-10-10 NOTE — PROGRESS NOTES
Chief Complaint   Patient presents with    Anxiety     Patient is here for Anxiety. she is a 63y.o. year old female who presents for evaluation of   Anxiety and/or Depression  Patient states that she feels like she had a dissociative symptom or event a couple weeks ago when she had a breakdown in front of her new girlfriend. Patient states that she was crying due to anxiety and feels like she had an out of body experience for several hours. States that she did not go to the hospital at the time no medication and individual therapy. Denies any suicidal or homicidal thoughts. Currently taking duloxetine at 20 mg twice a day and states that this is not working. Ongoing symptoms include: palpitations, racing thoughts hopelessness  Patient denies: none. Reported side effects from the treatment: none. Reviewed and agree with Nurse Note and duplicated in this note. Reviewed PmHx, RxHx, FmHx, SocHx, AllgHx and updated and dated in the chart.     Family History   Problem Relation Age of Onset    Delayed Awakening Mother         with anesthesia    Arthritis Mother     Other Mother         IBS/degenerative spinal disease    Anesth Problems Mother         N/V AND SLEEPY ALL DAY    COPD Father     Glaucoma Father     HIV/AIDS Sister     Heart Disease Brother         heart valve problem    Glaucoma Brother     Other Other     Heart Disease Sister         HEART VALVE DISESE        Past Medical History:   Diagnosis Date    Adverse effect of anesthesia     \"so sleepy and tired for the rest of the day\"    Anxiety     Asthma     Chronic pain     back - L5 is \"almost gone\"/left foot neuropathy -pinched nerve L4-5 - spine shifted/degenerative spinal disease/stiff and sore neck and shoulder - in PT for back and neck    Degenerative spinal arthritis     Diabetes ([address]     PRE DIABETIC    Hypertension     IBS (irritable bowel syndrome)     Ill-defined condition     pre-diabetes    Nausea & vomiting     ? d/t fentanyl    PUD (peptic ulcer disease)     Spondylolisthesis       Social History     Socioeconomic History    Marital status: OTHER   Tobacco Use    Smoking status: Former     Packs/day: 0.50     Years: 12.00     Pack years: 6.00     Types: Cigarettes     Quit date: 2012     Years since quittin.8    Smokeless tobacco: Never    Tobacco comments:     quit    Substance and Sexual Activity    Alcohol use: Yes     Comment: 3-4 times a yr    Drug use: Yes     Types: Marijuana     Comment:  LAST USED IN 2019    Sexual activity: Yes     Partners: Female        Review of Systems - negative except as listed above      Objective:     Vitals:    10/10/22 1109   BP: (!) 146/87   Pulse: 77   Resp: 16   Temp: 98.1 °F (36.7 °C)   SpO2: 97%   Weight: 196 lb (88.9 kg)   Height: 5' 8\" (1.727 m)       Physical Examination: General appearance - alert, well appearing, and in no distress  Eyes - pupils equal and reactive, extraocular eye movements intact  Ears - bilateral TM's and external ear canals normal  Nose - normal and patent, no erythema, discharge or polyps  Mouth - mucous membranes moist, pharynx normal without lesions  Neck - supple, no significant adenopathy  Chest - clear to auscultation, no wheezes, rales or rhonchi, symmetric air entry  Heart - normal rate, regular rhythm, normal S1, S2, no murmurs, rubs, clicks or gallops  Abdomen - soft, nontender, nondistended, no masses or organomegaly  Extremities - peripheral pulses normal, no pedal edema, no clubbing or cyanosis  Skin - normal coloration and turgor, no rashes, no suspicious skin lesions noted    Assessment/ Plan:   Diagnoses and all orders for this visit:    1. Anxiety  -     REFERRAL TO PSYCHIATRY  -     ALPRAZolam (XANAX) 0.25 mg tablet; Take 1 Tablet by mouth two (2) times daily as needed for Anxiety. Max Daily Amount: 0.5 mg.    2. Screening mammogram, encounter for  -     [facility] BI SCREENING INCL CAD;  Future    Other orders  -     DULoxetine (CYMBALTA) 60 mg capsule; Take 1 Capsule by mouth daily. I have discussed the diagnosis with the patient and the intended plan as seen in the above orders. The patient has received an after-visit summary and questions were answered concerning future plans. Medication Side Effects and Warnings were discussed with patient,  Patient Labs were reviewed and or requested, and  Patient Past Records were reviewed and or requested  yes         Pt agrees to call or return to clinic and/or go to closest ER with any worsening of symptoms. This may include, but not limited to increased fever (>100.4) with NSAIDS or Tylenol, increased edema, confusion, rash, worsening of presenting symptoms. Please note that this dictation was completed with [employer], the computer voice recognition software. Quite often unanticipated grammatical, syntax, homophones, and other interpretive errors are inadvertently transcribed by the computer software. Please disregard these errors. Please excuse any errors that have escaped final proofreading. Thank you.

## 2022-10-27 ENCOUNTER — OFFICE VISIT (OUTPATIENT)
Dept: INTERNAL MEDICINE CLINIC | Age: 62
End: 2022-10-27
Payer: MEDICAID

## 2022-10-27 VITALS
BODY MASS INDEX: 29.1 KG/M2 | OXYGEN SATURATION: 97 % | HEART RATE: 66 BPM | TEMPERATURE: 98.1 F | RESPIRATION RATE: 16 BRPM | WEIGHT: 192 LBS | HEIGHT: 68 IN | SYSTOLIC BLOOD PRESSURE: 120 MMHG | DIASTOLIC BLOOD PRESSURE: 71 MMHG

## 2022-10-27 DIAGNOSIS — E78.00 ELEVATED CHOLESTEROL: ICD-10-CM

## 2022-10-27 DIAGNOSIS — Z23 ENCOUNTER FOR IMMUNIZATION: ICD-10-CM

## 2022-10-27 DIAGNOSIS — F41.9 ANXIETY: ICD-10-CM

## 2022-10-27 DIAGNOSIS — R73.09 ELEVATED HEMOGLOBIN A1C: ICD-10-CM

## 2022-10-27 DIAGNOSIS — Z00.00 WELL WOMAN EXAM (NO GYNECOLOGICAL EXAM): Primary | ICD-10-CM

## 2022-10-27 PROCEDURE — 90750 HZV VACC RECOMBINANT IM: CPT | Performed by: FAMILY MEDICINE

## 2022-10-27 PROCEDURE — 99396 PREV VISIT EST AGE 40-64: CPT | Performed by: FAMILY MEDICINE

## 2022-10-27 RX ORDER — ALPRAZOLAM 0.25 MG/1
0.25 TABLET ORAL
Qty: 6 TABLET | Refills: 0 | Status: SHIPPED
Start: 2022-10-27 | End: 2022-11-02

## 2022-10-27 NOTE — PROGRESS NOTES
Chief Complaint   Patient presents with    Complete Physical     Patient is here for a wellness visit      she is a 63y.o. year old female who presents for CPE  Complete Physical Exam Questions:    LMP -  menopausal   Last Pap (q 3 years, or q5 with HPV) - 2022  Last Mammogram (50-74 biennially)- 2-3 years ago  Hx of abnl Pap - No    1. Do you follow a low fat diet? yes  2. Are you up to date on your Tdap (<10 years)? Yes  3. Have you ever had a Pneumovax vaccine (>65)? No   PCV13 No   PPSV23 No  4. Have you had Zoster vaccine (>60)? No  5. Have you had the HPV - Gardasil (13- 26)? No  6. Do you follow an exercise program?  no  7. Do you smoke?  no  8. Do you consider yourself overweight?  no  9. Is there a family history of CAD< age 48? No  10. Is there a family history of Cancer?  yes  11. Do you know your Cancer risks? Yes  12. Have you had a colonoscopy? yes  13. Have you been tested for HIV or other STI's? Yes HIV testing today(18-66 y/o)? No  14. Have had a bone density scan or DEXA done(>65)? No  15. Have you had an EKG performed in the last five years (>50)? Yes    Other complaints: right leg pain    Reviewed and agree with Nurse Note and duplicated in this note. Reviewed PmHx, RxHx, FmHx, SocHx, AllgHx and updated and dated in the chart.     Family History   Problem Relation Age of Onset    Delayed Awakening Mother         with anesthesia    Arthritis Mother     Other Mother         IBS/degenerative spinal disease    Anesth Problems Mother         N/V AND SLEEPY ALL DAY    COPD Father     Glaucoma Father     HIV/AIDS Sister     Heart Disease Brother         heart valve problem    Glaucoma Brother     Other Other     Heart Disease Sister         HEART VALVE DISESE        Past Medical History:   Diagnosis Date    Adverse effect of anesthesia     \"so sleepy and tired for the rest of the day\"    Anxiety     Asthma     Chronic pain     back - L5 is \"almost gone\"/left foot neuropathy -pinched nerve L4-5 - spine shifted/degenerative spinal disease/stiff and sore neck and shoulder - in PT for back and neck    Degenerative spinal arthritis     Diabetes ([facility] [address]     PRE DIABETIC    Hypertension     IBS (irritable bowel syndrome)     Ill-defined condition     pre-diabetes    Nausea & vomiting     ? d/t fentanyl    PUD (peptic ulcer disease)     Spondylolisthesis       Social History     Socioeconomic History    Marital status: OTHER   Tobacco Use    Smoking status: Former     Packs/day: 0.50     Years: 12.00     Pack years: 6.00     Types: Cigarettes     Quit date: 2012     Years since quittin.9    Smokeless tobacco: Never    Tobacco comments:     quit    Substance and Sexual Activity    Alcohol use: Yes     Comment: 3-4 times a yr    Drug use: Yes     Types: Marijuana     Comment:  LAST USED IN 2019    Sexual activity: Yes     Partners: Female        Review of Systems - negative except as listed above      Objective:     Vitals:    10/27/22 0927   BP: 120/71   Pulse: 66   Resp: 16   Temp: 98.1 °F (36.7 °C)   SpO2: 97%   Weight: 192 lb (87.1 kg)   Height: 5' 8\" (1.727 m)       Physical Examination: General appearance - alert, well appearing, and in no distress  Eyes - pupils equal and reactive, extraocular eye movements intact  Ears - bilateral TM's and external ear canals normal  Nose - normal and patent, no erythema, discharge or polyps  Mouth - mucous membranes moist, pharynx normal without lesions  Neck - supple, no significant adenopathy  Chest - clear to auscultation, no wheezes, rales or rhonchi, symmetric air entry  Heart - normal rate, regular rhythm, normal S1, S2, no murmurs, rubs, clicks or gallops  Abdomen - soft, nontender, nondistended, no masses or organomegaly  Musculoskeletal - no joint tenderness, deformity or swelling  Extremities - peripheral pulses normal, no pedal edema, no clubbing or cyanosis  Skin - normal coloration and turgor, no rashes, no suspicious skin lesions noted      Assessment/ Plan:   Diagnoses and all orders for this visit:    1. Well woman exam (no gynecological exam)  -     CBC W/O DIFF; Future  -     METABOLIC PANEL, COMPREHENSIVE; Future  -     LIPID PANEL; Future    2. Elevated hemoglobin A1c  -     HEMOGLOBIN A1C WITH EAG; Future    3. Elevated cholesterol    4. Anxiety  -     ALPRAZolam (XANAX) 0.25 mg tablet; Take 1 Tablet by mouth two (2) times daily as needed for Anxiety. Max Daily Amount: 0.5 mg. Labs to be drawn: CBC, CMP, Lipid       Dated    I have discussed the diagnosis with the patient and the intended plan as seen in the above orders. The patient has received an after-visit summary and questions were answered concerning future plans. Medication Side Effects and Warnings were discussed with patient,  Patient Labs were reviewed and or requested, and  Patient Past Records were reviewed and or requested  yes         Pt agrees to call or return to clinic and/or go to closest ER with any worsening of symptoms. This may include, but not limited to increased fever (>100.4) with NSAIDS or Tylenol, increased edema, confusion, rash, worsening of presenting symptoms. Please note that this dictation was completed with Right90, the computer voice recognition software. Quite often unanticipated grammatical, syntax, homophones, and other interpretive errors are inadvertently transcribed by the computer software. Please disregard these errors. Please excuse any errors that have escaped final proofreading. Thank you.

## 2022-10-28 LAB
ALBUMIN SERPL-MCNC: 3.7 G/DL (ref 3.5–5)
ALBUMIN/GLOB SERPL: 1 {RATIO} (ref 1.1–2.2)
ALP SERPL-CCNC: 111 U/L (ref 45–117)
ALT SERPL-CCNC: 26 U/L (ref 12–78)
ANION GAP SERPL CALC-SCNC: 4 MMOL/L (ref 5–15)
AST SERPL-CCNC: 14 U/L (ref 15–37)
BILIRUB SERPL-MCNC: 0.7 MG/DL (ref 0.2–1)
BUN SERPL-MCNC: 8 MG/DL (ref 6–20)
BUN/CREAT SERPL: 12 (ref 12–20)
CALCIUM SERPL-MCNC: 8.9 MG/DL (ref 8.5–10.1)
CHLORIDE SERPL-SCNC: 107 MMOL/L (ref 97–108)
CHOLEST SERPL-MCNC: 200 MG/DL
CO2 SERPL-SCNC: 30 MMOL/L (ref 21–32)
CREAT SERPL-MCNC: 0.67 MG/DL (ref 0.55–1.02)
ERYTHROCYTE [DISTWIDTH] IN BLOOD BY AUTOMATED COUNT: 13.3 % (ref 11.5–14.5)
EST. AVERAGE GLUCOSE BLD GHB EST-MCNC: 123 MG/DL
GLOBULIN SER CALC-MCNC: 3.8 G/DL (ref 2–4)
GLUCOSE SERPL-MCNC: 107 MG/DL (ref 65–100)
HBA1C MFR BLD: 5.9 % (ref 4–5.6)
HCT VFR BLD AUTO: 37.8 % (ref 35–47)
HDLC SERPL-MCNC: 50 MG/DL
HDLC SERPL: 4 {RATIO} (ref 0–5)
HGB BLD-MCNC: 11.4 G/DL (ref 11.5–16)
LDLC SERPL CALC-MCNC: 129.2 MG/DL (ref 0–100)
MCH RBC QN AUTO: 29.8 PG (ref 26–34)
MCHC RBC AUTO-ENTMCNC: 30.2 G/DL (ref 30–36.5)
MCV RBC AUTO: 98.7 FL (ref 80–99)
NRBC # BLD: 0 K/UL (ref 0–0.01)
NRBC BLD-RTO: 0 PER 100 WBC
PLATELET # BLD AUTO: 348 K/UL (ref 150–400)
PMV BLD AUTO: 10.2 FL (ref 8.9–12.9)
POTASSIUM SERPL-SCNC: 4.4 MMOL/L (ref 3.5–5.1)
PROT SERPL-MCNC: 7.5 G/DL (ref 6.4–8.2)
RBC # BLD AUTO: 3.83 M/UL (ref 3.8–5.2)
SODIUM SERPL-SCNC: 141 MMOL/L (ref 136–145)
TRIGL SERPL-MCNC: 104 MG/DL (ref ?–150)
VLDLC SERPL CALC-MCNC: 20.8 MG/DL
WBC # BLD AUTO: 7.8 K/UL (ref 3.6–11)

## 2022-10-28 NOTE — PROGRESS NOTES
Sugars are well controlled, will continue repeat in 6 months. Your \"Bad\" cholesterol (LDL and/or triglycerides) are elevated. Please eat a healthier diet as described below. In particular avoid fried, fatty and junk foods, while increasing fiber (fruits and vegetables). If you cannot increase fiber through diet, you can supplement with metamucil as directed on bottle daily. Also, make sure you are taking 1 to 2 grams of over the counter fish oil. Increase exercise to 5 times per week of cardio lasting at least 30 min's each (biking, walking, elliptical, swimming). Lets recheck the fasting (atleast eight hours) in 6 months. Mediterranean diet: Choose this heart-healthy diet option  The Mediterranean diet is a heart-healthy eating plan combining elements of Mediterranean-style cooking. Here's how to adopt the Mediterranean diet. If you're looking for a heart-healthy eating plan, the Mediterranean diet might be right for you. The Mediterranean diet incorporates the basics of healthy eating - plus a splash of flavorful olive oil and perhaps a glass of red wine - among other components characterizing the traditional cooking style of countries bordering the [facility]. Most healthy diets include fruits, vegetables, fish and whole grains, and limit unhealthy fats. While these parts of a healthy diet remain tried-and-true, subtle variations or differences in proportions of certain foods may make a difference in your risk of heart disease. Benefits of the [address] has shown that the traditional Mediterranean diet reduces the risk of heart disease. In fact, a recent analysis of more than 1.5 million healthy adults demonstrated that following a Mediterranean diet was associated with a reduced risk of overall and cardiovascular mortality, a reduced incidence of cancer and cancer mortality, and a reduced incidence of Parkinson's and Alzheimer's diseases.    For this reason, most if not all major scientific organizations encourage healthy adults to adapt a style of eating like that of the [address] [facility] for prevention of major chronic diseases. Key components of the Mediterranean diet  The Mediterranean diet emphasizes:   Getting plenty of exercise   Eating primarily plant-based foods, such as fruits and vegetables, whole grains, legumes and nuts   Replacing butter with healthy fats such as olive oil and canola oil   Using herbs and spices instead of salt to flavor foods   Limiting red meat to no more than a few times a month   Eating fish and poultry at least twice a week   Drinking red wine in moderation (optional)   The diet also recognizes the importance of enjoying meals with family and friends. Fruits, vegetables, nuts and grains  The Mediterranean diet traditionally includes fruits, vegetables, pasta and rice. For example, residents of [facility] eat very little red meat and average nine servings a day of antioxidant-rich fruits and vegetables. The Mediterranean diet has been associated with a lower level of oxidized low-density lipoprotein (LDL) cholesterol - the \"bad\" cholesterol that's more likely to build up deposits in your arteries. Nuts are another part of a healthy Mediterranean diet. Nuts are high in fat (approximately 80 percent of their calories come from fat), but most of the fat is not saturated. Because nuts are high in calories, they should not be eaten in large amounts - generally no more than a handful a day. For the best nutrition, avoid candied or honey-roasted and heavily salted nuts. Grains in the [address] [place] [address] region are typically whole grain and usually contain very few unhealthy trans fats, and bread is an important part of the diet there. However, throughout the [address] [place] [address] region, bread is eaten plain or dipped in olive oil - not eaten with butter or margarines, which contain saturated or trans fats.

## 2022-10-30 ENCOUNTER — APPOINTMENT (OUTPATIENT)
Dept: MRI IMAGING | Age: 62
DRG: 756 | End: 2022-10-30
Attending: EMERGENCY MEDICINE
Payer: MEDICAID

## 2022-10-30 ENCOUNTER — APPOINTMENT (OUTPATIENT)
Dept: CT IMAGING | Age: 62
DRG: 756 | End: 2022-10-30
Attending: EMERGENCY MEDICINE
Payer: MEDICAID

## 2022-10-30 ENCOUNTER — HOSPITAL ENCOUNTER (INPATIENT)
Age: 62
LOS: 2 days | Discharge: HOME | DRG: 756 | End: 2022-11-02
Attending: STUDENT IN AN ORGANIZED HEALTH CARE EDUCATION/TRAINING PROGRAM | Admitting: PSYCHIATRY & NEUROLOGY
Payer: MEDICAID

## 2022-10-30 ENCOUNTER — APPOINTMENT (OUTPATIENT)
Dept: GENERAL RADIOLOGY | Age: 62
DRG: 756 | End: 2022-10-30
Attending: STUDENT IN AN ORGANIZED HEALTH CARE EDUCATION/TRAINING PROGRAM
Payer: MEDICAID

## 2022-10-30 DIAGNOSIS — F41.1 GENERALIZED ANXIETY DISORDER: ICD-10-CM

## 2022-10-30 DIAGNOSIS — R41.89 DISORGANIZED THOUGHT PROCESS: Primary | ICD-10-CM

## 2022-10-30 LAB
ALBUMIN SERPL-MCNC: 3.7 G/DL (ref 3.5–5)
ALBUMIN/GLOB SERPL: 0.8 {RATIO} (ref 1.1–2.2)
ALP SERPL-CCNC: 112 U/L (ref 45–117)
ALT SERPL-CCNC: 26 U/L (ref 12–78)
AMPHET UR QL SCN: NEGATIVE
ANION GAP SERPL CALC-SCNC: 5 MMOL/L (ref 5–15)
APAP SERPL-MCNC: <2 UG/ML (ref 10–30)
APPEARANCE UR: CLEAR
AST SERPL-CCNC: 21 U/L (ref 15–37)
BACTERIA URNS QL MICRO: NEGATIVE /HPF
BARBITURATES UR QL SCN: NEGATIVE
BASOPHILS # BLD: 0.1 K/UL (ref 0–0.1)
BASOPHILS NFR BLD: 1 % (ref 0–1)
BENZODIAZ UR QL: NEGATIVE
BILIRUB SERPL-MCNC: 0.7 MG/DL (ref 0.2–1)
BILIRUB UR QL: NEGATIVE
BUN SERPL-MCNC: 9 MG/DL (ref 6–20)
BUN/CREAT SERPL: 13 (ref 12–20)
CALCIUM SERPL-MCNC: 8.8 MG/DL (ref 8.5–10.1)
CANNABINOIDS UR QL SCN: POSITIVE
CHLORIDE SERPL-SCNC: 105 MMOL/L (ref 97–108)
CO2 SERPL-SCNC: 30 MMOL/L (ref 21–32)
COCAINE UR QL SCN: NEGATIVE
COLOR UR: NORMAL
COMMENT, HOLDF: NORMAL
CREAT SERPL-MCNC: 0.72 MG/DL (ref 0.55–1.02)
DIFFERENTIAL METHOD BLD: NORMAL
DRUG SCRN COMMENT,DRGCM: ABNORMAL
EOSINOPHIL # BLD: 0.2 K/UL (ref 0–0.4)
EOSINOPHIL NFR BLD: 3 % (ref 0–7)
EPITH CASTS URNS QL MICRO: NORMAL /LPF
ERYTHROCYTE [DISTWIDTH] IN BLOOD BY AUTOMATED COUNT: 13.1 % (ref 11.5–14.5)
ETHANOL SERPL-MCNC: <10 MG/DL
FLUAV RNA SPEC QL NAA+PROBE: NOT DETECTED
FLUBV RNA SPEC QL NAA+PROBE: NOT DETECTED
GLOBULIN SER CALC-MCNC: 4.6 G/DL (ref 2–4)
GLUCOSE SERPL-MCNC: 100 MG/DL (ref 65–100)
GLUCOSE UR STRIP.AUTO-MCNC: NEGATIVE MG/DL
HCT VFR BLD AUTO: 36.6 % (ref 35–47)
HGB BLD-MCNC: 11.9 G/DL (ref 11.5–16)
HGB UR QL STRIP: NEGATIVE
HYALINE CASTS URNS QL MICRO: NORMAL /LPF (ref 0–5)
IMM GRANULOCYTES # BLD AUTO: 0 K/UL (ref 0–0.04)
IMM GRANULOCYTES NFR BLD AUTO: 0 % (ref 0–0.5)
KETONES UR QL STRIP.AUTO: NEGATIVE MG/DL
LEUKOCYTE ESTERASE UR QL STRIP.AUTO: NEGATIVE
LYMPHOCYTES # BLD: 1.6 K/UL (ref 0.8–3.5)
LYMPHOCYTES NFR BLD: 21 % (ref 12–49)
MCH RBC QN AUTO: 31.2 PG (ref 26–34)
MCHC RBC AUTO-ENTMCNC: 32.5 G/DL (ref 30–36.5)
MCV RBC AUTO: 95.8 FL (ref 80–99)
METHADONE UR QL: NEGATIVE
MONOCYTES # BLD: 0.5 K/UL (ref 0–1)
MONOCYTES NFR BLD: 7 % (ref 5–13)
NEUTS SEG # BLD: 5.3 K/UL (ref 1.8–8)
NEUTS SEG NFR BLD: 68 % (ref 32–75)
NITRITE UR QL STRIP.AUTO: NEGATIVE
NRBC # BLD: 0 K/UL (ref 0–0.01)
NRBC BLD-RTO: 0 PER 100 WBC
OPIATES UR QL: NEGATIVE
PCP UR QL: NEGATIVE
PH UR STRIP: 7.5 [PH] (ref 5–8)
PLATELET # BLD AUTO: 307 K/UL (ref 150–400)
PMV BLD AUTO: 9.2 FL (ref 8.9–12.9)
POTASSIUM SERPL-SCNC: 3.5 MMOL/L (ref 3.5–5.1)
PROT SERPL-MCNC: 8.3 G/DL (ref 6.4–8.2)
PROT UR STRIP-MCNC: NEGATIVE MG/DL
RBC # BLD AUTO: 3.82 M/UL (ref 3.8–5.2)
RBC #/AREA URNS HPF: NORMAL /HPF (ref 0–5)
SALICYLATES SERPL-MCNC: <1.7 MG/DL (ref 2.8–20)
SAMPLES BEING HELD,HOLD: NORMAL
SARS-COV-2, COV2: NOT DETECTED
SODIUM SERPL-SCNC: 140 MMOL/L (ref 136–145)
SP GR UR REFRACTOMETRY: 1.01 (ref 1–1.03)
TROPONIN-HIGH SENSITIVITY: <4 NG/L (ref 0–51)
UA: UC IF INDICATED,UAUC: NORMAL
UROBILINOGEN UR QL STRIP.AUTO: 0.2 EU/DL (ref 0.2–1)
WBC # BLD AUTO: 7.7 K/UL (ref 3.6–11)
WBC URNS QL MICRO: NORMAL /HPF (ref 0–4)

## 2022-10-30 PROCEDURE — 90791 PSYCH DIAGNOSTIC EVALUATION: CPT

## 2022-10-30 PROCEDURE — 80307 DRUG TEST PRSMV CHEM ANLYZR: CPT

## 2022-10-30 PROCEDURE — 81001 URINALYSIS AUTO W/SCOPE: CPT

## 2022-10-30 PROCEDURE — 36415 COLL VENOUS BLD VENIPUNCTURE: CPT

## 2022-10-30 PROCEDURE — 74011250636 HC RX REV CODE- 250/636

## 2022-10-30 PROCEDURE — 70496 CT ANGIOGRAPHY HEAD: CPT

## 2022-10-30 PROCEDURE — 74011250636 HC RX REV CODE- 250/636: Performed by: STUDENT IN AN ORGANIZED HEALTH CARE EDUCATION/TRAINING PROGRAM

## 2022-10-30 PROCEDURE — 99285 EMERGENCY DEPT VISIT HI MDM: CPT

## 2022-10-30 PROCEDURE — 82077 ASSAY SPEC XCP UR&BREATH IA: CPT

## 2022-10-30 PROCEDURE — 4A03X5D MEASUREMENT OF ARTERIAL FLOW, INTRACRANIAL, EXTERNAL APPROACH: ICD-10-PCS | Performed by: NURSE PRACTITIONER

## 2022-10-30 PROCEDURE — 74011000250 HC RX REV CODE- 250: Performed by: EMERGENCY MEDICINE

## 2022-10-30 PROCEDURE — 80053 COMPREHEN METABOLIC PANEL: CPT

## 2022-10-30 PROCEDURE — A9576 INJ PROHANCE MULTIPACK: HCPCS

## 2022-10-30 PROCEDURE — 70553 MRI BRAIN STEM W/O & W/DYE: CPT

## 2022-10-30 PROCEDURE — 84443 ASSAY THYROID STIM HORMONE: CPT

## 2022-10-30 PROCEDURE — 80179 DRUG ASSAY SALICYLATE: CPT

## 2022-10-30 PROCEDURE — 85025 COMPLETE CBC W/AUTO DIFF WBC: CPT

## 2022-10-30 PROCEDURE — 93005 ELECTROCARDIOGRAM TRACING: CPT

## 2022-10-30 PROCEDURE — 87636 SARSCOV2 & INF A&B AMP PRB: CPT

## 2022-10-30 PROCEDURE — 80143 DRUG ASSAY ACETAMINOPHEN: CPT

## 2022-10-30 PROCEDURE — 84484 ASSAY OF TROPONIN QUANT: CPT

## 2022-10-30 PROCEDURE — 70450 CT HEAD/BRAIN W/O DYE: CPT

## 2022-10-30 PROCEDURE — 0042T CT PERF W CBF: CPT

## 2022-10-30 PROCEDURE — 74011000636 HC RX REV CODE- 636: Performed by: RADIOLOGY

## 2022-10-30 PROCEDURE — 96375 TX/PRO/DX INJ NEW DRUG ADDON: CPT

## 2022-10-30 PROCEDURE — 96374 THER/PROPH/DIAG INJ IV PUSH: CPT

## 2022-10-30 PROCEDURE — 71045 X-RAY EXAM CHEST 1 VIEW: CPT

## 2022-10-30 RX ORDER — SODIUM CHLORIDE 0.9 % (FLUSH) 0.9 %
10 SYRINGE (ML) INJECTION
Status: COMPLETED | OUTPATIENT
Start: 2022-10-30 | End: 2022-10-30

## 2022-10-30 RX ORDER — DIAZEPAM 10 MG/2ML
5 INJECTION INTRAMUSCULAR
Status: COMPLETED | OUTPATIENT
Start: 2022-10-30 | End: 2022-10-30

## 2022-10-30 RX ADMIN — IOPAMIDOL 80 ML: 755 INJECTION, SOLUTION INTRAVENOUS at 22:35

## 2022-10-30 RX ADMIN — IOPAMIDOL 40 ML: 755 INJECTION, SOLUTION INTRAVENOUS at 22:35

## 2022-10-30 RX ADMIN — SODIUM CHLORIDE, PRESERVATIVE FREE 10 ML: 5 INJECTION INTRAVENOUS at 22:25

## 2022-10-30 RX ADMIN — GADOTERIDOL 15 ML: 279.3 INJECTION, SOLUTION INTRAVENOUS at 22:25

## 2022-10-30 RX ADMIN — DIAZEPAM 5 MG: 5 INJECTION, SOLUTION INTRAMUSCULAR; INTRAVENOUS at 17:51

## 2022-10-30 NOTE — ED TRIAGE NOTES
Per EMS. Call came in as a SOB, on arrival pt not at seen, found 1 block away, talking to self, and acting erratic. Caller state pt had hx of anxiety and asthma but had walked off. Pt stated Chest pain and EMS observed HR of 150 that subsided once pt calmed down. Pt c/o CP and anxiety. Reported to this writer an attempt to commit SI with  yesterday but denies it at this time also states she took [facility] today and PO benadryl and Advil PM. Pt afebrile and alert. State light is bothering her and was placed in a green gown. Spoke with MD regarding pt's presentation and medical hx. Labs placed accordingly. Report given to [facility].

## 2022-10-30 NOTE — ED PROVIDER NOTES
Ramon Daugherty is a 63 y.o. female with past medical history notable for asthma, chronic pain, diabetes, nausea and vomiting presenting with 2 complaints. 1.  Patient complains of chest pain, described as tightness and achiness, not associated with cough or dyspnea, diaphoresis. She still had vomiting or diarrhea. Pain has been ongoing for several days. She has not had exertional symptoms. Denies any illicit substance use. 2. patient complains of increased anxiety, depression. She did not voice suicidal ideation to me. She has a complex mental health history including generalized anxiety disorder and dissociative disorder.           Past Medical History:   Diagnosis Date    Adverse effect of anesthesia     \"so sleepy and tired for the rest of the day\"    Anxiety     Asthma     Chronic pain     back - L5 is \"almost gone\"/left foot neuropathy -pinched nerve L4-5 - spine shifted/degenerative spinal disease/stiff and sore neck and shoulder - in PT for back and neck    Degenerative spinal arthritis     Diabetes ([address]     PRE DIABETIC    Hypertension     IBS (irritable bowel syndrome)     Ill-defined condition     pre-diabetes    Nausea & vomiting     ? d/t fentanyl    PUD (peptic ulcer disease)     Spondylolisthesis        Past Surgical History:   Procedure Laterality Date    COLONOSCOPY N/A 4/25/2019    COLONOSCOPY performed by Bobby Mccray MD at [facility] ENDOSCOPY    [address]    HX CHOLECYSTECTOMY  2006    HX DILATION AND CURETTAGE  1984    HX GI  04/2019    COLONOSCOPY    HX GI      ENDOSCOPY         Family History:   Problem Relation Age of Onset    Delayed Awakening Mother         with anesthesia    Arthritis Mother     Other Mother         IBS/degenerative spinal disease    Anesth Problems Mother         N/V AND SLEEPY ALL DAY    COPD Father     Glaucoma Father     HIV/AIDS Sister     Heart Disease Brother         heart valve problem    Glaucoma Brother     Other Other     Heart Disease Sister         HEART VALVE DISESE        Social History     Socioeconomic History    Marital status: OTHER     Spouse name: Not on file    Number of children: Not on file    Years of education: Not on file    Highest education level: Not on file   Occupational History    Not on file   Tobacco Use    Smoking status: Former     Packs/day: 0.50     Years: 12.00     Pack years: 6.00     Types: Cigarettes     Quit date: 2012     Years since quittin.9    Smokeless tobacco: Never    Tobacco comments:     quit    Substance and Sexual Activity    Alcohol use: Yes     Comment: 3-4 times a yr    Drug use: Yes     Types: Marijuana     Comment:  LAST USED IN 2019    Sexual activity: Yes     Partners: Female   Other Topics Concern    Not on file   Social History Narrative    Not on file     Social Determinants of Health     Financial Resource Strain: Not on file   Food Insecurity: Not on file   Transportation Needs: Not on file   Physical Activity: Not on file   Stress: Not on file   Social Connections: Not on file   Intimate Partner Violence: Not on file   Housing Stability: Not on file         ALLERGIES: Cat dander, Demerol [meperidine], Egg yolk, Fentanyl, Grass pollen, House dust mite, Lactaid [lactase], Lactose, Mold, Prednisone, Prozac [fluoxetine], Soy, and Zoloft [sertraline]    Review of Systems   Constitutional:  Negative for chills and fever. Eyes:  Negative for photophobia. Respiratory:  Negative for cough and shortness of breath. Cardiovascular:  Positive for palpitations. Negative for chest pain and leg swelling. Gastrointestinal:  Negative for abdominal pain, nausea and vomiting. Genitourinary:  Negative for dysuria. Musculoskeletal:  Negative for back pain. Neurological:  Negative for headaches. Psychiatric/Behavioral:  Negative for confusion. All other systems reviewed and are negative.     Vitals:    10/30/22 1506 10/30/22 1753   BP: (!) 160/82 (!) 143/85   Pulse: 82 69   Resp: 22 21   Temp: 98.2 °F (36.8 °C) 97.7 °F (36.5 °C)   SpO2: 98% 97%   Weight: 88.5 kg (195 lb)    Height: 5' 8\" (1.727 m)             Physical Exam  Constitutional:       General: She is not in acute distress. Appearance: She is not toxic-appearing. HENT:      Head: Normocephalic and atraumatic. Mouth/Throat:      Mouth: Mucous membranes are moist.   Eyes:      Extraocular Movements: Extraocular movements intact. Cardiovascular:      Rate and Rhythm: Normal rate and regular rhythm. Heart sounds: Normal heart sounds. Pulmonary:      Effort: Pulmonary effort is normal. No respiratory distress. Breath sounds: Normal breath sounds. Abdominal:      Palpations: Abdomen is soft. Tenderness: There is no abdominal tenderness. Musculoskeletal:      Cervical back: Normal range of motion. Right lower leg: No edema. Left lower leg: No edema. Skin:     Capillary Refill: Capillary refill takes less than 2 seconds. Neurological:      General: No focal deficit present. Mental Status: She is alert and oriented to person, place, and time. Psychiatric:         Attention and Perception: She is inattentive. Mood and Affect: Mood is anxious. Affect is labile. Speech: Speech is rapid and pressured and tangential.         Behavior: Behavior is cooperative. [facility]    ED Course as of 10/30/22 1941   Sun Oct 30, 2022   153 EKG: 3:36 PM normal sinus rhythm ventricular rate 62 normal axis no ST elevation. [NS]      ED Course User Index  [NS] Jordon Sutherland MD       Procedures          EKG is within normal limits and biomarker negative, low suspicion for ACS, vital signs essentially stable, only mild hypertension. Will medically clear. Valium for anxiety given inability to clearly communicate with [facility] counselor, will reassess.   5:32 PM

## 2022-10-30 NOTE — BSMART NOTE
BSMART assessment completed, and suicide risk level noted to be Moderate. Primary Nurse Noel Packer  and Physician Kelley Ford notified. Concerns not observed. Security/Off- has not been notified.

## 2022-10-30 NOTE — ED NOTES
Bedside shift change report given to John Cantu RN (oncoming nurse) by Kenzie Phillips RN (offgoing nurse). Report included the following information SBAR, ED Summary, MAR, and Recent Results.
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
## 2022-10-30 NOTE — PROGRESS NOTES
Spiritual Care Assessment/Progress Note  [facility]      NAME: Terry Carlos      MRN: [id number]  AGE: 63 y.o. SEX: female  Mormonism Affiliation: /traditional   Language: English     10/30/2022     Total Time (in minutes): 16     Spiritual Assessment begun in [address] through conversation with:         [x]Patient        [] Family    [] Friend(s)        Reason for Consult: Initial visit     Spiritual beliefs: (Please include comment if needed)     [] Identifies with a lauren tradition:         [] Supported by a lauren community:            [] Claims no spiritual orientation:           [] Seeking spiritual identity:                [] Adheres to an individual form of spirituality:           [x] Not able to assess:                           Identified resources for coping:      [] Prayer                               [] Music                  [] Guided Imagery     [] Family/friends                 [] Pet visits     [] Devotional reading                         [x] Unknown     [] Other:                                               Interventions offered during this visit: (See comments for more details)    Patient Interventions: Crisis, Other (comment), Prayer (actual) (Bibi)           Plan of Care:     [x] Support spiritual and/or cultural needs    [] Support AMD and/or advance care planning process      [] Support grieving process   [] Coordinate Rites and/or Rituals    [] Coordination with community clergy   [] No spiritual needs identified at this time   [] Detailed Plan of Care below (See Comments)  [] Make referral to Music Therapy  [] Make referral to Pet Therapy     [] Make referral to Addiction services  [] Make referral to [facility]  [] Make referral to Spiritual Care Partner  [] No future visits requested        [x] Contact Spiritual Care for further referrals     Comments:  responded to a phone call from ED. A Rosary was requested.  I also asked lawson if she would like a prayer and she shook her head yes. I offered a prayer for her. Advised nurse to contact [facility] for any further referrals.     Chaplain Jayashree Thacker MDiv, [facility]

## 2022-10-30 NOTE — ED NOTES
Sitter at the bedside at this time with patient. Patient is in green gown and belongings secured. Room environment made safe for patient.

## 2022-10-30 NOTE — ED NOTES
Patient requested a chaplain tamara at the bedside at this time to give patient rosary and pray with patient.

## 2022-10-30 NOTE — ED NOTES
Security wanded patient at this time and 2 belonging bags placed in behavioral health closet with patient labels.

## 2022-10-30 NOTE — ED NOTES
Bedside and Verbal shift change report given to Svetlana Jerome RN (oncoming nurse) by Gunjan Santos RN (offgoing nurse). Report included the following information SBAR, ED Summary, MAR, and Recent Results.

## 2022-10-30 NOTE — ED NOTES
Bedside and Verbal shift change report given to Sandy Cervantes RN (oncoming nurse) by Camila Valenzuela RN (offgoing nurse). Report included the following information SBAR, Kardex, ED Summary, [facility] and Recent Results.

## 2022-10-31 PROBLEM — F41.1 GENERALIZED ANXIETY DISORDER: Status: ACTIVE | Noted: 2022-10-31

## 2022-10-31 LAB
ATRIAL RATE: 62 BPM
CALCULATED P AXIS, ECG09: 41 DEGREES
CALCULATED R AXIS, ECG10: 11 DEGREES
CALCULATED T AXIS, ECG11: 25 DEGREES
DIAGNOSIS, 93000: NORMAL
P-R INTERVAL, ECG05: 166 MS
Q-T INTERVAL, ECG07: 430 MS
QRS DURATION, ECG06: 70 MS
QTC CALCULATION (BEZET), ECG08: 436 MS
TSH SERPL DL<=0.05 MIU/L-ACNC: 0.29 UIU/ML (ref 0.36–3.74)
VENTRICULAR RATE, ECG03: 62 BPM

## 2022-10-31 PROCEDURE — 65220000003 HC RM SEMIPRIVATE PSYCH

## 2022-10-31 PROCEDURE — 74011250637 HC RX REV CODE- 250/637

## 2022-10-31 PROCEDURE — 74011250637 HC RX REV CODE- 250/637: Performed by: NURSE PRACTITIONER

## 2022-10-31 RX ORDER — ALBUTEROL SULFATE 90 UG/1
2 AEROSOL, METERED RESPIRATORY (INHALATION)
Status: DISCONTINUED | OUTPATIENT
Start: 2022-10-31 | End: 2022-10-31 | Stop reason: CLARIF

## 2022-10-31 RX ORDER — HALOPERIDOL 5 MG/ML
5 INJECTION INTRAMUSCULAR
Status: DISCONTINUED | OUTPATIENT
Start: 2022-10-31 | End: 2022-11-02 | Stop reason: HOSPADM

## 2022-10-31 RX ORDER — HYDROXYZINE 50 MG/1
50 TABLET, FILM COATED ORAL
Status: DISCONTINUED | OUTPATIENT
Start: 2022-10-31 | End: 2022-10-31

## 2022-10-31 RX ORDER — GABAPENTIN 100 MG/1
100 CAPSULE ORAL 3 TIMES DAILY
Status: DISCONTINUED | OUTPATIENT
Start: 2022-10-31 | End: 2022-11-02 | Stop reason: HOSPADM

## 2022-10-31 RX ORDER — ACETAMINOPHEN 325 MG/1
650 TABLET ORAL
Status: DISCONTINUED | OUTPATIENT
Start: 2022-10-31 | End: 2022-11-02 | Stop reason: HOSPADM

## 2022-10-31 RX ORDER — DIPHENHYDRAMINE HYDROCHLORIDE 50 MG/ML
50 INJECTION, SOLUTION INTRAMUSCULAR; INTRAVENOUS
Status: DISCONTINUED | OUTPATIENT
Start: 2022-10-31 | End: 2022-11-02 | Stop reason: HOSPADM

## 2022-10-31 RX ORDER — TRAZODONE HYDROCHLORIDE 50 MG/1
50 TABLET ORAL
Status: DISCONTINUED | OUTPATIENT
Start: 2022-10-31 | End: 2022-11-02 | Stop reason: HOSPADM

## 2022-10-31 RX ORDER — ADHESIVE BANDAGE
30 BANDAGE TOPICAL DAILY PRN
Status: DISCONTINUED | OUTPATIENT
Start: 2022-10-31 | End: 2022-11-02 | Stop reason: HOSPADM

## 2022-10-31 RX ORDER — OLANZAPINE 5 MG/1
5 TABLET ORAL
Status: DISCONTINUED | OUTPATIENT
Start: 2022-10-31 | End: 2022-11-02 | Stop reason: HOSPADM

## 2022-10-31 RX ORDER — BENZTROPINE MESYLATE 1 MG/1
1 TABLET ORAL
Status: DISCONTINUED | OUTPATIENT
Start: 2022-10-31 | End: 2022-11-02 | Stop reason: HOSPADM

## 2022-10-31 RX ORDER — GABAPENTIN 100 MG/1
100 CAPSULE ORAL 3 TIMES DAILY
COMMUNITY
Start: 2022-10-26 | End: 2022-11-02

## 2022-10-31 RX ORDER — HYOSCYAMINE SULFATE 0.38 MG/1
375 TABLET, EXTENDED RELEASE ORAL
COMMUNITY
End: 2022-11-02

## 2022-10-31 RX ORDER — HYDROXYZINE HYDROCHLORIDE 10 MG/1
10 TABLET, FILM COATED ORAL
Status: DISCONTINUED | OUTPATIENT
Start: 2022-10-31 | End: 2022-11-02 | Stop reason: HOSPADM

## 2022-10-31 RX ORDER — METOPROLOL TARTRATE 50 MG/1
50 TABLET ORAL DAILY
COMMUNITY
Start: 2022-10-13 | End: 2022-11-02

## 2022-10-31 RX ORDER — ALBUTEROL SULFATE 0.83 MG/ML
2.5 SOLUTION RESPIRATORY (INHALATION)
Status: DISCONTINUED | OUTPATIENT
Start: 2022-10-31 | End: 2022-11-02 | Stop reason: HOSPADM

## 2022-10-31 RX ORDER — BUSPIRONE HYDROCHLORIDE 5 MG/1
5 TABLET ORAL 3 TIMES DAILY
Status: DISCONTINUED | OUTPATIENT
Start: 2022-10-31 | End: 2022-11-01

## 2022-10-31 RX ORDER — LIDOCAINE 4 G/100G
1 PATCH TOPICAL DAILY
Status: DISCONTINUED | OUTPATIENT
Start: 2022-11-01 | End: 2022-11-02 | Stop reason: HOSPADM

## 2022-10-31 RX ORDER — METOPROLOL TARTRATE AND HYDROCHLOROTHIAZIDE 50; 25 MG/1; MG/1
1 TABLET ORAL 2 TIMES DAILY
COMMUNITY
End: 2022-11-02

## 2022-10-31 RX ORDER — GABAPENTIN 100 MG/1
100 CAPSULE ORAL 3 TIMES DAILY
Status: ON HOLD | COMMUNITY
End: 2022-10-31

## 2022-10-31 RX ORDER — PANTOPRAZOLE SODIUM 40 MG/1
40 TABLET, DELAYED RELEASE ORAL
Status: DISCONTINUED | OUTPATIENT
Start: 2022-10-31 | End: 2022-11-02 | Stop reason: HOSPADM

## 2022-10-31 RX ORDER — LOSARTAN POTASSIUM 50 MG/1
100 TABLET ORAL DAILY
Status: DISCONTINUED | OUTPATIENT
Start: 2022-10-31 | End: 2022-11-01

## 2022-10-31 RX ADMIN — PANTOPRAZOLE SODIUM 40 MG: 40 TABLET, DELAYED RELEASE ORAL at 17:35

## 2022-10-31 RX ADMIN — HYDROXYZINE HYDROCHLORIDE 50 MG: 50 TABLET, FILM COATED ORAL at 01:48

## 2022-10-31 RX ADMIN — BUSPIRONE HYDROCHLORIDE 5 MG: 5 TABLET ORAL at 21:26

## 2022-10-31 RX ADMIN — BUSPIRONE HYDROCHLORIDE 5 MG: 5 TABLET ORAL at 17:34

## 2022-10-31 RX ADMIN — BUSPIRONE HYDROCHLORIDE 5 MG: 5 TABLET ORAL at 14:20

## 2022-10-31 RX ADMIN — TRAZODONE HYDROCHLORIDE 50 MG: 50 TABLET ORAL at 01:48

## 2022-10-31 RX ADMIN — GABAPENTIN 100 MG: 100 CAPSULE ORAL at 17:35

## 2022-10-31 RX ADMIN — GABAPENTIN 100 MG: 100 CAPSULE ORAL at 21:27

## 2022-10-31 RX ADMIN — DULOXETINE HYDROCHLORIDE 90 MG: 60 CAPSULE, DELAYED RELEASE ORAL at 14:20

## 2022-10-31 RX ADMIN — LOSARTAN POTASSIUM 100 MG: 50 TABLET, FILM COATED ORAL at 14:20

## 2022-10-31 NOTE — ROUTINE PROCESS
TRANSFER - OUT REPORT:    Verbal report given to PEYTON Royal on Shiloh Dukes  being transferred to [facility] for routine progression of care       Report consisted of patients Situation, Background, Assessment and   Recommendations(SBAR). Information from the following report(s) SBAR, Kardex, ED Summary, MAR, Recent Results, and Med Rec Status was reviewed with the receiving nurse. Lines:   Peripheral IV 10/30/22 Right Antecubital (Active)   Site Assessment Clean, dry, & intact 10/30/22 1538   Phlebitis Assessment 0 10/30/22 1538   Infiltration Assessment 0 10/30/22 1538   Dressing Status Clean, dry, & intact 10/30/22 1538   Dressing Type Transparent 10/30/22 1538   Hub Color/Line Status Pink 10/30/22 1538   Action Taken Blood drawn 10/30/22 1538        Opportunity for questions and clarification was provided.       Patient transported with:   Patient's medications from home  Registered Nurse

## 2022-10-31 NOTE — BH NOTES
GROUP THERAPY PROGRESS NOTE     Patient is participating in self-care group. Group time: 45 minutes     Personal goal for participation: To discuss self-care tips and distraction activities     Goal orientation: Personal     Group therapy participation: passive     Therapeutic interventions reviewed and discussed: To develop an understanding of self-care. To gain insight into healthy, therapeutic practices beneficial to mind, body, spirit, and emotions. Handouts provided      Impression of participation: Pts were present for group but not really engaged. SW attempted to facilitate group by explaining basic self-care practices but ended up providing handouts to finish independently.       Jovany Bethea MSW, HP-A

## 2022-10-31 NOTE — INTERDISCIPLINARY ROUNDS
Behavioral Health Interdisciplinary Rounds     Patient Name: Gattis Duverney  Age: 63 y.o. Room/Bed:  4/  Primary Diagnosis: <principal problem not specified>   Admission Status: Voluntary     Readmission within 30 days: no  Power of  in place: no  Patient requires a blocked bed: no          Reason for blocked bed:       Flu Vaccine :    Consults:          Labs/Testing due today? Have they refused labs?:     Sleep hours:  3      Participation in Care/Groups:    Medication Compliant?:   PRNS (last 24 hours): Antianxiety and Sleep Aid    Restraints (last 24 hours):  no     CIWA (range last 24 hours):     COWS (range last 24 hours):      Alcohol screening (AUDIT) completed -         If applicable, date SBIRT discussed in treatment team AND documented:   AUDIT Screen Score:        Document Brief Intervention (corresponds directly with the 5 A's, Ask, Advise, Assess, Assist, and Arrange): At- Risk Patients (Score 7-15 for women; 8-15 for men)  Discuss concern patient is drinking at unhealthy levels known to increase risk of alcohol-related health problems. Is Patient ready to commit to change? If No:  Encourage reflection  Discuss short term and long term health risks of consuming alcohol  Barriers to change  Reaffirm willingness to help / Educational materials provided  If Yes:  Set goal  Plan  Educational materials provided    Harmful use or Dependence (Score 16 or greater)  Discuss short term and long term health risks of consuming alcohol  Recommendations  Negotiate drinking goal  Recommend addiction specialist/center  Arrange follow-up appointments.     Tobacco - patient is a smoker:    Illegal Drugs use:      24 hour chart check complete: yes   ____________________________________________________________________________________________________________    Patient goal(s) for today: Rest, communicate needs to staff, contact support system, take medications as scheduled  Treatment team focus/goals: Discuss reason for admission  Progress note: Pt met with treatment team for the first time since admission and appears anxious. Pt denies SI/HI and WOODS [facility] but states high anxiety. Pt lives with family and states she is unemployed at this time but has maxed out her credit cards to support herself. Pt reports she will be applying for disability. Pt reports her repetition of words is not typical for her and states it is stemming from anxiety, pt presents with a speech stutter and repetition of words. SW to contact family regarding follow-up, MD to start medications to manage anxiety.     LOS:  0  Expected LOS: 5-7    Financial concerns/prescription coverage: [facility] MEDICAID - [facility] COMMUNITY PLAN [facility]  Family contact: Braxton Sanon, girlfriend, [phone], Bren Moya, daughter, [phone], Asa Cortez, daughter, [phone], JAMIE signed for all      Family requesting physician contact today:  No  Discharge plan: Return home once medications are stable  Access to weapons : None  Outpatient provider(s): Ethel  Patient's preferred phone number for follow up call : [phone]   Patient's preferred e-mail address : N/A    Participating treatment team members: Rayo Paulson, Paulina Garza, MSW, Micha Rangel RN, Mendel Lees, NP

## 2022-10-31 NOTE — BH NOTES
GROUP THERAPY PROGRESS NOTE     No group due to low patient participation. SW left activity to complete independently.      David Henry, PRESLEY, QMHP-A

## 2022-10-31 NOTE — BH NOTES
TRANSFER - IN REPORT:    Verbal report received from Carla(name) on Gena Patten  being received from [facility] [place] ED(unit) for routine progression of care      Report consisted of patients Situation, Background, Assessment and   Recommendations(SBAR). Information from the following report(s) SBAR, ED Summary, MAR, and Recent Results was reviewed with the receiving nurse. Opportunity for questions and clarification was provided. Assessment completed upon patients arrival to unit and care assumed.

## 2022-10-31 NOTE — CONSULTS
Brief Neurology Consult Note:    Hugh Vogel is a 33-GNSM-PTT female presented to [facility] with disorganized thought and worsening anxiety. Was advised by psychiatry who requested a neurology evaluation for her apparent language impairment. She underwent CTA and MRI with no significant findings. Patient was briefly evaluated on the psych floor this morning for follow-up mentions that when she is anxious she has trouble with her thoughts otherwise she is fine. As such agree with patient that anxiety/disturbance of mood can certainly lead to difficulty with organized thought and thus fluent speech. No further recommendations, formal consultation deferred and no charges rendered.

## 2022-10-31 NOTE — PROGRESS NOTES
Patient resting in bed with eyes closed. No complaints or distress noted. Safety measures in place. Will continue to monitor q15 minutes. Problem: Falls - Risk of  Goal: *Absence of Falls  Description: Document Dennie Oak Fall Risk and appropriate interventions in the flowsheet.   10/31/2022 0334 by Randolph Marte RN  Note: Fall Risk Interventions:     Medication Interventions: Patient to rise slowly when getting out of bed

## 2022-10-31 NOTE — PROGRESS NOTES
Problem: Post Traumatic Stress (Adult/Pediatric)  Goal: *STG: Participates in treatment plan  Outcome: Progressing Towards Goal  Note: Out on unit appears relaxed and smiling with putting a puzzle together with peers. Denies SI, reports anxiety remains elevated regarding finances and housing. Noted stuttering continues and increases when answering nursing assessment however less noticiable when talking w peers. Verbalized understanding of medications.    Goal: *STG: Expresses feelings related to the traumatic event  Outcome: Progressing Towards Goal  Goal: *STG: Patient uses effective coping mechanisms  Outcome: Progressing Towards Goal  Goal: Interventions  Outcome: Josue Pelra  Master Treatment Plan for Mervyn Schaumann    Date Treatment Plan Initiated: 10/31/22    Treatment Plan Modalities:  Type of Modality Amount  (x minutes) Frequency (x/week) Duration (x days) Name of Responsible Staff   Community & wrap-up meetings to encourage peer interactions 15 7 1 Georgie Veloz     Group psychotherapy to assist in building coping skills and internal controls 60 7 101 [facility]   Therapeutic activity groups to build coping skills 60 7 1 [facility] [place]   Psychoeducation in group setting to address:   Medication education   [address] [employer]   [facility] [place] Veronika   Relaxation techniques         Symptom management         Dis[facility] planning   60 2 [facility]   Spirituality    60 2 1 Chaplain GARCIA   61 1 1 volunteer   Recovery/AA/NA      volunteer   Physician medication management   15 7 Latoya Boone 112 NP   Family meeting/discharge planning   15 2 1400 [facility]

## 2022-10-31 NOTE — BH NOTES
GROUP THERAPY PROGRESS NOTE    Patient did not participate in psychotherapy group.     Ernie SHERWOOD, [facility]

## 2022-10-31 NOTE — BH NOTES
Deanne Mcfadden RN and Aidan Burciaga RN performed a dual skin assessment on this patient. Gallbladder scar noted above umbilicus.  scar noted below umbilicus.
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
## 2022-10-31 NOTE — ED NOTES
9904     Teleneuro Mi Fry MD at bedside    2000      Pt in green gown resting in bed. Sitter at bedside.
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
## 2022-11-01 PROCEDURE — 74011250637 HC RX REV CODE- 250/637: Performed by: NURSE PRACTITIONER

## 2022-11-01 PROCEDURE — 74011250637 HC RX REV CODE- 250/637

## 2022-11-01 PROCEDURE — 65220000003 HC RM SEMIPRIVATE PSYCH

## 2022-11-01 RX ORDER — AZELASTINE 1 MG/ML
1 SPRAY, METERED NASAL 2 TIMES DAILY
Status: DISCONTINUED | OUTPATIENT
Start: 2022-11-01 | End: 2022-11-02 | Stop reason: HOSPADM

## 2022-11-01 RX ORDER — METOPROLOL TARTRATE 25 MG/1
25 TABLET, FILM COATED ORAL 2 TIMES DAILY
Status: DISCONTINUED | OUTPATIENT
Start: 2022-11-01 | End: 2022-11-02 | Stop reason: HOSPADM

## 2022-11-01 RX ORDER — BUSPIRONE HYDROCHLORIDE 10 MG/1
10 TABLET ORAL 3 TIMES DAILY
Status: DISCONTINUED | OUTPATIENT
Start: 2022-11-01 | End: 2022-11-02 | Stop reason: HOSPADM

## 2022-11-01 RX ADMIN — BUSPIRONE HYDROCHLORIDE 10 MG: 10 TABLET ORAL at 21:04

## 2022-11-01 RX ADMIN — GABAPENTIN 100 MG: 100 CAPSULE ORAL at 09:07

## 2022-11-01 RX ADMIN — BUSPIRONE HYDROCHLORIDE 5 MG: 5 TABLET ORAL at 09:07

## 2022-11-01 RX ADMIN — LOSARTAN POTASSIUM 100 MG: 50 TABLET, FILM COATED ORAL at 09:07

## 2022-11-01 RX ADMIN — GABAPENTIN 100 MG: 100 CAPSULE ORAL at 16:28

## 2022-11-01 RX ADMIN — PANTOPRAZOLE SODIUM 40 MG: 40 TABLET, DELAYED RELEASE ORAL at 06:42

## 2022-11-01 RX ADMIN — ACETAMINOPHEN 650 MG: 325 TABLET ORAL at 21:05

## 2022-11-01 RX ADMIN — DULOXETINE HYDROCHLORIDE 90 MG: 60 CAPSULE, DELAYED RELEASE ORAL at 09:07

## 2022-11-01 RX ADMIN — PANTOPRAZOLE SODIUM 40 MG: 40 TABLET, DELAYED RELEASE ORAL at 16:28

## 2022-11-01 RX ADMIN — ACETAMINOPHEN 650 MG: 325 TABLET ORAL at 16:39

## 2022-11-01 RX ADMIN — BUSPIRONE HYDROCHLORIDE 10 MG: 10 TABLET ORAL at 16:28

## 2022-11-01 RX ADMIN — ACETAMINOPHEN 650 MG: 325 TABLET ORAL at 09:12

## 2022-11-01 RX ADMIN — METOPROLOL TARTRATE 25 MG: 25 TABLET, FILM COATED ORAL at 21:04

## 2022-11-01 RX ADMIN — GABAPENTIN 100 MG: 100 CAPSULE ORAL at 21:03

## 2022-11-01 RX ADMIN — ACETAMINOPHEN 650 MG: 325 TABLET ORAL at 03:08

## 2022-11-01 NOTE — BH NOTES
GROUP THERAPY PROGRESS NOTE  Pt actively participated in Spirituality group.    Isis Jones, PRESLEY, HP-A

## 2022-11-01 NOTE — BH NOTES
GROUP THERAPY PROGRESS NOTE    Patient is participating in Healthy living and wellness group. Group time: 45 minutes    Personal goal for participation: Develop an understanding of sleep hygiene    Goal orientation: Personal    Group therapy participation: Active     Therapeutic interventions reviewed and discussed: Group members were able to develop an understanding of how sleep patterns effect mental health. Members were guided through developing an understanding of sleep hygiene. Members gained insight through discussion about current maladaptive sleep habits and ways to improve sleep quality. Sleep hygiene guideline worksheet provided. Impression of participation: Pt was present and engaged in group discussion. Pt added insight to group topic.  Pt interacted with peers and Lisbeth Thurston was calm, cooperative

## 2022-11-01 NOTE — PROGRESS NOTES
Problem: Depressed Mood (Adult/Pediatric)  Goal: *STG: Participates in treatment plan  Outcome: Progressing Towards Goal  Note: Out on unit brighter affect, engaged and social w peers and staff. Mood and affect bright and hopeful. Future focused. Daily goal is to stay active and participating in groups.  Staff focus is on offering  support and reassurance  Goal: *STG: Verbalizes anger, guilt, and other feelings in a constructive manor  Outcome: Progressing Towards Goal  Goal: *STG: Attends activities and groups  Outcome: Progressing Towards Goal  Goal: *STG: Demonstrates reduction in symptoms and increase in insight into coping skills/future focused  Outcome: Progressing Towards Goal  Goal: Interventions  Outcome: Progressing Towards Goal

## 2022-11-01 NOTE — PROGRESS NOTES
Problem: Falls - Risk of  Goal: *Absence of Falls  Description: Document Gutierrez Cristina Fall Risk and appropriate interventions in the flowsheet. Outcome: Progressing Towards Goal  Note: Fall Risk Interventions:            Medication Interventions: Teach patient to arise slowly    Patient received, appears to be asleep, eyes closed, breathing even and unlabored. No signs of distress noted. Will continue to monitor for safety.

## 2022-11-01 NOTE — BH NOTES
PRN Medication Documentation    Specific patient behavior that led to need for PRN medication: Patient requested meds for pain  Staff interventions attempted prior to PRN being given: Offered PRN pain meds  PRN medication given: Tylenol 650 mg PO   Patient response/effectiveness of PRN medication: Will continue to monitor.

## 2022-11-01 NOTE — PROGRESS NOTES
Spirituality Group      Meeting Topic: Coping with Loss    Meeting Time:  45-60 minutes    Meeting Goal: Participants will discuss the impact of loss, consider positive ways to cope with loss, and reflect on the experiences that resulted in personal and spiritual growth. Nayan Glaser attended and participated in the group appropriately respective of current diagnosis. For additional spiritual care, please contact the  on-call at ([phone]). Rev.  Morro Puckett MDiv, MS, [facility]  Staff

## 2022-11-01 NOTE — BH NOTES
PSYCHIATRIC PROGRESS NOTE       Patient: Anna Velazquez MRN: [id number]  SSN: [id number]    YOB: 1960  Age: 63 y.o. Sex: female      Admit Date: 10/30/2022    LOS: 1 day       Chief Complaint:  I am very anxious. Interval History:  Anna Velazquez reports ongoing anxiety. Denies depression. Her speech was spontaneous most of the interview, had periods of stuttering. She was seen by neuro, CT and MRI work up were both unremarkable. She usually takes metoprolol. We will go ahead and dc cozaar and restart metoprolol which also helps with anxiety. Says unstable housing has been quite overwhelming. Denies si hi or avh. At the present time the patient Anna Velazquez remains compliant with taking medications. Denies any adverse events from taking them and feels they have been beneficial.         Past Medical History:  Past Medical History:   Diagnosis Date    Adverse effect of anesthesia     \"so sleepy and tired for the rest of the day\"    Anxiety     Asthma     Chronic pain     back - L5 is \"almost gone\"/left foot neuropathy -pinched nerve L4-5 - spine shifted/degenerative spinal disease/stiff and sore neck and shoulder - in PT for back and neck    Degenerative spinal arthritis     Diabetes (HCC)     PRE DIABETIC    Hypertension     IBS (irritable bowel syndrome)     Ill-defined condition     pre-diabetes    Nausea & vomiting     ? d/t fentanyl    PUD (peptic ulcer disease)     Spondylolisthesis          ALLERGIES:(reviewed/updated 11/1/2022)  Allergies   Allergen Reactions    Cat Dander Runny Nose    Demerol [Meperidine] Nausea and Vomiting     Dizziness. Went to ER. Egg Yolk Swelling    Fentanyl Nausea and Vomiting     Dizziness.     Grass Pollen Runny Nose    House Dust Mite Not Reported This Time    Lactaid [Lactase] Other (comments)     Bloating/gas    Lactose Other (comments)     Bloating/gas    Mold Shortness of Breath    Prednisone Swelling    Prozac [Fluoxetine] Rash    Soy Nausea and Vomiting    Zoloft [Sertraline] Rash       Laboratory report:  Lab Results   Component Value Date/Time    WBC 7.7 10/30/2022 03:33 PM    HGB 11.9 10/30/2022 03:33 PM    HCT 36.6 10/30/2022 03:33 PM    PLATELET 242 46/49/2732 03:33 PM    MCV 95.8 10/30/2022 03:33 PM      Lab Results   Component Value Date/Time    Sodium 140 10/30/2022 03:33 PM    Potassium 3.5 10/30/2022 03:33 PM    Chloride 105 10/30/2022 03:33 PM    CO2 30 10/30/2022 03:33 PM    Anion gap 5 10/30/2022 03:33 PM    Glucose 100 10/30/2022 03:33 PM    BUN 9 10/30/2022 03:33 PM    Creatinine 0.72 10/30/2022 03:33 PM    BUN/Creatinine ratio 13 10/30/2022 03:33 PM    GFR est AA >60 07/11/2022 08:37 PM    GFR est non-AA 56 (L) 07/11/2022 08:37 PM    Calcium 8.8 10/30/2022 03:33 PM    Bilirubin, total 0.7 10/30/2022 03:33 PM    Alk.  phosphatase 112 10/30/2022 03:33 PM    Protein, total 8.3 (H) 10/30/2022 03:33 PM    Albumin 3.7 10/30/2022 03:33 PM    Globulin 4.6 (H) 10/30/2022 03:33 PM    A-G Ratio 0.8 (L) 10/30/2022 03:33 PM    ALT (SGPT) 26 10/30/2022 03:33 PM      Vitals:    10/31/22 2114 11/01/22 0951 11/01/22 1115 11/01/22 1545   BP: 129/82 (!) 144/73 (!) 136/90 119/86   Pulse: 82 82 99 97   Resp: 16 16 16 16   Temp: 97.6 °F (36.4 °C) 97.6 °F (36.4 °C) 97.7 °F (36.5 °C) 97.9 °F (36.6 °C)   SpO2: 97% 97%  97%   Weight:       Height:           No results found for: VALF2, VALAC, VALP, VALPR, DS6, CRBAM, CRBAMP, CARB2, XCRBAM  No results found for: LITHM    Vital Signs  Patient Vitals for the past 24 hrs:   Temp Pulse Resp BP SpO2   11/01/22 1545 97.9 °F (36.6 °C) 97 16 119/86 97 %   11/01/22 1115 97.7 °F (36.5 °C) 99 16 (!) 136/90 --   11/01/22 0951 97.6 °F (36.4 °C) 82 16 (!) 144/73 97 %   10/31/22 2114 97.6 °F (36.4 °C) 82 16 129/82 97 %   10/31/22 1705 97.8 °F (36.6 °C) 84 16 136/80 97 %     Wt Readings from Last 3 Encounters:   10/30/22 88.5 kg (195 lb)   10/27/22 87.1 kg (192 lb)   10/10/22 88.9 kg (196 lb)     Temp Readings from Last 3 Encounters: 11/01/22 97.9 °F (36.6 °C)   10/27/22 98.1 °F (36.7 °C)   10/10/22 98.1 °F (36.7 °C)     BP Readings from Last 3 Encounters:   11/01/22 119/86   10/27/22 120/71   10/10/22 (!) 146/87     Pulse Readings from Last 3 Encounters:   11/01/22 97   10/27/22 66   10/10/22 77       Radiology (reviewed/updated 11/1/2022)  MRI BRAIN W WO CONT    Result Date: 10/30/2022  EXAM:  MRI BRAIN W WO CONT INDICATION: Repetitive speech COMPARISON:  MRI 8/10/2021. CONTRAST: 15 ml of ProHance TECHNIQUE:  Multiplanar multisequence acquisition without and with contrast of the brain. FINDINGS: The ventricles are normal in size and position. There is no acute infarct, hemorrhage, extra-axial fluid collection, or mass effect. There is no cerebellar tonsillar herniation. Expected arterial flow-voids are present. No evidence of abnormal enhancement. The paranasal sinuses, mastoid air cells, and middle ears are clear. The orbital contents are within normal limits. No significant osseous or scalp lesions are identified. Normal.    CT HEAD WO CONT    Result Date: 10/30/2022  EXAM: CT HEAD WO CONT INDICATION: repetitive speech COMPARISON: MRI 10/30/2020. CONTRAST: None. TECHNIQUE: Unenhanced CT of the head was performed using 5 mm images. Brain and bone windows were generated. Coronal and sagittal reformats. CT dose reduction was achieved through use of a standardized protocol tailored for this examination and automatic exposure control for dose modulation. FINDINGS: The ventricles and sulci are normal in size, shape and configuration. There is no significant white matter disease. There is no intracranial hemorrhage, extra-axial collection, or mass effect. The basilar cisterns are open. No CT evidence of acute infarct. The bone windows demonstrate no abnormalities. The visualized portions of the paranasal sinuses and mastoid air cells are clear.      Normal.    CT PERF W CBF    Result Date: 10/31/2022  PRELIMINARY REPORTNo large vessel occlusion. No significant perfusion abnormality. Preliminary report was provided by Dr. Bobby Hale, the on-call radiologist, at 23:34 Final report to follow. END PRELIMINARY REPORTFINAL REPORT: INDICATION: repetitive speech EXAMINATION:  CT ANGIOGRAPHY HEAD AND NECK COMPARISON: CT head earlier the same day TECHNIQUE:  Following the uneventful administration of  intravenous contrast, axial CT angiography of the head and neck was performed. 3D image postprocessing was performed. CT dose reduction was achieved through use of a standardized protocol tailored for this examination and automatic exposure control for dose modulation. Cerebral perfusion analysis using computed tomography with contrast administration, including post processing of parametric maps with the termination of cerebral blood flow, cerebral blood volume, and mean transit time. This study was analyzed by the [address] ai algorithm. FINDINGS: CTA NECK Aortic Arch: Patent. Right Common Carotid Artery: Patent. Right Internal Carotid Artery: Patent. NASCET Right: 0-25%. Left Common Carotid Artery: Patent. Left Internal Carotid Artery: Patent. NASCET Left: 0-25%. Carotid stenosis determined using NASCET criteria. Right Vertebral Artery: Patent. Left Vertebral Artery: Patent. Cervical Soft Tissues: No significant abnormality. Lung Apices: No significant abnormality. Bones: No destructive bone lesion. Additional Comments: N/A. CTA HEAD Posterior Circulation: No flow limiting stenosis or occlusion. Anterior Circulation: No flow limiting stenosis or occlusion. Additional Comments: No evidence of aneurysm or vascular malformation. CT PERFUSION: There are no regional areas of elevated Tmax, decreased cerebral blood flow or blood volume. RCBF < 30% = 0 cc. Tmax > 6 seconds = 0 cc. Mismatch volume = 0 cc. 1.  No acute process. No large vessel occlusion, arterial dissection, or flow-limiting stenosis. 2.  No perfusion abnormality.     XR CHEST PORT    Result Date: 10/30/2022  INDICATION:  Chest pain Exam: Portable chest 1457. Comparison: 7/11/2022. Findings: Cardiomediastinal silhouette is within normal limits. Pulmonary vasculature is not engorged. There are no focal parenchymal opacities, effusions, or pneumothorax. 1. No acute disease      CTA HEAD NECK W CONT    Result Date: 10/31/2022  PRELIMINARY REPORT No large vessel occlusion. No significant perfusion abnormality. Preliminary report was provided by Dr. Helen Jean, the on-call radiologist, at 23:34 Final report to follow. END PRELIMINARY REPORTFINAL REPORT: INDICATION: repetitive speech EXAMINATION:  CT ANGIOGRAPHY HEAD AND NECK COMPARISON: CT head earlier the same day TECHNIQUE:  Following the uneventful administration of  intravenous contrast, axial CT angiography of the head and neck was performed. 3D image postprocessing was performed. CT dose reduction was achieved through use of a standardized protocol tailored for this examination and automatic exposure control for dose modulation. Cerebral perfusion analysis using computed tomography with contrast administration, including post processing of parametric maps with the termination of cerebral blood flow, cerebral blood volume, and mean transit time. This study was analyzed by the [address] ai algorithm. FINDINGS: CTA NECK Aortic Arch: Patent. Right Common Carotid Artery: Patent. Right Internal Carotid Artery: Patent. NASCET Right: 0-25%. Left Common Carotid Artery: Patent. Left Internal Carotid Artery: Patent. NASCET Left: 0-25%. Carotid stenosis determined using NASCET criteria. Right Vertebral Artery: Patent. Left Vertebral Artery: Patent. Cervical Soft Tissues: No significant abnormality. Lung Apices: No significant abnormality. Bones: No destructive bone lesion. Additional Comments: N/A. CTA HEAD Posterior Circulation: No flow limiting stenosis or occlusion. Anterior Circulation: No flow limiting stenosis or occlusion.  Additional Comments: No evidence of aneurysm or vascular malformation. CT PERFUSION: There are no regional areas of elevated Tmax, decreased cerebral blood flow or blood volume. RCBF < 30% = 0 cc. Tmax > 6 seconds = 0 cc. Mismatch volume = 0 cc. 1.  No acute process. No large vessel occlusion, arterial dissection, or flow-limiting stenosis. 2.  No perfusion abnormality.       Current Facility-Administered Medications   Medication Dose Route Frequency Provider Last Rate Last Admin    azelastine (ASTELIN) 137mcg/spray nasal spray  1 Spray Both Nostrils BID Maria Victoria Polk NP        metoprolol tartrate (LOPRESSOR) tablet 25 mg  25 mg Oral BID Maria Victoria Polk NP        busPIRone (BUSPAR) tablet 10 mg  10 mg Oral TID Maria Victoria Polk NP        OLANZapine (ZyPREXA) tablet 5 mg  5 mg Oral Q6H PRN Layla Masters, NP        haloperidol lactate (HALDOL) injection 5 mg  5 mg IntraMUSCular Q6H PRN Layla Masters, NP        benztropine (COGENTIN) tablet 1 mg  1 mg Oral BID PRN Layla Ramosiago, NP        diphenhydrAMINE (BENADRYL) injection 50 mg  50 mg IntraMUSCular BID PRN Layla Masters, NP        traZODone (DESYREL) tablet 50 mg  50 mg Oral QHS PRN Layla Masters, NP   50 mg at 10/31/22 0148    acetaminophen (TYLENOL) tablet 650 mg  650 mg Oral Q4H PRN Layla Masters, NP   650 mg at 11/01/22 0912    magnesium hydroxide (MILK OF MAGNESIA) 400 mg/5 mL oral suspension 30 mL  30 mL Oral DAILY PRN Layla Masters NP        lidocaine 4 % patch 1 Patch  1 Patch TransDERmal DAILY Maria Victoria Polk NP        pantoprazole (PROTONIX) tablet 40 mg  40 mg Oral ACB&D Maria Victoria Polk NP   40 mg at 11/01/22 0642    gabapentin (NEURONTIN) capsule 100 mg  100 mg Oral TID Maria Victoria Polk NP   100 mg at 11/01/22 0907    DULoxetine (CYMBALTA) capsule 90 mg  90 mg Oral DAILY Maria Victoria Polk NP   90 mg at 11/01/22 0907    hydrOXYzine HCL (ATARAX) tablet 10 mg  10 mg Oral Q6H PRN Maria Victoria Polk, NP        albuterol (PROVENTIL VENTOLIN) nebulizer solution 2.5 mg  2.5 mg Nebulization Q6H PRN Maria Victoria Polk NP           Side Effects: (reviewed/updated 11/1/2022)  None reported or admitted to. Review of Systems: (reviewed/updated 11/1/2022)  Appetite: good  Sleep: good   All other Review of Systems: negative    Mental Status Exam:  Eye contact: Good eye contact  Psychomotor activity: wnl  Speech is periods of stuttering. Thought process: Logical and goal directed   Mood is \"anxious\"  Affect: constricted  Perception: No avh  Suicidal ideation: No si  Homicidal ideation: No hi  Insight/judgment: Poor  Cognition is grossly intact      Physical Exam:  Musculoskeletal system: steady gait  Tremor not present  Cog wheeling not present      Assessment and Plan:  Kimberly Ragsdale meets criteria for a diagnosis of Unspecified mood disorder. Unspecified anxiety disorder. Sergei cozaar. Start metoprolol 25 mg bid. Increase buspar to 10 mg tid. Continue rest of medications as prescribed. We will closely monitor for safety. We will encourage reality orientation. Disposition planning to continue. I certify that this patients inpatient psychiatric hospital services furnished since the previous certification were, and continue to be, required for treatment that could reasonably be expected to improve the patient's condition, or for diagnostic study, and that the patient continues to need, on a daily basis, active treatment furnished directly by or requiring the supervision of inpatient psychiatric facility personnel. In addition, the hospital records show that services furnished were intensive treatment services, admission or related services, or equivalent services.       Signed:  Amalia Lopez NP  11/1/2022

## 2022-11-01 NOTE — PROGRESS NOTES
Problem: Falls - Risk of  Goal: *Absence of Falls  Description: Document Dennie Oak Fall Risk and appropriate interventions in the flowsheet. Outcome: Progressing Towards Goal  Note: Fall Risk Interventions:     Medication Interventions: Teach patient to arise slowly    Problem: Depressed Mood (Adult/Pediatric)  Goal: *STG: Participates in treatment plan  Outcome: Progressing Towards Goal  Goal: *STG: Verbalizes anger, guilt, and other feelings in a constructive manor  Outcome: Progressing Towards Goal  Goal: *STG: Demonstrates reduction in symptoms and increase in insight into coping skills/future focused  Outcome: Progressing Towards Goal  Goal: Interventions  Outcome: Progressing Towards Goal     Patient out on unit engaged with peers. Denies SI/HI/AVH. Remains medication and meal compliant. Will continue to monitor q15 minutes for safety.

## 2022-11-01 NOTE — INTERDISCIPLINARY ROUNDS
Behavioral Health Interdisciplinary Rounds     Patient Name: Annelise Romero  Age: 63 y.o. Room/Bed:  744/  Primary Diagnosis: <principal problem not specified>   Admission Status: Voluntary     Readmission within 30 days: no  Power of  in place: no  Patient requires a blocked bed: no          Reason for blocked bed:       Flu Vaccine : no   Consults: yes-neuro         Labs/Testing due today? Have they refused labs?: no    Sleep hours:  6      Participation in Care/Groups:  yes  Medication Compliant?: Yes  PRNS (last 24 hours): Pain    Restraints (last 24 hours):  no     CIWA (range last 24 hours):     COWS (range last 24 hours):      Alcohol screening (AUDIT) completed -         If applicable, date SBIRT discussed in treatment team AND documented:   AUDIT Screen Score:        Document Brief Intervention (corresponds directly with the 5 A's, Ask, Advise, Assess, Assist, and Arrange): At- Risk Patients (Score 7-15 for women; 8-15 for men)  Discuss concern patient is drinking at unhealthy levels known to increase risk of alcohol-related health problems. Is Patient ready to commit to change? If No:  Encourage reflection  Discuss short term and long term health risks of consuming alcohol  Barriers to change  Reaffirm willingness to help / Educational materials provided  If Yes:  Set goal  Plan  Educational materials provided    Harmful use or Dependence (Score 16 or greater)  Discuss short term and long term health risks of consuming alcohol  Recommendations  Negotiate drinking goal  Recommend addiction specialist/center  Arrange follow-up appointments.     Tobacco - patient is a smoker: Have You Used Tobacco in the Past 30 Days: Yes  Illegal Drugs use: Have You Used Any Illegal Substances Over the Past 12 Months: Yes    24 hour chart check complete: no   ____________________________________________________________________________________________________________    Patient goal(s) for today: Rest, communicate needs to staff, contact support system, take medications as scheduled  Treatment team focus/goals: Discuss disposition planning  Progress note: Pt met with treatment team and appeared with less word repetition than previous day but presents with involuntary, repetitive eye-brow furrowing. Pt reports anxiety is the same as the day before, NP to increase Buspar. SW spoke with girlfriend who reports she can stay with her once discharged. She is concerned that her therapist is recommending Xanax prescription and would like for her to find a new therapist, SW working on referral at this time. Pt states she feels uncomfortable going to stay with her girlfriend but SW encouraging to stay with her temporarily until finances are in a better place. Pt not receptive at this time, pt appears anxious and restless. NP to continue to monitor medications, pt to have visit with girlfriend this evening.      LOS:  1                       Expected LOS: 5-7     Financial concerns/prescription coverage: [facility] MEDICAID - [facility] COMMUNITY PLAN [facility]  Family contact: Sandy Cervantes, girlfriend, [phone], Rosie Footeer, daughter, [phone], Lourdes Farley, daughter, [phone], JAMIE signed for all                              Family requesting physician contact today:  No  Discharge plan: Return home once medications are stable  Access to weapons : None  Outpatient provider(s): Ethel  Patient's preferred phone number for follow up call : [phone]   Patient's preferred e-mail address : N/A     Participating treatment team members: Kevin Ortiz, PRESLEY De La Paz, Gudelia Stewart RN, Rubio Denise, CHACORTA

## 2022-11-01 NOTE — BH NOTES
GROUP THERAPY PROGRESS NOTE    Patient is participating in psychotherapy group. Group time: 60 minutes    Personal goal for participation: To gain an understanding Rumination and Worry     Goal orientation: Personal    Group therapy participation: Active     Therapeutic interventions reviewed and discussed:  Group members were guided through understanding the differences between worry and rumination. Members gained an idea of the effects of overthinking. Members were supported in identifying reoccurring or intrusive thoughts and applying different strategies to adjust their thought patterns. Handouts provided. Impression of participation:  Pt was present and engaged in group discussion. Pt added insight to group topic. Pt was calm, cooperative.        PRESLEY Boone, HP-A

## 2022-11-02 VITALS
RESPIRATION RATE: 16 BRPM | DIASTOLIC BLOOD PRESSURE: 88 MMHG | SYSTOLIC BLOOD PRESSURE: 126 MMHG | TEMPERATURE: 98 F | WEIGHT: 195 LBS | OXYGEN SATURATION: 97 % | HEIGHT: 68 IN | BODY MASS INDEX: 29.55 KG/M2 | HEART RATE: 99 BPM

## 2022-11-02 PROCEDURE — 74011000250 HC RX REV CODE- 250: Performed by: NURSE PRACTITIONER

## 2022-11-02 PROCEDURE — 74011250637 HC RX REV CODE- 250/637: Performed by: NURSE PRACTITIONER

## 2022-11-02 RX ORDER — DULOXETIN HYDROCHLORIDE 30 MG/1
90 CAPSULE, DELAYED RELEASE ORAL DAILY
Qty: 90 CAPSULE | Refills: 0 | Status: SHIPPED | OUTPATIENT
Start: 2022-11-03 | End: 2022-12-03

## 2022-11-02 RX ORDER — GABAPENTIN 100 MG/1
100 CAPSULE ORAL 3 TIMES DAILY
Qty: 90 CAPSULE | Refills: 0 | Status: SHIPPED | OUTPATIENT
Start: 2022-11-02 | End: 2022-12-02

## 2022-11-02 RX ORDER — LIDOCAINE 50 MG/G
1 PATCH TOPICAL
Qty: 1 EACH | Refills: 0 | Status: SHIPPED | OUTPATIENT
Start: 2022-11-02 | End: 2022-12-02

## 2022-11-02 RX ORDER — PANTOPRAZOLE SODIUM 40 MG/1
40 TABLET, DELAYED RELEASE ORAL 2 TIMES DAILY
Qty: 60 TABLET | Refills: 0 | Status: SHIPPED | OUTPATIENT
Start: 2022-11-02

## 2022-11-02 RX ORDER — TRAZODONE HYDROCHLORIDE 50 MG/1
50 TABLET ORAL
Qty: 30 TABLET | Refills: 0 | Status: SHIPPED | OUTPATIENT
Start: 2022-11-02 | End: 2022-12-02

## 2022-11-02 RX ORDER — METOPROLOL TARTRATE 25 MG/1
25 TABLET, FILM COATED ORAL 2 TIMES DAILY
Qty: 60 TABLET | Refills: 0 | Status: SHIPPED | OUTPATIENT
Start: 2022-11-02 | End: 2022-12-02

## 2022-11-02 RX ORDER — BUSPIRONE HYDROCHLORIDE 10 MG/1
10 TABLET ORAL 3 TIMES DAILY
Qty: 90 TABLET | Refills: 0 | Status: SHIPPED | OUTPATIENT
Start: 2022-11-02 | End: 2022-12-02

## 2022-11-02 RX ORDER — AZELASTINE 1 MG/ML
1 SPRAY, METERED NASAL 2 TIMES DAILY
Qty: 1 EACH | Refills: 0 | Status: SHIPPED | OUTPATIENT
Start: 2022-11-02

## 2022-11-02 RX ADMIN — PANTOPRAZOLE SODIUM 40 MG: 40 TABLET, DELAYED RELEASE ORAL at 15:15

## 2022-11-02 RX ADMIN — GABAPENTIN 100 MG: 100 CAPSULE ORAL at 08:55

## 2022-11-02 RX ADMIN — PANTOPRAZOLE SODIUM 40 MG: 40 TABLET, DELAYED RELEASE ORAL at 06:47

## 2022-11-02 RX ADMIN — METOPROLOL TARTRATE 25 MG: 25 TABLET, FILM COATED ORAL at 08:55

## 2022-11-02 RX ADMIN — AZELASTINE HYDROCHLORIDE 1 SPRAY: 137 SPRAY, METERED NASAL at 08:56

## 2022-11-02 RX ADMIN — BUSPIRONE HYDROCHLORIDE 10 MG: 10 TABLET ORAL at 08:55

## 2022-11-02 RX ADMIN — DULOXETINE HYDROCHLORIDE 90 MG: 60 CAPSULE, DELAYED RELEASE ORAL at 08:55

## 2022-11-02 RX ADMIN — GABAPENTIN 100 MG: 100 CAPSULE ORAL at 15:15

## 2022-11-02 RX ADMIN — BUSPIRONE HYDROCHLORIDE 10 MG: 10 TABLET ORAL at 15:15

## 2022-11-02 NOTE — BH NOTES
GROUP THERAPY PROGRESS NOTE    Patient is participating in [address] and Wellness group. Group time: 45 minutes    Personal goal for participation: To identify and define overthinking. Process and practice skills related. Goal orientation: Personal    Group therapy participation: active     Therapeutic interventions reviewed and discussed:  Group members were offered education about overthinking. Members were guided through types of overthinking and identifying positive strategies to adjust rumination and worry. Handouts provided. Impression of participation: Pt was engaged in group discussion. Pt showed insight into the topic and was calm and cooperative with peers.       PRESLEY Suggs, HP-A

## 2022-11-02 NOTE — BH NOTES
GROUP THERAPY PROGRESS NOTE    Patient is participating in psychotherapy group. Group time: 60 minutes    Personal goal for participation: To gain an understanding of emotions and emotional regulation    Goal orientation: Personal    Group therapy participation: Active     Therapeutic interventions: Group members were educated about the 8 primary emotions. Members also gained an understanding of what emotions do, how we can regulate them and myths about emotions. Handouts provided. Impression of participation:  Pt was present and engaged in group discussion. Pt added insight to group topic. Pt was supportive of peers. Pt was calm, cooperative.        PRESLEY Jacques, QMHP-A

## 2022-11-02 NOTE — DISCHARGE INSTRUCTIONS
DISCHARGE SUMMARY    Katerin Fraire  : 1960  MRN: [id number]    The patient Gattis Duverney exhibits the ability to control behavior in a less restrictive environment. Patient's level of functioning is improving. No assaultive/destructive behavior has been observed for the past 24 hours. No suicidal/homicidal threat or behavior has been observed for the past 24 hours. There is no evidence of serious medication side effects. Patient has not been in physical or protective restraints for at least the past 24 hours. If weapons involved, how are they secured? None    Is patient aware of and in agreement with discharge plan? Yes    Arrangements for medication:  Prescriptions given to patient, given a 30 day supply. Copy of discharge instructions to provider?: Yes    Arrangements for transportation home: Family    Keep all follow up appointments as scheduled, continue to take prescribed medications per physician instructions.   Mental health crisis number:  910 or your local mental health crisis line number at [address] [place] [address] at [place] [address] Emergency WARM LINE      [phone] ([phone])      M-F: 9am to 9pm      Sat & Sun: 5pm - 9pm  National suicide prevention lines:                             7-257-UCWVOUE ([phone])       5-710-931-TALK ([phone])    Crisis Text Line:  Text HOME to 345826
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
## 2022-11-02 NOTE — PROGRESS NOTES
Problem: Falls - Risk of  Goal: *Absence of Falls  Description: Document Nelida Camejo Fall Risk and appropriate interventions in the flowsheet. Outcome: Progressing Towards Goal  Note: Fall Risk Interventions:            Medication Interventions: Teach patient to arise slowly       Patient received, appears to be asleep, eyes closed, breathing even and unlabored. No signs of distress noted. Will continue to monitor for safety.

## 2022-11-02 NOTE — BH NOTES
GROUP THERAPY PROGRESS NOTE    Patient is participating in Self-care group. Group time: 45 minutes    Personal goal for participation: To identify and communicate feelings, thoughts, wants and needs    Goal orientation: Personal    Group therapy participation: active     Therapeutic interventions reviewed and discussed:  Group members were encouraged to identify feelings, thoughts and goals to achieve their wants and needs. SW assisted members to understand the importance of being able to identify emotions and how to communicate them. Handout provided     Impression of participation: Pt was present and engaged in group discussion. Pt added insight to group topic. Pt asked relevant questions. Pt interacted with peers and Davion Cruz was calm, cooperative.        PRESLEY Birch, QMHP-A

## 2022-11-02 NOTE — BH NOTES
Behavioral Health Transition Record to Provider    Patient Name: Terry Carlos  YOB: 1960  Medical Record Number: [id number]  Date of Admission: 10/30/2022  Date of Discharge: 11/2/2022    Attending Provider: Lizet Butler MD  Discharging Provider: Kurt Cody NP  To contact this individual call [phone] and ask the  to page. If unavailable, ask to be transferred to [facility] Provider on call. [facility] Provider will be available on call 24/7 and during holidays. Primary Care Provider: Bonny Gann MD    Allergies   Allergen Reactions    Cat Dander Runny Nose    Demerol [Meperidine] Nausea and Vomiting     Dizziness. Went to ER. Egg Yolk Swelling    Fentanyl Nausea and Vomiting     Dizziness. Grass Pollen Runny Nose    House Dust Mite Not Reported This Time    Lactaid [Lactase] Other (comments)     Bloating/gas    Lactose Other (comments)     Bloating/gas    Mold Shortness of Breath    Prednisone Swelling    Prozac [Fluoxetine] Rash    Soy Nausea and Vomiting    Zoloft [Sertraline] Rash       Reason for Admission: CHIEF COMPLAINT:  \"I wanted to get some help. \"     HISTORY OF PRESENTING ILLNESS:  The patient is a 60-year-old female who is admitted at [facility] inpatient psychiatric unit on a voluntary basis. She reported this is her first psychiatric inpatient admission. She reports a history of depression and anxiety and has an appointment coming up with a new psychiatrist mid November. She stutters during the interview. She reports that she needs to get some help to keep her calm and less anxious. She states that she needs more than the Cymbalta. Reportedly, she was found wandering outside, making references about suicide, and was talking repeatedly to herself. She is noted to have some periods of stuttering during the interview. She states that she has been under a lot of stress about her living situation and also her finances. She states that she was living with an older aunt, but because her aunt was old that they do not use the air conditioner as much, so she ended up staying with her friend. She is feeling pressured from her friend for financial help. She states that she has been very anxious quite lately. Her thoughts are racing. She endorsed passive suicidal ideation with ER nurse and in the emergency room, but denies any plan. She denies any suicidal thoughts with me. She reports that her main issue is housing. Her urine drug screen is positive for THC. She states that she smokes THC to calm herself down. No history of suicide attempt. She is admitted to the inpatient psychiatric unit for further evaluation and treatment. Admission Diagnosis: Generalized anxiety disorder [F41.1]    * No surgery found *    Results for orders placed or performed during the hospital encounter of 10/30/22   COVID-19 WITH INFLUENZA A/B   Result Value Ref Range    SARS-CoV-2 by PCR Not detected NOTD      Influenza A by PCR Not detected NOTD      Influenza B by PCR Not detected NOTD     CBC WITH AUTOMATED DIFF   Result Value Ref Range    WBC 7.7 3.6 - 11.0 K/uL    RBC 3.82 3.80 - 5.20 M/uL    HGB 11.9 11.5 - 16.0 g/dL    HCT 36.6 35.0 - 47.0 %    MCV 95.8 80.0 - 99.0 FL    MCH 31.2 26.0 - 34.0 PG    MCHC 32.5 30.0 - 36.5 g/dL    RDW 13.1 11.5 - 14.5 %    PLATELET 131 938 - 197 K/uL    MPV 9.2 8.9 - 12.9 FL    NRBC 0.0 0  WBC    ABSOLUTE NRBC 0.00 0.00 - 0.01 K/uL    NEUTROPHILS 68 32 - 75 %    LYMPHOCYTES 21 12 - 49 %    MONOCYTES 7 5 - 13 %    EOSINOPHILS 3 0 - 7 %    BASOPHILS 1 0 - 1 %    IMMATURE GRANULOCYTES 0 0.0 - 0.5 %    ABS. NEUTROPHILS 5.3 1.8 - 8.0 K/UL    ABS. LYMPHOCYTES 1.6 0.8 - 3.5 K/UL    ABS. MONOCYTES 0.5 0.0 - 1.0 K/UL    ABS. EOSINOPHILS 0.2 0.0 - 0.4 K/UL    ABS. BASOPHILS 0.1 0.0 - 0.1 K/UL    ABS. IMM.  GRANS. 0.0 0.00 - 0.04 K/UL    DF AUTOMATED     METABOLIC PANEL, COMPREHENSIVE   Result Value Ref Range Sodium 140 136 - 145 mmol/L    Potassium 3.5 3.5 - 5.1 mmol/L    Chloride 105 97 - 108 mmol/L    CO2 30 21 - 32 mmol/L    Anion gap 5 5 - 15 mmol/L    Glucose 100 65 - 100 mg/dL    BUN 9 6 - 20 MG/DL    Creatinine 0.72 0.55 - 1.02 MG/DL    BUN/Creatinine ratio 13 12 - 20      eGFR >60 >60 ml/min/1.73m2    Calcium 8.8 8.5 - 10.1 MG/DL    Bilirubin, total 0.7 0.2 - 1.0 MG/DL    ALT (SGPT) 26 12 - 78 U/L    AST (SGOT) 21 15 - 37 U/L    Alk.  phosphatase 112 45 - 117 U/L    Protein, total 8.3 (H) 6.4 - 8.2 g/dL    Albumin 3.7 3.5 - 5.0 g/dL    Globulin 4.6 (H) 2.0 - 4.0 g/dL    A-G Ratio 0.8 (L) 1.1 - 2.2     TROPONIN-HIGH SENSITIVITY   Result Value Ref Range    Troponin-High Sensitivity <4 0 - 51 ng/L   ACETAMINOPHEN   Result Value Ref Range    Acetaminophen level <2 (L) 10 - 30 ug/mL   SALICYLATE   Result Value Ref Range    Salicylate level <5.3 (L) 2.8 - 20.0 MG/DL   DRUG SCREEN, URINE   Result Value Ref Range    AMPHETAMINES Negative NEG      BARBITURATES Negative NEG      BENZODIAZEPINES Negative NEG      COCAINE Negative NEG      METHADONE Negative NEG      OPIATES Negative NEG      PCP(PHENCYCLIDINE) Negative NEG      THC (TH-CANNABINOL) Positive (A) NEG      Drug screen comment (NOTE)    URINALYSIS W/ REFLEX CULTURE    Specimen: Urine   Result Value Ref Range    Color YELLOW/STRAW      Appearance CLEAR CLEAR      Specific gravity 1.012 1.003 - 1.030      pH (UA) 7.5 5.0 - 8.0      Protein Negative NEG mg/dL    Glucose Negative NEG mg/dL    Ketone Negative NEG mg/dL    Bilirubin Negative NEG      Blood Negative NEG      Urobilinogen 0.2 0.2 - 1.0 EU/dL    Nitrites Negative NEG      Leukocyte Esterase Negative NEG      UA:UC IF INDICATED CULTURE NOT INDICATED BY UA RESULT CNI      WBC 0-4 0 - 4 /hpf    RBC 0-5 0 - 5 /hpf    Epithelial cells FEW FEW /lpf    Bacteria Negative NEG /hpf    Hyaline cast 0-2 0 - 5 /lpf   SAMPLES BEING HELD   Result Value Ref Range    SAMPLES BEING HELD 1blu     COMMENT        Add-on orders for these samples will be processed based on acceptable specimen integrity and analyte stability, which may vary by analyte. ETHYL ALCOHOL   Result Value Ref Range    ALCOHOL(ETHYL),SERUM <10 <10 MG/DL   TSH 3RD GENERATION   Result Value Ref Range    TSH 0.29 (L) 0.36 - 3.74 uIU/mL   EKG, 12 LEAD, INITIAL   Result Value Ref Range    Ventricular Rate 62 BPM    Atrial Rate 62 BPM    P-R Interval 166 ms    QRS Duration 70 ms    Q-T Interval 430 ms    QTC Calculation (Bezet) 436 ms    Calculated P Axis 41 degrees    Calculated R Axis 11 degrees    Calculated T Axis 25 degrees    Diagnosis       Normal sinus rhythm  Normal ECG  When compared with ECG of 11-JUL-2022 19:41,  No significant change was found  Confirmed by Roxie Rodriguez MD ([id number]) on 10/31/2022 10:14:54 AM         Immunizations administered during this encounter:   Immunization History   Administered Date(s) Administered    COVID-19, J&J, (age 18y+), IM, 0.5mL 05/02/2021    Influenza, FLUARIX, FLULAVAL, FLUZONE (age 10 mo+) AND AFLURIA, (age 1 y+), PF, 0.5mL 10/14/2019, 10/19/2020, 10/06/2021, 10/10/2022    Tdap 10/14/2019       Screening for Metabolic Disorders for Patients on Antipsychotic Medications  (Data obtained from the EMR)    Estimated Body Mass Index  Estimated body mass index is 29.65 kg/m² as calculated from the following:    Height as of this encounter: 5' 8\" (1.727 m). Weight as of this encounter: 88.5 kg (195 lb).      Vital Signs/Blood Pressure  Visit Vitals  BP (P) 134/82 (BP Patient Position: Sitting)   Pulse (P) 83   Temp (P) 98.2 °F (36.8 °C)   Resp (P) 16   Ht 5' 8\" (1.727 m)   Wt 88.5 kg (195 lb)   SpO2 (P) 94%   Breastfeeding No   BMI 29.65 kg/m²       Blood Glucose/Hemoglobin A1c  Lab Results   Component Value Date/Time    Glucose 100 10/30/2022 03:33 PM    Glucose (POC) 149 (H) 11/14/2019 12:55 PM       Lab Results   Component Value Date/Time    Hemoglobin A1c 5.9 (H) 10/27/2022 12:45 PM        Lipid Panel  Lab Results Component Value Date/Time    Cholesterol, total 200 (H) 10/27/2022 12:45 PM    HDL Cholesterol 50 10/27/2022 12:45 PM    LDL, calculated 129.2 (H) 10/27/2022 12:45 PM    Triglyceride 104 10/27/2022 12:45 PM    CHOL/HDL Ratio 4.0 10/27/2022 12:45 PM        Discharge Diagnosis: Unspecified mood disorder. Unspecified anxiety disorder. Cannabis use disorder. Discharge Plan: The patient Susanna Field exhibits the ability to control behavior in a less restrictive environment. Patient's level of functioning is improving. No assaultive/destructive behavior has been observed for the past 24 hours. No suicidal/homicidal threat or behavior has been observed for the past 24 hours. There is no evidence of serious medication side effects. Patient has not been in physical or protective restraints for at least the past 24 hours. If weapons involved, how are they secured? None    Is patient aware of and in agreement with discharge plan? Yes    Arrangements for medication:  Prescriptions given to patient, given a 30 day supply. Copy of discharge instructions to provider?: Yes    Arrangements for transportation home: Family    Keep all follow up appointments as scheduled, continue to take prescribed medications per physician instructions. Mental health crisis number:  376 or your local mental health crisis line number at [address] [place] [address] at [phone]    Discharge Medication List and Instructions:   Current Discharge Medication List        START taking these medications    Details   azelastine (ASTELIN) 137 mcg (0.1 %) nasal spray 1 [place] by Both Nostrils route two (2) times a day. Use in each nostril as directed  Indications: seasonal runny nose  Qty: 1 Each, Refills: 0  Start date: 11/2/2022      busPIRone (BUSPAR) 10 mg tablet Take 1 Tablet by mouth three (3) times daily for 30 days.  Indications: repeated episodes of anxiety  Qty: 90 Tablet, Refills: 0  Start date: 11/2/2022, End date: 12/2/2022 traZODone (DESYREL) 50 mg tablet Take 1 Tablet by mouth nightly as needed for Sleep (For insomnia) for up to 30 days. Indications: insomnia associated with depression  Qty: 30 Tablet, Refills: 0  Start date: 11/2/2022, End date: 12/2/2022           CONTINUE these medications which have CHANGED    Details   DULoxetine (CYMBALTA) 30 mg capsule Take 3 Capsules by mouth daily for 30 days. Indications: anxiousness associated with depression  Qty: 90 Capsule, Refills: 0  Start date: 11/3/2022, End date: 12/3/2022      gabapentin (NEURONTIN) 100 mg capsule Take 1 Capsule by mouth three (3) times daily for 30 days. Max Daily Amount: 300 mg. Indications: anxiety  Qty: 90 Capsule, Refills: 0  Start date: 11/2/2022, End date: 12/2/2022    Associated Diagnoses: Generalized anxiety disorder      lidocaine (LIDODERM) 5 % 1 Patch by TransDERmal route daily as needed for Pain for up to 30 days. Apply patch to the affected area for 12 hours a day and remove for 12 hours a day. 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 Indications: high blood pressure  Qty: 60 Tablet, Refills: 0  Start date: 11/2/2022, End date: 12/2/2022      pantoprazole (PROTONIX) 40 mg tablet Take 1 Tablet by mouth two (2) times a day. Indications: gastroesophageal reflux disease  Qty: 60 Tablet, Refills: 0  Start date: 11/2/2022           CONTINUE these medications which have NOT CHANGED    Details   albuterol (PROVENTIL HFA, VENTOLIN HFA, PROAIR HFA) 90 mcg/actuation inhaler Take 2 Puffs by inhalation every six (6) hours as needed for Wheezing or Shortness of Breath.            STOP taking these medications       hyoscyamine SR (Levbid) 0.375 mg SR tablet Comments:   Reason for Stopping:         ALPRAZolam (XANAX) 0.25 mg tablet Comments:   Reason for Stopping:         atenoloL (TENORMIN) 25 mg tablet Comments:   Reason for Stopping:         losartan (COZAAR) 100 mg tablet Comments:   Reason for Stopping:         metoprolol ta-hydrochlorothiaz (LOPRESSOR HCT) 50-25 mg per tablet Comments:   Reason for Stopping:         fluticasone propionate (FLONASE) 50 mcg/actuation nasal spray Comments:   Reason for Stopping:               Unresulted Labs (24h ago, onward)      None          To obtain results of studies pending at discharge, please contact [phone]    Follow-up Information       Follow up With Specialties Details Why Contact Info    Kvng Saba MD Family Medicine, Sports Medicine Physician   [address] [facility] [address]  [address] [facility], [facility]  Go on 11/7/2022 2PM counseling appointmen, please continue for therapy services [address]  #Angie Wong, [address] [facility] [address]  P: [address] [facility], HELEN Patton  Go on 11/15/2022 3PM with HELEN Patton for medication management appointment [address] [place], [facility] [address]   P: [facility]  Call As needed for community referral assistance, medication assistance, or case management services. [address] [facility]  [place], [address] [facility]  [phone]            Advanced Directive:   Does the patient have an appointed surrogate decision maker? No  Does the patient have a Medical Advance Directive? No  Does the patient have a Psychiatric Advance Directive? No  If the patient does not have a surrogate or Medical Advance Directive AND Psychiatric Advance Directive, the patient was offered information on these advance directives Yes and Patient declined to complete    Patient Instructions: Please continue all medications until otherwise directed by physician. Tobacco Cessation Discharge Plan:   Is the patient a smoker and needs referral for smoking cessation?  No  Patient referred to the following for smoking cessation with an appointment? Not applicable     Patient was offered medication to assist with smoking cessation at discharge? Not applicable  Was education for smoking cessation added to the discharge instructions? Yes    Alcohol/Substance Abuse Discharge Plan:   Does the patient have a history of substance/alcohol abuse and requires a referral for treatment? No  Patient referred to the following for substance/alcohol abuse treatment with an appointment? Not applicable  Patient was offered medication to assist with alcohol cessation at discharge? Not applicable  Was education for substance/alcohol abuse added to discharge instructions? Not applicable    Patient discharged to Home; discussed with patient/caregiver and provided to the patient/caregiver either in hard copy or electronically.

## 2022-11-02 NOTE — PROGRESS NOTES
Problem: Depressed Mood (Adult/Pediatric)  Goal: *STG: Participates in treatment plan  Outcome: Progressing Towards Goal  Goal: *STG: Verbalizes anger, guilt, and other feelings in a constructive manor  Outcome: Progressing Towards Goal  Goal: *STG: Attends activities and groups  Outcome: Progressing Towards Goal     Patient awake and alert and present in the milieu. Interacts well with staff and peers. Med and meal compliant. Will continue to monitor q15 minutes for safety checks.

## 2022-11-02 NOTE — DISCHARGE SUMMARY
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 vessel occlusion, arterial dissection, or flow-limiting stenosis. 2.  No perfusion abnormality. XR CHEST PORT    Result Date: 10/30/2022  INDICATION:  Chest pain Exam: Portable chest 1457. Comparison: 7/11/2022. Findings: Cardiomediastinal silhouette is within normal limits. Pulmonary vasculature is not engorged. There are no focal parenchymal opacities, effusions, or pneumothorax. 1. No acute disease      CTA HEAD NECK W CONT    Result Date: 10/31/2022  PRELIMINARY REPORT No large vessel occlusion. No significant perfusion abnormality. Preliminary report was provided by Dr. Francis Liu, the on-call radiologist, at 23:34 Final report to follow. END PRELIMINARY REPORTFINAL REPORT: INDICATION: repetitive speech EXAMINATION:  CT ANGIOGRAPHY HEAD AND NECK COMPARISON: CT head earlier the same day TECHNIQUE:  Following the uneventful administration of  intravenous contrast, axial CT angiography of the head and neck was performed. 3D image postprocessing was performed. CT dose reduction was achieved through use of a standardized protocol tailored for this examination and automatic exposure control for dose modulation.  Cerebral perfusion analysis using computed tomography with contrast administration, including post processing of parametric maps with the termination of cerebral blood flow, cerebral blood volume, and mean transit time. This study was analyzed by the [address] narendra algorithm. FINDINGS: CTA NECK Aortic Arch: Patent. Right Common Carotid Artery: Patent. Right Internal Carotid Artery: Patent. NASCET Right: 0-25%. Left Common Carotid Artery: Patent. Left Internal Carotid Artery: Patent. NASCET Left: 0-25%. Carotid stenosis determined using NASCET criteria. Right Vertebral Artery: Patent. Left Vertebral Artery: Patent. Cervical Soft Tissues: No significant abnormality. Lung Apices: No significant abnormality. Bones: No destructive bone lesion. Additional Comments: N/A. CTA HEAD Posterior Circulation: No flow limiting stenosis or occlusion. Anterior Circulation: No flow limiting stenosis or occlusion. Additional Comments: No evidence of aneurysm or vascular malformation. CT PERFUSION: There are no regional areas of elevated Tmax, decreased cerebral blood flow or blood volume. RCBF < 30% = 0 cc. Tmax > 6 seconds = 0 cc. Mismatch volume = 0 cc. 1.  No acute process. No large vessel occlusion, arterial dissection, or flow-limiting stenosis. 2.  No perfusion abnormality. Mental Status Exam on Discharge:  General appearance:   Rayo Paulson is a 63 y.o. / female who is well groomed, psychomotor activity is WNL  Eye contact: makes good eye contact  Speech: Spontaneous and coherent  Affect : Euthymic  Mood: \"OK\"  Thought Process: Logical, goal directed  Perception: Denies any AH or VH. Thought Content: Denies any SI or Plan  Insight: Partial  Judgement: Fair  Cognition: Intact grossly. Discharge Diagnosis:  Unspecified mood disorder. Unspecified anxiety disorder. Cannabis use disorder.       Current Discharge Medication List        START taking these medications    Details   azelastine (ASTELIN) 137 mcg (0.1 %) nasal spray 1 Spray by Both Nostrils route two (2) times a day. Use in each nostril as directed  Indications: seasonal runny nose  Qty: 1 Each, Refills: 0  Start date: 11/2/2022      busPIRone (BUSPAR) 10 mg tablet Take 1 Tablet by mouth three (3) times daily for 30 days. Indications: repeated episodes of anxiety  Qty: 90 Tablet, Refills: 0  Start date: 11/2/2022, End date: 12/2/2022      traZODone (DESYREL) 50 mg tablet Take 1 Tablet by mouth nightly as needed for Sleep (For insomnia) for up to 30 days. Indications: insomnia associated with depression  Qty: 30 Tablet, Refills: 0  Start date: 11/2/2022, End date: 12/2/2022           CONTINUE these medications which have CHANGED    Details   DULoxetine (CYMBALTA) 30 mg capsule Take 3 Capsules by mouth daily for 30 days. Indications: anxiousness associated with depression  Qty: 90 Capsule, Refills: 0  Start date: 11/3/2022, End date: 12/3/2022      gabapentin (NEURONTIN) 100 mg capsule Take 1 Capsule by mouth three (3) times daily for 30 days. Max Daily Amount: 300 mg. Indications: anxiety  Qty: 90 Capsule, Refills: 0  Start date: 11/2/2022, End date: 12/2/2022    Associated Diagnoses: Generalized anxiety disorder      lidocaine (LIDODERM) 5 % 1 Patch by TransDERmal route daily as needed for Pain for up to 30 days. Apply patch to the affected area for 12 hours a day and remove for 12 hours a day. 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 Indications: high blood pressure  Qty: 60 Tablet, Refills: 0  Start date: 11/2/2022, End date: 12/2/2022      pantoprazole (PROTONIX) 40 mg tablet Take 1 Tablet by mouth two (2) times a day.  Indications: gastroesophageal reflux disease  Qty: 60 Tablet, Refills: 0  Start date: 11/2/2022           CONTINUE these medications which have NOT CHANGED    Details   albuterol (PROVENTIL HFA, VENTOLIN HFA, PROAIR HFA) 90 mcg/actuation inhaler Take 2 Puffs by inhalation every six (6) hours as needed for Wheezing or Shortness of Breath. STOP taking these medications       hyoscyamine SR (Levbid) 0.375 mg SR tablet Comments:   Reason for Stopping:         ALPRAZolam (XANAX) 0.25 mg tablet Comments:   Reason for Stopping:         atenoloL (TENORMIN) 25 mg tablet Comments:   Reason for Stopping:         losartan (COZAAR) 100 mg tablet Comments:   Reason for Stopping:         metoprolol ta-hydrochlorothiaz (LOPRESSOR HCT) 50-25 mg per tablet Comments:   Reason for Stopping:         fluticasone propionate (FLONASE) 50 mcg/actuation nasal spray Comments:   Reason for Stopping: Follow-up Information       Follow up With Specialties Details Why [address], MD Family Medicine, Sports Medicine Physician   [address] [place] [address]  [phone]            WOUND CARE: none needed. Prognosis:   Good / [facility] based on nature of patient's pathology/ies and treatment compliance issues. Prognosis is greatly dependent upon patient's ability to  follow up on psychiatric/psychotherapy appointments as well as to comply with psychiatric medications as prescribed. I certify that this patients inpatient psychiatric hospital services furnished since the previous certification were, and continue to be, required for treatment that could reasonably be expected to improve the patient's condition, or for diagnostic study, and that the patient continues to need, on a daily basis, active treatment furnished directly by or requiring the supervision of inpatient psychiatric facility personnel. In addition, the hospital records show that services furnished were intensive treatment services, admission or related services, or equivalent services.      Signed:  Natalie Santana NP  11/2/2022

## 2022-11-02 NOTE — INTERDISCIPLINARY ROUNDS
Behavioral Health Interdisciplinary Rounds     Patient Name: Bashir Motta  Age: 63 y.o. Room/Bed:  4/  Primary Diagnosis: <principal problem not specified>   Admission Status: Voluntary     Readmission within 30 days: no  Power of  in place: no  Patient requires a blocked bed: no          Reason for blocked bed:       Flu Vaccine : no   Consults:          Labs/Testing due today? Have they refused labs?: no    Sleep hours:  7+      Participation in Care/Groups:  yes  Medication Compliant?: Yes  PRNS (last 24 hours): Pain    Restraints (last 24 hours):  no     CIWA (range last 24 hours):     COWS (range last 24 hours):      Alcohol screening (AUDIT) completed -         If applicable, date SBIRT discussed in treatment team AND documented:   AUDIT Screen Score:        Document Brief Intervention (corresponds directly with the 5 A's, Ask, Advise, Assess, Assist, and Arrange): At- Risk Patients (Score 7-15 for women; 8-15 for men)  Discuss concern patient is drinking at unhealthy levels known to increase risk of alcohol-related health problems. Is Patient ready to commit to change? If No:  Encourage reflection  Discuss short term and long term health risks of consuming alcohol  Barriers to change  Reaffirm willingness to help / Educational materials provided  If Yes:  Set goal  Plan  Educational materials provided    Harmful use or Dependence (Score 16 or greater)  Discuss short term and long term health risks of consuming alcohol  Recommendations  Negotiate drinking goal  Recommend addiction specialist/center  Arrange follow-up appointments.     Tobacco - patient is a smoker: Have You Used Tobacco in the Past 30 Days: Yes  Illegal Drugs use: Have You Used Any Illegal Substances Over the Past 12 Months: Yes    24 hour chart check complete: no   ____________________________________________________________________________________________________________    Patient goal(s) for today: Rest, communicate needs to staff, contact support system, take medications as scheduled  Treatment team focus/goals: Discuss disposition planning  Progress note: Pt met with treatment team and appeared with a pleasant and bright mood and affect. Pt reports feeling that the increase of Cymbalta and Buspar have improved anxiety. Pt is no longer presenting with stammering speech or with word repetition. Pt denies SI/HI and [facility] [place] and states anxiety is diminished. Pt states family is supportive, family to  this afternoon after medications are filled. SW confirming upcoming appointments with [facility] end location.      LOS:  2                       Expected LOS: 5-7     Financial concerns/prescription coverage: [facility] MEDICAID - [address] [facility] [place]  Family contact: Lexx Horne, girlfriend, [phone], Se Cohen, daughter, [phone], Ant Sosa, daughter, [phone], JAMIE signed for all                              Family requesting physician contact today:  No  Discharge plan: Return home once medications are stable  Access to weapons : None  Outpatient provider(s): Ethel  Patient's preferred phone number for follow up call : [phone]   Patient's preferred e-mail address : N/A     Participating treatment team members: Astrid Sewell, PRESLEY, Ibis Roa RN, Liset Ojeda NP

## 2022-12-05 ENCOUNTER — VIRTUAL VISIT (OUTPATIENT)
Dept: INTERNAL MEDICINE CLINIC | Age: 62
End: 2022-12-05
Payer: MEDICAID

## 2022-12-05 DIAGNOSIS — R13.10 DYSPHAGIA, UNSPECIFIED TYPE: ICD-10-CM
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
## 2022-12-05 DIAGNOSIS — R05.9 COUGH, UNSPECIFIED TYPE: ICD-10-CM

## 2022-12-05 PROCEDURE — 99214 OFFICE O/P EST MOD 30 MIN: CPT | Performed by: FAMILY MEDICINE
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
## 2023-01-07 ENCOUNTER — HOSPITAL ENCOUNTER (EMERGENCY)
Facility: HOSPITAL | Age: 63
Discharge: HOME | End: 2023-01-07
Attending: EMERGENCY MEDICINE

## 2023-01-07 ENCOUNTER — APPOINTMENT (EMERGENCY)
Dept: RADIOLOGY | Facility: HOSPITAL | Age: 63
End: 2023-01-07

## 2023-01-07 VITALS
SYSTOLIC BLOOD PRESSURE: 142 MMHG | OXYGEN SATURATION: 99 % | RESPIRATION RATE: 16 BRPM | DIASTOLIC BLOOD PRESSURE: 75 MMHG | HEART RATE: 69 BPM | BODY MASS INDEX: 26.22 KG/M2 | WEIGHT: 173 LBS | TEMPERATURE: 96.9 F | HEIGHT: 68 IN

## 2023-01-07 DIAGNOSIS — J40 BRONCHITIS: ICD-10-CM

## 2023-01-07 DIAGNOSIS — R07.9 CHEST PAIN: ICD-10-CM

## 2023-01-07 DIAGNOSIS — F32.A DEPRESSION: Primary | ICD-10-CM

## 2023-01-07 DIAGNOSIS — F19.939 MEDICATION WITHDRAWAL (HCC): ICD-10-CM

## 2023-01-07 LAB
2HR DELTA HS TROPONIN: 0 NG/L
ANION GAP SERPL CALCULATED.3IONS-SCNC: 7 MMOL/L (ref 4–13)
ATRIAL RATE: 56 BPM
BASOPHILS # BLD AUTO: 0.05 THOUSANDS/ÂΜL (ref 0–0.1)
BASOPHILS NFR BLD AUTO: 1 % (ref 0–1)
BUN SERPL-MCNC: 11 MG/DL (ref 5–25)
CALCIUM SERPL-MCNC: 8.5 MG/DL (ref 8.3–10.1)
CARDIAC TROPONIN I PNL SERPL HS: 2 NG/L
CARDIAC TROPONIN I PNL SERPL HS: 2 NG/L
CHLORIDE SERPL-SCNC: 105 MMOL/L (ref 96–108)
CO2 SERPL-SCNC: 29 MMOL/L (ref 21–32)
CREAT SERPL-MCNC: 0.64 MG/DL (ref 0.6–1.3)
EOSINOPHIL # BLD AUTO: 0.41 THOUSAND/ÂΜL (ref 0–0.61)
EOSINOPHIL NFR BLD AUTO: 5 % (ref 0–6)
ERYTHROCYTE [DISTWIDTH] IN BLOOD BY AUTOMATED COUNT: 13.5 % (ref 11.6–15.1)
FLUAV RNA RESP QL NAA+PROBE: NEGATIVE
FLUBV RNA RESP QL NAA+PROBE: NEGATIVE
GFR SERPL CREATININE-BSD FRML MDRD: 97 ML/MIN/1.73SQ M
GLUCOSE SERPL-MCNC: 99 MG/DL (ref 65–140)
HCT VFR BLD AUTO: 36.2 % (ref 34.8–46.1)
HGB BLD-MCNC: 11.5 G/DL (ref 11.5–15.4)
IMM GRANULOCYTES # BLD AUTO: 0.02 THOUSAND/UL (ref 0–0.2)
IMM GRANULOCYTES NFR BLD AUTO: 0 % (ref 0–2)
LYMPHOCYTES # BLD AUTO: 2.85 THOUSANDS/ÂΜL (ref 0.6–4.47)
LYMPHOCYTES NFR BLD AUTO: 35 % (ref 14–44)
MCH RBC QN AUTO: 30.3 PG (ref 26.8–34.3)
MCHC RBC AUTO-ENTMCNC: 31.8 G/DL (ref 31.4–37.4)
MCV RBC AUTO: 95 FL (ref 82–98)
MONOCYTES # BLD AUTO: 0.65 THOUSAND/ÂΜL (ref 0.17–1.22)
MONOCYTES NFR BLD AUTO: 8 % (ref 4–12)
NEUTROPHILS # BLD AUTO: 4.09 THOUSANDS/ÂΜL (ref 1.85–7.62)
NEUTS SEG NFR BLD AUTO: 51 % (ref 43–75)
NRBC BLD AUTO-RTO: 0 /100 WBCS
P AXIS: 36 DEGREES
PLATELET # BLD AUTO: 309 THOUSANDS/UL (ref 149–390)
PMV BLD AUTO: 9 FL (ref 8.9–12.7)
POTASSIUM SERPL-SCNC: 3.6 MMOL/L (ref 3.5–5.3)
PR INTERVAL: 172 MS
PROCALCITONIN SERPL-MCNC: <0.05 NG/ML
QRS AXIS: 16 DEGREES
QRSD INTERVAL: 72 MS
QT INTERVAL: 450 MS
QTC INTERVAL: 434 MS
RBC # BLD AUTO: 3.8 MILLION/UL (ref 3.81–5.12)
RSV RNA RESP QL NAA+PROBE: NEGATIVE
SARS-COV-2 RNA RESP QL NAA+PROBE: NEGATIVE
SODIUM SERPL-SCNC: 141 MMOL/L (ref 135–147)
T WAVE AXIS: 46 DEGREES
VENTRICULAR RATE: 56 BPM
WBC # BLD AUTO: 8.07 THOUSAND/UL (ref 4.31–10.16)

## 2023-01-07 RX ORDER — DULOXETIN HYDROCHLORIDE 30 MG/1
30 CAPSULE, DELAYED RELEASE ORAL DAILY
Qty: 14 CAPSULE | Refills: 0 | Status: SHIPPED | OUTPATIENT
Start: 2023-01-07 | End: 2023-01-21

## 2023-01-07 RX ORDER — ALBUTEROL SULFATE 90 UG/1
2 AEROSOL, METERED RESPIRATORY (INHALATION) EVERY 6 HOURS PRN
Qty: 8.5 G | Refills: 0 | Status: SHIPPED | OUTPATIENT
Start: 2023-01-07

## 2023-01-07 RX ORDER — DEXTROMETHORPHAN POLISTIREX 30 MG/5ML
10 SUSPENSION ORAL 2 TIMES DAILY
Qty: 148 ML | Refills: 0 | Status: SHIPPED | OUTPATIENT
Start: 2023-01-07

## 2023-01-07 RX ORDER — DIPHENHYDRAMINE HYDROCHLORIDE 50 MG/ML
25 INJECTION INTRAMUSCULAR; INTRAVENOUS ONCE
Status: COMPLETED | OUTPATIENT
Start: 2023-01-07 | End: 2023-01-07

## 2023-01-07 RX ORDER — METOCLOPRAMIDE HYDROCHLORIDE 5 MG/ML
10 INJECTION INTRAMUSCULAR; INTRAVENOUS ONCE
Status: COMPLETED | OUTPATIENT
Start: 2023-01-07 | End: 2023-01-07

## 2023-01-07 RX ORDER — BENZONATATE 100 MG/1
100 CAPSULE ORAL EVERY 8 HOURS
Qty: 21 CAPSULE | Refills: 0 | Status: SHIPPED | OUTPATIENT
Start: 2023-01-07

## 2023-01-07 RX ORDER — KETOROLAC TROMETHAMINE 30 MG/ML
15 INJECTION, SOLUTION INTRAMUSCULAR; INTRAVENOUS ONCE
Status: COMPLETED | OUTPATIENT
Start: 2023-01-07 | End: 2023-01-07

## 2023-01-07 RX ORDER — FLUTICASONE PROPIONATE 50 MCG
1 SPRAY, SUSPENSION (ML) NASAL DAILY
Qty: 16 G | Refills: 0 | Status: SHIPPED | OUTPATIENT
Start: 2023-01-07

## 2023-01-07 RX ORDER — DULOXETIN HYDROCHLORIDE 30 MG/1
30 CAPSULE, DELAYED RELEASE ORAL ONCE
Status: COMPLETED | OUTPATIENT
Start: 2023-01-07 | End: 2023-01-07

## 2023-01-07 RX ADMIN — DIPHENHYDRAMINE HYDROCHLORIDE 25 MG: 50 INJECTION, SOLUTION INTRAMUSCULAR; INTRAVENOUS at 07:40

## 2023-01-07 RX ADMIN — METOCLOPRAMIDE 10 MG: 5 INJECTION, SOLUTION INTRAMUSCULAR; INTRAVENOUS at 07:42

## 2023-01-07 RX ADMIN — KETOROLAC TROMETHAMINE 15 MG: 30 INJECTION, SOLUTION INTRAMUSCULAR at 07:38

## 2023-01-07 RX ADMIN — DULOXETINE HYDROCHLORIDE 30 MG: 30 CAPSULE, DELAYED RELEASE ORAL at 07:50

## 2023-01-07 NOTE — ED PROVIDER NOTES
Final Diagnosis:  1  Depression    2  Chest pain    3  Bronchitis    4  Medication withdrawal [facility])        Chief Complaint   Patient presents with   • Chest Pain     Pt arrives from home reporting intermittent chest pain, reports some SOB, as well as HA, neck pain  HPI  rufino presents with chest pains which radiates into neck and head, and also w/ intermittent chest pains  [place]  She's here visiting and hasn't had her antidepressant for > 3 days  She thinks she might be withdrawing  I agree  Symptoms seem to fit that  However, she is an every day tobacco user and has other risk factors  She also has a hx of afib s/p ablation  Will proceed r/o ACS as well  She's on APT  No anticoag  She feels her heart racin though it is sinus rhythm normal rate  Symptoms days in duration  Nonexertional  No cough fever congestion  No n/v  No neuro deficit  No diaphoresis  No illicits  - No language barrier    - History obtained from patient  - There are no limitations to the history obtained  - Previous charting underwent limited review with attention to last ED visits, labs, ekgs, and prior imaging  PMH:   has a past medical history of Diverticulitis, Elevated hemoglobin A1c, and Lumbar spinal stenosis  PSH:   has a past surgical history that includes Cardiac electrophysiology study and ablation  Social History:    Tobacco Use: High Risk   • Smoking Tobacco Use: Every Day   • Smokeless Tobacco Use: Never   • Passive Exposure: Not on file     Alcohol Use: Not on file     Patient with no illicit use    ROS:    Pertinent positives/negatives:   Review of Systems   Respiratory: Positive for shortness of breath  Cardiovascular: Positive for chest pain  Neurological: Positive for headaches  Psychiatric/Behavioral: The patient is nervous/anxious  CONSTITUTIONAL:  No dizziness  No weakness  No unexpected weight loss  EYES:  No pain, erythema, or discharge  No loss of vision    ENT:  No tinnitus, decreased hearing  No epistaxis/purulent drainage  No voice change, airway closing, trismus  CARDIOVASCULAR:  No chest pain  No palpitations  No new lower extremity edema  RESPIRATORY:  No purulent cough  No hemoptysis  No dyspnea  No paroxysmal nocturnal dyspnea  No stridor, audible wheezing bedside  GASTROINTESTINAL:  Normal appetite  No vomiting, diarrhea  No pain  No bloating  No melena  GENITOURINARY:  No frequency, urgency, nocturia  No hematuria or dysuria  No discharge  No sores/adenopathy  MUSCULOSKELETAL:  No arthralgias or myalgias that are new  INTEGUMENTARY:  No swelling  No unexpected contusions  No abrasions  No lymphangitis  NEUROLOGIC:  No meningismus  No numbness of the extremities  No new focal weakness  No postural instability  PSYCHIATRIC:  No SI HI AVH  HEMATOLOGICAL:  No bleeding  No petechiae  No bruising  ALLERGIES:  No urticaria  No sudden abd cramping  No stridor  PE:     Physical exam highlights:   Physical Exam       Vitals:    01/07/23 0510 01/07/23 0527 01/07/23 0748 01/07/23 0751   BP: 152/71   142/75   BP Location: Left arm   Left arm   Pulse: 65   69   Resp: 16   16   Temp:  (!) 96 9 °F (36 1 °C)     TempSrc:  Tympanic     SpO2: 99%   99%   Weight:   78 5 kg (173 lb)    Height:   5' 8" (1 727 m)      Vitals reviewed by me  Nursing note reviewed  Chaperone present for all sensitive exam   Const: No acute distress  Alert  Nontoxic  Not diaphoretic  HEENT: External ears normal  No protrusion drainage swelling  Nose normal  No drainage/traumatic deformity  MMM  Mouth with baseline/symmetric movement  No trismus  Eyes: No squinting  No icterus  Tracks through the room with normal EOM  No tearing/swelling/drainage  Neck: ROM normal  No rigidity  No meningismus  Cards: Rate as per vitals  Compared to monitor sinus unless documented above  Regular  Well perfused  Pulm: able to verbalize without additional effort  Effort and excursion normal  No disress   No audible wheezing/ stridor  Normal resp rate  Abd: No distension beyond baseline  No fluctuant wave  Patient without peritoneal pain with shifting/bumping the bed  MSK: ROM normal and baseline  No deformity  Skin: No new rashes visible  Well perfused  Neuro: Nonfocal  Baseline  CN grossly intact  Moving all four with coordination  Psych: Normal behavior and affect  A:  - Nursing note reviewed  Ddx and MDM  R/o ACS w/ delta trop  Patient urged to not sign out AMA  [email or web address] wishes to follow with her cards at [facility]    R/o electrolyte abnormality  Monitor while here r/o arrythmia recurrence, none    Likely withdrawal from snrI                          XR chest 1 view portable   Final Result      No acute cardiopulmonary disease                    Workstation performed: [id number]           Orders Placed This Encounter   Procedures   • COVID/FLU/RSV   • XR chest 1 view portable   • HS Troponin 0hr (reflex protocol)   • CBC and differential   • Basic metabolic panel   • Procalcitonin   • HS Troponin I 2hr   • Ambulatory Referral to Family Practice   • ECG 12 lead   • ECG 12 lead     Labs Reviewed   CBC AND DIFFERENTIAL - Abnormal       Result Value Ref Range Status    WBC 8 07  4 31 - 10 16 Thousand/uL Final    RBC 3 80 (*) 3 81 - 5 12 Million/uL Final    Hemoglobin 11 5  11 5 - 15 4 g/dL Final    Hematocrit 36 2  34 8 - 46 1 % Final    MCV 95  82 - 98 fL Final    MCH 30 3  26 8 - 34 3 pg Final    MCHC 31 8  31 4 - 37 4 g/dL Final    RDW 13 5  11 6 - 15 1 % Final    MPV 9 0  8 9 - 12 7 fL Final    Platelets 559  206 - 390 Thousands/uL Final    nRBC 0  /100 WBCs Final    Neutrophils Relative 51  43 - 75 % Final    Immat GRANS % 0  0 - 2 % Final    Lymphocytes Relative 35  14 - 44 % Final    Monocytes Relative 8  4 - 12 % Final    Eosinophils Relative 5  0 - 6 % Final    Basophils Relative 1  0 - 1 % Final    Neutrophils Absolute 4 09  1 85 - 7 62 Thousands/µL Final    Immature Grans Absolute 0 02  0 00 - 0 20 Thousand/uL Final    Lymphocytes Absolute 2 85  0 60 - 4 47 Thousands/µL Final    Monocytes Absolute 0 65  0 17 - 1 22 Thousand/µL Final    Eosinophils Absolute 0 41  0 00 - 0 61 Thousand/µL Final    Basophils Absolute 0 05  0 00 - 0 10 Thousands/µL Final   COVID19, INFLUENZA A/B, RSV PCR, [facility] - Normal    SARS-CoV-2 Negative  Negative Final    INFLUENZA A PCR Negative  Negative Final    INFLUENZA B PCR Negative  Negative Final    RSV PCR Negative  Negative Final    Narrative:     FOR PEDIATRIC PATIENTS - copy/paste COVID Guidelines URL to browser: [email or web address]  [email or web address]    SARS-CoV-2 assay is a Nucleic Acid Amplification assay intended for the  qualitative detection of nucleic acid from SARS-CoV-2 in nasopharyngeal  swabs  Results are for the presumptive identification of SARS-CoV-2 RNA  Positive results are indicative of infection with SARS-CoV-2, the virus  causing COVID-19, but do not rule out bacterial infection or co-infection  with other viruses  Laboratories within the [facility] and its  territories are required to report all positive results to the appropriate  public health authorities  Negative results do not preclude SARS-CoV-2  infection and should not be used as the sole basis for treatment or other  patient management decisions  Negative results must be combined with  clinical observations, patient history, and epidemiological information  This test has not been FDA cleared or approved  This test has been authorized by FDA under an Emergency Use Authorization  (EUA)  This test is only authorized for the duration of time the  declaration that circumstances exist justifying the authorization of the  emergency use of an in vitro diagnostic tests for detection of SARS-CoV-2  virus and/or diagnosis of COVID-19 infection under section 564(b)(1) of  the Act, 21 U  S C  317WSU-5(V)(2), unless the authorization is terminated  or revoked sooner  The test has been validated but independent review by FDA  and CLIA is pending  Test performed using [employer] GeneXpert: This RT-PCR assay targets N2,  a region unique to SARS-CoV-2  A conserved region in the E-gene was chosen  for pan-Sarbecovirus detection which includes SARS-CoV-2  According to CMS-2020-01-R, this platform meets the definition of high-throughput technology  HS TROPONIN I 0HR - Normal    hs TnI 0hr 2  "Refer to ACS Flowchart"- see link ng/L Final    Comment:                                              Initial (time 0) result  If >=50 ng/L, Myocardial injury suggested ;  Type of myocardial injury and treatment strategy  to be determined  If 5-49 ng/L, a delta result at 2 hours and or 4 hours will be needed to further evaluate  If <4 ng/L, and chest pain has been >3 hours since onset, patient may qualify for discharge based on the HEART score in the ED  If <5 ng/L and <3hours since onset of chest pain, a delta result at 2 hours will be needed to further evaluate  HS Troponin 99th Percentile URL of a Health Population=12 ng/L with a 95% Confidence Interval of 8-18 ng/L  Second Troponin (time 2 hours)  If calculated delta >= 20 ng/L,  Myocardial injury suggested ; Type of myocardial injury and treatment strategy to be determined  If 5-49 ng/L and the calculated delta is 5-19 ng/L, consult medical service for evaluation  Continue evaluation for ischemia on ecg and other possible etiology and repeat hs troponin at 4 hours  If delta is <5 ng/L at 2 hours, consider discharge based on risk stratification via the HEART score (if in ED), or KAYLA risk score in IP/Observation  HS Troponin 99th Percentile URL of a Health Population=12 ng/L with a 95% Confidence Interval of 8-18 ng/L     PROCALCITONIN TEST - Normal    Procalcitonin <0 05  <=0 25 ng/ml Final    Comment: Suspected Lower Respiratory Tract Infection (LRTI):  - LESS than or EQUAL to 0 25 ng/mL:   low likelihood for bacterial LRTI; antibiotics DISCOURAGED   - GREATER than 0 25 ng/mL:   increased likelihood for bacterial LRTI; antibiotics ENCOURAGED  Suspected Sepsis:  - Strongly consider initiating antibiotics in ALL UNSTABLE patients  - LESS than or EQUAL to 0 5 ng/mL:   low likelihood for bacterial sepsis; antibiotics DISCOURAGED   - GREATER than 0 5 ng/mL:   increased likelihood for bacterial sepsis; antibiotics ENCOURAGED   - GREATER than 2 ng/mL:   high risk for severe sepsis / septic shock; antibiotics strongly ENCOURAGED  Decisions on antibiotic use should not be based solely on Procalcitonin (PCT) levels  If PCT is low but uncertainty exists with stopping antibiotics, repeat PCT in 6-24 hours to confirm the low level  If antibiotics are administered (regardless if initial PCT was high or low), repeat PCT every 1-2 days to consider early antibiotic cessation (when GREATER than 80% decrease from the peak OR when PCT drops below designated cutoffs, whichever comes first), so long as the infection is NOT one that typically requires prolonged treatment durations (e g , bone/joint infections, endocarditis, Staph  aureus bacteremia)      Situations of FALSE-POSITIVE Procalcitonin values:  1) Newborns < 67 hours old  2) Massive stress from severe trauma / burns, major surgery, acute pancreatitis, cardiogenic / hemorrhagic shock, sickle cell crisis, or other organ perfusion abnormalities  3) Malaria and some Candidal infections  4) Treatment with agents that stimulate cytokines (e g , OKT3, anti-lymphocyte globulins, alemtuzumab, IL-2, granulocyte transfusion [NOT GCSFs])  5) Chronic renal disease causes elevated baseline levels (consider GREATER than 0 75 ng/mL as an abnormal cut-off); initiating HD/CRRT may cause transient decreases  6) Paraneoplastic syndromes from medullary thyroid or SCLC, some forms of vasculitis, and acute [id number] disease    Situations of FALSE-NEGATIVE Procalcitonin values:  1) Too early in clinical course for PCT to have reached its peak (may repeat in 6-24 hours to confirm low level)  2) Localized infection WITHOUT systemic (SIRS / sepsis) response (e g , an abscess, osteomyelitis, cystitis)  3) Mycobacteria (e g , Tuberculosis, MAC)  4) Cystic fibrosis exacerbations     HS TROPONIN I 2HR - Normal    hs TnI 2hr 2  "Refer to ACS Flowchart"- see link ng/L Final    Comment:                                              Initial (time 0) result  If >=50 ng/L, Myocardial injury suggested ;  Type of myocardial injury and treatment strategy  to be determined  If 5-49 ng/L, a delta result at 2 hours and or 4 hours will be needed to further evaluate  If <4 ng/L, and chest pain has been >3 hours since onset, patient may qualify for discharge based on the HEART score in the ED  If <5 ng/L and <3hours since onset of chest pain, a delta result at 2 hours will be needed to further evaluate  HS Troponin 99th Percentile URL of a Health Population=12 ng/L with a 95% Confidence Interval of 8-18 ng/L  Second Troponin (time 2 hours)  If calculated delta >= 20 ng/L,  Myocardial injury suggested ; Type of myocardial injury and treatment strategy to be determined  If 5-49 ng/L and the calculated delta is 5-19 ng/L, consult medical service for evaluation  Continue evaluation for ischemia on ecg and other possible etiology and repeat hs troponin at 4 hours  If delta is <5 ng/L at 2 hours, consider discharge based on risk stratification via the HEART score (if in ED), or KAYLA risk score in IP/Observation  HS Troponin 99th Percentile URL of a Health Population=12 ng/L with a 95% Confidence Interval of 8-18 ng/L      Delta 2hr hsTnI 0  <20 ng/L Final   BASIC METABOLIC PANEL    Sodium 667  135 - 147 mmol/L Final    Potassium 3 6  3 5 - 5 3 mmol/L Final    Chloride 105  96 - 108 mmol/L Final    CO2 29  21 - 32 mmol/L Final    ANION GAP 7  4 - 13 mmol/L Final    BUN 11  5 - 25 mg/dL Final    Creatinine 0 64  0 60 - 1 30 mg/dL Final    Comment: Standardized to IDMS reference method    Glucose 99  65 - 140 mg/dL Final    Comment: If the patient is fasting, the ADA then defines impaired fasting glucose as > 100 mg/dL and diabetes as > or equal to 123 mg/dL  Specimen collection should occur prior to Sulfasalazine administration due to the potential for falsely depressed results  Specimen collection should occur prior to Sulfapyridine administration due to the potential for falsely elevated results  Calcium 8 5  8 3 - 10 1 mg/dL Final    eGFR 97  ml/min/1 73sq m Final    Narrative:     [place] guidelines for Chronic Kidney Disease (CKD):   •  Stage 1 with normal or high GFR (GFR > 90 mL/min/1 73 square meters)  •  Stage 2 Mild CKD (GFR = 60-89 mL/min/1 73 square meters)  •  Stage 3A Moderate CKD (GFR = 45-59 mL/min/1 73 square meters)  •  Stage 3B Moderate CKD (GFR = 30-44 mL/min/1 73 square meters)  •  Stage 4 Severe CKD (GFR = 15-29 mL/min/1 73 square meters)  •  Stage 5 End Stage CKD (GFR <15 mL/min/1 73 square meters)  Note: GFR calculation is accurate only with a steady state creatinine       Final Diagnosis:  1  Depression    2  Chest pain    3  Bronchitis    4  [facility] ([facility] )        P:  - hospital tx includes   Medications   ketorolac (TORADOL) injection 15 mg (15 mg Intravenous Given 1/7/23 9676)   metoclopramide (REGLAN) injection 10 mg (10 mg Intravenous Given 1/7/23 7642)   diphenhydrAMINE (BENADRYL) injection 25 mg (25 mg Intravenous Given 1/7/23 3435)   DULoxetine (CYMBALTA) delayed release capsule 30 mg (30 mg Oral Given 1/7/23 0060)         - disposition  Time reflects when diagnosis was documented in both MDM as applicable and the Disposition within this note     Time User Action Codes Description Comment    1/7/2023  7:29 AM Tylene Allison Add [employer]  A] Depression     1/7/2023  7:29 AM Tylene Allison Add [R07 9] Chest pain     1/7/2023  7:33 AM Tylene Allison Add [J40] Bronchitis     1/7/2023  7:33 AM Carin Durán Add [S31 792] Medication withdrawal [facility])       ED Disposition     ED Disposition   Discharge    Condition   Stable    Date/Time   Sat Jan 7, 2023  7:33 AM    Comment   Yvonne Lundborg discharge to home/self care  Follow-up Information     Follow up With Specialties Details Why Contact Info Additional [address]   [address]  [place] [address] [address]  [address] [place] [address] [address] [facility]          - patient will call their PCP to let them know they were in the emergency department  We discuss return precautions       - additional tx intended, if consistent with primary provider:  - patient to follow with :      Discharge Medication List as of 1/7/2023  7:35 AM      START taking these medications    Details   albuterol (ProAir HFA) 90 mcg/act inhaler Inhale 2 puffs every 6 (six) hours as needed for wheezing, Starting Sat 1/7/2023, Normal      benzonatate (TESSALON PERLES) 100 mg capsule Take 1 capsule (100 mg total) by mouth every 8 (eight) hours, Starting Sat 1/7/2023, Normal      Dextromethorphan Polistirex ER 30 MG/5ML SUER Take 10 mL (60 mg total) by mouth 2 (two) times a day, Starting Sat 1/7/2023, Normal      DULoxetine (CYMBALTA) 30 mg delayed release capsule Take 1 capsule (30 mg total) by mouth daily for 14 days, Starting Sat 1/7/2023, Until Sat 1/21/2023, Print      fluticasone (FLONASE) 50 mcg/act nasal spray 1 spray into each nostril daily, Starting Sat 1/7/2023, Normal             None       Portions of the record may have been created with voice recognition software  Occasional wrong word or "sound a like" substitutions may have occurred due to the inherent limitations of voice recognition software   Read the chart carefully and recognize, using context, where substitutions have occurred      Electronically signed by:  MD Opal Blas MD  01/14/23 2600

## 2023-01-11 ENCOUNTER — TELEPHONE (OUTPATIENT)
Dept: INTERNAL MEDICINE CLINIC | Age: 63
End: 2023-01-11

## 2023-01-11 NOTE — TELEPHONE ENCOUNTER
Left message for patient to schedule mammogram ordered by Dr. Lily Soriano on 10/22. Requested patient to return call to panel manager at [phone]  to schedule mammogram or notify that mammogram   has been completed at an outside facility.

## 2023-02-01 ENCOUNTER — VIRTUAL VISIT (OUTPATIENT)
Dept: INTERNAL MEDICINE CLINIC | Age: 63
End: 2023-02-01
Payer: MEDICAID

## 2023-02-01 DIAGNOSIS — J40 BRONCHITIS: Primary | ICD-10-CM

## 2023-02-01 PROCEDURE — 99214 OFFICE O/P EST MOD 30 MIN: CPT | Performed by: FAMILY MEDICINE

## 2023-02-01 RX ORDER — AZITHROMYCIN 250 MG/1
TABLET, FILM COATED ORAL
Qty: 6 TABLET | Refills: 0 | Status: SHIPPED | OUTPATIENT
Start: 2023-02-01 | End: 2023-02-01 | Stop reason: SDUPTHER

## 2023-02-01 RX ORDER — AZITHROMYCIN 250 MG/1
TABLET, FILM COATED ORAL
Qty: 6 TABLET | Refills: 0 | Status: SHIPPED | OUTPATIENT
Start: 2023-02-01 | End: 2023-02-06

## 2023-02-01 NOTE — PATIENT INSTRUCTIONS
Viral Respiratory Infection: Care Instructions  Overview     A viral respiratory infection is an infection of the nose, sinuses, or throat caused by a virus. Colds and the flu are common types of viral respiratory infections. The symptoms of a viral respiratory infection often start quickly. They include a fever, sore throat, and runny nose. You may also just not feel well. Or you may not want to eat much. Most viral infections can be treated with home care. This may include drinking lots of fluids and taking over-the-counter pain medicine. You will probably feel better in 4 to 10 days. Antibiotics are not used to treat a viral infection. Antibiotics don't kill viruses, so they won't help cure a viral illness. In some cases, a doctor may prescribe antiviral medicine to help your body fight a serious viral infection. Follow-up care is a key part of your treatment and safety. Be sure to make and go to all appointments, and call your doctor if you are having problems. It's also a good idea to know your test results and keep a list of the medicines you take. How can you care for yourself at home? To prevent dehydration, drink plenty of fluids. Choose water and other clear liquids until you feel better. If you have kidney, heart, or liver disease and have to limit fluids, talk with your doctor before you increase the amount of fluids you drink. Ask your doctor if you can take an over-the-counter pain medicine, such as acetaminophen (Tylenol), ibuprofen (Advil, Motrin), or naproxen (Aleve). Be safe with medicines. Read and follow all instructions on the label. No one younger than 20 should take aspirin. It has been linked to Reye syndrome, a serious illness. Be careful when taking over-the-counter cold or flu medicines and Tylenol at the same time. Many of these medicines have acetaminophen, which is Tylenol. Read the labels to make sure that you are not taking more than the recommended dose.  Too much acetaminophen (Tylenol) can be harmful. Get plenty of rest.  Use saline (saltwater) nasal washes to help keep your nasal passages open and wash out mucus and allergens. You can buy saline nose sprays at a grocery store or drugstore. Follow the instructions on the package. Or you can make your own at home. Add 1 teaspoon of non-iodized salt and 1 teaspoon of baking soda to 2 cups of distilled or boiled and cooled water. Fill a squeeze bottle or neti pot with the nasal wash. Then put the tip into your nostril, and lean over the sink. With your mouth open, gently squirt the liquid. Repeat on the other side. Use a vaporizer or humidifier to add moisture to your bedroom. Follow the instructions for cleaning the machine. Do not smoke or allow others to smoke around you. If you need help quitting, talk to your doctor about stop-smoking programs and medicines. These can increase your chances of quitting for good. When should you call for help? Call 911 anytime you think you may need emergency care. For example, call if:    You have severe trouble breathing. Call your doctor now or seek immediate medical care if:    You have a new or higher fever. Your fever lasts more than 48 hours. You have trouble breathing. You have a fever with a stiff neck or a severe headache. You are sensitive to light. You feel very sleepy or confused. Watch closely for changes in your health, and be sure to contact your doctor if:    You do not get better as expected. Where can you learn more? Go to http://www.gray.com/  Enter Q795 in the search box to learn more about \"Viral Respiratory Infection: Care Instructions. \"  Current as of: February 9, 2022               Content Version: 13.4  © 1086-2005 [employer]. Care instructions adapted under license by [employer] (which disclaims liability or warranty for this information).  If you have questions about a medical condition or this instruction, always ask your healthcare professional. [place] [address] any warranty or liability for your use of this information.

## 2023-02-01 NOTE — PROGRESS NOTES
Leonor Lawson is a 63 y.o. female who was seen by synchronous (real-time) audio-video technology on 2/1/2023 for No chief complaint on file. Assessment & Plan:   Diagnoses and all orders for this visit:    1. Bronchitis    Other orders  -     azithromycin (ZITHROMAX) 250 mg tablet; Take 2 tablets today, then take 1 tablet daily    Adults: For nasal congestion, cough and cold/flu symptoms I advised:    - Seek medical care if symptoms become more severe or if you develop      chest pain, shortness of breath, confusion.    - Contact us if your symptoms fail to improve after 7-10 days   - Rest as much as possible and stay home from work/school at least 24 hours                  after last fever              - Wash hand frequently and cough/sneeze into your sleeve to help prevent       infection of others   - Drink plenty of fluids   - Ibuprofen (Advil, Motrin) 400-800mg every 6 hours or                  Aleve 220 mg 1-2 pills every 8 hours for fever, headache, pain   - Tylenol extra strength 500 mg every 6 hours for pain, headache, fever   - Nasal saline rinses 2-3 times daily for nasal congestion   - Mucinex 1200 mg twice daily or Guaifenesin 400 mg every 4 hours for chest       congestion              - Robitussin DM or Delsym for cough(suppress cough and thin mucus.  )   - Cepacol throat lozenges and saline gargles (1 tsp salt in 8 oz water) for sore       throat   - Tea with honey for cough (buckwheat honey preferred)              - Benadryl (diphenhydramine) 50 mg at night for nasal congestion/allergies   - Pseudoephedrine 12-hour tablets twice daily for nasal and inner ear       -Ask your pharmacist (this is kept behind the counter)     -If you have high blood pressure or heart disease, use this        medication with caution (ask your doctor), alternative coricidin   - Afrin (oxymetazoline) nasal spray 2 sprays in each nostril twice daily for severe      congestion.       -Do not use this medication for more than 3 days as it may cause         \"rebound congestion\". -If you have high blood pressure or heart disease, use this medication       with caution (ask your doctor)      Children:   Sit in bathroom with hot shower running for steam.    Nasal saline rinses and Neti pot  In general for viral respiratory infection -  Aim for drainage/increased airflow  Increase fluids - Pedialyte popsicles, Gatorade mixed with 50% water           Subjective:   Patient is a 63-year-old female seen today for 1 month history of night sweats and productive cough for 1 month. Patient was seen in the ER in early January in [place], she was given [employer] and diagnosed with acute bronchitis. After that she did not get any better. COVID testing was negative. No nausea and no vomiting . No fever. Over-the-counter remedies including yes   . Hx Asthma:  no  Smoker:  no  Contacts with similar infections: no   Recent travel:no       Prior to Admission medications    Medication Sig Start Date End Date Taking? Authorizing Provider   pantoprazole (PROTONIX) 40 mg tablet Take 1 Tablet by mouth two (2) times a day. Indications: gastroesophageal reflux disease 11/2/22   Zaid SIEGEL NP   azelastine (ASTELIN) 137 mcg (0.1 %) nasal spray 1 [place] by Both Nostrils route two (2) times a day. Use in each nostril as directed  Indications: seasonal runny nose 11/2/22   Enoch Polk NP   albuterol (PROVENTIL HFA, VENTOLIN HFA, PROAIR HFA) 90 mcg/actuation inhaler Take 2 Puffs by inhalation every six (6) hours as needed for Wheezing or Shortness of Breath.     Provider, Historical     Patient Active Problem List   Diagnosis Code    Elevated hemoglobin A1c R73.09    Spinal stenosis, lumbar M48.061    Acute diverticulitis K57.92    Generalized anxiety disorder F41.1     Patient Active Problem List    Diagnosis Date Noted    Generalized anxiety disorder 10/31/2022    Acute diverticulitis 03/23/2022    Spinal stenosis, lumbar 11/11/2019    Elevated hemoglobin A1c 11/05/2019     Current Outpatient Medications   Medication Sig Dispense Refill    pantoprazole (PROTONIX) 40 mg tablet Take 1 Tablet by mouth two (2) times a day. Indications: gastroesophageal reflux disease 60 Tablet 0    azelastine (ASTELIN) 137 mcg (0.1 %) nasal spray 1 [place] by Both Nostrils route two (2) times a day. Use in each nostril as directed  Indications: seasonal runny nose 1 Each 0    albuterol (PROVENTIL HFA, VENTOLIN HFA, PROAIR HFA) 90 mcg/actuation inhaler Take 2 Puffs by inhalation every six (6) hours as needed for Wheezing or Shortness of Breath. Allergies   Allergen Reactions    Cat Dander Runny Nose    Demerol [Meperidine] Nausea and Vomiting     Dizziness. Went to ER. Egg Yolk Swelling    Fentanyl Nausea and Vomiting     Dizziness.     Grass Pollen Runny Nose    House Dust Mite Not Reported This Time    Lactaid [Lactase] Other (comments)     Bloating/gas    Lactose Other (comments)     Bloating/gas    Mold Shortness of Breath    Prednisone Swelling    Prozac [Fluoxetine] Rash    Soy Nausea and Vomiting    Zoloft [Sertraline] Rash     Past Medical History:   Diagnosis Date    Adverse effect of anesthesia     \"so sleepy and tired for the rest of the day\"    Anxiety     Asthma     Chronic pain     back - L5 is \"almost gone\"/left foot neuropathy -pinched nerve L4-5 - spine shifted/degenerative spinal disease/stiff and sore neck and shoulder - in PT for back and neck    Degenerative spinal arthritis     Diabetes ([address]     PRE DIABETIC    Hypertension     IBS (irritable bowel syndrome)     Ill-defined condition     pre-diabetes    Nausea & vomiting     ? d/t fentanyl    PUD (peptic ulcer disease)     Spondylolisthesis      Past Surgical History:   Procedure Laterality Date    COLONOSCOPY N/A 4/25/2019    COLONOSCOPY performed by Virgen Berumen MD at [facility]  2006    HX DILATION AND CURETTAGE 1984    HX GI  04/2019    COLONOSCOPY    HX GI      ENDOSCOPY     Family History   Problem Relation Age of Onset    Delayed Awakening Mother         with anesthesia    Arthritis Mother     Other Mother         IBS/degenerative spinal disease    Anesth Problems Mother         N/V AND SLEEPY ALL DAY    COPD Father     Glaucoma Father     HIV/AIDS Sister     Heart Disease Brother         heart valve problem    Glaucoma Brother     Other Other     Heart Disease Sister         HEART VALVE DISESE      Social History     Tobacco Use    Smoking status: Former     Packs/day: 0.50     Years: 12.00     Pack years: 6.00     Types: Cigarettes     Quit date: 12/2012     Years since quitting: 10.1    Smokeless tobacco: Never    Tobacco comments:     quit 2012   Substance Use Topics    Alcohol use: Yes     Comment: 3-4 times a yr       ROS    Objective:     Patient-Reported Vitals 12/20/2021   Patient-Reported Weight 207   Patient-Reported Height 5'8\"   Patient-Reported Pulse 100   Patient-Reported Temperature 96.2   Patient-Reported SpO2 95   Patient-Reported Systolic  832   Patient-Reported Diastolic 78        [INSTRUCTIONS:  \"[x]\" Indicates a positive item  \"[]\" Indicates a negative item  -- DELETE ALL ITEMS NOT EXAMINED]    Constitutional: [x] Appears well-developed and well-nourished [x] No apparent distress      [] Abnormal -     Mental status: [x] Alert and awake  [x] Oriented to person/place/time [x] Able to follow commands    [] Abnormal -     Eyes:   EOM    [x]  Normal    [] Abnormal -   Sclera  [x]  Normal    [] Abnormal -          Discharge [x]  None visible   [] Abnormal -     HENT: [x] Normocephalic, atraumatic  [] Abnormal -   [x] Mouth/Throat: Mucous membranes are moist    External Ears [x] Normal  [] Abnormal -    Neck: [x] No visualized mass [] Abnormal -     Pulmonary/Chest: [x] Respiratory effort normal   [x] No visualized signs of difficulty breathing or respiratory distress        [] Abnormal - Musculoskeletal:   [x] Normal gait with no signs of ataxia         [x] Normal range of motion of neck        [] Abnormal -     Neurological:        [x] No Facial Asymmetry (Cranial nerve 7 motor function) (limited exam due to video visit)          [x] No gaze palsy        [] Abnormal -          Skin:        [x] No significant exanthematous lesions or discoloration noted on facial skin         [] Abnormal -            Psychiatric:       [x] Normal Affect [] Abnormal -        [x] No Hallucinations    Other pertinent observable physical exam findings:-        We discussed the expected course, resolution and complications of the diagnosis(es) in detail. Medication risks, benefits, costs, interactions, and alternatives were discussed as indicated. I advised her to contact the office if her condition worsens, changes or fails to improve as anticipated. She expressed understanding with the diagnosis(es) and plan. Mervyn Schaumann, was evaluated through a synchronous (real-time) audio-video encounter. The patient (or guardian if applicable) is aware that this is a billable service, which includes applicable co-pays. This Virtual Visit was conducted with patient's (and/or legal guardian's) consent. The visit was conducted pursuant to the emergency declaration under the [facility], [address] [place] [address] authority and the [email or web address] and [employer] General Act. Patient identification was verified, and a caregiver was present when appropriate. The patient was located at: Home: [place] [address]  The provider was located at: Facility (Appt Department): [facility].  Birdie Sanchez MD

## 2023-03-14 RX ORDER — DULOXETIN HYDROCHLORIDE 30 MG/1
CAPSULE, DELAYED RELEASE ORAL
COMMUNITY
Start: 2023-01-30

## 2023-03-14 RX ORDER — BUSPIRONE HYDROCHLORIDE 10 MG/1
10 TABLET ORAL 3 TIMES DAILY
Qty: 90 TABLET | Refills: 1 | Status: SHIPPED | OUTPATIENT
Start: 2023-03-14

## 2023-03-14 RX ORDER — TRAZODONE HYDROCHLORIDE 50 MG/1
TABLET ORAL
COMMUNITY
Start: 2023-01-30

## 2023-04-05 RX ORDER — DULOXETIN HYDROCHLORIDE 30 MG/1
30 CAPSULE, DELAYED RELEASE ORAL 2 TIMES DAILY
Qty: 60 CAPSULE | Refills: 0
Start: 2023-04-05

## 2023-04-17 NOTE — Clinical Note
Called patient to schedule an appointment for April 27th at 8 am.         Defibrillation and grounding pads were applied.

## 2023-06-07 RX ORDER — LIDOCAINE 50 MG/G
1 PATCH TOPICAL DAILY
Qty: 10 PATCH | Refills: 0 | Status: SHIPPED | OUTPATIENT
Start: 2023-06-07 | End: 2023-07-07

## 2023-06-07 RX ORDER — DULOXETIN HYDROCHLORIDE 30 MG/1
30 CAPSULE, DELAYED RELEASE ORAL 3 TIMES DAILY
Qty: 90 CAPSULE | Refills: 1 | Status: SHIPPED | OUTPATIENT
Start: 2023-06-07

## 2023-06-07 RX ORDER — BUSPIRONE HYDROCHLORIDE 10 MG/1
10 TABLET ORAL 3 TIMES DAILY
Qty: 30 TABLET | Refills: 0 | Status: SHIPPED | OUTPATIENT
Start: 2023-06-07 | End: 2023-07-07

## 2023-06-07 NOTE — TELEPHONE ENCOUNTER
This patient states you have been treating her with these meds until she get a psych doctor and she has an appt on the 13th with one.

## 2023-08-29 RX ORDER — LIDOCAINE 50 MG/G
1 PATCH TOPICAL DAILY
Qty: 10 PATCH | Refills: 0 | Status: SHIPPED | OUTPATIENT
Start: 2023-08-29 | End: 2023-08-29 | Stop reason: SDUPTHER

## 2023-08-29 RX ORDER — LIDOCAINE 50 MG/G
1 PATCH TOPICAL DAILY
Qty: 10 PATCH | Refills: 0 | Status: SHIPPED | OUTPATIENT
Start: 2023-08-29 | End: 2023-09-08

## 2023-09-01 ENCOUNTER — TELEMEDICINE (OUTPATIENT)
Facility: CLINIC | Age: 63
End: 2023-09-01
Payer: MEDICAID

## 2023-09-01 DIAGNOSIS — J40 BRONCHITIS: Primary | ICD-10-CM

## 2023-09-01 DIAGNOSIS — M25.50 MULTIPLE JOINT PAIN: ICD-10-CM

## 2023-09-01 PROCEDURE — 99214 OFFICE O/P EST MOD 30 MIN: CPT | Performed by: FAMILY MEDICINE

## 2023-09-01 RX ORDER — METHYLPREDNISOLONE 4 MG/1
TABLET ORAL
Qty: 1 KIT | Refills: 0 | Status: SHIPPED | OUTPATIENT
Start: 2023-09-01 | End: 2023-09-07

## 2023-09-01 SDOH — ECONOMIC STABILITY: TRANSPORTATION INSECURITY
IN THE PAST 12 MONTHS, HAS LACK OF TRANSPORTATION KEPT YOU FROM MEETINGS, WORK, OR FROM GETTING THINGS NEEDED FOR DAILY LIVING?: YES

## 2023-09-01 SDOH — ECONOMIC STABILITY: HOUSING INSECURITY
IN THE LAST 12 MONTHS, WAS THERE A TIME WHEN YOU DID NOT HAVE A STEADY PLACE TO SLEEP OR SLEPT IN A SHELTER (INCLUDING NOW)?: YES

## 2023-09-01 SDOH — ECONOMIC STABILITY: FOOD INSECURITY: WITHIN THE PAST 12 MONTHS, THE FOOD YOU BOUGHT JUST DIDN'T LAST AND YOU DIDN'T HAVE MONEY TO GET MORE.: OFTEN TRUE

## 2023-09-01 SDOH — ECONOMIC STABILITY: FOOD INSECURITY: WITHIN THE PAST 12 MONTHS, YOU WORRIED THAT YOUR FOOD WOULD RUN OUT BEFORE YOU GOT MONEY TO BUY MORE.: OFTEN TRUE

## 2023-09-01 SDOH — ECONOMIC STABILITY: INCOME INSECURITY: HOW HARD IS IT FOR YOU TO PAY FOR THE VERY BASICS LIKE FOOD, HOUSING, MEDICAL CARE, AND HEATING?: VERY HARD

## 2023-09-01 ASSESSMENT — PATIENT HEALTH QUESTIONNAIRE - PHQ9
SUM OF ALL RESPONSES TO PHQ QUESTIONS 1-9: 0
SUM OF ALL RESPONSES TO PHQ9 QUESTIONS 1 & 2: 0
2. FEELING DOWN, DEPRESSED OR HOPELESS: 0
SUM OF ALL RESPONSES TO PHQ QUESTIONS 1-9: 0
SUM OF ALL RESPONSES TO PHQ QUESTIONS 1-9: 0
1. LITTLE INTEREST OR PLEASURE IN DOING THINGS: 0
SUM OF ALL RESPONSES TO PHQ QUESTIONS 1-9: 0

## 2023-09-01 NOTE — PROGRESS NOTES
Manju Montero, was evaluated through a synchronous (real-time) audio-video encounter. The patient (or guardian if applicable) is aware that this is a billable service, which includes applicable co-pays. This Virtual Visit was conducted with patient's (and/or legal guardian's) consent. Patient identification was verified, and a caregiver was present when appropriate. The patient was located at Home: [address] [place] [address]  Provider was located at [facility] (Appt Dept): [address] [facility],  [address] [facility]      Manju Montero (:  1960) is a Established patient, presenting virtually for evaluation of the following:    Assessment & Plan   Below is the assessment and plan developed based on review of pertinent history, physical exam, labs, studies, and medications. 1. Bronchitis  We will start with prednisone, if no resolution by next week patient may request Z-Tony  Recommend testing for COVID and she will message back if positive  Return in about 8 weeks (around 10/30/2023). Subjective   Patient complains of congestion, nasal blockage, post nasal drip, and myalgias for 2 days. No fevers. no nausea and no vomiting . No fever. Over-the-counter remedies including none   . Hx Asthma:  yes  Smoker:  no  Contacts with similar infections: no   Recent travel:no   Covid Vaccine: Yes  Has not self tested for COVID      Review of Systems   All other systems reviewed and are negative.        Objective   Patient-Reported Vitals  No data recorded     Physical Exam  [INSTRUCTIONS:  \"[x]\" Indicates a positive item  \"[]\" Indicates a negative item  -- DELETE ALL ITEMS NOT EXAMINED]    Constitutional: [x] Appears well-developed and well-nourished [x] No apparent distress      [] Abnormal -     Mental status: [x] Alert and awake  [x] Oriented to person/place/time [x] Able to follow commands    [] Abnormal -     Eyes:   EOM    [x]  Normal    [] Abnormal -   Sclera  [x]  Normal    [] Abnormal

## 2023-09-08 RX ORDER — AZITHROMYCIN 250 MG/1
250 TABLET, FILM COATED ORAL SEE ADMIN INSTRUCTIONS
Qty: 6 TABLET | Refills: 0 | Status: SHIPPED | OUTPATIENT
Start: 2023-09-08 | End: 2023-09-13

## 2023-09-27 DIAGNOSIS — M25.50 MULTIPLE JOINT PAIN: Primary | ICD-10-CM

## 2023-09-27 RX ORDER — GABAPENTIN 100 MG/1
1 CAPSULE ORAL 3 TIMES DAILY
COMMUNITY
Start: 2023-02-28 | End: 2023-09-27 | Stop reason: SDUPTHER

## 2023-09-27 RX ORDER — GABAPENTIN 100 MG/1
100 CAPSULE ORAL 3 TIMES DAILY
Qty: 90 CAPSULE | Refills: 1 | Status: SHIPPED | OUTPATIENT
Start: 2023-09-27 | End: 2023-10-27

## 2023-09-27 RX ORDER — HYOSCYAMINE SULFATE EXTENDED-RELEASE 0.38 MG/1
TABLET ORAL
COMMUNITY
Start: 2023-07-10 | End: 2023-09-27 | Stop reason: SDUPTHER

## 2023-09-27 RX ORDER — HYOSCYAMINE SULFATE EXTENDED-RELEASE 0.38 MG/1
375 TABLET ORAL EVERY 12 HOURS PRN
Qty: 60 TABLET | Refills: 1 | Status: SHIPPED | OUTPATIENT
Start: 2023-09-27

## 2023-09-27 RX ORDER — LIDOCAINE 50 MG/G
1 PATCH TOPICAL DAILY
Qty: 30 PATCH | Refills: 1 | Status: SHIPPED | OUTPATIENT
Start: 2023-09-27 | End: 2023-10-27

## 2024-01-22 RX ORDER — HYOSCYAMINE SULFATE EXTENDED-RELEASE 0.38 MG/1
TABLET ORAL
Qty: 60 TABLET | Refills: 0 | Status: SHIPPED | OUTPATIENT
Start: 2024-01-22

## 2024-03-04 LAB — MAMMOGRAPHY, EXTERNAL: NORMAL

## 2024-06-04 NOTE — DISCHARGE SUMMARY
[facility]   [address] [id number] [address]  [phone]     Discharge Summary       PATIENT ID: Neena Kramer  MRN: [id number]   YOB: 1960    DATE OF ADMISSION: 11/11/2019 11:45 AM    DATE OF DISCHARGE: 11/15/2019   PRIMARY CARE PROVIDER: Linda Beltran MD     CONSULTATIONS: IP CONSULT TO GASTROENTEROLOGY    PROCEDURES/SURGERIES: Procedure(s):  LUMBAR LAMINECTOMY L4-S1 WITH L4-5 MIDLINE FUSION - MAZOR    History of Present Illness:  Neena Kramer is a 64 y.o. female with a history of  chronic history of back pain, bilateral leg pain, lumbar spondylolisthesis L4-5; lumbar spondylosis L5-S1, lumbar stenosis L4-5 and L5-S1. After failing conservative therapy and a discussion of the risks, benefits, alternatives, perioperative course, and potential complications of surgery, she consented to undergo a Procedure(s):  LUMBAR LAMINECTOMY L4-S1 WITH L4-5 MIDLINE FUSION - MAZOR. Hospital Course:  Neena Kramer tolerated the procedure well. She was transferred  to the recovery room in stable condition. After a brief stay the patient was then transferred to the Spinal Surgery Unit at [facility].  On postoperative day #1, the dressing was clean and dry, she was neurovascularly intact. The patient was afebrile and vital signs were stable. Calves were soft and non-tender bilaterally. The patient was tolerating a regular diet and making slow progress with physical therapy. She had a lumbar brace to mobilize out of bed. Her hemovac drain was removed on postoperative day #2. She had trouble with constipation and abdominal cramping postoperatively. She has a longstanding history of IBS. On postoperative day #3 she developed acute abdominal cramping and had a vasovagal episode. Rapid response was called. Patient's vitals were stable, oxygen saturations were normal. She denies chest pain.  Sinus rhythm was noted on monitor. States she has history of passing out when her abdominal cramping becomes this intense. An abdominal xray was negative for any acute process. She was given antispasmodic agents with improvement in her abdominal pain. GI was consulted at patient's request due to her longstanding IBS history with intractable cramping. They recommended librax as needed and continuing a stool softener for constipation. Hemoglobin prior to discharge were   Lab Results   Component Value Date/Time    HGB 9.5 (L) 11/14/2019 81:09 PM        Arpan Viveros made satisfactory progress with physical therapy and was discharged to Home with home health in stable condition on postoperative day 4. She was provided with routine postoperative instructions and advised to follow up with  Therman Skiff, MD in 2 weeks following discharge from the hospital. She was given instructions on weaning from opioids over the next several days. She should follow-up with Dr. Bud Alvarez with GI in 4 weeks for her IBS. FOLLOW UP APPOINTMENTS:    Follow-up Information     Follow up With Specialties Details Why Contact Info    [address] [place] [address]  home health PT and OT   [address] [place] [address]    Lala Humphrey MD [facility], Sports Medicine   [address] [place] [address]  [phone]      Andrew De Dios MD Gastroenterology Schedule an appointment as soon as possible for a visit in 1 month IBS [address] [place] [address]  [phone]               DISCHARGE MEDICATIONS:  Current Discharge Medication List      START taking these medications    Details   clindinium-chlordiazePOXIDE (LIBRAX) 5-2.5 mg per capsule Take 1 Cap by mouth two (2) times daily as needed (abdominal cramping) for up to 30 days. Max Daily Amount: 2 Caps.   Qty: 30 Cap, Refills: 0    Associated Diagnoses: Irritable bowel syndrome with both constipation and diarrhea naloxone (NARCAN) 4 mg/actuation nasal spray Use 1 spray intranasally, then discard. Repeat with new spray every 2 min as needed for opioid overdose symptoms, alternating nostrils. Qty: 1 Each, Refills: 0      HYDROmorphone (DILAUDID) 2 mg tablet Take 1 Tab by mouth every four (4) hours as needed for Pain for up to 7 days. Max Daily Amount: 12 mg.  Qty: 40 Tab, Refills: 0    Associated Diagnoses: Spinal stenosis of lumbar region, unspecified whether neurogenic claudication present      bisacodyl (DULCOLAX) 10 mg suppository Insert 10 mg into rectum daily as needed (constipation). Qty: 4 Suppository, Refills: 0      ondansetron (ZOFRAN ODT) 4 mg disintegrating tablet Take 1 Tab by mouth every eight (8) hours as needed for Nausea. Qty: 10 Tab, Refills: 0      chlorzoxazone (PARAFON FORTE) 500 mg tablet Take 1 Tab by mouth three (3) times daily as needed (muscle spasms). Indications: muscle spasm  Qty: 15 Tab, Refills: 0         CONTINUE these medications which have NOT CHANGED    Details   busPIRone (BUSPAR) 5 mg tablet Take 1 Tab by mouth daily as needed (Anxiety). Qty: 30 Tab, Refills: 2      losartan (COZAAR) 25 mg tablet Take 1 Tab by mouth daily. Qty: 30 Tab, Refills: 5      FIBER-CAPS, PSYLLIUM HUSK, PO Take 2 Caps by mouth daily (after breakfast). turmeric (CURCUMIN) Take  by mouth. [facility] triple turmeric. 766 mg of turmeric/100 mg curcumin per 2 caps. Takes one po once daily. omega-3/dha/epa/fish oil (OMEGA-3 FISH OIL PO) Take 1 Cap by mouth daily. MULTIVITAMIN PO Take  by mouth. With iron and D3. Takes one po once daily. loratadine (CLARITIN) 10 mg tablet Take 10 mg by mouth daily (after breakfast). raNITIdine (ZANTAC) 150 mg tablet Take 150 mg by mouth Daily (before breakfast). Bacillus coagulans (DIGESTIVE ADVANTAGE PO) Take  by mouth. Takes one po twice daily. albuterol (PROAIR HFA) 90 mcg/actuation inhaler Take 1-2 Puffs by inhalation as needed.          STOP taking these medications       hyoscyamine SL (LEVSIN/SL) 0.125 mg SL tablet Comments:   Reason for Stopping:               PHYSICAL EXAMINATION AT DISCHARGE:  General: Pleasant, alert, cooperative, no distress. Resp: Equal chest expansion. No accessory muscle use. Gastrointestinal:  Soft, non-tender, non-distended. Neurological: Follows commands. ARNETT. Speech clear. Sensation intact to light touch. Motor: unchanged  L2-S1. Skin:  Lumbar Incision - clean, dry and intact. No significant erythema or swelling.       CHRONIC MEDICAL DIAGNOSES:  Problem List as of 11/15/2019 Date Reviewed: 11/14/2019          Codes Class Noted - Resolved    Spinal stenosis, lumbar ICD-10-CM: M48.061  ICD-9-CM: 724.02  11/11/2019 - Present        Elevated hemoglobin A1c ICD-10-CM: R73.09  ICD-9-CM: 790.29  11/5/2019 - Present    Overview Signed 11/5/2019  3:02 PM by Jemima Melchor NP     6.1 11/4/19                   Signed:   Kristine Mattson NP  11/15/2019  1:22 PM No

## 2024-11-08 RX ORDER — HYOSCYAMINE SULFATE 0.38 MG/1
TABLET, EXTENDED RELEASE ORAL
Qty: 60 TABLET | Refills: 1 | OUTPATIENT
Start: 2024-11-08

## 2025-04-01 RX ORDER — LIDOCAINE 50 MG/G
1 PATCH TOPICAL DAILY
Qty: 30 PATCH | Refills: 1 | Status: SHIPPED | OUTPATIENT
Start: 2025-04-01

## (undated) DEVICE — PINNACLE INTRODUCER SHEATH: Brand: PINNACLE

## (undated) DEVICE — BONE WAX WHITE: Brand: BONE WAX WHITE

## (undated) DEVICE — SOLUTION IV 250ML 0.9% SOD CHL CLR INJ FLX BG CONT PRT CLSR

## (undated) DEVICE — INTRODUCER SHTH 8FR L63CM 8FR DIL GWIRE L145CM DIA0.038IN

## (undated) DEVICE — 1200 GUARD II KIT W/5MM TUBE W/O VAC TUBE: Brand: GUARDIAN

## (undated) DEVICE — BW-412T DISP COMBO CLEANING BRUSH: Brand: SINGLE USE COMBINATION CLEANING BRUSH

## (undated) DEVICE — SYSTEM CLOSURE 6-12 FR VEN VASC VASCADE MVP

## (undated) DEVICE — SUTURE VCRL SZ 3-0 L27IN ABSRB UD CP-2 L26MM 1/2 CIR REV J868H

## (undated) DEVICE — CABLE EP L150CM GRY BPLR QPLR FOR 4FR QPLR CATH SUPREME

## (undated) DEVICE — MEDI-TRACE CADENCE ADULT, DEFIBRILLATION ELECTRODE -RTS  (10 PR/PK) - PHYSIO-CONTROL: Brand: MEDI-TRACE CADENCE

## (undated) DEVICE — FORCEPS BX L240CM JAW DIA2.8MM L CAP W/ NDL MIC MESH TOOTH

## (undated) DEVICE — FLOSEAL HEMOSTATIC MATRIX, 10 ML: Brand: FLOSEAL

## (undated) DEVICE — PREP KIT PEEL PTCH POVIDONE IOD

## (undated) DEVICE — BITE BLK ENDOSCP AD 54FR GRN POLYETH ENDOSCP W STRP SLD

## (undated) DEVICE — GARMENT,MEDLINE,DVT,INT,CALF,MED, GEN2: Brand: MEDLINE

## (undated) DEVICE — DECANTER BAG 9": Brand: MEDLINE INDUSTRIES, INC.

## (undated) DEVICE — INFECTION CONTROL KIT SYS

## (undated) DEVICE — HEART CATH-MRMC: Brand: MEDLINE INDUSTRIES, INC.

## (undated) DEVICE — SUTURE VCRL 2-0 L27IN ABSRB UD CP-2 L26MM 1/2 CIR REV CUT J869H

## (undated) DEVICE — SUTURE ABSRB BRAID COAT UD OS-6 NO 1 27IN VCRL J535H

## (undated) DEVICE — SPONGE GZ W4XL4IN COT 12 PLY TYP VII WVN C FLD DSGN

## (undated) DEVICE — Device: Brand: MEDICAL ACTION INDUSTRIES

## (undated) DEVICE — CATH QUAD 6F 2/5/2 120CM JSN --

## (undated) DEVICE — NEEDLE HYPO 18GA L1.5IN PNK S STL HUB POLYPR SHLD REG BVL

## (undated) DEVICE — ENDO CARRY-ON PROCEDURE KIT INCLUDES ENZYMATIC SPONGE, GAUZE, BIOHAZARD LABEL, TRAY, LUBRICANT, DIRTY SCOPE LABEL, WATER LABEL, TRAY, DRAWSTRING PAD, AND DEFENDO 4-PIECE KIT.: Brand: ENDO CARRY-ON PROCEDURE KIT

## (undated) DEVICE — 4-PORT MANIFOLD: Brand: NEPTUNE 2

## (undated) DEVICE — REM POLYHESIVE ADULT PATIENT RETURN ELECTRODE: Brand: VALLEYLAB

## (undated) DEVICE — Device

## (undated) DEVICE — APPLICATOR MEDICATED 10.5 CC SOLUTION CLR STRL CHLORAPREP

## (undated) DEVICE — POSITIONER HD REST FOAM CMFRT TCH

## (undated) DEVICE — CONTAINER SPEC 20 ML LID NEUT BUFF FORMALIN 10 % POLYPR STS

## (undated) DEVICE — SET TBNG L260CM IRRIG RF THER COOL PNT

## (undated) DEVICE — 3M™ TEGADERM™ TRANSPARENT FILM DRESSING FRAME STYLE, 1626W, 4 IN X 4-3/4 IN (10 CM X 12 CM), 50/CT 4CT/CASE: Brand: 3M™ TEGADERM™

## (undated) DEVICE — HANDPIECE SET WITH BONE CLEANING TIP AND SUCTION TUBE: Brand: INTERPULSE

## (undated) DEVICE — BAG BELONG PT PERS CLEAR HANDL

## (undated) DEVICE — CANN NASAL O2 CAPNOGRAPHY AD -- FILTERLINE

## (undated) DEVICE — PREP SKN PREVAIL 40ML APPL --

## (undated) DEVICE — BIPOLAR IRRIGATOR INTEGRATED TUBING AND BIPOLAR CORD SET, DISPOSABLE

## (undated) DEVICE — SHEATH STEREOTACTIC SURG SYS RENAISSANCE ACCSRY

## (undated) DEVICE — SOLIDIFIER FLUID 3000 CC ABSORB

## (undated) DEVICE — SOLUTION IRRIG 3000ML 0.9% SOD CHL FLX CONT 0797208] ICU MEDICAL INC]

## (undated) DEVICE — STERILE POLYISOPRENE POWDER-FREE SURGICAL GLOVES WITH EMOLLIENT COATING: Brand: PROTEXIS

## (undated) DEVICE — TOOL 14BA50 LEGEND 14CM 5MM BA: Brand: MIDAS REX ™

## (undated) DEVICE — KIT ELECTRD SURF FOR DISPLAYING THE 3D POS OF EP CATH

## (undated) DEVICE — TOTAL TRAY, 16FR 10ML SIL FOLEY, URN: Brand: MEDLINE

## (undated) DEVICE — DRAPE C-ARMOUR C-ARM KIT --

## (undated) DEVICE — AIRLIFE™ U/CONNECT-IT OXYGEN TUBING 7 FEET (2.1 M) CRUSH-RESISTANT OXYGEN TUBING, VINYL TIPPED: Brand: AIRLIFE™

## (undated) DEVICE — CABLE EP L150CM RED HEXAPOLAR OCTAPOLAR DECAPOLAR EXTN CONN

## (undated) DEVICE — SET ADMIN 16ML TBNG L100IN 2 Y INJ SITE IV PIGGY BK DISP

## (undated) DEVICE — CATH IV AUTOGRD BC BLU 22GA 25 -- INSYTE

## (undated) DEVICE — BAG SPEC BIOHZD LF 2MIL 6X10IN -- CONVERT TO ITEM 357326

## (undated) DEVICE — Z DISCONTINUED USE 2751540 TUBING IRRIG L10IN DISP PMP ENDOGATOR

## (undated) DEVICE — NEONATAL-ADULT SPO2 SENSOR: Brand: NELLCOR

## (undated) DEVICE — Z DISCONTINUED NO SUB IDED SET EXTN W/ 4 W STPCOCK M SPIN LOK 36IN

## (undated) DEVICE — TUBING HYDR IRR --

## (undated) DEVICE — LAMINECTOMY RICHMOND-LF: Brand: MEDLINE INDUSTRIES, INC.

## (undated) DEVICE — KIT EVAC 0.13IN RECT TB DIA10FR 400CC PVC 3 SPR Y CONN DRN

## (undated) DEVICE — CATH QUAD 6F 2/5MM 120CM CRD

## (undated) DEVICE — CATH EP CRV 7F DUO 2/8 2M LG -- LIVEWIRE STRL

## (undated) DEVICE — SYRINGE MED 20ML STD CLR PLAS LUERLOCK TIP N CTRL DISP

## (undated) DEVICE — KENDALL RADIOLUCENT FOAM MONITORING ELECTRODE -RECTANGULAR SHAPE: Brand: KENDALL

## (undated) DEVICE — COVER,TABLE,HEAVY DUTY,60"X90",STRL: Brand: MEDLINE

## (undated) DEVICE — CABLE EXT EPS B/T/BLK 150CM --

## (undated) DEVICE — CABLE EXT EP H/O/D BLK 150CM --

## (undated) DEVICE — QUILTED PREMIUM COMFORT UNDERPAD,EXTRA HEAVY: Brand: WINGS

## (undated) DEVICE — CONNECTOR TBNG AUX H2O JET DISP FOR OLY 160/180 SER